# Patient Record
Sex: FEMALE | Race: WHITE | NOT HISPANIC OR LATINO | Employment: OTHER | URBAN - NONMETROPOLITAN AREA
[De-identification: names, ages, dates, MRNs, and addresses within clinical notes are randomized per-mention and may not be internally consistent; named-entity substitution may affect disease eponyms.]

---

## 2017-02-10 ENCOUNTER — APPOINTMENT (OUTPATIENT)
Dept: PREADMISSION TESTING | Facility: HOSPITAL | Age: 72
End: 2017-02-10

## 2017-02-10 VITALS
RESPIRATION RATE: 20 BRPM | OXYGEN SATURATION: 98 % | WEIGHT: 146 LBS | HEIGHT: 64 IN | HEART RATE: 76 BPM | BODY MASS INDEX: 24.92 KG/M2 | DIASTOLIC BLOOD PRESSURE: 80 MMHG | SYSTOLIC BLOOD PRESSURE: 138 MMHG

## 2017-02-10 LAB
ANION GAP SERPL CALCULATED.3IONS-SCNC: 14 MMOL/L (ref 4–13)
BUN BLD-MCNC: 26 MG/DL (ref 5–21)
BUN/CREAT SERPL: 16.7 (ref 7–25)
CALCIUM SPEC-SCNC: 8.9 MG/DL (ref 8.4–10.4)
CHLORIDE SERPL-SCNC: 111 MMOL/L (ref 98–110)
CO2 SERPL-SCNC: 17 MMOL/L (ref 24–31)
CREAT BLD-MCNC: 1.56 MG/DL (ref 0.5–1.4)
DEPRECATED RDW RBC AUTO: 47.3 FL (ref 40–54)
ERYTHROCYTE [DISTWIDTH] IN BLOOD BY AUTOMATED COUNT: 14.6 % (ref 12–15)
GFR SERPL CREATININE-BSD FRML MDRD: 33 ML/MIN/1.73
GLUCOSE BLD-MCNC: 98 MG/DL (ref 70–100)
HCT VFR BLD AUTO: 38.9 % (ref 37–47)
HGB BLD-MCNC: 12.7 G/DL (ref 12–16)
MCH RBC QN AUTO: 29 PG (ref 28–32)
MCHC RBC AUTO-ENTMCNC: 32.6 G/DL (ref 33–36)
MCV RBC AUTO: 88.8 FL (ref 82–98)
PLATELET # BLD AUTO: 210 10*3/MM3 (ref 130–400)
PMV BLD AUTO: 11 FL (ref 6–12)
POTASSIUM BLD-SCNC: 4.1 MMOL/L (ref 3.5–5.3)
RBC # BLD AUTO: 4.38 10*6/MM3 (ref 4.2–5.4)
SODIUM BLD-SCNC: 142 MMOL/L (ref 135–145)
WBC NRBC COR # BLD: 6.76 10*3/MM3 (ref 4.8–10.8)

## 2017-02-10 PROCEDURE — 93005 ELECTROCARDIOGRAM TRACING: CPT | Performed by: ORTHOPAEDIC SURGERY

## 2017-02-10 PROCEDURE — 93010 ELECTROCARDIOGRAM REPORT: CPT | Performed by: INTERNAL MEDICINE

## 2017-02-10 PROCEDURE — 93005 ELECTROCARDIOGRAM TRACING: CPT

## 2017-02-10 PROCEDURE — 80048 BASIC METABOLIC PNL TOTAL CA: CPT | Performed by: ORTHOPAEDIC SURGERY

## 2017-02-10 PROCEDURE — 85027 COMPLETE CBC AUTOMATED: CPT | Performed by: ORTHOPAEDIC SURGERY

## 2017-02-10 RX ORDER — LANOLIN ALCOHOL/MO/W.PET/CERES
1000 CREAM (GRAM) TOPICAL DAILY
COMMUNITY

## 2017-02-10 RX ORDER — PRENATAL VIT NO.126/IRON/FOLIC 28MG-0.8MG
1 TABLET ORAL
COMMUNITY
End: 2021-08-10

## 2017-02-10 RX ORDER — ASPIRIN 81 MG/1
81 TABLET ORAL DAILY
COMMUNITY
End: 2022-01-14

## 2017-02-10 RX ORDER — QUETIAPINE FUMARATE 200 MG/1
200 TABLET, FILM COATED ORAL NIGHTLY
COMMUNITY

## 2017-02-10 RX ORDER — ESCITALOPRAM OXALATE 20 MG/1
20 TABLET ORAL DAILY
Status: ON HOLD | COMMUNITY
End: 2020-10-30

## 2017-02-10 RX ORDER — FERROUS SULFATE 325(65) MG
325 TABLET ORAL
Status: ON HOLD | COMMUNITY
End: 2020-10-30

## 2017-02-10 NOTE — DISCHARGE INSTRUCTIONS
DAY OF SURGERY INSTRUCTIONS        YOUR SURGEON: dr crandall    PROCEDURE: left knee arthroscopic partial medial menisectomy, medial femur and tibial subchondroplasty    DATE OF SURGERY: 2/17/2017    ARRIVAL TIME: AS DIRECTED BY OFFICE    DAY OF SURGERY TAKE ONLY THESE MEDICATIONS: none        BEFORE YOU COME TO THE HOSPITAL  (Pre-op instructions)  • Do not eat, drink, smoke or chew gum after midnight the night before surgery.  This also includes no mints.  • Morning of surgery take only the medicines you have been instructed with a sip of water unless otherwise instructed  by your physician.  • Do not shave, wear makeup or dark nail polish.  • Remove all jewelry including rings.  • Leave anything you consider valuable at home.  • Leave your suitcase in the car until after your surgery.  • Bring the following with you if applicable:  o Picture ID and insurance, Medicare or Medicaid cards  o Co-pay/deductible required by insurance (cash, check, credit card)  o Medications (no narcotics) in original bottles (not a list) including all over-the-counter medications.  o Copy of advance directive, living will or power-of- documents if not brought to PAT  o CPAP or BIPAP mask and tubing  o Skin prep instruction sheet  o Relaxation aids (MP3 player, book, magazine)  • Confirm your arrival time with you surgeon the day before your surgery (surgery times are subject to change)  • On the day of surgery check in at registration located at the main entrance of the hospital.       Outpatient Surgery Guidelines, Adult  Outpatient procedures are those for which the person having the procedure is allowed to go home the same day as the procedure. Various procedures are done on an outpatient basis. You should follow some general guidelines if you will be having an outpatient procedure.  LET YOUR HEALTH CARE PROVIDER KNOW ABOUT:  · Any allergies you have.  · All medicines you are taking, including vitamins, herbs, eye drops,  creams, and over-the-counter medicines.  · Previous problems you or members of your family have had with the use of anesthetics.  · Any blood disorders you have.  · Previous surgeries you have had.  · Medical conditions you have.  RISKS AND COMPLICATIONS  Your health care provider will discuss possible risks and complications with you before surgery. Common risks and complications include:    · Problems due to the use of anesthetics.  · Blood loss and replacement (does not apply to minor surgical procedures).  · Temporary increase in pain due to surgery.  · Uncorrected pain or problems that the surgery was meant to correct.  · Infection.  · New damage.  BEFORE THE PROCEDURE  · Ask your health care provider about changing or stopping your regular medicines. You may need to stop taking certain medicines in the days or weeks before the procedure.  · Stop smoking at least 2 weeks before surgery. This lowers your risk for complications during and after surgery. Ask your health care provider for help with this if needed.  · Eat your usual meals and a light supper the day before surgery. Continue fluid intake. Do not drink alcohol.  · Do not eat or drink after midnight the night before your surgery.   · Arrange for someone to take you home and to stay with you for 24 hours after the procedure. Medicine given for your procedure may affect your ability to drive or to care for yourself.  · Call your health care provider's office if you develop an illness or problem that may prevent you from safely having your procedure.  AFTER THE PROCEDURE  After surgery, you will be taken to a recovery area, where your progress will be monitored. If there are no complications, you will be allowed to go home when you are awake, stable, and taking fluids well. You may have numbness around the surgical site. Healing will take some time. You will have tenderness at the surgical site and may have some swelling and bruising. You may also have  some nausea.  HOME CARE INSTRUCTIONS  · Do not drive for 24 hours, or as directed by your health care provider. Do not drive while taking prescription pain medicines.  · Do not drink alcohol for 24 hours.  · Do not make important decisions or sign legal documents for 24 hours.  · You may resume a normal diet and activities as directed.  · Do not lift anything heavier than 10 pounds (4.5 kg) or play contact sports until your health care provider says it is okay.  · Change your bandages (dressings) as directed.  · Only take over-the-counter or prescription medicines as directed by your health care provider.  · Follow up with your health care provider as directed.  SEEK MEDICAL CARE IF:  · You have increased bleeding (more than a small spot) from the surgical site.  · You have redness, swelling, or increasing pain in the wound.  · You see pus coming from the wound.  · You have a fever.  · You notice a bad smell coming from the wound or dressing.  · You feel lightheaded or faint.  · You develop a rash.  · You have trouble breathing.  · You develop allergies.  MAKE SURE YOU:  · Understand these instructions.  · Will watch your condition.  · Will get help right away if you are not doing well or get worse.     This information is not intended to replace advice given to you by your health care provider. Make sure you discuss any questions you have with your health care provider.     Document Released: 09/12/2002 Document Revised: 05/03/2016 Document Reviewed: 05/22/2014  [employer] Interactive Patient Education ©2016 [employer]       Fall Prevention in Hospitals, Adult  As a hospital patient, your condition and the treatments you receive can increase your risk for falls. Some additional risk factors for falls in a hospital include:  · Being in an unfamiliar environment.  · Being on bed rest.  · Your surgery.  · Taking certain medicines.  · Your tubing requirements, such as intravenous (IV) therapy or catheters.  It is  important that you learn how to decrease fall risks while at the hospital. Below are important tips that can help prevent falls.  SAFETY TIPS FOR PREVENTING FALLS  Talk about your risk of falling.  · Ask your health care provider why you are at risk for falling. Is it your medicine, illness, tubing placement, or something else?  · Make a plan with your health care provider to keep you safe from falls.  · Ask your health care provider or pharmacist about side effects of your medicines. Some medicines can make you dizzy or affect your coordination.  Ask for help.  · Ask for help before getting out of bed. You may need to press your call button.  · Ask for assistance in getting safely to the toilet.  · Ask for a walker or cane to be put at your bedside. Ask that most of the side rails on your bed be placed up before your health care provider leaves the room.  · Ask family or friends to sit with you.  · Ask for things that are out of your reach, such as your glasses, hearing aids, telephone, bedside table, or call button.  Follow these tips to avoid falling:  · Stay lying or seated, rather than standing, while waiting for help.  · Wear rubber-soled slippers or shoes whenever you walk in the hospital.  · Avoid quick, sudden movements.  ¨ Change positions slowly.  ¨ Sit on the side of your bed before standing.  ¨ Stand up slowly and wait before you start to walk.  · Let your health care provider know if there is a spill on the floor.  · Pay careful attention to the medical equipment, electrical cords, and tubes around you.  · When you need help, use your call button by your bed or in the bathroom. Wait for one of your health care providers to help you.  · If you feel dizzy or unsure of your footing, return to bed and wait for assistance.  · Avoid being distracted by the TV, telephone, or another person in your room.  · Do not lean or support yourself on rolling objects, such as IV poles or bedside tables.     This  information is not intended to replace advice given to you by your health care provider. Make sure you discuss any questions you have with your health care provider.     Document Released: 12/15/2001 Document Revised: 01/08/2016 Document Reviewed: 08/25/2013  [employer] Interactive Patient Education ©2016 [employer]       Surgical Site Infections FAQs  What is a Surgical Site Infection (SSI)?  A surgical site infection is an infection that occurs after surgery in the part of the body where the surgery took place. Most patients who have surgery do not develop an infection. However, infections develop in about 1 to 3 out of every 100 patients who have surgery.  Some of the common symptoms of a surgical site infection are:  · Redness and pain around the area where you had surgery  · Drainage of cloudy fluid from your surgical wound  · Fever  Can SSIs be treated?  Yes. Most surgical site infections can be treated with antibiotics. The antibiotic given to you depends on the bacteria (germs) causing the infection. Sometimes patients with SSIs also need another surgery to treat the infection.  What are some of the things that hospitals are doing to prevent SSIs?  To prevent SSIs, doctors, nurses, and other healthcare providers:  · Clean their hands and arms up to their elbows with an antiseptic agent just before the surgery.  · Clean their hands with soap and water or an alcohol-based hand rub before and after caring for each patient.  · May remove some of your hair immediately before your surgery using electric clippers if the hair is in the same area where the procedure will occur. They should not shave you with a razor.  · Wear special hair covers, masks, gowns, and gloves during surgery to keep the surgery area clean.  · Give you antibiotics before your surgery starts. In most cases, you should get antibiotics within 60 minutes before the surgery starts and the antibiotics should be stopped within 24 hours after  surgery.  · Clean the skin at the site of your surgery with a special soap that kills germs.  What can I do to help prevent SSIs?  Before your surgery:  · Tell your doctor about other medical problems you may have. Health problems such as allergies, diabetes, and obesity could affect your surgery and your treatment.  · Quit smoking. Patients who smoke get more infections. Talk to your doctor about how you can quit before your surgery.  · Do not shave near where you will have surgery. Shaving with a razor can irritate your skin and make it easier to develop an infection.  At the time of your surgery:  · Speak up if someone tries to shave you with a razor before surgery. Ask why you need to be shaved and talk with your surgeon if you have any concerns.  · Ask if you will get antibiotics before surgery.  After your surgery:  · Make sure that your healthcare providers clean their hands before examining you, either with soap and water or an alcohol-based hand rub.  · If you do not see your providers clean their hands, please ask them to do so.  · Family and friends who visit you should not touch the surgical wound or dressings.  · Family and friends should clean their hands with soap and water or an alcohol-based hand rub before and after visiting you. If you do not see them clean their hands, ask them to clean their hands.  What do I need to do when I go home from the hospital?  · Before you go home, your doctor or nurse should explain everything you need to know about taking care of your wound. Make sure you understand how to care for your wound before you leave the hospital.  · Always clean your hands before and after caring for your wound.  · Before you go home, make sure you know who to contact if you have questions or problems after you get home.  · If you have any symptoms of an infection, such as redness and pain at the surgery site, drainage, or fever, call your doctor immediately.  If you have additional  questions, please ask your doctor or nurse.  Developed and co-sponsored by The Society for Healthcare Epidemiology of [place] (SHEA); Infectious Diseases Society of [place] (IDSA); American Hospital Association; Association for Professionals in Infection Control and Epidemiology (APIC); Centers for Disease Control and Prevention (CDC); and The Joint Commission.     This information is not intended to replace advice given to you by your health care provider. Make sure you discuss any questions you have with your health care provider.     Document Released: 12/23/2014 Document Revised: 01/08/2016 Document Reviewed: 03/02/2016  [employer] Interactive Patient Education ©2016 [employer].       [facility]  CHG 4% Patient Instruction Sheet    Preparing the Skin Before Surgery  Preparing or “prepping” skin before surgery can reduce the risk of infection at the surgical site. To make the process easier,[facility] has chosen 4% Chlorhexidine Gluconate (CHG) antiseptic solution.   The steps below outline the prepping process and should be carefully followed.                                                                                                                                                      Prep the skin at the following time(s):                                                      We recommend you shower the night before surgery, and again the morning of surgery with the 4% CHG antiseptic solution using half of the bottle and a cloth each time.  Dress in clean clothes/sleepwear after showering.  See instructions below for application.          Do not apply any lotions or moisturizers.       Do not shave the area to be prepped for at least 2 days prior to surgery.    Clipping the hair may be done immediately prior to your surgery at the hospital    if needed.    Directions:  Thoroughly rinse your body with water.  Apply 4% CHG to a cloth and wash skin gently, paying special attention to the operative site.   Rinse again thoroughly.  Once you have begun using this product do not apply anything else to your skin. If itching or redness persists, rinse affected areas and discontinue use.    When using this product:  • Keep out of eyes, ears, and mouth.  • If solution should contact these areas, rinse out promptly and thoroughly with water.  • For external use only.  • Do not use in genital area, on your face or head.      PATIENT/FAMILY/RESPONSIBLE PARTY VERBALIZES UNDERSTANDING OF ABOVE EDUCATION

## 2017-02-17 ENCOUNTER — ANESTHESIA (OUTPATIENT)
Dept: PERIOP | Facility: HOSPITAL | Age: 72
End: 2017-02-17

## 2017-02-17 ENCOUNTER — APPOINTMENT (OUTPATIENT)
Dept: GENERAL RADIOLOGY | Facility: HOSPITAL | Age: 72
End: 2017-02-17

## 2017-02-17 ENCOUNTER — HOSPITAL ENCOUNTER (OUTPATIENT)
Facility: HOSPITAL | Age: 72
Setting detail: HOSPITAL OUTPATIENT SURGERY
Discharge: HOME | End: 2017-02-17
Attending: ORTHOPAEDIC SURGERY | Admitting: ORTHOPAEDIC SURGERY

## 2017-02-17 ENCOUNTER — ANESTHESIA EVENT (OUTPATIENT)
Dept: PERIOP | Facility: HOSPITAL | Age: 72
End: 2017-02-17

## 2017-02-17 VITALS
SYSTOLIC BLOOD PRESSURE: 139 MMHG | HEART RATE: 77 BPM | OXYGEN SATURATION: 92 % | TEMPERATURE: 97.6 F | RESPIRATION RATE: 16 BRPM | DIASTOLIC BLOOD PRESSURE: 69 MMHG

## 2017-02-17 PROBLEM — S83.209A MENISCUS TEAR: Status: ACTIVE | Noted: 2017-02-17

## 2017-02-17 PROCEDURE — 25010000002 METOCLOPRAMIDE PER 10 MG: Performed by: ANESTHESIOLOGY

## 2017-02-17 PROCEDURE — 25010000002 METHYLPREDNISOLONE PER 80 MG: Performed by: ORTHOPAEDIC SURGERY

## 2017-02-17 PROCEDURE — 25010000002 HYDROMORPHONE PER 4 MG: Performed by: ANESTHESIOLOGY

## 2017-02-17 PROCEDURE — 25010000002 PROPOFOL 10 MG/ML EMULSION: Performed by: NURSE ANESTHETIST, CERTIFIED REGISTERED

## 2017-02-17 PROCEDURE — 25010000002 ROPIVACAINE PER 1 MG: Performed by: ANESTHESIOLOGY

## 2017-02-17 PROCEDURE — C1713 ANCHOR/SCREW BN/BN,TIS/BN: HCPCS | Performed by: ORTHOPAEDIC SURGERY

## 2017-02-17 PROCEDURE — 25010000002 MIDAZOLAM PER 1 MG: Performed by: ANESTHESIOLOGY

## 2017-02-17 PROCEDURE — 25010000002 ROPIVACAINE PER 1 MG: Performed by: ORTHOPAEDIC SURGERY

## 2017-02-17 PROCEDURE — 76000 FLUOROSCOPY <1 HR PHYS/QHP: CPT

## 2017-02-17 PROCEDURE — 73560 X-RAY EXAM OF KNEE 1 OR 2: CPT

## 2017-02-17 DEVICE — IMPLANTABLE DEVICE: Type: IMPLANTABLE DEVICE | Status: FUNCTIONAL

## 2017-02-17 RX ORDER — METHYLPREDNISOLONE ACETATE 80 MG/ML
INJECTION, SUSPENSION INTRA-ARTICULAR; INTRALESIONAL; INTRAMUSCULAR; SOFT TISSUE AS NEEDED
Status: DISCONTINUED | OUTPATIENT
Start: 2017-02-17 | End: 2017-02-17 | Stop reason: HOSPADM

## 2017-02-17 RX ORDER — ONDANSETRON 2 MG/ML
4 INJECTION INTRAMUSCULAR; INTRAVENOUS AS NEEDED
Status: DISCONTINUED | OUTPATIENT
Start: 2017-02-17 | End: 2017-02-17 | Stop reason: HOSPADM

## 2017-02-17 RX ORDER — MAGNESIUM HYDROXIDE 1200 MG/15ML
LIQUID ORAL AS NEEDED
Status: DISCONTINUED | OUTPATIENT
Start: 2017-02-17 | End: 2017-02-17 | Stop reason: HOSPADM

## 2017-02-17 RX ORDER — SODIUM CHLORIDE, SODIUM LACTATE, POTASSIUM CHLORIDE, CALCIUM CHLORIDE 600; 310; 30; 20 MG/100ML; MG/100ML; MG/100ML; MG/100ML
100 INJECTION, SOLUTION INTRAVENOUS CONTINUOUS
Status: DISCONTINUED | OUTPATIENT
Start: 2017-02-17 | End: 2017-02-17 | Stop reason: HOSPADM

## 2017-02-17 RX ORDER — LIDOCAINE HYDROCHLORIDE 20 MG/ML
INJECTION, SOLUTION INFILTRATION; PERINEURAL AS NEEDED
Status: DISCONTINUED | OUTPATIENT
Start: 2017-02-17 | End: 2017-02-17 | Stop reason: SURG

## 2017-02-17 RX ORDER — ROPIVACAINE HYDROCHLORIDE 5 MG/ML
INJECTION, SOLUTION EPIDURAL; INFILTRATION; PERINEURAL AS NEEDED
Status: DISCONTINUED | OUTPATIENT
Start: 2017-02-17 | End: 2017-02-17 | Stop reason: HOSPADM

## 2017-02-17 RX ORDER — METOCLOPRAMIDE HYDROCHLORIDE 5 MG/ML
5 INJECTION INTRAMUSCULAR; INTRAVENOUS
Status: DISCONTINUED | OUTPATIENT
Start: 2017-02-17 | End: 2017-02-17 | Stop reason: HOSPADM

## 2017-02-17 RX ORDER — SODIUM CHLORIDE 0.9 % (FLUSH) 0.9 %
1-10 SYRINGE (ML) INJECTION AS NEEDED
Status: DISCONTINUED | OUTPATIENT
Start: 2017-02-17 | End: 2017-02-17 | Stop reason: HOSPADM

## 2017-02-17 RX ORDER — ONDANSETRON 4 MG/1
4 TABLET, FILM COATED ORAL EVERY 8 HOURS PRN
Qty: 20 TABLET | Refills: 0 | Status: SHIPPED | OUTPATIENT
Start: 2017-02-17 | End: 2019-12-20

## 2017-02-17 RX ORDER — FLUMAZENIL 0.1 MG/ML
0.2 INJECTION INTRAVENOUS AS NEEDED
Status: DISCONTINUED | OUTPATIENT
Start: 2017-02-17 | End: 2017-02-17 | Stop reason: HOSPADM

## 2017-02-17 RX ORDER — LABETALOL HYDROCHLORIDE 5 MG/ML
5 INJECTION, SOLUTION INTRAVENOUS
Status: DISCONTINUED | OUTPATIENT
Start: 2017-02-17 | End: 2017-02-17 | Stop reason: HOSPADM

## 2017-02-17 RX ORDER — MEPERIDINE HYDROCHLORIDE 25 MG/ML
12.5 INJECTION INTRAMUSCULAR; INTRAVENOUS; SUBCUTANEOUS
Status: DISCONTINUED | OUTPATIENT
Start: 2017-02-17 | End: 2017-02-17 | Stop reason: HOSPADM

## 2017-02-17 RX ORDER — MIDAZOLAM HYDROCHLORIDE 1 MG/ML
1 INJECTION INTRAMUSCULAR; INTRAVENOUS
Status: DISCONTINUED | OUTPATIENT
Start: 2017-02-17 | End: 2017-02-17 | Stop reason: HOSPADM

## 2017-02-17 RX ORDER — ROPIVACAINE HYDROCHLORIDE 5 MG/ML
INJECTION, SOLUTION EPIDURAL; INFILTRATION; PERINEURAL AS NEEDED
Status: DISCONTINUED | OUTPATIENT
Start: 2017-02-17 | End: 2017-02-17 | Stop reason: SURG

## 2017-02-17 RX ORDER — ONDANSETRON 2 MG/ML
4 INJECTION INTRAMUSCULAR; INTRAVENOUS ONCE AS NEEDED
Status: DISCONTINUED | OUTPATIENT
Start: 2017-02-17 | End: 2017-02-17 | Stop reason: HOSPADM

## 2017-02-17 RX ORDER — SUFENTANIL CITRATE 50 UG/ML
INJECTION EPIDURAL; INTRAVENOUS AS NEEDED
Status: DISCONTINUED | OUTPATIENT
Start: 2017-02-17 | End: 2017-02-17 | Stop reason: SURG

## 2017-02-17 RX ORDER — MORPHINE SULFATE 2 MG/ML
2 INJECTION, SOLUTION INTRAMUSCULAR; INTRAVENOUS AS NEEDED
Status: DISCONTINUED | OUTPATIENT
Start: 2017-02-17 | End: 2017-02-17 | Stop reason: HOSPADM

## 2017-02-17 RX ORDER — MIDAZOLAM HYDROCHLORIDE 1 MG/ML
2 INJECTION INTRAMUSCULAR; INTRAVENOUS
Status: DISCONTINUED | OUTPATIENT
Start: 2017-02-17 | End: 2017-02-17 | Stop reason: HOSPADM

## 2017-02-17 RX ORDER — OXYCODONE AND ACETAMINOPHEN 10; 325 MG/1; MG/1
1-2 TABLET ORAL EVERY 6 HOURS PRN
Qty: 60 TABLET | Refills: 0 | Status: SHIPPED | OUTPATIENT
Start: 2017-02-17 | End: 2019-12-20

## 2017-02-17 RX ORDER — HYDRALAZINE HYDROCHLORIDE 20 MG/ML
5 INJECTION INTRAMUSCULAR; INTRAVENOUS
Status: DISCONTINUED | OUTPATIENT
Start: 2017-02-17 | End: 2017-02-17 | Stop reason: HOSPADM

## 2017-02-17 RX ORDER — IPRATROPIUM BROMIDE AND ALBUTEROL SULFATE 2.5; .5 MG/3ML; MG/3ML
3 SOLUTION RESPIRATORY (INHALATION) ONCE AS NEEDED
Status: DISCONTINUED | OUTPATIENT
Start: 2017-02-17 | End: 2017-02-17 | Stop reason: HOSPADM

## 2017-02-17 RX ORDER — OXYCODONE AND ACETAMINOPHEN 7.5; 325 MG/1; MG/1
1 TABLET ORAL ONCE AS NEEDED
Status: DISCONTINUED | OUTPATIENT
Start: 2017-02-17 | End: 2017-02-17 | Stop reason: HOSPADM

## 2017-02-17 RX ORDER — PROPOFOL 10 MG/ML
VIAL (ML) INTRAVENOUS AS NEEDED
Status: DISCONTINUED | OUTPATIENT
Start: 2017-02-17 | End: 2017-02-17 | Stop reason: SURG

## 2017-02-17 RX ORDER — METOCLOPRAMIDE HYDROCHLORIDE 5 MG/ML
10 INJECTION INTRAMUSCULAR; INTRAVENOUS ONCE AS NEEDED
Status: COMPLETED | OUTPATIENT
Start: 2017-02-17 | End: 2017-02-17

## 2017-02-17 RX ORDER — FENTANYL CITRATE 50 UG/ML
25 INJECTION, SOLUTION INTRAMUSCULAR; INTRAVENOUS AS NEEDED
Status: DISCONTINUED | OUTPATIENT
Start: 2017-02-17 | End: 2017-02-17 | Stop reason: HOSPADM

## 2017-02-17 RX ORDER — NALOXONE HCL 0.4 MG/ML
0.04 VIAL (ML) INJECTION AS NEEDED
Status: DISCONTINUED | OUTPATIENT
Start: 2017-02-17 | End: 2017-02-17 | Stop reason: HOSPADM

## 2017-02-17 RX ADMIN — OXYCODONE HYDROCHLORIDE AND ACETAMINOPHEN 1 TABLET: 7.5; 325 TABLET ORAL at 14:13

## 2017-02-17 RX ADMIN — HYDROMORPHONE HYDROCHLORIDE 1 MG: 1 INJECTION, SOLUTION INTRAMUSCULAR; INTRAVENOUS; SUBCUTANEOUS at 11:55

## 2017-02-17 RX ADMIN — SODIUM CHLORIDE, POTASSIUM CHLORIDE, SODIUM LACTATE AND CALCIUM CHLORIDE 100 ML/HR: 600; 310; 30; 20 INJECTION, SOLUTION INTRAVENOUS at 10:25

## 2017-02-17 RX ADMIN — METOCLOPRAMIDE 10 MG: 5 INJECTION, SOLUTION INTRAMUSCULAR; INTRAVENOUS at 10:29

## 2017-02-17 RX ADMIN — SUFENTANIL CITRATE 10 MCG: 50 INJECTION, SOLUTION EPIDURAL; INTRAVENOUS at 10:35

## 2017-02-17 RX ADMIN — LIDOCAINE HYDROCHLORIDE 100 MG: 20 INJECTION, SOLUTION INFILTRATION; PERINEURAL at 10:35

## 2017-02-17 RX ADMIN — MIDAZOLAM HYDROCHLORIDE 2 MG: 1 INJECTION, SOLUTION INTRAMUSCULAR; INTRAVENOUS at 10:29

## 2017-02-17 RX ADMIN — SODIUM CHLORIDE, POTASSIUM CHLORIDE, SODIUM LACTATE AND CALCIUM CHLORIDE 100 ML/HR: 600; 310; 30; 20 INJECTION, SOLUTION INTRAVENOUS at 12:30

## 2017-02-17 RX ADMIN — HYDROMORPHONE HYDROCHLORIDE 1 MG: 1 INJECTION, SOLUTION INTRAMUSCULAR; INTRAVENOUS; SUBCUTANEOUS at 11:38

## 2017-02-17 RX ADMIN — ROPIVACAINE HYDROCHLORIDE 20 ML: 5 INJECTION, SOLUTION EPIDURAL; INFILTRATION; PERINEURAL at 12:17

## 2017-02-17 RX ADMIN — CEFAZOLIN 1 G: 1 INJECTION, POWDER, FOR SOLUTION INTRAMUSCULAR; INTRAVENOUS; PARENTERAL at 10:33

## 2017-02-17 RX ADMIN — PROPOFOL 100 MG: 10 INJECTION, EMULSION INTRAVENOUS at 10:35

## 2017-02-17 NOTE — OP NOTE
Date of Procedure:  02/17/2017     PREOPERATIVE DIAGNOSIS:   1.  Left medial femoral condyle insufficiency fracture.   2.  Left knee medial tibial plateau insufficiency fracture.   3.  Left knee degenerative medial meniscus tear.    4.  Left knee primary osteoarthritis.     POSTOPERATIVE DIAGNOSES:   1.  Left medial femoral condyle insufficiency fracture.   2.  Left knee medial tibial plateau insufficiency fracture.   3.  Left knee degenerative medial meniscus tear.    4.  Left knee primary osteoarthritis.    PROCEDURES PERFORMED:  1.  Left knee percutaneous repair of a medial tibial plateau insufficiency fracture with calcium phosphate  2.  Left knee percutaneous repair of a medial femoral condyle insufficiency fracture with calcium phosphate  3.  Left knee arthroscopic partial medial meniscectomy.     SURGEON: Dhaval Yepez MD     ASSISTANT: Frederic Williamson Jr., PA-C     ANESTHESIA: General endotracheal anesthesia.     ESTIMATED BLOOD LOSS: Minimal.     COMPLICATIONS: None.     CONDITION: Stable.     An assistant surgeon was scrubbed and present throughout the entire procedure. The assistant aided in limb positioning as well as management of arthroscopic camera and was also responsible for wound closure.     BRIEF HISTORY: This is a 71-year-old female who presented to the outpatient clinic with complaints of medial-sided left knee pain. She did demonstrate mechanical symptoms and medial joint line pain. Her MRI demonstrated large insufficiency fractures or bone marrow lesions on the medial femoral condyle and tibial plateau with a complex medial meniscus tear. Based on this and based on the fact that the patient wanted to avoid arthroplasty, the decision was made to take her to the operating room for the above-mentioned procedure. After the risks and benefits had been discussed with the patient, she agreed to proceed.     DESCRIPTION OF PROCEDURE: The patient was interviewed in the preanesthesia area, where  her left knee was marked with a marking pen. She was then taken to the operative suite where general endotracheal anesthesia was performed by the anesthesia team. Timeout was then called to confirm the patient, the operative site, as well as the planned procedure and administration of antibiotics. The patient was prepped and draped in a standard sterile fashion using ChloraPrep. Once fully prepped and draped, the leg was exsanguinated with an Esmarch and tourniquet was inflated to 250.     Initially, fluoroscopy was brought in with the limb positioned on bone foam. Perfect AP and lateral views were obtained of the knee and the proximal medial tibial plateau and medial femoral condyle were marked out. We then placed a fenestrated trocar for an anterior-to-posterior into the proximal tibia, just below the insufficiency fracture.  Five 1 mL vials of calcium phosphate were then placed in the fracture site and allowed to cure, While this was curing, a and cannulated trocar was then placed into the femur from medial-to-lateral position just above and anterior to the insufficiency fracture. Once again, five 1 mL vials of calcium phosphate were placed into the deficit. Once these were allowed to cure, trocar was removed from the knee. This was done under fluoroscopy. We did get excellent fill. Attention was then turned the arthroscopic portion of the case. A #11 blade scalpel was used to create a standard anterolateral arthroscopic portal and the scope with trocar was introduced into the notch and suprapatellar pouch. The patient had synovitis in the suprapatellar pouch. She had previous chondromalacia of the undersurface of the patella and trochlea. The knee was then dropped into flexion, where a spinal needle was brought in just above the medial meniscus followed by a #11 blade scalpel and then a probe. Medial compartment was then examined. The patient did have a complex tear of the medial meniscus. She also had areas of  full-thickness chondrosis of the medial femoral condyle and medial tibial plateau. The scope was then taken across the notch where the ACL and PCL were found to be intact. It was then placed in the lateral compartment where the patient demonstrated degenerative changes of the lateral compartment with intact lateral meniscus.     The scope was then moved back in the medial compartment where, using a combination of a shaver as well as a basket cutter and upbiter, a partial medial meniscectomy was performed. We were able to resect the meniscus back to a stable margin. Once this was complete, the scope was withdrawn from the knee, the portal sites were closed with 3-0 nylon suture. The patient awoke from anesthesia without difficulty and was transferred to the PACU in stable condition. All sponge, needle, and instrument counts were correct at the end of the procedure.        cc:                   KELLY German/[id number]  D:  02/17/2017 14:20:30(Eastern Time)  T:  02/17/2017 16:24:11(Eastern Time)  Voice ID:  [id number]/Document ID:  [id number]

## 2017-02-17 NOTE — ANESTHESIA POSTPROCEDURE EVALUATION
Patient: Thao Mohr    Procedure Summary     Date Anesthesia Start Anesthesia Stop Room / Location    02/17/17 1033 1126 [facility] 10 / [facility]       Procedure Diagnosis Surgeon Provider    LEFT KNEE ARTHROSCOPIC PARTIAL MEDIAL MENISECTOMY, MEDIAL FEMUR AND TIBIAL SUBCHONDROPLASTY (Left ) (LEFT KNEE PAIN) MD Scott Curran CRNA          Anesthesia Type: general  Last vitals  /73 (02/17/17 1334)    Temp 97.6 °F (36.4 °C) (02/17/17 1320)    Pulse 72 (02/17/17 1334)   Resp 16 (02/17/17 1334)    SpO2 93 % (02/17/17 1334)      Post Anesthesia Care and Evaluation    Patient location during evaluation: PACU  Patient participation: complete - patient participated  Level of consciousness: awake and alert  Pain management: satisfactory to patient  Airway patency: patent  Anesthetic complications: No anesthetic complications    Cardiovascular status: acceptable  Respiratory status: acceptable  Hydration status: acceptable

## 2017-02-17 NOTE — ANESTHESIA PREPROCEDURE EVALUATION
Anesthesia Evaluation     Patient summary reviewed   no history of anesthetic complications (PONV):  NPO Status: > 8 hours   Airway   Mallampati: II  TM distance: >3 FB  Neck ROM: full  no difficulty expected  Dental - normal exam     Pulmonary    (+) asthma,   (-) COPD, sleep apnea, not a smoker  Cardiovascular   Exercise tolerance: good (4-7 METS) (Limited by hip and knee pain, states able to climb stairs without chest pain)    (+) hypertension, PVD (3 cm aortic aneurysm, followed by CT scans q6 months),   (-) pacemaker, past MI, cardiac stents      Neuro/Psych  (+) headaches,    (-) seizures, TIA, CVA  GI/Hepatic/Renal/Endo    (+)  chronic renal disease CRI, hypothyroidism,   (-) liver disease, diabetes    Musculoskeletal     Abdominal    Substance History      OB/GYN          Other   (+) arthritis       Other Comment: Lupus                            Anesthesia Plan    ASA 2     general     intravenous induction   Anesthetic plan and risks discussed with patient.

## 2017-02-17 NOTE — PLAN OF CARE
Problem: Perioperative Period (Adult)  Goal: Signs and Symptoms of Listed Potential Problems Will be Absent or Manageable (Perioperative Period)  Outcome: Outcome(s) achieved Date Met:  02/17/17 02/17/17 1537   Perioperative Period   Problems Assessed (Perioperative Period) all   Problems Present (Perioperative Period) none

## 2017-02-17 NOTE — PLAN OF CARE
Problem: Patient Care Overview (Adult)  Goal: Plan of Care Review  Outcome: Outcome(s) achieved Date Met:  02/17/17 02/17/17 1537   Coping/Psychosocial Response Interventions   Plan Of Care Reviewed With patient;spouse;family   Patient Care Overview   Progress improving   Outcome Evaluation   Outcome Summary/Follow up Plan DISCHG CRITERIA MET

## 2017-02-17 NOTE — ANESTHESIA PROCEDURE NOTES
Peripheral Block    Patient location during procedure: post-op  Start time: 2/17/2017 12:17 PM  Stop time: 2/17/2017 12:20 PM  Reason for block: procedure for pain, at surgeon's request and post-op pain management  Performed by  Anesthesiologist: RELL ARNDT  Preanesthetic Checklist  Completed: patient identified, site marked, surgical consent, pre-op evaluation, timeout performed, IV checked, risks and benefits discussed and monitors and equipment checked  Peripheral Block Prep:  Sterile barriers:cap, gloves and sterile barriers  Prep: ChloraPrep  Patient monitoring: blood pressure monitoring, continuous pulse oximetry and EKG  Peripheral Procedure  Sedation:yes  Guidance:ultrasound guided  Images:still images obtained  Laterality:leftBlock Type:adductor canal block  Injection Technique:single-shotNeedle Type:echogenic  Needle Gauge:20 G  ULTRASOUND INTERPRETATION. Using ultrasound guidance a 20 G gauge needle was placed in close proximity to the nerve, at which point, under ultrasound guidance anesthetic was injected in the area of the nerve and spread of the anesthesia was seen on ultrasound in close proximity thereto.  There were no abnormalities seen on ultrasound; a digital image was taken; and the patient tolerated the procedure with no complications.   Medications  Local Injected:ropivacaine 0.5% without epinephrine Local Amount Injected:20mL  Post Assessment  Patient Tolerance:comfortable throughout block  Complications:no  Additional Notes  Patient was consented preoperatively for femoral and sciatic PNB. Patient evaluated post operatively and complaining of medial/anterior pain most significantly. Knee with dressing to mid thigh. Able to obtain good view of adductor canal in mid calf. Proceeded with adductor canal/saphenous block.

## 2017-02-17 NOTE — ANESTHESIA PROCEDURE NOTES
Airway  Airway not difficult    General Information and Staff    Patient location during procedure: OR  CRNA: GUS AGUILERA    Indications and Patient Condition  Indications for airway management: airway protection    Preoxygenated: yes  MILS maintained throughout  Mask difficulty assessment: 1 - vent by mask    Final Airway Details  Final airway type: supraglottic airway      Successful airway: unique  Size 3    Number of attempts at approach: 1

## 2017-02-17 NOTE — DISCHARGE INSTRUCTIONS
Keep incision clean and dry  Ok to change dressing as needed  May shower post op day 2  No bath or soaking in water  Non weight bearing operative extremity for 2 days then weight bear as tolerated    General Anesthesia, Adult, Care After  Refer to this sheet in the next few weeks. These instructions provide you with information on caring for yourself after your procedure. Your health care provider may also give you more specific instructions. Your treatment has been planned according to current medical practices, but problems sometimes occur. Call your health care provider if you have any problems or questions after your procedure.  WHAT TO EXPECT AFTER THE PROCEDURE  After the procedure, it is typical to experience:  · Sleepiness.  · Nausea and vomiting.  HOME CARE INSTRUCTIONS  · For the first 24 hours after general anesthesia:  ¨ Have a responsible person with you.  ¨ Do not drive a car. If you are alone, do not take public transportation.  ¨ Do not drink alcohol.  ¨ Do not take medicine that has not been prescribed by your health care provider.  ¨ Do not sign important papers or make important decisions.  ¨ You may resume a normal diet and activities as directed by your health care provider.  · Change bandages (dressings) as directed.  · If you have questions or problems that seem related to general anesthesia, call the hospital and ask for the anesthetist or anesthesiologist on call.  SEEK MEDICAL CARE IF:  · You have nausea and vomiting that continue the day after anesthesia.  · You develop a rash.  SEEK IMMEDIATE MEDICAL CARE IF:    · You have difficulty breathing.  · You have chest pain.  · You have any allergic problems.     This information is not intended to replace advice given to you by your health care provider. Make sure you discuss any questions you have with your health care provider.     Document Released: 03/26/2002 Document Revised: 01/08/2016 Document Reviewed: 07/03/2014  [employer]  Patient Education ©2016 [employer]    CALL YOUR PHYSICIAN IF YOU EXPERIENCE  INCREASED PAIN NOT HELPED BY YOUR PAIN MEDICATION.    IF YOU HAVE QUESTIONS OR CONCERNS AFTER YOU ARE DISCHARGED CALL THE NURSE AT THE [facility] LINE [phone].            Fall Prevention in the Home      Falls can cause injuries. They can happen to people of all ages. There are many things you can do to make your home safe and to help prevent falls.    WHAT CAN I DO ON THE OUTSIDE OF MY HOME?  · Regularly fix the edges of walkways and driveways and fix any cracks.  · Remove anything that might make you trip as you walk through a door, such as a raised step or threshold.  · Trim any bushes or trees on the path to your home.  · Use bright outdoor lighting.  · Clear any walking paths of anything that might make someone trip, such as rocks or tools.  · Regularly check to see if handrails are loose or broken. Make sure that both sides of any steps have handrails.  · Any raised decks and porches should have guardrails on the edges.  · Have any leaves, snow, or ice cleared regularly.  · Use sand or salt on walking paths during winter.  · Clean up any spills in your garage right away. This includes oil or grease spills.  WHAT CAN I DO IN THE BATHROOM?    · Use night lights.  · Install grab bars by the toilet and in the tub and shower. Do not use towel bars as grab bars.  · Use non-skid mats or decals in the tub or shower.  · If you need to sit down in the shower, use a plastic, non-slip stool.  · Keep the floor dry. Clean up any water that spills on the floor as soon as it happens.  · Remove soap buildup in the tub or shower regularly.  · Attach bath mats securely with double-sided non-slip rug tape.  · Do not have throw rugs and other things on the floor that can make you trip.  WHAT CAN I DO IN THE BEDROOM?  · Use night lights.  · Make sure that you have a light by your bed that is easy to reach.  · Do not use any sheets or blankets  that are too big for your bed. They should not hang down onto the floor.  · Have a firm chair that has side arms. You can use this for support while you get dressed.  · Do not have throw rugs and other things on the floor that can make you trip.  WHAT CAN I DO IN THE KITCHEN?  · Clean up any spills right away.  · Avoid walking on wet floors.  · Keep items that you use a lot in easy-to-reach places.  · If you need to reach something above you, use a strong step stool that has a grab bar.  · Keep electrical cords out of the way.  · Do not use floor polish or wax that makes floors slippery. If you must use wax, use non-skid floor wax.  · Do not have throw rugs and other things on the floor that can make you trip.  WHAT CAN I DO WITH MY STAIRS?  · Do not leave any items on the stairs.  · Make sure that there are handrails on both sides of the stairs and use them. Fix handrails that are broken or loose. Make sure that handrails are as long as the stairways.  · Check any carpeting to make sure that it is firmly attached to the stairs. Fix any carpet that is loose or worn.  · Avoid having throw rugs at the top or bottom of the stairs. If you do have throw rugs, attach them to the floor with carpet tape.  · Make sure that you have a light switch at the top of the stairs and the bottom of the stairs. If you do not have them, ask someone to add them for you.  WHAT ELSE CAN I DO TO HELP PREVENT FALLS?  · Wear shoes that:  ¨ Do not have high heels.  ¨ Have rubber bottoms.  ¨ Are comfortable and fit you well.  ¨ Are closed at the toe. Do not wear sandals.  · If you use a stepladder:  ¨ Make sure that it is fully opened. Do not climb a closed stepladder.  ¨ Make sure that both sides of the stepladder are locked into place.  ¨ Ask someone to hold it for you, if possible.  · Clearly shai and make sure that you can see:  ¨ Any grab bars or handrails.  ¨ First and last steps.  ¨ Where the edge of each step is.  · Use tools that help  you move around (mobility aids) if they are needed. These include:  ¨ Canes.  ¨ Walkers.  ¨ Scooters.  ¨ Crutches.  · Turn on the lights when you go into a dark area. Replace any light bulbs as soon as they burn out.  · Set up your furniture so you have a clear path. Avoid moving your furniture around.  · If any of your floors are uneven, fix them.  · If there are any pets around you, be aware of where they are.  · Review your medicines with your doctor. Some medicines can make you feel dizzy. This can increase your chance of falling.  Ask your doctor what other things that you can do to help prevent falls.     This information is not intended to replace advice given to you by your health care provider. Make sure you discuss any questions you have with your health care provider.     Document Released: 10/14/2010 Document Revised: 05/03/2016 Document Reviewed: 01/22/2016  [employer] Interactive Patient Education ©2016 [employer]     PATIENT/FAMILY/RESPONSIBLE PARTY VERBALIZES UNDERSTANDING OF ABOVE EDUCATION

## 2017-02-22 NOTE — ADDENDUM NOTE
Addendum  created 02/22/17 1332 by Rip Batres CRNA    Visit Navigator Flowsheet section accepted

## 2017-03-13 ENCOUNTER — TRANSCRIBE ORDERS (OUTPATIENT)
Dept: ADMINISTRATIVE | Facility: HOSPITAL | Age: 72
End: 2017-03-13

## 2017-03-13 ENCOUNTER — APPOINTMENT (OUTPATIENT)
Dept: LAB | Facility: HOSPITAL | Age: 72
End: 2017-03-13

## 2017-03-13 DIAGNOSIS — R19.7 DIARRHEA, UNSPECIFIED TYPE: ICD-10-CM

## 2017-03-13 DIAGNOSIS — R10.13 ABDOMINAL PAIN, EPIGASTRIC: Primary | ICD-10-CM

## 2017-03-13 LAB
ADV 40+41 DNA STL QL NAA+NON-PROBE: NOT DETECTED
ALBUMIN SERPL-MCNC: 3.9 G/DL (ref 3.5–5)
ALBUMIN/GLOB SERPL: 1.3 G/DL (ref 1.1–2.5)
ALP SERPL-CCNC: 219 U/L (ref 24–120)
ALT SERPL W P-5'-P-CCNC: 17 U/L (ref 0–54)
AMYLASE SERPL-CCNC: 70 U/L (ref 30–110)
ANION GAP SERPL CALCULATED.3IONS-SCNC: 12 MMOL/L (ref 4–13)
AST SERPL-CCNC: 21 U/L (ref 7–45)
ASTRO TYP 1-8 RNA STL QL NAA+NON-PROBE: NOT DETECTED
BASOPHILS # BLD AUTO: 0.03 10*3/MM3 (ref 0–0.2)
BASOPHILS NFR BLD AUTO: 0.3 % (ref 0–2)
BILIRUB SERPL-MCNC: 0.6 MG/DL (ref 0.1–1)
BUN BLD-MCNC: 14 MG/DL (ref 5–21)
BUN/CREAT SERPL: 9.1 (ref 7–25)
C CAYETANENSIS DNA STL QL NAA+NON-PROBE: NOT DETECTED
C DIFF TOX GENS STL QL NAA+PROBE: NOT DETECTED
CALCIUM SPEC-SCNC: 8.9 MG/DL (ref 8.4–10.4)
CAMPY SP DNA.DIARRHEA STL QL NAA+PROBE: NOT DETECTED
CHLORIDE SERPL-SCNC: 108 MMOL/L (ref 98–110)
CO2 SERPL-SCNC: 24 MMOL/L (ref 24–31)
CREAT BLD-MCNC: 1.54 MG/DL (ref 0.5–1.4)
CRYPTOSP STL CULT: NOT DETECTED
DEPRECATED RDW RBC AUTO: 55.1 FL (ref 40–54)
E COLI DNA SPEC QL NAA+PROBE: NOT DETECTED
E HISTOLYT AG STL-ACNC: NOT DETECTED
EAEC PAA PLAS AGGR+AATA ST NAA+NON-PRB: NOT DETECTED
EC STX1+STX2 GENES STL QL NAA+NON-PROBE: NOT DETECTED
EOSINOPHIL # BLD AUTO: 0.53 10*3/MM3 (ref 0–0.7)
EOSINOPHIL NFR BLD AUTO: 4.7 % (ref 0–4)
EPEC EAE GENE STL QL NAA+NON-PROBE: NOT DETECTED
ERYTHROCYTE [DISTWIDTH] IN BLOOD BY AUTOMATED COUNT: 17.7 % (ref 12–15)
ETEC LTA+ST1A+ST1B TOX ST NAA+NON-PROBE: NOT DETECTED
G LAMBLIA DNA SPEC QL NAA+PROBE: NOT DETECTED
GFR SERPL CREATININE-BSD FRML MDRD: 33 ML/MIN/1.73
GLOBULIN UR ELPH-MCNC: 3.1 GM/DL
GLUCOSE BLD-MCNC: 95 MG/DL (ref 70–100)
HCT VFR BLD AUTO: 36.7 % (ref 37–47)
HGB BLD-MCNC: 11.4 G/DL (ref 12–16)
IMM GRANULOCYTES # BLD: 0.03 10*3/MM3 (ref 0–0.03)
IMM GRANULOCYTES NFR BLD: 0.3 % (ref 0–5)
LIPASE SERPL-CCNC: 78 U/L (ref 23–203)
LYMPHOCYTES # BLD AUTO: 1.21 10*3/MM3 (ref 0.72–4.86)
LYMPHOCYTES NFR BLD AUTO: 10.6 % (ref 15–45)
MCH RBC QN AUTO: 28.4 PG (ref 28–32)
MCHC RBC AUTO-ENTMCNC: 31.1 G/DL (ref 33–36)
MCV RBC AUTO: 91.3 FL (ref 82–98)
MONOCYTES # BLD AUTO: 0.82 10*3/MM3 (ref 0.19–1.3)
MONOCYTES NFR BLD AUTO: 7.2 % (ref 4–12)
NEUTROPHILS # BLD AUTO: 8.77 10*3/MM3 (ref 1.87–8.4)
NEUTROPHILS NFR BLD AUTO: 76.9 % (ref 39–78)
NOROVIRUS GI+II RNA STL QL NAA+NON-PROBE: NOT DETECTED
P SHIGELLOIDES DNA STL QL NAA+NON-PROBE: NOT DETECTED
PLATELET # BLD AUTO: 278 10*3/MM3 (ref 130–400)
PMV BLD AUTO: 10.6 FL (ref 6–12)
POTASSIUM BLD-SCNC: 3.6 MMOL/L (ref 3.5–5.3)
PROT SERPL-MCNC: 7 G/DL (ref 6.3–8.7)
RBC # BLD AUTO: 4.02 10*6/MM3 (ref 4.2–5.4)
RV RNA STL NAA+PROBE: NOT DETECTED
SALMONELLA DNA SPEC QL NAA+PROBE: NOT DETECTED
SAPO I+II+IV+V RNA STL QL NAA+NON-PROBE: NOT DETECTED
SHIGELLA SP+EIEC IPAH ST NAA+NON-PROBE: NOT DETECTED
SODIUM BLD-SCNC: 144 MMOL/L (ref 135–145)
V CHOLERAE DNA SPEC QL NAA+PROBE: NOT DETECTED
VIBRIO DNA SPEC NAA+PROBE: NOT DETECTED
WBC NRBC COR # BLD: 11.39 10*3/MM3 (ref 4.8–10.8)
YERSINIA STL CULT: NOT DETECTED

## 2017-03-13 PROCEDURE — 85025 COMPLETE CBC W/AUTO DIFF WBC: CPT | Performed by: PHYSICIAN ASSISTANT

## 2017-03-13 PROCEDURE — 87999 UNLISTED MICROBIOLOGY PX: CPT | Performed by: PHYSICIAN ASSISTANT

## 2017-03-13 PROCEDURE — 36415 COLL VENOUS BLD VENIPUNCTURE: CPT | Performed by: PHYSICIAN ASSISTANT

## 2017-03-13 PROCEDURE — 80053 COMPREHEN METABOLIC PANEL: CPT | Performed by: PHYSICIAN ASSISTANT

## 2017-03-13 PROCEDURE — 82150 ASSAY OF AMYLASE: CPT | Performed by: PHYSICIAN ASSISTANT

## 2017-03-13 PROCEDURE — 83690 ASSAY OF LIPASE: CPT | Performed by: PHYSICIAN ASSISTANT

## 2017-10-31 ENCOUNTER — TRANSCRIBE ORDERS (OUTPATIENT)
Dept: ADMINISTRATIVE | Facility: HOSPITAL | Age: 72
End: 2017-10-31

## 2017-10-31 DIAGNOSIS — Z78.0 ASYMPTOMATIC MENOPAUSAL STATE: ICD-10-CM

## 2017-10-31 DIAGNOSIS — Z78.0 ASYMPTOMATIC POSTMENOPAUSAL STATUS: Primary | ICD-10-CM

## 2017-11-01 ENCOUNTER — APPOINTMENT (OUTPATIENT)
Dept: BONE DENSITY | Facility: HOSPITAL | Age: 72
End: 2017-11-01

## 2017-11-09 ENCOUNTER — HOSPITAL ENCOUNTER (OUTPATIENT)
Dept: BONE DENSITY | Facility: HOSPITAL | Age: 72
Discharge: HOME | End: 2017-11-09
Admitting: PHYSICIAN ASSISTANT

## 2017-11-09 DIAGNOSIS — Z78.0 ASYMPTOMATIC MENOPAUSAL STATE: ICD-10-CM

## 2017-11-09 PROCEDURE — 77080 DXA BONE DENSITY AXIAL: CPT

## 2017-11-15 ENCOUNTER — HOSPITAL ENCOUNTER (OUTPATIENT)
Dept: PREADMISSION TESTING | Age: 72
Discharge: HOME | End: 2017-11-15
Payer: MEDICARE

## 2017-11-15 VITALS — BODY MASS INDEX: 25.27 KG/M2 | HEIGHT: 64 IN | WEIGHT: 148 LBS

## 2017-11-15 LAB
ANION GAP SERPL CALCULATED.3IONS-SCNC: 13 MMOL/L (ref 7–19)
APTT: 31.8 SEC (ref 26–36.2)
BASOPHILS ABSOLUTE: 0.1 K/UL (ref 0–0.2)
BASOPHILS RELATIVE PERCENT: 0.9 % (ref 0–1)
BUN BLDV-MCNC: 27 MG/DL (ref 8–23)
CALCIUM SERPL-MCNC: 8.6 MG/DL (ref 8.8–10.2)
CHLORIDE BLD-SCNC: 106 MMOL/L (ref 98–111)
CO2: 21 MMOL/L (ref 22–29)
CREAT SERPL-MCNC: 1.6 MG/DL (ref 0.5–0.9)
EOSINOPHILS ABSOLUTE: 0.5 K/UL (ref 0–0.6)
EOSINOPHILS RELATIVE PERCENT: 6.1 % (ref 0–5)
GFR NON-AFRICAN AMERICAN: 32
GLUCOSE BLD-MCNC: 72 MG/DL (ref 74–109)
HCT VFR BLD CALC: 34.7 % (ref 37–47)
HEMOGLOBIN: 10.8 G/DL (ref 12–16)
INR BLD: 1.05 (ref 0.88–1.18)
LYMPHOCYTES ABSOLUTE: 2.2 K/UL (ref 1.1–4.5)
LYMPHOCYTES RELATIVE PERCENT: 29 % (ref 20–40)
MCH RBC QN AUTO: 29.8 PG (ref 27–31)
MCHC RBC AUTO-ENTMCNC: 31.1 G/DL (ref 33–37)
MCV RBC AUTO: 95.9 FL (ref 81–99)
MONOCYTES ABSOLUTE: 0.6 K/UL (ref 0–0.9)
MONOCYTES RELATIVE PERCENT: 8.2 % (ref 0–10)
NEUTROPHILS ABSOLUTE: 4.1 K/UL (ref 1.5–7.5)
NEUTROPHILS RELATIVE PERCENT: 55.5 % (ref 50–65)
PDW BLD-RTO: 13.5 % (ref 11.5–14.5)
PLATELET # BLD: 209 K/UL (ref 130–400)
PMV BLD AUTO: 11.2 FL (ref 9.4–12.3)
POTASSIUM SERPL-SCNC: 4.8 MMOL/L (ref 3.5–5)
PROTHROMBIN TIME: 13.6 SEC (ref 12–14.6)
RBC # BLD: 3.62 M/UL (ref 4.2–5.4)
SODIUM BLD-SCNC: 140 MMOL/L (ref 136–145)
WBC # BLD: 7.4 K/UL (ref 4.8–10.8)

## 2017-11-15 PROCEDURE — 80048 BASIC METABOLIC PNL TOTAL CA: CPT

## 2017-11-15 PROCEDURE — 85025 COMPLETE CBC W/AUTO DIFF WBC: CPT

## 2017-11-15 PROCEDURE — 93005 ELECTROCARDIOGRAM TRACING: CPT

## 2017-11-15 PROCEDURE — 85610 PROTHROMBIN TIME: CPT

## 2017-11-15 PROCEDURE — 85730 THROMBOPLASTIN TIME PARTIAL: CPT

## 2017-11-15 PROCEDURE — 87070 CULTURE OTHR SPECIMN AEROBIC: CPT

## 2017-11-15 RX ORDER — FERROUS SULFATE 325(65) MG
325 TABLET ORAL 2 TIMES DAILY
Status: ON HOLD | COMMUNITY
End: 2022-04-08 | Stop reason: ALTCHOICE

## 2017-11-15 RX ORDER — GABAPENTIN 300 MG/1
300 CAPSULE ORAL NIGHTLY
COMMUNITY

## 2017-11-15 RX ORDER — POTASSIUM CHLORIDE 1.5 G/1.77G
20 POWDER, FOR SOLUTION ORAL 2 TIMES DAILY
Status: ON HOLD | COMMUNITY
End: 2022-04-19 | Stop reason: HOSPADM

## 2017-11-15 RX ORDER — TRAMADOL HYDROCHLORIDE 50 MG/1
50 TABLET ORAL DAILY
Status: ON HOLD | COMMUNITY
End: 2020-07-27

## 2017-11-15 RX ORDER — OXYCODONE HYDROCHLORIDE AND ACETAMINOPHEN 5; 325 MG/1; MG/1
1 TABLET ORAL EVERY 12 HOURS PRN
Status: ON HOLD | COMMUNITY
End: 2017-11-27 | Stop reason: RX

## 2017-11-15 RX ORDER — QUETIAPINE FUMARATE 100 MG/1
200 TABLET, FILM COATED ORAL NIGHTLY
Status: ON HOLD | COMMUNITY
End: 2022-04-09

## 2017-11-15 RX ORDER — CALCIUM CARBONATE/VITAMIN D3 500-10/5ML
LIQUID (ML) ORAL DAILY
COMMUNITY

## 2017-11-15 RX ORDER — ESCITALOPRAM OXALATE 20 MG/1
20 TABLET ORAL DAILY
COMMUNITY

## 2017-11-15 NOTE — PROGRESS NOTES
Patient has an appointment for cardiac clearance for surgery on 11/20 at 2:45 with Dr. Shelbie Swanson

## 2017-11-16 LAB
EKG P AXIS: 51 DEGREES
EKG P-R INTERVAL: 144 MS
EKG Q-T INTERVAL: 356 MS
EKG QRS DURATION: 84 MS
EKG QTC CALCULATION (BAZETT): 380 MS
EKG T AXIS: 36 DEGREES

## 2017-11-18 LAB
MRSA CULTURE ONLY: ABNORMAL
MRSA CULTURE ONLY: ABNORMAL
ORGANISM: ABNORMAL

## 2017-11-20 ENCOUNTER — OFFICE VISIT (OUTPATIENT)
Dept: CARDIOLOGY | Facility: CLINIC | Age: 72
End: 2017-11-20

## 2017-11-20 VITALS
HEART RATE: 50 BPM | BODY MASS INDEX: 24.75 KG/M2 | WEIGHT: 145 LBS | DIASTOLIC BLOOD PRESSURE: 76 MMHG | OXYGEN SATURATION: 99 % | SYSTOLIC BLOOD PRESSURE: 120 MMHG | HEIGHT: 64 IN

## 2017-11-20 DIAGNOSIS — I51.9 LEFT VENTRICULAR SYSTOLIC DYSFUNCTION WITHOUT HEART FAILURE: ICD-10-CM

## 2017-11-20 DIAGNOSIS — Z01.818 PRE-OP EVALUATION: Primary | ICD-10-CM

## 2017-11-20 DIAGNOSIS — I35.1 NONRHEUMATIC AORTIC VALVE INSUFFICIENCY: ICD-10-CM

## 2017-11-20 DIAGNOSIS — I10 ESSENTIAL HYPERTENSION: ICD-10-CM

## 2017-11-20 PROCEDURE — 93000 ELECTROCARDIOGRAM COMPLETE: CPT | Performed by: INTERNAL MEDICINE

## 2017-11-20 PROCEDURE — 99203 OFFICE O/P NEW LOW 30 MIN: CPT | Performed by: INTERNAL MEDICINE

## 2017-11-20 RX ORDER — POTASSIUM CHLORIDE 20 MEQ/1
20 TABLET, EXTENDED RELEASE ORAL 2 TIMES DAILY
COMMUNITY
End: 2019-12-20

## 2017-11-20 RX ORDER — GABAPENTIN 300 MG/1
300 CAPSULE ORAL DAILY PRN
COMMUNITY

## 2017-11-20 RX ORDER — TRAMADOL HYDROCHLORIDE 50 MG/1
50 TABLET ORAL DAILY
COMMUNITY
End: 2019-12-20

## 2017-11-20 RX ORDER — TELMISARTAN 40 MG/1
40 TABLET ORAL DAILY
COMMUNITY
End: 2022-05-19

## 2017-11-20 NOTE — PROGRESS NOTES
"    Subjective:     Encounter Date:11/20/2017      Patient ID: Thao Mohr is a 72 y.o. female who is referred for further evaluation after an abnormal echocardiogram was identified.    Referring Provider: Greg Rey MD    Reason for Referral: Abnormal echocardiogram    Chief Complaint: \"I am having knee surgery.\"    Hypertension   This is a new ((new to me)) problem. The problem is unchanged. The problem is controlled. Pertinent negatives include no blurred vision, chest pain, headaches, malaise/fatigue, neck pain, orthopnea, palpitations, peripheral edema, PND or shortness of breath. Agents associated with hypertension include thyroid hormones. Risk factors for coronary artery disease include family history. Past treatments include angiotensin blockers. There are no compliance problems.  There is no history of angina, CAD/MI or CVA. Identifiable causes of hypertension include chronic renal disease.     This is a 72-year-old white female who presents for further evaluation of an abnormal echocardiogram but also for preoperative risk stratification in the setting of upcoming knee surgery.  The patient says that she is to have knee surgery in the near future, as soon as next Monday potentially.  The patient had an echocardiogram done at [facility] on 8/29/17.  The indication for the study appears to have been shortness of breath and fatigue.  The results are referenced below.  The patient says that she has never been aware of her heart being weak and she denies any history of coronary artery disease, including a coronary artery bypass grafting or stents.  The patient has never been hospitalized with congestive heart failure.  The patient describes very mild shortness of breath and dyspnea on exertion that do not limit her activities.  She denies any chest discomfort.  She denies orthopnea, PND, edema of the lower extremities.  The patient has been told in the past that she has a " "slight heart murmur.  The patient denies lightheadedness, dizziness, syncope.  In general, the patient's blood pressure has been well-controlled.  She does not have any history of diabetes mellitus.  She does have a history of some degree of chronic kidney disease.  She has not had any trouble with her medications.  She says that, if it were not for her knee pain, she would otherwise be very active and not have any significant limitations to her activities.    The following portions of the patient's history were reviewed and updated as appropriate: allergies, current medications, past family history, past medical history, past social history, past surgical history and problem list.     Past Medical History:   Diagnosis Date   • Aneurysm     Pt states \"Somewhere near my spleen.\"   • Anxiety and depression    • Arthritis    • Diarrhea    • Generalized headaches    • Heart murmur    • Hypertension    • Hypothyroid    • Lupus    • MRSA (methicillin resistant Staphylococcus aureus)     in past , on face   • PONV (postoperative nausea and vomiting)    • Renal failure    • Thrombosis 2007    RIGHT KNEE     Past Surgical History:   Procedure Laterality Date   • APPENDECTOMY     • CHOLECYSTECTOMY     • HYSTERECTOMY     • SHOULDER ROTATOR CUFF REPAIR Right    • TOTAL HIP ARTHROPLASTY Right    • WRIST FRACTURE SURGERY Left 2 weeks ago       Current Outpatient Prescriptions:   •  aspirin 81 MG EC tablet, Take 81 mg by mouth Daily., Disp: , Rfl:   •  escitalopram (LEXAPRO) 20 MG tablet, Take 20 mg by mouth Daily., Disp: , Rfl:   •  ferrous sulfate 325 (65 FE) MG tablet, Take 325 mg by mouth Daily With Breakfast., Disp: , Rfl:   •  gabapentin (NEURONTIN) 300 MG capsule, Take 300 mg by mouth As Needed., Disp: , Rfl:   •  levothyroxine (SYNTHROID, LEVOTHROID) 125 MCG tablet, Take 1 tablet by mouth Daily. (Patient taking differently: Take 137 mcg by mouth Daily.), Disp: 30 tablet, Rfl: 0  •  magnesium oxide (MAGOX) 400 (241.3 MG) MG " tablet tablet, Take 400 mg by mouth Daily., Disp: , Rfl:   •  methocarbamol (ROBAXIN) 500 MG tablet, TAKE 2 TABLETS BY MOUTH EVERY 6 HOURS AS NEEDED, Disp: , Rfl: 0  •  Multiple Vitamins-Minerals (OCUVITE ADULT 50+ PO), Take 1 tablet by mouth Daily., Disp: , Rfl:   •  ondansetron (ZOFRAN) 4 MG tablet, Take 1 tablet by mouth Every 8 (Eight) Hours As Needed for nausea or vomiting., Disp: 20 tablet, Rfl: 0  •  oxyCODONE-acetaminophen (PERCOCET)  MG per tablet, Take 1-2 tablets by mouth Every 6 (Six) Hours As Needed for moderate pain (4-6)., Disp: 60 tablet, Rfl: 0  •  potassium chloride (K-DUR,KLOR-CON) 20 MEQ CR tablet, Take 20 mEq by mouth 2 (Two) Times a Day., Disp: , Rfl:   •  Prenatal Vit-Fe Fumarate-FA (PRENATAL, CLASSIC, VITAMIN) 28-0.8 MG tablet tablet, Take 1 tablet by mouth., Disp: , Rfl:   •  Probiotic Product (PROBIOTIC DAILY PO), Take 1 tablet/day by mouth., Disp: , Rfl:   •  QUEtiapine (SEROquel) 50 MG tablet, Take 100 mg by mouth Every Night., Disp: , Rfl:   •  SODIUM BICARBONATE PO, Take 10 mg by mouth 2 (Two) Times a Day., Disp: , Rfl:   •  telmisartan (MICARDIS) 40 MG tablet, Take 40 mg by mouth Daily., Disp: , Rfl:   •  traMADol (ULTRAM) 50 MG tablet, Take 50 mg by mouth Daily., Disp: , Rfl:   •  vitamin B-12 (CYANOCOBALAMIN) 1000 MCG tablet, Take 1,500 mcg by mouth Daily., Disp: , Rfl:   •  vitamin D (ERGOCALCIFEROL) 84653 UNITS capsule capsule, TAKE 1 CAPSULE BY MOUTH ONCE WEEKLY., Disp: , Rfl: 0    Allergies   Allergen Reactions   • Codeine Hives     Social History   Substance Use Topics   • Smoking status: Never Smoker   • Smokeless tobacco: Never Used   • Alcohol use No     Family History   Problem Relation Age of Onset   • Cancer Father    • Coronary artery disease Brother      Review of Systems   Constitution: Negative for chills, fever, malaise/fatigue, night sweats and weight loss.   HENT: Negative for congestion and hearing loss.    Eyes: Negative for blurred vision and pain.    Cardiovascular: Negative for chest pain, claudication, dyspnea on exertion, irregular heartbeat, leg swelling, orthopnea, palpitations, paroxysmal nocturnal dyspnea and syncope.   Respiratory: Negative for cough, hemoptysis, shortness of breath and wheezing.    Endocrine: Negative for cold intolerance, heat intolerance, polydipsia and polyuria.   Hematologic/Lymphatic: Negative for adenopathy and bleeding problem. Does not bruise/bleed easily.   Skin: Negative for color change, poor wound healing and rash.   Musculoskeletal: Positive for joint pain. Negative for arthritis, back pain, joint swelling, myalgias and neck pain.   Gastrointestinal: Negative for abdominal pain, change in bowel habit, constipation, diarrhea, heartburn, hematochezia, melena, nausea and vomiting.   Genitourinary: Negative for bladder incontinence, dysuria, frequency, hematuria and nocturia.   Neurological: Negative for dizziness, focal weakness, headaches, light-headedness, loss of balance, numbness and seizures.   Psychiatric/Behavioral: Negative for altered mental status, memory loss and substance abuse.   Allergic/Immunologic: Negative for hives and persistent infections.       ECG 12 Lead  Date/Time: 11/20/2017 4:21 PM  Performed by: MADISON ROSS  Authorized by: MADISON ROSS   Comparison: compared with previous ECG from 2/10/2017  Similar to previous ECG  Rhythm: sinus rhythm  Rate: normal  BPM: 71  Conduction: conduction normal  ST Segments: ST segments normal  T Waves: T waves normal  QRS axis: normal  Other: no other findings  Clinical impression: normal ECG             Objective:     Physical Exam   Constitutional: She is oriented to person, place, and time. Vital signs are normal. She appears well-developed and well-nourished. She is cooperative.  Non-toxic appearance. No distress.   HENT:   Head: Normocephalic and atraumatic.   Right Ear: External ear normal.   Left Ear: External ear normal.   Nose: Nose  normal.   Mouth/Throat: Uvula is midline, oropharynx is clear and moist and mucous membranes are normal. Mucous membranes are not pale, not dry and not cyanotic. No oropharyngeal exudate.   Eyes: EOM and lids are normal. Pupils are equal, round, and reactive to light.   Neck: Normal range of motion. Neck supple. No hepatojugular reflux and no JVD present. Carotid bruit is not present. No tracheal deviation and no edema present. No thyroid mass and no thyromegaly present.   Cardiovascular: Normal rate, regular rhythm, S1 normal, S2 normal, intact distal pulses and normal pulses.   No extrasystoles are present. PMI is not displaced.  Exam reveals no gallop and no friction rub.    Murmur heard.  High-pitched blowing decrescendo early diastolic murmur is present with a grade of 1/6  at the upper right sternal border radiating to the apex  Pulses:       Radial pulses are 2+ on the right side, and 2+ on the left side.        Femoral pulses are 2+ on the right side, and 2+ on the left side.       Dorsalis pedis pulses are 2+ on the right side, and 2+ on the left side.        Posterior tibial pulses are 2+ on the right side, and 2+ on the left side.   Pulmonary/Chest: Effort normal and breath sounds normal. No accessory muscle usage. No respiratory distress. She has no wheezes. She has no rales. She exhibits no tenderness.   Abdominal: Soft. Normal appearance and bowel sounds are normal. She exhibits no distension, no abdominal bruit and no pulsatile midline mass. There is no hepatosplenomegaly. There is no tenderness.   Musculoskeletal: Normal range of motion. She exhibits no edema, tenderness or deformity.   Lymphadenopathy:     She has no cervical adenopathy.   Neurological: She is oriented to person, place, and time. She has normal strength. No cranial nerve deficit.   Skin: Skin is warm, dry and intact. No rash noted. She is not diaphoretic. No cyanosis or erythema. Nails show no clubbing.   Psychiatric: She has a  "normal mood and affect. Her speech is normal and behavior is normal. Thought content normal.   Vitals reviewed.    /76 (BP Location: Left arm, Patient Position: Sitting)  Pulse 50  Ht 64\" (162.6 cm)  Wt 145 lb (65.8 kg)  SpO2 99%  BMI 24.89 kg/m2    Data/Lab Review:     Echocardiogram from [facility] on 8/29/17: Ejection fraction estimated at 45% with global hypokinesis, normal diastolic function, normal atrial size, minimal mitral regurgitation, mild aortic valve insufficiency.      Assessment:          Diagnosis Plan   1. Pre-op evaluation  ECG 12 Lead   2. Left ventricular systolic dysfunction without heart failure     3. Essential hypertension     4. Nonrheumatic aortic valve insufficiency            Plan:         1.  Preoperative evaluation: This patient is to undergo intermediate risk surgery.  Based on the Revised Cardiac Risk Index, this patient has no clinical risk factors - no history of ischemic heart disease, history of heart failure (the patient does have left ventricular systolic dysfunction but has never had clinical heart failure), history of cerebrovascular disease, insulin therapy, serum creatinine > 2.  The patient's estimated functional capacity is estimated at 4 metabolic equivalents.  At this level of activity she does not have any significant limitations.  She is clinically stable on examination today.  Therefore, despite this patient's diagnosis of left ventricular systolic dysfunction, no further cardiac workup is indicated prior to this patient proceeding for her knee surgery.        2.  Left ventricular systolic dysfunction: The patient does have mild left ventricular systolic dysfunction based on her echocardiogram in August.  However, this patient does not have clinical congestive heart failure.  She is euvolemic on examination today and has no signs or symptoms of congestive heart failure.  At this time, she would not tolerate beta blocker therapy and she " is on angiotensin receptor blocker therapy.  She has no significant limitations to her activities.  Therefore, she is clinically stable.        3.  Essential hypertension: The patient's blood pressure is well-controlled at this time.  Continue current medications.        4.  Nonrheumatic aortic valve insufficiency: The patient does have mild aortic insufficiency based on her echocardiogram.  She is clinical stable in this regard and no further workup is indicated at this time.        Follow-up: As needed in our office at this time.        EMR Dragon/Transcription disclaimer: Much of this encounter note is an electronic transcription/translation of spoken language to printed text. The electronic translation of spoken language may permit erroneous, or at times, nonsensical words or phrases to be inadvertently transcribed; although I have reviewed the note for such errors, some may still exist.

## 2017-11-27 ENCOUNTER — ANESTHESIA (OUTPATIENT)
Dept: OPERATING ROOM | Age: 72
DRG: 470 | End: 2017-11-27
Payer: MEDICARE

## 2017-11-27 ENCOUNTER — ANESTHESIA EVENT (OUTPATIENT)
Dept: OPERATING ROOM | Age: 72
DRG: 470 | End: 2017-11-27
Payer: MEDICARE

## 2017-11-27 ENCOUNTER — HOSPITAL ENCOUNTER (INPATIENT)
Age: 72
LOS: 3 days | Discharge: HOME | DRG: 470 | End: 2017-11-30
Attending: ORTHOPAEDIC SURGERY | Admitting: ORTHOPAEDIC SURGERY
Payer: MEDICARE

## 2017-11-27 VITALS
TEMPERATURE: 99.3 F | OXYGEN SATURATION: 96 % | DIASTOLIC BLOOD PRESSURE: 60 MMHG | SYSTOLIC BLOOD PRESSURE: 111 MMHG | RESPIRATION RATE: 12 BRPM

## 2017-11-27 PROBLEM — M17.10 ARTHRITIS OF KNEE: Status: ACTIVE | Noted: 2017-11-27

## 2017-11-27 LAB
ABO/RH: NORMAL
ANTIBODY SCREEN: NORMAL

## 2017-11-27 PROCEDURE — 3E0T3BZ INTRODUCTION OF ANESTHETIC AGENT INTO PERIPHERAL NERVES AND PLEXI, PERCUTANEOUS APPROACH: ICD-10-PCS | Performed by: ANESTHESIOLOGY

## 2017-11-27 PROCEDURE — 86901 BLOOD TYPING SEROLOGIC RH(D): CPT

## 2017-11-27 PROCEDURE — 94664 DEMO&/EVAL PT USE INHALER: CPT

## 2017-11-27 PROCEDURE — 7100000001 HC PACU RECOVERY - ADDTL 15 MIN: Performed by: ORTHOPAEDIC SURGERY

## 2017-11-27 PROCEDURE — 6360000002 HC RX W HCPCS: Performed by: ANESTHESIOLOGY

## 2017-11-27 PROCEDURE — 3700000000 HC ANESTHESIA ATTENDED CARE: Performed by: ORTHOPAEDIC SURGERY

## 2017-11-27 PROCEDURE — 86900 BLOOD TYPING SEROLOGIC ABO: CPT

## 2017-11-27 PROCEDURE — 2580000003 HC RX 258: Performed by: ORTHOPAEDIC SURGERY

## 2017-11-27 PROCEDURE — 3600000005 HC SURGERY LEVEL 5 BASE: Performed by: ORTHOPAEDIC SURGERY

## 2017-11-27 PROCEDURE — 7100000000 HC PACU RECOVERY - FIRST 15 MIN: Performed by: ORTHOPAEDIC SURGERY

## 2017-11-27 PROCEDURE — 3600000015 HC SURGERY LEVEL 5 ADDTL 15MIN: Performed by: ORTHOPAEDIC SURGERY

## 2017-11-27 PROCEDURE — C1713 ANCHOR/SCREW BN/BN,TIS/BN: HCPCS | Performed by: ORTHOPAEDIC SURGERY

## 2017-11-27 PROCEDURE — G8979 MOBILITY GOAL STATUS: HCPCS

## 2017-11-27 PROCEDURE — 86850 RBC ANTIBODY SCREEN: CPT

## 2017-11-27 PROCEDURE — 2500000003 HC RX 250 WO HCPCS: Performed by: NURSE ANESTHETIST, CERTIFIED REGISTERED

## 2017-11-27 PROCEDURE — 64447 NJX AA&/STRD FEMORAL NRV IMG: CPT | Performed by: NURSE ANESTHETIST, CERTIFIED REGISTERED

## 2017-11-27 PROCEDURE — 1210000000 HC MED SURG R&B

## 2017-11-27 PROCEDURE — 6360000002 HC RX W HCPCS: Performed by: ORTHOPAEDIC SURGERY

## 2017-11-27 PROCEDURE — 6370000000 HC RX 637 (ALT 250 FOR IP): Performed by: ORTHOPAEDIC SURGERY

## 2017-11-27 PROCEDURE — 2720000001 HC MISC SURG SUPPLY STERILE $51-500: Performed by: ORTHOPAEDIC SURGERY

## 2017-11-27 PROCEDURE — 6360000002 HC RX W HCPCS: Performed by: NURSE ANESTHETIST, CERTIFIED REGISTERED

## 2017-11-27 PROCEDURE — 0SRD0J9 REPLACEMENT OF LEFT KNEE JOINT WITH SYNTHETIC SUBSTITUTE, CEMENTED, OPEN APPROACH: ICD-10-PCS | Performed by: ORTHOPAEDIC SURGERY

## 2017-11-27 PROCEDURE — 3700000001 HC ADD 15 MINUTES (ANESTHESIA): Performed by: ORTHOPAEDIC SURGERY

## 2017-11-27 PROCEDURE — 2580000003 HC RX 258: Performed by: ANESTHESIOLOGY

## 2017-11-27 PROCEDURE — C1776 JOINT DEVICE (IMPLANTABLE): HCPCS | Performed by: ORTHOPAEDIC SURGERY

## 2017-11-27 PROCEDURE — 97162 PT EVAL MOD COMPLEX 30 MIN: CPT

## 2017-11-27 PROCEDURE — 36415 COLL VENOUS BLD VENIPUNCTURE: CPT

## 2017-11-27 PROCEDURE — G8978 MOBILITY CURRENT STATUS: HCPCS

## 2017-11-27 DEVICE — DISCONTINUED USE 416978 CEMENT PALACOS R SING DOSE 40GR: Type: IMPLANTABLE DEVICE | Site: KNEE | Status: FUNCTIONAL

## 2017-11-27 DEVICE — COMPONENT PAT DIA29MM THK8MM KNEE POLY CEM CONVENTIONAL: Type: IMPLANTABLE DEVICE | Site: KNEE | Status: FUNCTIONAL

## 2017-11-27 DEVICE — IMPL KNEE PERSONA LFT FEM PS STND SZ 8: Type: IMPLANTABLE DEVICE | Site: KNEE | Status: FUNCTIONAL

## 2017-11-27 DEVICE — IMPLANTABLE DEVICE: Type: IMPLANTABLE DEVICE | Site: KNEE | Status: FUNCTIONAL

## 2017-11-27 RX ORDER — TRANEXAMIC ACID 100 MG/ML
INJECTION, SOLUTION INTRAVENOUS PRN
Status: DISCONTINUED | OUTPATIENT
Start: 2017-11-27 | End: 2017-11-27 | Stop reason: SDUPTHER

## 2017-11-27 RX ORDER — DIPHENHYDRAMINE HYDROCHLORIDE 50 MG/ML
12.5 INJECTION INTRAMUSCULAR; INTRAVENOUS
Status: DISCONTINUED | OUTPATIENT
Start: 2017-11-27 | End: 2017-11-27 | Stop reason: HOSPADM

## 2017-11-27 RX ORDER — ONDANSETRON 4 MG/1
4 TABLET, FILM COATED ORAL EVERY 8 HOURS PRN
Status: DISCONTINUED | OUTPATIENT
Start: 2017-11-27 | End: 2017-11-30 | Stop reason: HOSPADM

## 2017-11-27 RX ORDER — HYDROCODONE BITARTRATE AND ACETAMINOPHEN 5; 325 MG/1; MG/1
2 TABLET ORAL EVERY 4 HOURS PRN
Status: DISCONTINUED | OUTPATIENT
Start: 2017-11-27 | End: 2017-11-30 | Stop reason: HOSPADM

## 2017-11-27 RX ORDER — OXYCODONE HCL 10 MG/1
10 TABLET, FILM COATED, EXTENDED RELEASE ORAL EVERY 12 HOURS SCHEDULED
Status: DISCONTINUED | OUTPATIENT
Start: 2017-11-27 | End: 2017-11-30 | Stop reason: HOSPADM

## 2017-11-27 RX ORDER — LIDOCAINE HYDROCHLORIDE 10 MG/ML
INJECTION, SOLUTION EPIDURAL; INFILTRATION; INTRACAUDAL; PERINEURAL PRN
Status: DISCONTINUED | OUTPATIENT
Start: 2017-11-27 | End: 2017-11-27 | Stop reason: SDUPTHER

## 2017-11-27 RX ORDER — FERROUS SULFATE 325(65) MG
325 TABLET ORAL 2 TIMES DAILY
Status: DISCONTINUED | OUTPATIENT
Start: 2017-11-27 | End: 2017-11-30 | Stop reason: HOSPADM

## 2017-11-27 RX ORDER — IRON POLYSACCHARIDE COMPLEX 150 MG
150 CAPSULE ORAL 2 TIMES DAILY
Status: DISCONTINUED | OUTPATIENT
Start: 2017-11-27 | End: 2017-11-30 | Stop reason: HOSPADM

## 2017-11-27 RX ORDER — FENTANYL CITRATE 50 UG/ML
INJECTION, SOLUTION INTRAMUSCULAR; INTRAVENOUS PRN
Status: DISCONTINUED | OUTPATIENT
Start: 2017-11-27 | End: 2017-11-27 | Stop reason: SDUPTHER

## 2017-11-27 RX ORDER — SODIUM CHLORIDE 9 MG/ML
INJECTION, SOLUTION INTRAVENOUS CONTINUOUS
Status: DISCONTINUED | OUTPATIENT
Start: 2017-11-27 | End: 2017-11-30 | Stop reason: HOSPADM

## 2017-11-27 RX ORDER — PROPOFOL 10 MG/ML
INJECTION, EMULSION INTRAVENOUS PRN
Status: DISCONTINUED | OUTPATIENT
Start: 2017-11-27 | End: 2017-11-27 | Stop reason: SDUPTHER

## 2017-11-27 RX ORDER — ONDANSETRON 2 MG/ML
4 INJECTION INTRAMUSCULAR; INTRAVENOUS EVERY 6 HOURS PRN
Status: DISCONTINUED | OUTPATIENT
Start: 2017-11-27 | End: 2017-11-30 | Stop reason: HOSPADM

## 2017-11-27 RX ORDER — SODIUM CHLORIDE, SODIUM LACTATE, POTASSIUM CHLORIDE, CALCIUM CHLORIDE 600; 310; 30; 20 MG/100ML; MG/100ML; MG/100ML; MG/100ML
INJECTION, SOLUTION INTRAVENOUS CONTINUOUS
Status: DISCONTINUED | OUTPATIENT
Start: 2017-11-27 | End: 2017-11-27

## 2017-11-27 RX ORDER — ERGOCALCIFEROL 1.25 MG/1
50000 CAPSULE ORAL WEEKLY
Status: DISCONTINUED | OUTPATIENT
Start: 2017-11-27 | End: 2017-11-30 | Stop reason: HOSPADM

## 2017-11-27 RX ORDER — ROCURONIUM BROMIDE 10 MG/ML
INJECTION, SOLUTION INTRAVENOUS PRN
Status: DISCONTINUED | OUTPATIENT
Start: 2017-11-27 | End: 2017-11-27 | Stop reason: SDUPTHER

## 2017-11-27 RX ORDER — GABAPENTIN 300 MG/1
300 CAPSULE ORAL 3 TIMES DAILY PRN
Status: DISCONTINUED | OUTPATIENT
Start: 2017-11-27 | End: 2017-11-30 | Stop reason: HOSPADM

## 2017-11-27 RX ORDER — ROPIVACAINE HYDROCHLORIDE 5 MG/ML
INJECTION, SOLUTION EPIDURAL; INFILTRATION; PERINEURAL PRN
Status: DISCONTINUED | OUTPATIENT
Start: 2017-11-27 | End: 2017-11-27 | Stop reason: SDUPTHER

## 2017-11-27 RX ORDER — ASPIRIN 81 MG/1
81 TABLET, CHEWABLE ORAL DAILY
Status: DISCONTINUED | OUTPATIENT
Start: 2017-11-27 | End: 2017-11-30 | Stop reason: HOSPADM

## 2017-11-27 RX ORDER — MORPHINE SULFATE 1 MG/ML
1 INJECTION, SOLUTION EPIDURAL; INTRATHECAL; INTRAVENOUS EVERY 5 MIN PRN
Status: DISCONTINUED | OUTPATIENT
Start: 2017-11-27 | End: 2017-11-27 | Stop reason: HOSPADM

## 2017-11-27 RX ORDER — DEXAMETHASONE SODIUM PHOSPHATE 10 MG/ML
INJECTION INTRAMUSCULAR; INTRAVENOUS PRN
Status: DISCONTINUED | OUTPATIENT
Start: 2017-11-27 | End: 2017-11-27 | Stop reason: SDUPTHER

## 2017-11-27 RX ORDER — POTASSIUM CHLORIDE 20 MEQ/1
20 TABLET, EXTENDED RELEASE ORAL 2 TIMES DAILY
Status: DISCONTINUED | OUTPATIENT
Start: 2017-11-27 | End: 2017-11-30 | Stop reason: HOSPADM

## 2017-11-27 RX ORDER — SODIUM CHLORIDE 0.9 % (FLUSH) 0.9 %
10 SYRINGE (ML) INJECTION EVERY 12 HOURS SCHEDULED
Status: DISCONTINUED | OUTPATIENT
Start: 2017-11-27 | End: 2017-11-30 | Stop reason: HOSPADM

## 2017-11-27 RX ORDER — DOCUSATE SODIUM 100 MG/1
100 CAPSULE, LIQUID FILLED ORAL 2 TIMES DAILY
Status: DISCONTINUED | OUTPATIENT
Start: 2017-11-27 | End: 2017-11-30 | Stop reason: HOSPADM

## 2017-11-27 RX ORDER — LOSARTAN POTASSIUM 50 MG/1
50 TABLET ORAL DAILY
Status: DISCONTINUED | OUTPATIENT
Start: 2017-11-27 | End: 2017-11-30 | Stop reason: HOSPADM

## 2017-11-27 RX ORDER — UBIDECARENONE 75 MG
50 CAPSULE ORAL DAILY
Status: DISCONTINUED | OUTPATIENT
Start: 2017-11-27 | End: 2017-11-30 | Stop reason: HOSPADM

## 2017-11-27 RX ORDER — METHOCARBAMOL 500 MG/1
500 TABLET, FILM COATED ORAL 3 TIMES DAILY
Status: DISCONTINUED | OUTPATIENT
Start: 2017-11-27 | End: 2017-11-30 | Stop reason: HOSPADM

## 2017-11-27 RX ORDER — EPHEDRINE SULFATE 50 MG/ML
INJECTION, SOLUTION INTRAVENOUS PRN
Status: DISCONTINUED | OUTPATIENT
Start: 2017-11-27 | End: 2017-11-27 | Stop reason: SDUPTHER

## 2017-11-27 RX ORDER — HYDRALAZINE HYDROCHLORIDE 20 MG/ML
5 INJECTION INTRAMUSCULAR; INTRAVENOUS EVERY 10 MIN PRN
Status: DISCONTINUED | OUTPATIENT
Start: 2017-11-27 | End: 2017-11-27 | Stop reason: HOSPADM

## 2017-11-27 RX ORDER — ENALAPRILAT 2.5 MG/2ML
1.25 INJECTION INTRAVENOUS
Status: DISCONTINUED | OUTPATIENT
Start: 2017-11-27 | End: 2017-11-27 | Stop reason: HOSPADM

## 2017-11-27 RX ORDER — FENTANYL CITRATE 50 UG/ML
50 INJECTION, SOLUTION INTRAMUSCULAR; INTRAVENOUS
Status: DISCONTINUED | OUTPATIENT
Start: 2017-11-27 | End: 2017-11-27 | Stop reason: HOSPADM

## 2017-11-27 RX ORDER — SODIUM CHLORIDE 0.9 % (FLUSH) 0.9 %
10 SYRINGE (ML) INJECTION EVERY 12 HOURS SCHEDULED
Status: DISCONTINUED | OUTPATIENT
Start: 2017-11-27 | End: 2017-11-27 | Stop reason: HOSPADM

## 2017-11-27 RX ORDER — QUETIAPINE FUMARATE 100 MG/1
100 TABLET, FILM COATED ORAL NIGHTLY
Status: DISCONTINUED | OUTPATIENT
Start: 2017-11-27 | End: 2017-11-30 | Stop reason: HOSPADM

## 2017-11-27 RX ORDER — ONDANSETRON 2 MG/ML
INJECTION INTRAMUSCULAR; INTRAVENOUS PRN
Status: DISCONTINUED | OUTPATIENT
Start: 2017-11-27 | End: 2017-11-27 | Stop reason: SDUPTHER

## 2017-11-27 RX ORDER — DIPHENHYDRAMINE HCL 25 MG
25 TABLET ORAL EVERY 6 HOURS PRN
Status: DISCONTINUED | OUTPATIENT
Start: 2017-11-27 | End: 2017-11-30 | Stop reason: HOSPADM

## 2017-11-27 RX ORDER — ESCITALOPRAM OXALATE 10 MG/1
20 TABLET ORAL DAILY
Status: DISCONTINUED | OUTPATIENT
Start: 2017-11-27 | End: 2017-11-30 | Stop reason: HOSPADM

## 2017-11-27 RX ORDER — MEPERIDINE HYDROCHLORIDE 50 MG/ML
12.5 INJECTION INTRAMUSCULAR; INTRAVENOUS; SUBCUTANEOUS EVERY 5 MIN PRN
Status: DISCONTINUED | OUTPATIENT
Start: 2017-11-27 | End: 2017-11-27 | Stop reason: HOSPADM

## 2017-11-27 RX ORDER — HYDROCODONE BITARTRATE AND ACETAMINOPHEN 5; 325 MG/1; MG/1
1 TABLET ORAL EVERY 4 HOURS PRN
Status: DISCONTINUED | OUTPATIENT
Start: 2017-11-27 | End: 2017-11-30 | Stop reason: HOSPADM

## 2017-11-27 RX ORDER — AMLODIPINE BESYLATE 5 MG/1
5 TABLET ORAL DAILY
Status: ON HOLD | COMMUNITY
End: 2022-04-08 | Stop reason: ALTCHOICE

## 2017-11-27 RX ORDER — METOCLOPRAMIDE HYDROCHLORIDE 5 MG/ML
10 INJECTION INTRAMUSCULAR; INTRAVENOUS ONCE
Status: COMPLETED | OUTPATIENT
Start: 2017-11-27 | End: 2017-11-27

## 2017-11-27 RX ORDER — PROMETHAZINE HYDROCHLORIDE 25 MG/ML
6.25 INJECTION, SOLUTION INTRAMUSCULAR; INTRAVENOUS
Status: DISCONTINUED | OUTPATIENT
Start: 2017-11-27 | End: 2017-11-27 | Stop reason: HOSPADM

## 2017-11-27 RX ORDER — LIDOCAINE HYDROCHLORIDE 10 MG/ML
1 INJECTION, SOLUTION EPIDURAL; INFILTRATION; INTRACAUDAL; PERINEURAL
Status: DISCONTINUED | OUTPATIENT
Start: 2017-11-27 | End: 2017-11-27 | Stop reason: HOSPADM

## 2017-11-27 RX ORDER — MORPHINE SULFATE 1 MG/ML
2 INJECTION, SOLUTION EPIDURAL; INTRATHECAL; INTRAVENOUS
Status: DISCONTINUED | OUTPATIENT
Start: 2017-11-27 | End: 2017-11-28 | Stop reason: SDUPTHER

## 2017-11-27 RX ORDER — MORPHINE SULFATE 1 MG/ML
2 INJECTION, SOLUTION EPIDURAL; INTRATHECAL; INTRAVENOUS EVERY 5 MIN PRN
Status: DISCONTINUED | OUTPATIENT
Start: 2017-11-27 | End: 2017-11-27 | Stop reason: HOSPADM

## 2017-11-27 RX ORDER — TRAMADOL HYDROCHLORIDE 50 MG/1
50 TABLET ORAL DAILY
Status: DISCONTINUED | OUTPATIENT
Start: 2017-11-27 | End: 2017-11-30 | Stop reason: HOSPADM

## 2017-11-27 RX ORDER — MORPHINE SULFATE 1 MG/ML
4 INJECTION, SOLUTION EPIDURAL; INTRATHECAL; INTRAVENOUS
Status: DISCONTINUED | OUTPATIENT
Start: 2017-11-27 | End: 2017-11-28 | Stop reason: SDUPTHER

## 2017-11-27 RX ORDER — SODIUM CHLORIDE 0.9 % (FLUSH) 0.9 %
10 SYRINGE (ML) INJECTION PRN
Status: DISCONTINUED | OUTPATIENT
Start: 2017-11-27 | End: 2017-11-27 | Stop reason: HOSPADM

## 2017-11-27 RX ORDER — ACETAMINOPHEN 325 MG/1
650 TABLET ORAL EVERY 4 HOURS PRN
Status: DISCONTINUED | OUTPATIENT
Start: 2017-11-27 | End: 2017-11-30 | Stop reason: HOSPADM

## 2017-11-27 RX ORDER — FENTANYL CITRATE 50 UG/ML
25 INJECTION, SOLUTION INTRAMUSCULAR; INTRAVENOUS
Status: DISCONTINUED | OUTPATIENT
Start: 2017-11-27 | End: 2017-11-27 | Stop reason: HOSPADM

## 2017-11-27 RX ORDER — ONDANSETRON 2 MG/ML
4 INJECTION INTRAMUSCULAR; INTRAVENOUS
Status: DISCONTINUED | OUTPATIENT
Start: 2017-11-27 | End: 2017-11-27 | Stop reason: HOSPADM

## 2017-11-27 RX ORDER — LABETALOL HYDROCHLORIDE 5 MG/ML
5 INJECTION, SOLUTION INTRAVENOUS EVERY 10 MIN PRN
Status: DISCONTINUED | OUTPATIENT
Start: 2017-11-27 | End: 2017-11-27 | Stop reason: HOSPADM

## 2017-11-27 RX ORDER — SODIUM CHLORIDE 0.9 % (FLUSH) 0.9 %
10 SYRINGE (ML) INJECTION PRN
Status: DISCONTINUED | OUTPATIENT
Start: 2017-11-27 | End: 2017-11-30 | Stop reason: HOSPADM

## 2017-11-27 RX ORDER — MIDAZOLAM HYDROCHLORIDE 1 MG/ML
2 INJECTION INTRAMUSCULAR; INTRAVENOUS
Status: COMPLETED | OUTPATIENT
Start: 2017-11-27 | End: 2017-11-27

## 2017-11-27 RX ORDER — LEVOTHYROXINE SODIUM 0.07 MG/1
150 TABLET ORAL DAILY
Status: DISCONTINUED | OUTPATIENT
Start: 2017-11-28 | End: 2017-11-30 | Stop reason: HOSPADM

## 2017-11-27 RX ADMIN — EPHEDRINE SULFATE 10 MG: 50 INJECTION, SOLUTION INTRAMUSCULAR; INTRAVENOUS; SUBCUTANEOUS at 09:10

## 2017-11-27 RX ADMIN — Medication 2 MG: at 11:38

## 2017-11-27 RX ADMIN — OXYCODONE HYDROCHLORIDE 10 MG: 10 TABLET, FILM COATED, EXTENDED RELEASE ORAL at 21:12

## 2017-11-27 RX ADMIN — HYDROCODONE BITARTRATE AND ACETAMINOPHEN 2 TABLET: 5; 325 TABLET ORAL at 13:24

## 2017-11-27 RX ADMIN — TRANEXAMIC ACID 1000 MG: 100 INJECTION, SOLUTION INTRAVENOUS at 10:30

## 2017-11-27 RX ADMIN — DEXAMETHASONE SODIUM PHOSPHATE 10 MG: 10 INJECTION INTRAMUSCULAR; INTRAVENOUS at 08:54

## 2017-11-27 RX ADMIN — HYDROMORPHONE HYDROCHLORIDE 0.5 MG: 1 INJECTION, SOLUTION INTRAMUSCULAR; INTRAVENOUS; SUBCUTANEOUS at 10:25

## 2017-11-27 RX ADMIN — METOCLOPRAMIDE 10 MG: 5 INJECTION, SOLUTION INTRAMUSCULAR; INTRAVENOUS at 07:36

## 2017-11-27 RX ADMIN — ONDANSETRON 4 MG: 2 INJECTION INTRAMUSCULAR; INTRAVENOUS at 14:47

## 2017-11-27 RX ADMIN — HYDROCODONE BITARTRATE AND ACETAMINOPHEN 2 TABLET: 5; 325 TABLET ORAL at 17:59

## 2017-11-27 RX ADMIN — ROPIVACAINE HYDROCHLORIDE 15 ML: 5 INJECTION, SOLUTION EPIDURAL; INFILTRATION; PERINEURAL at 08:20

## 2017-11-27 RX ADMIN — HYDROMORPHONE HYDROCHLORIDE 0.5 MG: 1 INJECTION, SOLUTION INTRAMUSCULAR; INTRAVENOUS; SUBCUTANEOUS at 10:45

## 2017-11-27 RX ADMIN — FERROUS SULFATE TAB 325 MG (65 MG ELEMENTAL FE) 325 MG: 325 (65 FE) TAB at 21:12

## 2017-11-27 RX ADMIN — ESCITALOPRAM OXALATE 20 MG: 10 TABLET ORAL at 17:59

## 2017-11-27 RX ADMIN — ONDANSETRON HYDROCHLORIDE 4 MG: 2 SOLUTION INTRAMUSCULAR; INTRAVENOUS at 10:30

## 2017-11-27 RX ADMIN — CEFAZOLIN SODIUM 1 G: 1 INJECTION, SOLUTION INTRAVENOUS at 09:05

## 2017-11-27 RX ADMIN — Medication 2 MG: at 11:26

## 2017-11-27 RX ADMIN — METHOCARBAMOL 500 MG: 500 TABLET ORAL at 21:12

## 2017-11-27 RX ADMIN — ROCURONIUM BROMIDE 50 MG: 10 INJECTION INTRAVENOUS at 08:54

## 2017-11-27 RX ADMIN — SODIUM CHLORIDE, SODIUM LACTATE, POTASSIUM CHLORIDE, AND CALCIUM CHLORIDE: 600; 310; 30; 20 INJECTION, SOLUTION INTRAVENOUS at 09:15

## 2017-11-27 RX ADMIN — HYDROMORPHONE HYDROCHLORIDE 0.5 MG: 1 INJECTION, SOLUTION INTRAMUSCULAR; INTRAVENOUS; SUBCUTANEOUS at 10:30

## 2017-11-27 RX ADMIN — PROPOFOL 100 MG: 10 INJECTION, EMULSION INTRAVENOUS at 08:54

## 2017-11-27 RX ADMIN — Medication 2 MG: at 14:47

## 2017-11-27 RX ADMIN — POTASSIUM CHLORIDE 20 MEQ: 20 TABLET, EXTENDED RELEASE ORAL at 21:12

## 2017-11-27 RX ADMIN — MIDAZOLAM 2 MG: 1 INJECTION INTRAMUSCULAR; INTRAVENOUS at 08:21

## 2017-11-27 RX ADMIN — EPHEDRINE SULFATE 10 MG: 50 INJECTION, SOLUTION INTRAMUSCULAR; INTRAVENOUS; SUBCUTANEOUS at 09:05

## 2017-11-27 RX ADMIN — Medication 2 MG: at 22:51

## 2017-11-27 RX ADMIN — FENTANYL CITRATE 50 MCG: 50 INJECTION, SOLUTION INTRAMUSCULAR; INTRAVENOUS at 08:54

## 2017-11-27 RX ADMIN — Medication 1 MG: at 11:32

## 2017-11-27 RX ADMIN — RIVAROXABAN 10 MG: 10 TABLET, FILM COATED ORAL at 17:59

## 2017-11-27 RX ADMIN — ASPIRIN 81 MG CHEWABLE TABLET 81 MG: 81 TABLET CHEWABLE at 17:59

## 2017-11-27 RX ADMIN — TRANEXAMIC ACID 1000 MG: 100 INJECTION, SOLUTION INTRAVENOUS at 09:05

## 2017-11-27 RX ADMIN — SUGAMMADEX 200 MG: 100 INJECTION, SOLUTION INTRAVENOUS at 10:57

## 2017-11-27 RX ADMIN — QUETIAPINE FUMARATE 100 MG: 100 TABLET ORAL at 21:12

## 2017-11-27 RX ADMIN — HYDROMORPHONE HYDROCHLORIDE 0.5 MG: 1 INJECTION, SOLUTION INTRAMUSCULAR; INTRAVENOUS; SUBCUTANEOUS at 10:55

## 2017-11-27 RX ADMIN — DOCUSATE SODIUM 100 MG: 100 CAPSULE, LIQUID FILLED ORAL at 21:12

## 2017-11-27 RX ADMIN — Medication 10 ML: at 21:16

## 2017-11-27 RX ADMIN — CEFAZOLIN SODIUM 2 G: 2 SOLUTION INTRAVENOUS at 18:00

## 2017-11-27 RX ADMIN — Medication 2 MG: at 11:15

## 2017-11-27 RX ADMIN — Medication 2 MG: at 20:30

## 2017-11-27 RX ADMIN — SODIUM CHLORIDE, SODIUM LACTATE, POTASSIUM CHLORIDE, AND CALCIUM CHLORIDE: 600; 310; 30; 20 INJECTION, SOLUTION INTRAVENOUS at 07:37

## 2017-11-27 RX ADMIN — Medication 150 MG: at 21:12

## 2017-11-27 RX ADMIN — SODIUM CHLORIDE: 9 INJECTION, SOLUTION INTRAVENOUS at 14:47

## 2017-11-27 RX ADMIN — METHOCARBAMOL 500 MG: 500 TABLET ORAL at 18:00

## 2017-11-27 RX ADMIN — LIDOCAINE HYDROCHLORIDE 5 ML: 10 INJECTION, SOLUTION EPIDURAL; INFILTRATION; INTRACAUDAL; PERINEURAL at 08:54

## 2017-11-27 ASSESSMENT — LIFESTYLE VARIABLES: SMOKING_STATUS: 0

## 2017-11-27 ASSESSMENT — PAIN SCALES - GENERAL
PAINLEVEL_OUTOF10: 7
PAINLEVEL_OUTOF10: 9
PAINLEVEL_OUTOF10: 10
PAINLEVEL_OUTOF10: 8
PAINLEVEL_OUTOF10: 9
PAINLEVEL_OUTOF10: 8
PAINLEVEL_OUTOF10: 9
PAINLEVEL_OUTOF10: 8

## 2017-11-27 NOTE — ANESTHESIA POSTPROCEDURE EVALUATION
Department of Anesthesiology  Postprocedure Note    Patient: Francoise Wilson  MRN: [id number]  [place]: 1945  Date of evaluation: 11/27/2017  Time:  11:12 AM     Procedure Summary     Date:  11/27/17 Room / Location:  [facility] OR  / [facility] OR    Anesthesia Start:  5877 Anesthesia Stop:  6624    Procedure:  KNEE TOTAL ARTHROPLASTY (Left Knee) Diagnosis:  (M17.12)    Surgeon:  Toya Mohs, MD Responsible Provider:  Latoya Jean-Baptiste CRNA    Anesthesia Type:  general, regional ASA Status:  3          Anesthesia Type: general, regional    Kenn Phase I: Kenn Score: 8    Kenn Phase II:      Last vitals: Reviewed and per EMR flowsheets.        Anesthesia Post Evaluation    Patient location during evaluation: bedside  Patient participation: complete - patient participated  Level of consciousness: awake and alert  Pain score: 0  Airway patency: patent  Nausea & Vomiting: no nausea  Complications: no  Cardiovascular status: hemodynamically stable  Respiratory status: acceptable  Hydration status: euvolemic

## 2017-11-27 NOTE — H&P
Yes Historical Provider, MD   QUEtiapine (SEROQUEL) 100 MG tablet Take 100 mg by mouth nightly    Historical Provider, MD   escitalopram (LEXAPRO) 20 MG tablet Take 20 mg by mouth daily    Historical Provider, MD   gabapentin (NEURONTIN) 300 MG capsule Take 300 mg by mouth 3 times daily as needed    Historical Provider, MD   traMADol (ULTRAM) 50 MG tablet Take 50 mg by mouth daily . Historical Provider, MD   epoetin niurka (EPOGEN;PROCRIT) 94058 UNIT/ML injection Inject 20,000 Units into the skin every 3 months    Historical Provider, MD   iron polysaccharides (FERREX 150) 150 MG capsule Take 150 mg by mouth 2 times daily    Historical Provider, MD   vitamin B-12 (CYANOCOBALAMIN) 100 MCG tablet Take 50 mcg by mouth daily    Historical Provider, MD   vitamin D (ERGOCALCIFEROL) 43068 UNITS CAPS capsule Take 50,000 Units by mouth once a week    Historical Provider, MD   telmisartan (MICARDIS) 40 MG tablet Take 40 mg by mouth daily    Historical Provider, MD   methocarbamol (ROBAXIN) 500 MG tablet Take 500 mg by mouth 3 times daily    Historical Provider, MD       Allergies:    Allergies   Allergen Reactions    Codeine        Review of systems:     * Constitutional: negative     * HEENT: negative     * Skin: negative     * Cardiac: negative     * Respiratory: negative     * GI: negative     * : negative     * Neuro: negative     * CNS: negative     * Extremities: negative     * Endcrine: negative     Physical Exam:   BP (!) 131/94   Pulse 85   Temp 98.2 °F (36.8 °C) (Temporal)   Resp 16   SpO2 97%     - General: normal development and appearance     - HEENT: normocephalic, MILADIS     - Skin: pink, warm, normal tone     - Neck: supple, no masses,     - Chest: no rales or wheezes, normal expansion     - Heart: RRR, no murmurs/gallops     - Abdomen: soft, nondistended, nontender, normal sounds     - Vascular: normal pulses, color, tone     - Pysch/Neuro: normal affect, mood, alert and oriented     - Musculoskeletal: left knee decreased ROM      Impression: Left knee primary osteoarthritis    Surgical Plan: Left TKA      Electronically signed by Brenda Humphrey MD on 11/27/2017 at 7:37 AM

## 2017-11-27 NOTE — ANESTHESIA PRE PROCEDURE
Provider, MD   vitamin D (ERGOCALCIFEROL) 12058 UNITS CAPS capsule Take 50,000 Units by mouth once a week    Historical Provider, MD   telmisartan (MICARDIS) 40 MG tablet Take 40 mg by mouth daily    Historical Provider, MD   methocarbamol (ROBAXIN) 500 MG tablet Take 500 mg by mouth 3 times daily    Historical Provider, MD   diphenhydrAMINE (BENADRYL) 25 MG tablet Take 25 mg by mouth every 6 hours as needed for Itching    Historical Provider, MD   levothyroxine (SYNTHROID) 150 MCG tablet Take 150 mcg by mouth Daily    Historical Provider, MD   Prenatal Vit-Fe Fumarate-FA (PRENATAL VITAMIN PO) Take by mouth    Historical Provider, MD   Probiotic Product (PROBIOTIC DAILY PO) Take by mouth    Historical Provider, MD       Current medications:    No current facility-administered medications for this encounter. Allergies: Allergies   Allergen Reactions    Codeine        Problem List:  There is no problem list on file for this patient. Past Medical History:        Diagnosis Date    Arthritis     Asthma     DVT (deep vein thrombosis) in pregnancy ([facility] [address]     History of blood transfusion     Hx of blood clots     hAD dvt WAS ON Coumadin for a year. 2006    Hypertension     Lupus     Renal failure     Splenic artery aneurysm (HCC)     Thyroid disease        Past Surgical History:        Procedure Laterality Date    GASTRIC BYPASS SURGERY  1975    HYSTERECTOMY      JOINT REPLACEMENT Right     hip and shoulder    LYMPHADENECTOMY         Social History:    Social History   Substance Use Topics    Smoking status: Former Smoker    Smokeless tobacco: Never Used    Alcohol use No                                Counseling given: Not Answered      Vital Signs (Current): There were no vitals filed for this visit.                                            BP Readings from Last 3 Encounters:   08/26/16 129/81       NPO Status: BMI:   Wt Readings from Last 3 Encounters:   11/15/17 148 lb (67.1 kg)   08/26/16 145 lb (65.8 kg)     There is no height or weight on file to calculate BMI.    CBC:   Lab Results   Component Value Date    WBC 7.4 11/15/2017    RBC 3.62 11/15/2017    HGB 10.8 11/15/2017    HCT 34.7 11/15/2017    MCV 95.9 11/15/2017    RDW 13.5 11/15/2017     11/15/2017       CMP:   Lab Results   Component Value Date     11/15/2017    K 4.8 11/15/2017     11/15/2017    CO2 21 11/15/2017    BUN 27 11/15/2017    CREATININE 1.6 11/15/2017    LABGLOM 32 11/15/2017    GLUCOSE 72 11/15/2017    PROT 5.5 11/12/2015    CALCIUM 8.6 11/15/2017    ALKPHOS 89 11/12/2015    AST 15 11/12/2015    ALT 7 11/12/2015       POC Tests: No results for input(s): POCGLU, POCNA, POCK, POCCL, POCBUN, POCHEMO, POCHCT in the last 72 hours. Coags:   Lab Results   Component Value Date    PROTIME 13.6 11/15/2017    INR 1.05 11/15/2017    APTT 31.8 11/15/2017       HCG (If Applicable): No results found for: PREGTESTUR, PREGSERUM, HCG, HCGQUANT     ABGs: No results found for: PHART, PO2ART, KEK7CCJ, HZO5EHL, BEART, S5PMLGUV     Type & Screen (If Applicable):  No results found for: LABABO, [address]    Anesthesia Evaluation  Patient summary reviewed and Nursing notes reviewed   history of anesthetic complications: PONV.   Airway: Mallampati: II  TM distance: >3 FB   Neck ROM: full   Dental:    (+) lower dentures and upper dentures      Pulmonary:       (-) COPD, asthma and not a current smoker                           Cardiovascular:    (+) hypertension:,     (-) pacemaker, CAD and CABG/stent    ECG reviewed               Beta Blocker:  Not on Beta Blocker         Neuro/Psych:      (-) seizures, TIA and CVA           GI/Hepatic/Renal:   (+) renal disease: CRI,      (-) GERD       Endo/Other:    (+) hypothyroidism::., .    (-) blood dyscrasia, no Type II DM, no Type I DM                ROS comment: H/O SLE Abdominal:           Vascular:

## 2017-11-27 NOTE — OP NOTE
OPERATIVE NOTE    PREOPERATIVE DIAGNOSIS: Left knee primary osteoarthritis     POSTOPERATIVE DIAGNOSIS:  Left knee primary osteoarthritis     PROCEDURE:  Left Total Knee Arthroplasty     SURGEON:  Marne Dakins, MD    ASSISTANT:  First Assistant: Shaan Benton PA-C  Scrub Person First: Felicitas Little M.D. The assistant aided in limb position as well as retractor placement.  The assistant was also critical in implantation of the prosthesis.  In addition to this, the assistant was responsible for wound closure.  It was necessary to have the assistant present in order to complete the procedure. ANESTHESIA:  General    ESTIMATED BLOOD LOSS:  250cc. COMPLICATIONS:  None. CONDITION:  Stable. IMPLANTS:    Implant Name Type Inv. Item Serial No.  Lot No. LRB No. Used                                  HISTORY: 71 yo female presents for a left TKA for end stage osteoarthritis. Patient has failed conservative management including: time, activity modifications, PT, NSAIDs, corticosteriods and viscosupplementation.     PROCEDURE:  The patient was interviewed in the pre-anesthesia area.  The operative limb was then marked with a marking pen.  The patient wasthen administered a regional block per the anesthesia team.  The patient was then taken to the operative suite where General anesthesia was performed by the anesthesia team.  A time out was then called to confirm the administration of 1 gm Ancef as well as the administration of tranexamic acid prior to incision.  The patient was then prepped and draped in standard sterile fashion using ChloraPrep.      Once fully prepped and draped, the limb was reprepped with ChloraPrep.  Sterile marking pen was then used to jaci out the tibia distally as well as the first web space.  The leg was then exsanguinated with Esmarch and tourniquet was inflated to 250 mmHg was placed in a [place] leg higgins and a standard midline incision was

## 2017-11-27 NOTE — PROGRESS NOTES
Physical Therapy    Facility/Department: [facility] SURG SERVICES  Initial Assessment    NAME: Kemal Arechiga  :   MRN: [id number]    Date of Service: 2017    Patient Diagnosis(es): There were no encounter diagnoses. has a past medical history of Arthritis; Asthma; DVT (deep vein thrombosis) in pregnancy ([facility] [address] History of blood transfusion; Hx of blood clots; Hypertension; Lupus; Renal failure; Splenic artery aneurysm ([facility] [address] and Thyroid disease. has a past surgical history that includes Hysterectomy; lymphadenectomy; Gastric bypass surgery (); joint replacement (Right); and total knee arthroplasty (Left, 2017). Restrictions  Restrictions/Precautions  Restrictions/Precautions: Weight Bearing  Lower Extremity Weight Bearing Restrictions  Left Lower Extremity Weight Bearing: Weight Bearing As Tolerated  Vision/Hearing  Vision: Within Functional Limits  Hearing: Within functional limits     Subjective  General  Additional Pertinent Hx: LUPUS, R THR  Family / Caregiver Present: Yes  Diagnosis: L TKR  Follows Commands: Within Functional Limits  General Comment  Comments: pt LYING IN BED. STATES SHE NEEDS TO USE THE BR  Subjective  Subjective: pt C/O BEING IN PAIN. STATES SHE JUST HAD ORAL PAIN MEDS BUT PAIN IS NOT CONTROLLED. ADVISED pt TO DISCUSS WITH RN AND MD. FAMILY MEMBER WAS LOUDLY EXPRESSING FRUSTRATION OVER Pt'S PAIN CONTROL. Pt SAID TO PT \"JUST IGNORE HIM\". Orientation  Orientation  Overall Orientation Status: Within Normal Limits    Social/Functional History  Social/Functional History  Lives With: Spouse  Type of Home: House  Home Layout: One level  Home Access: Stairs to enter without rails  Entrance Stairs - Number of Steps: 3  Ambulation Assistance: Independent  Additional Comments: STATES SHE WAS IND WITH ADL'S AND AMB AND WILL NEED A RW FOR USE AT HOME.  UNDECIDED ABOUT BSC  Objective          AROM RLE (degrees)  RLE AROM: WNL  AROM LLE (degrees)  LLE General AROM: ABLE TO SIT AND FLEX TO GROSSLY 85-90 DEG  Strength RLE  Strength RLE: WFL  Strength LLE  Comment: ABLE TO EXTEND L KNEE AGAINST GRAVITY     Sensation  Overall Sensation Status: WFL  Bed mobility  Supine to Sit: Minimal assistance  Transfers  Sit to Stand: Contact guard assistance  Stand to sit: Contact guard assistance  Bed to Chair: Contact guard assistance  Ambulation  Ambulation?: Yes  Ambulation 1  Surface: level tile  Device: Rolling Walker  Other Apparatus:  (GT BELT)  Assistance: Contact guard assistance  Quality of Gait: SLOW, ANTALGIC, SHORT STEP LENGTH  Distance: 10 FT X 2  Comments: Pt DOING WELL EXCEPT FOR PAIN CONTROL ISSUES     Balance  Sitting - Static: Good  Sitting - Dynamic: Good  Standing - Static: Fair;+  Standing - Dynamic: Fair;+        Assessment   Body structures, Functions, Activity limitations: Decreased functional mobility ; Decreased strength;Decreased ROM; Decreased endurance  Assessment: pt WORKING WELL WITH PT DESPITE PAIN CONTROL ISSUES  Treatment Diagnosis: L TKR  Prognosis: Good  Decision Making: Medium Complexity  Patient Education: FALL PREVENTION, POSITIONING STRATEGIES TO PROMOTE L KNEE EXTENSION  Barriers to Learning: NONE NOTED  REQUIRES PT FOLLOW UP: Yes  Activity Tolerance  Activity Tolerance: Patient Tolerated treatment well;Patient limited by pain  PT Equipment Recommendations  Equipment Needed: Yes  Mobility Devices: Abhay Numbers: Rolling     Discharge Recommendations:  Continue to assess pending progress, Patient would benefit from continued therapy after discharge      Plan   Plan  Times per week: PT AT LEAST ONCE DAILY X 14 DAYS  Current Treatment Recommendations: Strengthening, ROM, Functional Mobility Training, Transfer Training, Pain Management, Positioning, Gait Training, Safety Education & Training  Plan Comment: WBAT  Safety Devices  Type of devices: Gait belt, Left in chair, Call light within reach    G-Code  PT G-Codes  Functional Assessment Tool Used: BED>CHAIR  Score: CJ, CGA  Functional Limitation: Mobility: Walking and moving around  Mobility: Walking and Moving Around Current Status (): At least 20 percent but less than 40 percent impaired, limited or restricted  Mobility: Walking and Moving Around Goal Status ():  At least 1 percent but less than 20 percent impaired, limited or restricted  OutComes Score                                           Goals  Short term goals  Time Frame for Short term goals: 14 DAYS BEFORE RE EVAL  Short term goal 1: SUP<>SIT IND  Short term goal 2: SIT<>STAND SBA  Short term goal 3:  FT WITH RW AND SBA  Short term goal 4: STEPS WITH CGA  Patient Goals   Patient goals : D/C HOME       Therapy Time   Individual Concurrent Group Co-treatment   Time In           Time Out           Minutes                   Jesus Santiago PT    Electronically signed by Jesus Santiago PT on 11/27/2017 at 2:30 PM

## 2017-11-27 NOTE — ANESTHESIA PROCEDURE NOTES
Peripheral Block    Patient location during procedure: holding area  Start time: 11/27/2017 8:25 AM  End time: 11/27/2017 8:25 AM  Staffing  Anesthesiologist: Julisa Gross  Performed: anesthesiologist   Preanesthetic Checklist  Completed: patient identified, site marked, surgical consent, pre-op evaluation, timeout performed, IV checked, risks and benefits discussed, monitors and equipment checked, anesthesia consent given, oxygen available and patient being monitored  Peripheral Block  Patient position: supine  Prep: ChloraPrep  Patient monitoring: cardiac monitor, continuous pulse ox, frequent blood pressure checks and IV access  Block type: Femoral  Laterality: left  Injection technique: single-shot  Procedures: ultrasound guided  Local infiltration: lidocaine  Infiltration strength: 1 %  Dose: 3 mL  Adductor canal  Provider prep: sterile gloves  Local infiltration: lidocaine  Needle  Needle type: combined needle/nerve stimulator   Needle gauge: 21 G  Needle length: 10 cm  Needle localization: ultrasound guidance  Assessment  Injection assessment: negative aspiration for heme, no paresthesia on injection and local visualized surrounding nerve on ultrasound  Paresthesia pain: none  Slow fractionated injection: yes  Hemodynamics: stable  Additional Notes  Under ultrasound (\"US\") guidance, a 21 gauge needle was inserted and placed in close proximity to the femoral nerve. Ultrasound was also used to visualize the spread of the anesthetic in close proximity to the nerve being blocked. The nerve appeared anatomically normal, and there were no abnormal pathological findings. A permanent image was recorded. Emergency resuscitation equipment and lipid emulsion readily available.     Reason for block: post-op pain management

## 2017-11-28 LAB
ANION GAP SERPL CALCULATED.3IONS-SCNC: 14 MMOL/L (ref 7–19)
BUN BLDV-MCNC: 19 MG/DL (ref 8–23)
CALCIUM SERPL-MCNC: 7.7 MG/DL (ref 8.8–10.2)
CHLORIDE BLD-SCNC: 110 MMOL/L (ref 98–111)
CO2: 15 MMOL/L (ref 22–29)
CREAT SERPL-MCNC: 1.5 MG/DL (ref 0.5–0.9)
GFR NON-AFRICAN AMERICAN: 34
GLUCOSE BLD-MCNC: 100 MG/DL (ref 74–109)
HCT VFR BLD CALC: 25.9 % (ref 37–47)
HEMOGLOBIN: 8.2 G/DL (ref 12–16)
MCH RBC QN AUTO: 29.8 PG (ref 27–31)
MCHC RBC AUTO-ENTMCNC: 31.7 G/DL (ref 33–37)
MCV RBC AUTO: 94.2 FL (ref 81–99)
PDW BLD-RTO: 13.4 % (ref 11.5–14.5)
PLATELET # BLD: 190 K/UL (ref 130–400)
PMV BLD AUTO: 10.7 FL (ref 9.4–12.3)
POTASSIUM SERPL-SCNC: 5 MMOL/L (ref 3.5–5)
RBC # BLD: 2.75 M/UL (ref 4.2–5.4)
SODIUM BLD-SCNC: 139 MMOL/L (ref 136–145)
WBC # BLD: 11.2 K/UL (ref 4.8–10.8)

## 2017-11-28 PROCEDURE — 2580000003 HC RX 258: Performed by: ORTHOPAEDIC SURGERY

## 2017-11-28 PROCEDURE — 97116 GAIT TRAINING THERAPY: CPT

## 2017-11-28 PROCEDURE — 36415 COLL VENOUS BLD VENIPUNCTURE: CPT

## 2017-11-28 PROCEDURE — 1210000000 HC MED SURG R&B

## 2017-11-28 PROCEDURE — G8988 SELF CARE GOAL STATUS: HCPCS

## 2017-11-28 PROCEDURE — 6360000002 HC RX W HCPCS: Performed by: ORTHOPAEDIC SURGERY

## 2017-11-28 PROCEDURE — 80048 BASIC METABOLIC PNL TOTAL CA: CPT

## 2017-11-28 PROCEDURE — G8987 SELF CARE CURRENT STATUS: HCPCS

## 2017-11-28 PROCEDURE — 6370000000 HC RX 637 (ALT 250 FOR IP): Performed by: ORTHOPAEDIC SURGERY

## 2017-11-28 PROCEDURE — 85027 COMPLETE CBC AUTOMATED: CPT

## 2017-11-28 PROCEDURE — 97165 OT EVAL LOW COMPLEX 30 MIN: CPT

## 2017-11-28 RX ORDER — MORPHINE SULFATE 4 MG/ML
4 INJECTION, SOLUTION INTRAMUSCULAR; INTRAVENOUS
Status: DISCONTINUED | OUTPATIENT
Start: 2017-11-28 | End: 2017-11-30 | Stop reason: HOSPADM

## 2017-11-28 RX ORDER — DIAZEPAM 5 MG/1
5 TABLET ORAL EVERY 6 HOURS PRN
Status: DISCONTINUED | OUTPATIENT
Start: 2017-11-28 | End: 2017-11-30 | Stop reason: HOSPADM

## 2017-11-28 RX ORDER — MORPHINE SULFATE 4 MG/ML
2 INJECTION, SOLUTION INTRAMUSCULAR; INTRAVENOUS
Status: DISCONTINUED | OUTPATIENT
Start: 2017-11-28 | End: 2017-11-30 | Stop reason: HOSPADM

## 2017-11-28 RX ADMIN — QUETIAPINE FUMARATE 100 MG: 100 TABLET ORAL at 19:21

## 2017-11-28 RX ADMIN — POTASSIUM CHLORIDE 20 MEQ: 20 TABLET, EXTENDED RELEASE ORAL at 07:50

## 2017-11-28 RX ADMIN — HYDROCODONE BITARTRATE AND ACETAMINOPHEN 2 TABLET: 5; 325 TABLET ORAL at 14:15

## 2017-11-28 RX ADMIN — DOCUSATE SODIUM 100 MG: 100 CAPSULE, LIQUID FILLED ORAL at 19:21

## 2017-11-28 RX ADMIN — ASPIRIN 81 MG CHEWABLE TABLET 81 MG: 81 TABLET CHEWABLE at 07:51

## 2017-11-28 RX ADMIN — TRAMADOL HYDROCHLORIDE 50 MG: 50 TABLET, FILM COATED ORAL at 07:50

## 2017-11-28 RX ADMIN — ONDANSETRON 4 MG: 2 INJECTION INTRAMUSCULAR; INTRAVENOUS at 23:45

## 2017-11-28 RX ADMIN — POTASSIUM CHLORIDE 20 MEQ: 20 TABLET, EXTENDED RELEASE ORAL at 19:19

## 2017-11-28 RX ADMIN — Medication 4 MG: at 05:40

## 2017-11-28 RX ADMIN — RIVAROXABAN 10 MG: 10 TABLET, FILM COATED ORAL at 17:40

## 2017-11-28 RX ADMIN — MORPHINE SULFATE 4 MG: 4 INJECTION, SOLUTION INTRAMUSCULAR; INTRAVENOUS at 15:29

## 2017-11-28 RX ADMIN — ESCITALOPRAM OXALATE 20 MG: 10 TABLET ORAL at 07:50

## 2017-11-28 RX ADMIN — CEFAZOLIN SODIUM 2 G: 2 SOLUTION INTRAVENOUS at 00:21

## 2017-11-28 RX ADMIN — LOSARTAN POTASSIUM 50 MG: 50 TABLET ORAL at 07:51

## 2017-11-28 RX ADMIN — METHOCARBAMOL 500 MG: 500 TABLET ORAL at 14:16

## 2017-11-28 RX ADMIN — HYDROCODONE BITARTRATE AND ACETAMINOPHEN 2 TABLET: 5; 325 TABLET ORAL at 23:45

## 2017-11-28 RX ADMIN — ONDANSETRON 4 MG: 2 INJECTION INTRAMUSCULAR; INTRAVENOUS at 09:13

## 2017-11-28 RX ADMIN — SODIUM CHLORIDE: 9 INJECTION, SOLUTION INTRAVENOUS at 02:43

## 2017-11-28 RX ADMIN — Medication 4 MG: at 02:47

## 2017-11-28 RX ADMIN — Medication 4 MG: at 11:45

## 2017-11-28 RX ADMIN — Medication 150 MG: at 19:19

## 2017-11-28 RX ADMIN — FERROUS SULFATE TAB 325 MG (65 MG ELEMENTAL FE) 325 MG: 325 (65 FE) TAB at 07:51

## 2017-11-28 RX ADMIN — FERROUS SULFATE TAB 325 MG (65 MG ELEMENTAL FE) 325 MG: 325 (65 FE) TAB at 19:20

## 2017-11-28 RX ADMIN — HYDROCODONE BITARTRATE AND ACETAMINOPHEN 2 TABLET: 5; 325 TABLET ORAL at 19:20

## 2017-11-28 RX ADMIN — OXYCODONE HYDROCHLORIDE 10 MG: 10 TABLET, FILM COATED, EXTENDED RELEASE ORAL at 07:50

## 2017-11-28 RX ADMIN — Medication 4 MG: at 09:12

## 2017-11-28 RX ADMIN — HYDROCODONE BITARTRATE AND ACETAMINOPHEN 2 TABLET: 5; 325 TABLET ORAL at 07:50

## 2017-11-28 RX ADMIN — Medication 150 MG: at 07:50

## 2017-11-28 RX ADMIN — DOCUSATE SODIUM 100 MG: 100 CAPSULE, LIQUID FILLED ORAL at 07:51

## 2017-11-28 RX ADMIN — METHOCARBAMOL 500 MG: 500 TABLET ORAL at 07:51

## 2017-11-28 RX ADMIN — MORPHINE SULFATE 4 MG: 4 INJECTION, SOLUTION INTRAMUSCULAR; INTRAVENOUS at 17:40

## 2017-11-28 RX ADMIN — VITAMIN B12 0.1 MG ORAL TABLET 50 MCG: 0.1 TABLET ORAL at 07:51

## 2017-11-28 RX ADMIN — HYDROCODONE BITARTRATE AND ACETAMINOPHEN 2 TABLET: 5; 325 TABLET ORAL at 00:31

## 2017-11-28 RX ADMIN — OXYCODONE HYDROCHLORIDE 10 MG: 10 TABLET, FILM COATED, EXTENDED RELEASE ORAL at 19:20

## 2017-11-28 RX ADMIN — LEVOTHYROXINE SODIUM 150 MCG: 75 TABLET ORAL at 05:40

## 2017-11-28 RX ADMIN — METHOCARBAMOL 500 MG: 500 TABLET ORAL at 19:20

## 2017-11-28 ASSESSMENT — PAIN SCALES - GENERAL
PAINLEVEL_OUTOF10: 9
PAINLEVEL_OUTOF10: 8
PAINLEVEL_OUTOF10: 9
PAINLEVEL_OUTOF10: 6
PAINLEVEL_OUTOF10: 8
PAINLEVEL_OUTOF10: 5
PAINLEVEL_OUTOF10: 4
PAINLEVEL_OUTOF10: 9
PAINLEVEL_OUTOF10: 8
PAINLEVEL_OUTOF10: 9
PAINLEVEL_OUTOF10: 9
PAINLEVEL_OUTOF10: 3

## 2017-11-28 ASSESSMENT — PAIN DESCRIPTION - LOCATION: LOCATION: KNEE

## 2017-11-28 ASSESSMENT — PAIN DESCRIPTION - ORIENTATION: ORIENTATION: LEFT

## 2017-11-28 NOTE — PROGRESS NOTES
Supervision  ADL  Feeding: Independent  Grooming: Independent  UE Bathing: Supervision  LE Bathing: Minimal assistance  UE Dressing: Supervision  LE Dressing: Minimal assistance  Toileting: Contact guard assistance           Transfers  Stand Step Transfers: Contact guard assistance  Sit to stand: Supervision     Cognition  Overall Cognitive Status: WNL                 LUE AROM (degrees)  LUE AROM : WNL  RUE AROM (degrees)  RUE AROM : WNL  LUE Strength  Gross LUE Strength: WNL  RUE Strength  Gross RUE Strength: WNL                  Assessment   Performance deficits / Impairments: Decreased ADL status; Decreased balance;Decreased endurance;Decreased functional mobility   Assessment: Pt doing well but has significant pain in L knee. Will progress as tolerated. Treatment Diagnosis: L TKR  Prognosis: Good  Decision Making: Low Complexity  REQUIRES OT FOLLOW UP: Yes  Activity Tolerance  Activity Tolerance: Patient Tolerated treatment well        Discharge Recommendations:        Plan   Plan  Times per week: 3-5x/week  Times per day: Daily    G-Code  OT G-codes  Functional Assessment Tool Used: ADLs, transfers  Score: CI  Functional Limitation: Self care  Self Care Current Status (): At least 1 percent but less than 20 percent impaired, limited or restricted  Self Care Goal Status ():  At least 1 percent but less than 20 percent impaired, limited or restricted  OutComes Score                                           Goals  Short term goals  Time Frame for Short term goals: 1 week  Short term goal 1: Supervision with toilet tfers  Short term goal 2: Supervision with clothing mgmt  Short term goal 3: Supervision with seated LB dsg  Long term goals  Long term goal 1: Return to PLOF       Therapy Time   Individual Concurrent Group Co-treatment   Time In           Time Out           Minutes                   ALE Golden/WON

## 2017-11-28 NOTE — CARE COORDINATION
Admission Information   Attending Provider Admitting Provider Admission Type Admission Date/Time   MD Sarah Irene MD Elective 11/27/17  9852   Discharge Date Hospital Service Auth/Cert Status Service Area    Med/Surg [facility] Department ([facility])   Unit Room/Bed Admission Status      [facility] 5 SURG SERVICES 0502/502-01 Admission (Confirmed)    Admission   Complaint   [facility] Account   Name Acct ID Class Status Primary Coverage   Fabian Mariano [id number] Inpatient Open MEDICARE - MEDICARE PART A AND B          Guarantor Account (for Hospital Account [de-identified])   Name Relation to Pt Service Area Active? Acct Type   Fabian Mariano Self [address] Yes Personal/Family   Address Phone         [address]  [place], [address] [facility] [phone](P)            Coverage Information (for Hospital Account [de-identified])   1. [facility] PART A AND B   F/O Payor/Plan Precert #   MEDICARE/MEDICARE PART A AND B    Subscriber Subscriber #   Fabian Mariano [id number]   Address Phone   [address] [address] [address] [place] [address] [phone]   2.  / FOR LIFE MEDICARE SUPP   F/O Payor/Plan Precert #   / FOR LIFE MEDICARE SUPP    Subscriber Subscriber #   Taylor Chaviraon [id number]   Address Phone   [address] [place] [address] [address], [facility]           Medical Problems   Comment      Last edited by  on  at    [address] Problem List   Never Reviewed            Codes Priority Class Noted POA   Arthritis of knee ICD-10-CM: M17.10  ICD-9-CM: 716.96   11/27/2017 Yes

## 2017-11-28 NOTE — PROGRESS NOTES
Orthopedic Surgery Left TKRA Progress Note    Evelin Rodriguez  2017  0502/502-01  POD# 1 Day Post-Op Procedure(s):  KNEE TOTAL ARTHROPLASTY    Subjective: Interval History: Pain not very well under control so far. States morphine works but does not last long enough. No other complaints this am. Has been ambulating to the bathroom. No signs or symptoms of post op anemia. Objective:     General: A&O x3, NAD, No signs or symptoms of PE. Wound: clean, dry, intact             Dressing: clean, dry, and intact   Extremity: Distal NVI, Soft throughout           DVT Exam: No evidence of DVT seen on physical exam.                   Data Review:  Vitals:  /63   Pulse 76   Temp 98.4 °F (36.9 °C) (Temporal)   Resp 14   SpO2 95%   Temp (24hrs), Av.1 °F (37.3 °C), Min:97.5 °F (36.4 °C), Max:99.8 °F (37.7 °C)      I/O (24Hr):     Intake/Output Summary (Last 24 hours) at 17 0744  Last data filed at 17 0544   Gross per 24 hour   Intake          3514.52 ml   Output              300 ml   Net          3214.52 ml       Labs:  2017  4:15 AM - Kristin Trinh Incoming Lab Results From [facility]     Component Results     Component Value Ref Range & Units Status Collected Lab   WBC 11.2   4.8 - 10.8 K/uL Final 2017  2:26 AM [facility]   RBC 2.75   4.20 - 5.40 M/uL Final 2017  2:26 AM [facility]   Hemoglobin 8.2   12.0 - 16.0 g/dL Final 2017  2:26 AM [facility]   Hematocrit 25.9   37.0 - 47.0 % Final 2017  2:26 AM [facility]   MCV 94.2  81.0 - 99.0 fL Final 2017  2:26 AM [facility]   MCH 29.8  27.0 - 31.0 pg Final 2017  2:26 AM [facility]   MCHC 31.7   33.0 - 37.0 g/dL Final 2017  2:26 AM [facility]   RDW 13.4  11.5 - 14.5 % Final 2017  2:26 AM [facility]   Platelets 560  052 - 400 K/uL Final 2017  2:26 AM [facility] MPV 10.7  9.4 - 12.3 fL Final 11/28/2017  2:26 AM [facility] Lab   Testing Performed By     Kay De Leon Name Director Address Valid Date Range   [address] LAB Radha Brock  [place],[address]  [address] [place] [address] 08/30/17 1233-Present         Assessment:     <principal problem not specified>  POD 1 Day Post-Op Procedure(s):  KNEE TOTAL ARTHROPLASTY      Plan:      1:  DVT prophylaxis, ICE, elevate  2:  Pain control  3:  Physical therapy/Occupation therapy  4:  Anticipate discharge 1-2 days if pain well controlled  5: Full weight bearing operative extremity    Electronically signed by Shaan Benton on 11/28/2017 at 7:44 AM

## 2017-11-29 LAB
BASOPHILS ABSOLUTE: 0 K/UL (ref 0–0.2)
BASOPHILS RELATIVE PERCENT: 0.2 % (ref 0–1)
EOSINOPHILS ABSOLUTE: 0.3 K/UL (ref 0–0.6)
EOSINOPHILS RELATIVE PERCENT: 2 % (ref 0–5)
HCT VFR BLD CALC: 30.2 % (ref 37–47)
HEMOGLOBIN: 9.2 G/DL (ref 12–16)
LYMPHOCYTES ABSOLUTE: 3.3 K/UL (ref 1.1–4.5)
LYMPHOCYTES RELATIVE PERCENT: 25.2 % (ref 20–40)
MCH RBC QN AUTO: 29.5 PG (ref 27–31)
MCHC RBC AUTO-ENTMCNC: 30.5 G/DL (ref 33–37)
MCV RBC AUTO: 96.8 FL (ref 81–99)
MONOCYTES ABSOLUTE: 1.8 K/UL (ref 0–0.9)
MONOCYTES RELATIVE PERCENT: 13.8 % (ref 0–10)
NEUTROPHILS ABSOLUTE: 7.6 K/UL (ref 1.5–7.5)
NEUTROPHILS RELATIVE PERCENT: 58.4 % (ref 50–65)
PDW BLD-RTO: 13.6 % (ref 11.5–14.5)
PLATELET # BLD: 210 K/UL (ref 130–400)
PMV BLD AUTO: 11 FL (ref 9.4–12.3)
RBC # BLD: 3.12 M/UL (ref 4.2–5.4)
WBC # BLD: 13 K/UL (ref 4.8–10.8)

## 2017-11-29 PROCEDURE — 1210000000 HC MED SURG R&B

## 2017-11-29 PROCEDURE — 85025 COMPLETE CBC W/AUTO DIFF WBC: CPT

## 2017-11-29 PROCEDURE — 2580000003 HC RX 258: Performed by: ORTHOPAEDIC SURGERY

## 2017-11-29 PROCEDURE — 6360000002 HC RX W HCPCS: Performed by: ORTHOPAEDIC SURGERY

## 2017-11-29 PROCEDURE — 6370000000 HC RX 637 (ALT 250 FOR IP): Performed by: ORTHOPAEDIC SURGERY

## 2017-11-29 PROCEDURE — 97116 GAIT TRAINING THERAPY: CPT

## 2017-11-29 PROCEDURE — 36415 COLL VENOUS BLD VENIPUNCTURE: CPT

## 2017-11-29 PROCEDURE — 6370000000 HC RX 637 (ALT 250 FOR IP): Performed by: PHYSICIAN ASSISTANT

## 2017-11-29 RX ADMIN — MORPHINE SULFATE 4 MG: 4 INJECTION, SOLUTION INTRAMUSCULAR; INTRAVENOUS at 11:46

## 2017-11-29 RX ADMIN — ONDANSETRON 4 MG: 2 INJECTION INTRAMUSCULAR; INTRAVENOUS at 06:25

## 2017-11-29 RX ADMIN — HYDROCODONE BITARTRATE AND ACETAMINOPHEN 2 TABLET: 5; 325 TABLET ORAL at 03:40

## 2017-11-29 RX ADMIN — ESCITALOPRAM OXALATE 20 MG: 10 TABLET ORAL at 08:05

## 2017-11-29 RX ADMIN — FERROUS SULFATE TAB 325 MG (65 MG ELEMENTAL FE) 325 MG: 325 (65 FE) TAB at 08:06

## 2017-11-29 RX ADMIN — HYDROCODONE BITARTRATE AND ACETAMINOPHEN 2 TABLET: 5; 325 TABLET ORAL at 17:56

## 2017-11-29 RX ADMIN — POTASSIUM CHLORIDE 20 MEQ: 20 TABLET, EXTENDED RELEASE ORAL at 20:55

## 2017-11-29 RX ADMIN — DIAZEPAM 5 MG: 5 TABLET ORAL at 20:55

## 2017-11-29 RX ADMIN — Medication 150 MG: at 08:06

## 2017-11-29 RX ADMIN — QUETIAPINE FUMARATE 100 MG: 100 TABLET ORAL at 20:55

## 2017-11-29 RX ADMIN — DOCUSATE SODIUM 100 MG: 100 CAPSULE, LIQUID FILLED ORAL at 20:55

## 2017-11-29 RX ADMIN — MORPHINE SULFATE 2 MG: 4 INJECTION, SOLUTION INTRAMUSCULAR; INTRAVENOUS at 06:27

## 2017-11-29 RX ADMIN — OXYCODONE HYDROCHLORIDE 10 MG: 10 TABLET, FILM COATED, EXTENDED RELEASE ORAL at 10:19

## 2017-11-29 RX ADMIN — TRAMADOL HYDROCHLORIDE 50 MG: 50 TABLET, FILM COATED ORAL at 08:07

## 2017-11-29 RX ADMIN — Medication 10 ML: at 11:46

## 2017-11-29 RX ADMIN — FERROUS SULFATE TAB 325 MG (65 MG ELEMENTAL FE) 325 MG: 325 (65 FE) TAB at 20:55

## 2017-11-29 RX ADMIN — METHOCARBAMOL 500 MG: 500 TABLET ORAL at 08:06

## 2017-11-29 RX ADMIN — LOSARTAN POTASSIUM 50 MG: 50 TABLET ORAL at 08:06

## 2017-11-29 RX ADMIN — VITAMIN B12 0.1 MG ORAL TABLET 50 MCG: 0.1 TABLET ORAL at 08:06

## 2017-11-29 RX ADMIN — POTASSIUM CHLORIDE 20 MEQ: 20 TABLET, EXTENDED RELEASE ORAL at 08:06

## 2017-11-29 RX ADMIN — RIVAROXABAN 10 MG: 10 TABLET, FILM COATED ORAL at 17:56

## 2017-11-29 RX ADMIN — Medication 10 ML: at 20:56

## 2017-11-29 RX ADMIN — HYDROCODONE BITARTRATE AND ACETAMINOPHEN 2 TABLET: 5; 325 TABLET ORAL at 13:57

## 2017-11-29 RX ADMIN — Medication 150 MG: at 20:55

## 2017-11-29 RX ADMIN — METHOCARBAMOL 500 MG: 500 TABLET ORAL at 13:57

## 2017-11-29 RX ADMIN — HYDROCODONE BITARTRATE AND ACETAMINOPHEN 2 TABLET: 5; 325 TABLET ORAL at 08:06

## 2017-11-29 RX ADMIN — LEVOTHYROXINE SODIUM 150 MCG: 75 TABLET ORAL at 05:37

## 2017-11-29 RX ADMIN — DOCUSATE SODIUM 100 MG: 100 CAPSULE, LIQUID FILLED ORAL at 08:06

## 2017-11-29 RX ADMIN — OXYCODONE HYDROCHLORIDE 10 MG: 10 TABLET, FILM COATED, EXTENDED RELEASE ORAL at 20:55

## 2017-11-29 RX ADMIN — ASPIRIN 81 MG CHEWABLE TABLET 81 MG: 81 TABLET CHEWABLE at 08:06

## 2017-11-29 RX ADMIN — METHOCARBAMOL 500 MG: 500 TABLET ORAL at 20:55

## 2017-11-29 ASSESSMENT — PAIN SCALES - GENERAL
PAINLEVEL_OUTOF10: 8
PAINLEVEL_OUTOF10: 8
PAINLEVEL_OUTOF10: 6
PAINLEVEL_OUTOF10: 9
PAINLEVEL_OUTOF10: 2
PAINLEVEL_OUTOF10: 8
PAINLEVEL_OUTOF10: 9
PAINLEVEL_OUTOF10: 0
PAINLEVEL_OUTOF10: 9
PAINLEVEL_OUTOF10: 9

## 2017-11-29 NOTE — PLAN OF CARE
Problem: Falls - Risk of  Goal: Absence of falls  Outcome: Ongoing      Problem: Pain:  Goal: Pain level will decrease  Pain level will decrease   Outcome: Ongoing    Goal: Control of acute pain  Control of acute pain   Outcome: Ongoing    Goal: Control of chronic pain  Control of chronic pain   Outcome: Ongoing      Problem: Discharge Planning:  Goal: Discharged to appropriate level of care  Discharged to appropriate level of care  Outcome: Ongoing      Problem: Mobility - Impaired:  Goal: Mobility will improve  Mobility will improve  Outcome: Ongoing      Problem: Infection - Surgical Site:  Goal: Will show no infection signs and symptoms  Will show no infection signs and symptoms  Outcome: Ongoing

## 2017-11-29 NOTE — PROGRESS NOTES
Physical Therapy  [id number]     11/29/17 1144   Subjective   Subjective Pt states she is ready to walk, states she is hurting   General Comment   Comments called for more pain meds    Bed Mobility   Supine to Sit Modified independent   Scooting Independent   Transfers   Sit to Stand Stand by assistance   Stand to sit Contact guard assistance   Bed to Chair Contact guard assistance   Ambulation   Ambulation? Yes   Ambulation 1   Surface level tile   Device Rolling Walker   Assistance Contact guard assistance   Quality of Gait very slow due to pain   Distance 75'   Comments back to chair   Short term goals   Time Frame for Short term goals 14 DAYS BEFORE RE EVAL   Short term goal 1 SUP<>SIT IND   Short term goal 2 SIT<>STAND SBA   Short term goal 3  FT WITH RW AND SBA   Short term goal 4 STEPS WITH CGA   Activity Tolerance   Activity Tolerance Patient limited by endurance; Patient limited by pain   Electronically signed by Marylene Chalk, PTA on 11/29/2017 at 11:46 AM

## 2017-11-29 NOTE — PROGRESS NOTES
Physical Therapy  [id number]     11/29/17 1604   Subjective   Subjective Pt states she is willing to walk again   General Comment   Comments wearing yellow gowns for treatment   Bed Mobility   Supine to Sit Modified independent   Sit to Supine Supervision   Transfers   Sit to Stand Stand by assistance   Stand to sit Contact guard assistance   Ambulation   Ambulation? Yes   Ambulation 1   Surface level tile   Device Rolling Walker   Assistance Contact guard assistance   Quality of Gait very slow due to pain   Distance 120'   Comments back to chair but having trouble bending knee during amb   Short term goals   Time Frame for Short term goals 14 DAYS BEFORE RE EVAL   Short term goal 1 SUP<>SIT IND   Short term goal 2 SIT<>STAND SBA   Short term goal 3  FT WITH RW AND SBA   Short term goal 4 STEPS WITH CGA   Activity Tolerance   Activity Tolerance Patient limited by endurance; Patient limited by pain   Electronically signed by Danae Anderson PTA on 11/29/2017 at 4:07 PM

## 2017-11-30 VITALS
HEART RATE: 79 BPM | DIASTOLIC BLOOD PRESSURE: 62 MMHG | SYSTOLIC BLOOD PRESSURE: 109 MMHG | TEMPERATURE: 98 F | OXYGEN SATURATION: 90 % | RESPIRATION RATE: 17 BRPM

## 2017-11-30 LAB
BASOPHILS ABSOLUTE: 0 K/UL (ref 0–0.2)
BASOPHILS RELATIVE PERCENT: 0.3 % (ref 0–1)
EOSINOPHILS ABSOLUTE: 0.6 K/UL (ref 0–0.6)
EOSINOPHILS RELATIVE PERCENT: 4.9 % (ref 0–5)
HCT VFR BLD CALC: 28.2 % (ref 37–47)
HEMOGLOBIN: 8.7 G/DL (ref 12–16)
LYMPHOCYTES ABSOLUTE: 3.4 K/UL (ref 1.1–4.5)
LYMPHOCYTES RELATIVE PERCENT: 27.8 % (ref 20–40)
MCH RBC QN AUTO: 29.7 PG (ref 27–31)
MCHC RBC AUTO-ENTMCNC: 30.9 G/DL (ref 33–37)
MCV RBC AUTO: 96.2 FL (ref 81–99)
MONOCYTES ABSOLUTE: 1.4 K/UL (ref 0–0.9)
MONOCYTES RELATIVE PERCENT: 11.7 % (ref 0–10)
NEUTROPHILS ABSOLUTE: 6.7 K/UL (ref 1.5–7.5)
NEUTROPHILS RELATIVE PERCENT: 55 % (ref 50–65)
PDW BLD-RTO: 13.4 % (ref 11.5–14.5)
PLATELET # BLD: 198 K/UL (ref 130–400)
PMV BLD AUTO: 10.8 FL (ref 9.4–12.3)
RBC # BLD: 2.93 M/UL (ref 4.2–5.4)
WBC # BLD: 12.1 K/UL (ref 4.8–10.8)

## 2017-11-30 PROCEDURE — 36415 COLL VENOUS BLD VENIPUNCTURE: CPT

## 2017-11-30 PROCEDURE — 6370000000 HC RX 637 (ALT 250 FOR IP): Performed by: ORTHOPAEDIC SURGERY

## 2017-11-30 PROCEDURE — 85025 COMPLETE CBC W/AUTO DIFF WBC: CPT

## 2017-11-30 RX ORDER — OXYCODONE HCL 10 MG/1
10 TABLET, FILM COATED, EXTENDED RELEASE ORAL EVERY 12 HOURS SCHEDULED
Qty: 28 TABLET | Refills: 0 | Status: SHIPPED | OUTPATIENT
Start: 2017-11-30 | End: 2017-12-14

## 2017-11-30 RX ORDER — OXYCODONE AND ACETAMINOPHEN 7.5; 325 MG/1; MG/1
1 TABLET ORAL EVERY 4 HOURS PRN
Qty: 60 TABLET | Refills: 0 | Status: SHIPPED | OUTPATIENT
Start: 2017-11-30 | End: 2017-12-07

## 2017-11-30 RX ADMIN — OXYCODONE HYDROCHLORIDE 10 MG: 10 TABLET, FILM COATED, EXTENDED RELEASE ORAL at 08:20

## 2017-11-30 RX ADMIN — VITAMIN B12 0.1 MG ORAL TABLET 50 MCG: 0.1 TABLET ORAL at 08:19

## 2017-11-30 RX ADMIN — LEVOTHYROXINE SODIUM 150 MCG: 75 TABLET ORAL at 06:01

## 2017-11-30 RX ADMIN — POTASSIUM CHLORIDE 20 MEQ: 20 TABLET, EXTENDED RELEASE ORAL at 08:19

## 2017-11-30 RX ADMIN — FERROUS SULFATE TAB 325 MG (65 MG ELEMENTAL FE) 325 MG: 325 (65 FE) TAB at 08:19

## 2017-11-30 RX ADMIN — ESCITALOPRAM OXALATE 20 MG: 10 TABLET ORAL at 08:20

## 2017-11-30 RX ADMIN — LOSARTAN POTASSIUM 50 MG: 50 TABLET ORAL at 08:19

## 2017-11-30 RX ADMIN — ASPIRIN 81 MG CHEWABLE TABLET 81 MG: 81 TABLET CHEWABLE at 08:19

## 2017-11-30 RX ADMIN — DOCUSATE SODIUM 100 MG: 100 CAPSULE, LIQUID FILLED ORAL at 08:19

## 2017-11-30 RX ADMIN — Medication 150 MG: at 08:20

## 2017-11-30 RX ADMIN — TRAMADOL HYDROCHLORIDE 50 MG: 50 TABLET, FILM COATED ORAL at 08:20

## 2017-11-30 RX ADMIN — HYDROCODONE BITARTRATE AND ACETAMINOPHEN 2 TABLET: 5; 325 TABLET ORAL at 07:18

## 2017-11-30 RX ADMIN — METHOCARBAMOL 500 MG: 500 TABLET ORAL at 08:20

## 2017-11-30 RX ADMIN — HYDROCODONE BITARTRATE AND ACETAMINOPHEN 2 TABLET: 5; 325 TABLET ORAL at 02:44

## 2017-11-30 ASSESSMENT — PAIN SCALES - GENERAL
PAINLEVEL_OUTOF10: 8

## 2017-11-30 NOTE — DISCHARGE SUMMARY
day  sodium chloride flush 0.9 % injection 10 mL, 10 mL, Intravenous, PRN  acetaminophen (TYLENOL) tablet 650 mg, 650 mg, Oral, Q4H PRN  docusate sodium (COLACE) capsule 100 mg, 100 mg, Oral, BID  ondansetron (ZOFRAN) injection 4 mg, 4 mg, Intravenous, Q6H PRN  0.9 % sodium chloride infusion, , Intravenous, Continuous  HYDROcodone-acetaminophen (NORCO) 5-325 MG per tablet 1 tablet, 1 tablet, Oral, Q4H PRN **OR** HYDROcodone-acetaminophen (NORCO) 5-325 MG per tablet 2 tablet, 2 tablet, Oral, Q4H PRN  oxyCODONE (OXYCONTIN) extended release tablet 10 mg, 10 mg, Oral, 2 times per day  rivaroxaban (XARELTO) tablet 10 mg, 10 mg, Oral, Daily    Hospital Course   Post-operatively the patients diet was advanced as tolerated. The incision is dressing in place, clean, dry and intact with no signs of infection. The patient remained neurovascularly intact in the lower extremity and had intact pulses distally. The patient was able to move their knee, ankle, and foot without any problems post-operatively. Physical therapy and occupational therapy were consulted and began working with the patient post-operatively. The patient progressed with PT/OT as would be expected and continued to improve through their stay. The patients pain was initially controlled with IV medications but we were able to transition to oral pain medications soon after arrival to the floor and their pain remained under good control through their hospital stay. From a medical standpoint the patient remained stable and continued to have the medicine team follow throughout their stay. The patients dressing was changed/incison was checked on day of d/c. The patient will be discharged at this time to Home  with their current diet restrictions and will continue to follow the total knee precautions outlined to them by us and PT/OT. Condition on Discharge: Stable    Plan  Return visit in 2 weeks. No return appointment scheduled. .  Patient was instructed

## 2017-11-30 NOTE — PROGRESS NOTES
Patient discharged home today with home health to follow. New medications reviewed with patient as well as discharge instructions. Patient verbalized understanding. Patient stable upon discharge.   Electronically signed by Cassidy Bucio RN on 11/30/2017 at 7:39 AM

## 2017-11-30 NOTE — DISCHARGE INSTR - DIET

## 2018-06-19 ENCOUNTER — TRANSCRIBE ORDERS (OUTPATIENT)
Dept: ADMINISTRATIVE | Facility: HOSPITAL | Age: 73
End: 2018-06-19

## 2018-06-19 DIAGNOSIS — R10.9 ABDOMINAL PAIN, UNSPECIFIED ABDOMINAL LOCATION: Primary | ICD-10-CM

## 2018-07-02 ENCOUNTER — HOSPITAL ENCOUNTER (OUTPATIENT)
Dept: CT IMAGING | Facility: HOSPITAL | Age: 73
Discharge: HOME | End: 2018-07-02
Attending: INTERNAL MEDICINE | Admitting: INTERNAL MEDICINE

## 2018-07-02 DIAGNOSIS — R10.9 ABDOMINAL PAIN, UNSPECIFIED ABDOMINAL LOCATION: ICD-10-CM

## 2018-07-02 PROCEDURE — 74176 CT ABD & PELVIS W/O CONTRAST: CPT

## 2018-10-17 ENCOUNTER — TRANSCRIBE ORDERS (OUTPATIENT)
Dept: ADMINISTRATIVE | Facility: HOSPITAL | Age: 73
End: 2018-10-17

## 2018-10-17 DIAGNOSIS — R10.2 PELVIC AND PERINEAL PAIN: ICD-10-CM

## 2018-10-17 DIAGNOSIS — Z12.31 ENCOUNTER FOR SCREENING MAMMOGRAM FOR MALIGNANT NEOPLASM OF BREAST: Primary | ICD-10-CM

## 2019-04-29 ENCOUNTER — APPOINTMENT (OUTPATIENT)
Dept: LAB | Facility: HOSPITAL | Age: 74
End: 2019-04-29

## 2019-04-29 ENCOUNTER — TRANSCRIBE ORDERS (OUTPATIENT)
Dept: ADMINISTRATIVE | Facility: HOSPITAL | Age: 74
End: 2019-04-29

## 2019-04-29 DIAGNOSIS — N18.30 CHRONIC KIDNEY DISEASE, STAGE III (MODERATE) (HCC): Primary | ICD-10-CM

## 2019-04-29 DIAGNOSIS — E03.9 HYPOTHYROIDISM, UNSPECIFIED TYPE: ICD-10-CM

## 2019-04-29 LAB
ALBUMIN SERPL-MCNC: 4 G/DL (ref 3.5–5)
ALBUMIN/GLOB SERPL: 1.4 G/DL (ref 1.1–2.5)
ALP SERPL-CCNC: 124 U/L (ref 24–120)
ALT SERPL W P-5'-P-CCNC: <15 U/L (ref 0–54)
ANION GAP SERPL CALCULATED.3IONS-SCNC: 10 MMOL/L (ref 4–13)
AST SERPL-CCNC: 19 U/L (ref 7–45)
BASOPHILS # BLD AUTO: 0.09 10*3/MM3 (ref 0–0.2)
BASOPHILS NFR BLD AUTO: 1.7 % (ref 0–2)
BILIRUB SERPL-MCNC: 0.3 MG/DL (ref 0.1–1)
BUN BLD-MCNC: 29 MG/DL (ref 5–21)
BUN/CREAT SERPL: 14.3 (ref 7–25)
CALCIUM SPEC-SCNC: 8.4 MG/DL (ref 8.4–10.4)
CHLORIDE SERPL-SCNC: 113 MMOL/L (ref 98–110)
CO2 SERPL-SCNC: 17 MMOL/L (ref 24–31)
CREAT BLD-MCNC: 2.03 MG/DL (ref 0.5–1.4)
DEPRECATED RDW RBC AUTO: 52.6 FL (ref 40–54)
EOSINOPHIL # BLD AUTO: 0.63 10*3/MM3 (ref 0–0.7)
EOSINOPHIL NFR BLD AUTO: 11.6 % (ref 0–4)
ERYTHROCYTE [DISTWIDTH] IN BLOOD BY AUTOMATED COUNT: 15.1 % (ref 12–15)
GFR SERPL CREATININE-BSD FRML MDRD: 24 ML/MIN/1.73
GLOBULIN UR ELPH-MCNC: 2.8 GM/DL
GLUCOSE BLD-MCNC: 86 MG/DL (ref 70–100)
HCT VFR BLD AUTO: 33.2 % (ref 37–47)
HGB BLD-MCNC: 10.2 G/DL (ref 12–16)
IMM GRANULOCYTES # BLD AUTO: 0.01 10*3/MM3 (ref 0–0.05)
IMM GRANULOCYTES NFR BLD AUTO: 0.2 % (ref 0–5)
LYMPHOCYTES # BLD AUTO: 2.03 10*3/MM3 (ref 0.72–4.86)
LYMPHOCYTES NFR BLD AUTO: 37.2 % (ref 15–45)
MCH RBC QN AUTO: 29.3 PG (ref 28–32)
MCHC RBC AUTO-ENTMCNC: 30.7 G/DL (ref 33–36)
MCV RBC AUTO: 95.4 FL (ref 82–98)
MONOCYTES # BLD AUTO: 0.64 10*3/MM3 (ref 0.19–1.3)
MONOCYTES NFR BLD AUTO: 11.7 % (ref 4–12)
NEUTROPHILS # BLD AUTO: 2.05 10*3/MM3 (ref 1.87–8.4)
NEUTROPHILS NFR BLD AUTO: 37.6 % (ref 39–78)
NRBC BLD AUTO-RTO: 0 /100 WBC (ref 0–0.2)
PLATELET # BLD AUTO: 219 10*3/MM3 (ref 130–400)
PMV BLD AUTO: 10.5 FL (ref 6–12)
POTASSIUM BLD-SCNC: 4.9 MMOL/L (ref 3.5–5.3)
PROT SERPL-MCNC: 6.8 G/DL (ref 6.3–8.7)
RBC # BLD AUTO: 3.48 10*6/MM3 (ref 4.2–5.4)
SODIUM BLD-SCNC: 140 MMOL/L (ref 135–145)
TSH SERPL DL<=0.05 MIU/L-ACNC: 3.55 MIU/ML (ref 0.47–4.68)
URATE SERPL-MCNC: 5.7 MG/DL (ref 2.7–7.5)
WBC NRBC COR # BLD: 5.45 10*3/MM3 (ref 4.8–10.8)

## 2019-04-29 PROCEDURE — 84443 ASSAY THYROID STIM HORMONE: CPT | Performed by: INTERNAL MEDICINE

## 2019-04-29 PROCEDURE — 80053 COMPREHEN METABOLIC PANEL: CPT | Performed by: INTERNAL MEDICINE

## 2019-04-29 PROCEDURE — 84550 ASSAY OF BLOOD/URIC ACID: CPT | Performed by: INTERNAL MEDICINE

## 2019-04-29 PROCEDURE — 36415 COLL VENOUS BLD VENIPUNCTURE: CPT | Performed by: INTERNAL MEDICINE

## 2019-04-29 PROCEDURE — 85025 COMPLETE CBC W/AUTO DIFF WBC: CPT | Performed by: INTERNAL MEDICINE

## 2019-05-22 ENCOUNTER — TRANSCRIBE ORDERS (OUTPATIENT)
Dept: ADMINISTRATIVE | Facility: HOSPITAL | Age: 74
End: 2019-05-22

## 2019-05-22 DIAGNOSIS — R51.9 NONINTRACTABLE HEADACHE, UNSPECIFIED CHRONICITY PATTERN, UNSPECIFIED HEADACHE TYPE: Primary | ICD-10-CM

## 2019-05-28 ENCOUNTER — HOSPITAL ENCOUNTER (OUTPATIENT)
Dept: MRI IMAGING | Facility: HOSPITAL | Age: 74
Discharge: HOME | End: 2019-05-28
Admitting: INTERNAL MEDICINE

## 2019-05-28 DIAGNOSIS — R51.9 NONINTRACTABLE HEADACHE, UNSPECIFIED CHRONICITY PATTERN, UNSPECIFIED HEADACHE TYPE: ICD-10-CM

## 2019-05-28 PROCEDURE — 70551 MRI BRAIN STEM W/O DYE: CPT

## 2019-12-12 ENCOUNTER — TRANSCRIBE ORDERS (OUTPATIENT)
Dept: ADMINISTRATIVE | Facility: HOSPITAL | Age: 74
End: 2019-12-12

## 2019-12-12 DIAGNOSIS — Z78.0 ASYMPTOMATIC MENOPAUSAL STATE: ICD-10-CM

## 2019-12-12 DIAGNOSIS — M85.80 OTHER SPECIFIED DISORDERS OF BONE DENSITY AND STRUCTURE, UNSPECIFIED SITE: Primary | ICD-10-CM

## 2019-12-20 ENCOUNTER — APPOINTMENT (OUTPATIENT)
Dept: PREADMISSION TESTING | Facility: HOSPITAL | Age: 74
End: 2019-12-20

## 2019-12-20 VITALS
BODY MASS INDEX: 29.22 KG/M2 | SYSTOLIC BLOOD PRESSURE: 141 MMHG | DIASTOLIC BLOOD PRESSURE: 66 MMHG | HEART RATE: 90 BPM | WEIGHT: 164.9 LBS | HEIGHT: 63 IN | OXYGEN SATURATION: 98 %

## 2019-12-20 LAB
ANION GAP SERPL CALCULATED.3IONS-SCNC: 12 MMOL/L (ref 5–15)
BUN BLD-MCNC: 31 MG/DL (ref 8–23)
BUN/CREAT SERPL: 15.7 (ref 7–25)
CALCIUM SPEC-SCNC: 8.7 MG/DL (ref 8.6–10.5)
CHLORIDE SERPL-SCNC: 109 MMOL/L (ref 98–107)
CO2 SERPL-SCNC: 21 MMOL/L (ref 22–29)
CREAT BLD-MCNC: 1.98 MG/DL (ref 0.57–1)
DEPRECATED RDW RBC AUTO: 50.2 FL (ref 37–54)
ERYTHROCYTE [DISTWIDTH] IN BLOOD BY AUTOMATED COUNT: 14.5 % (ref 12.3–15.4)
GFR SERPL CREATININE-BSD FRML MDRD: 25 ML/MIN/1.73
GLUCOSE BLD-MCNC: 91 MG/DL (ref 65–99)
HCT VFR BLD AUTO: 31.8 % (ref 34–46.6)
HGB BLD-MCNC: 10.3 G/DL (ref 12–15.9)
MCH RBC QN AUTO: 30.6 PG (ref 26.6–33)
MCHC RBC AUTO-ENTMCNC: 32.4 G/DL (ref 31.5–35.7)
MCV RBC AUTO: 94.4 FL (ref 79–97)
PLATELET # BLD AUTO: 248 10*3/MM3 (ref 140–450)
PMV BLD AUTO: 10.1 FL (ref 6–12)
POTASSIUM BLD-SCNC: 4.3 MMOL/L (ref 3.5–5.2)
RBC # BLD AUTO: 3.37 10*6/MM3 (ref 3.77–5.28)
SODIUM BLD-SCNC: 142 MMOL/L (ref 136–145)
WBC NRBC COR # BLD: 6.26 10*3/MM3 (ref 3.4–10.8)

## 2019-12-20 PROCEDURE — 93010 ELECTROCARDIOGRAM REPORT: CPT | Performed by: INTERNAL MEDICINE

## 2019-12-20 PROCEDURE — 36415 COLL VENOUS BLD VENIPUNCTURE: CPT

## 2019-12-20 PROCEDURE — 80048 BASIC METABOLIC PNL TOTAL CA: CPT | Performed by: ORTHOPAEDIC SURGERY

## 2019-12-20 PROCEDURE — 85027 COMPLETE CBC AUTOMATED: CPT | Performed by: ORTHOPAEDIC SURGERY

## 2019-12-20 PROCEDURE — 93005 ELECTROCARDIOGRAM TRACING: CPT

## 2019-12-20 RX ORDER — ALLOPURINOL 100 MG/1
100 TABLET ORAL DAILY
Status: ON HOLD | COMMUNITY
End: 2020-10-30

## 2019-12-20 RX ORDER — AMLODIPINE BESYLATE 5 MG/1
5 TABLET ORAL DAILY
COMMUNITY
End: 2021-08-10

## 2019-12-20 RX ORDER — PROMETHAZINE HYDROCHLORIDE 25 MG/1
25 TABLET ORAL EVERY 8 HOURS PRN
COMMUNITY

## 2019-12-20 RX ORDER — ERYTHROPOIETIN 10000 [IU]/ML
INJECTION, SOLUTION INTRAVENOUS; SUBCUTANEOUS AS NEEDED
COMMUNITY

## 2019-12-20 RX ORDER — BUPROPION HYDROCHLORIDE 300 MG/1
300 TABLET ORAL DAILY
Status: ON HOLD | COMMUNITY
End: 2020-10-30

## 2019-12-20 RX ORDER — CALCITRIOL 0.25 UG/1
0.25 CAPSULE, LIQUID FILLED ORAL DAILY
COMMUNITY

## 2019-12-20 RX ORDER — DIAZEPAM 5 MG/1
5 TABLET ORAL DAILY
COMMUNITY

## 2019-12-20 RX ORDER — LEVOTHYROXINE SODIUM 175 UG/1
150 TABLET ORAL DAILY
COMMUNITY

## 2019-12-20 RX ORDER — TOPIRAMATE 100 MG/1
100 TABLET, FILM COATED ORAL DAILY
COMMUNITY

## 2019-12-20 RX ORDER — RANITIDINE 150 MG/1
150 TABLET ORAL DAILY
Status: ON HOLD | COMMUNITY
End: 2020-10-30

## 2019-12-20 NOTE — DISCHARGE INSTRUCTIONS
DAY OF SURGERY INSTRUCTIONS        YOUR SURGEON: ***    PROCEDURE: ***left reverse total shoulder arthroplasty    DATE OF SURGERY: ***12/27/19    ARRIVAL TIME: AS DIRECTED BY OFFICE    YOU MAY TAKE THE FOLLOWING MEDICATION(S) THE MORNING OF SURGERY WITH A SIP OF WATER: ***diazepam (do not take telmisartan 24 hours prior to surgery per anesthesia)      ALL OTHER HOME MEDICATION CHECK WITH YOUR PHYSICIAN                MANAGING PAIN AFTER SURGERY    We know you are probably wondering what your pain will be like after surgery.  Following surgery it is unrealistic to expect you will not have pain.   Pain is how our bodies let us know that something is wrong or cautions us to be careful.  That said, our goal is to make your pain tolerable.    Methods we may use to treat your pain include (oral or IV medications, PCAs, epidurals, nerve blocks, etc.)   While some procedures require IV pain medications for a short time after surgery, transitioning to pain medications by mouth allows for better management of pain.   Your nurse will encourage you to take oral pain medications whenever possible.  IV medications work almost immediately, but only last a short while.  Taking medications by mouth allows for a more constant level of medication in your blood stream for a longer period of time.      Once your pain is out of control it is harder to get back under control.  It is important you are aware when your next dose of pain medication is due.  If you are admitted, your nurse may write the time of your next dose on the white board in your room to help you remember.      We are interested in your pain and encourage you to inform us about aggravating factors during your visit.   Many times a simple repositioning every few hours can make a big difference.    If your physician says it is okay, do not let your pain prevent you from getting out of bed. Be sure to call your nurse for assistance prior to getting up so you do not  fall.      Before surgery, please decide your tolerable pain goal.  These faces help describe the pain ratings we use on a 0-10 scale.   Be prepared to tell us your goal and whether or not you take pain or anxiety medications at home.          BEFORE YOU COME TO THE HOSPITAL  (Pre-op instructions)  • Do not eat, drink, smoke or chew gum after midnight the night before surgery.  This also includes no mints.  • Morning of surgery take only the medicines you have been instructed with a sip of water unless otherwise instructed  by your physician.  • Do not shave, wear makeup or dark nail polish.  • Remove all jewelry including rings.  • Leave anything you consider valuable at home.  • Leave your suitcase in the car until after your surgery.  • Bring the following with you if applicable:  o Picture ID and insurance, Medicare or Medicaid cards  o Co-pay/deductible required by insurance (cash, check, credit card)  o Copy of advance directive, living will or power-of- documents if not brought to PAT  o CPAP or BIPAP mask and tubing  o Relaxation aids ( book, magazine), etc.  o Hearing aids                        ON THE DAY OF SURGERY  · On the day of surgery check in at registration located at the main entrance of the hospital.   ? You will be registered and given a beeper with instructions where to wait in the main lobby.  ? When your beeper lights up and vibrates a member of the Outpatient Surgery staff will meet you at the double doors under the stair steps and escort you to your preoperative room.   · You may have cloth compression devices placed on your legs. These help to prevent blood clots and reduce swelling in your legs.  · An IV may be inserted into one of your veins.  · In the operating room, you may be given one or more of the following:  ? A medicine to help you relax (sedative).  ? A medicine to numb the area (local anesthetic).  ? A medicine to make you fall asleep (general anesthetic).  ? A medicine  "that is injected into an area of your body to numb everything below the injection site (regional anesthetic).  · Your surgical site will be marked or identified.  · You may be given an antibiotic through your IV to help prevent infection.  Contact a health care provider if you:  · Develop a fever of more than 100.4°F (38°C) or other feelings of illness during the 48 hours before your surgery.  · Have symptoms that get worse.  Have questions or concerns about your surgery    General Anesthesia/Surgery, Adult  General anesthesia is the use of medicines to make a person \"go to sleep\" (unconscious) for a medical procedure. General anesthesia must be used for certain procedures, and is often recommended for procedures that:  · Last a long time.  · Require you to be still or in an unusual position.  · Are major and can cause blood loss.  The medicines used for general anesthesia are called general anesthetics. As well as making you unconscious for a certain amount of time, these medicines:  · Prevent pain.  · Control your blood pressure.  · Relax your muscles.  Tell a health care provider about:  · Any allergies you have.  · All medicines you are taking, including vitamins, herbs, eye drops, creams, and over-the-counter medicines.  · Any problems you or family members have had with anesthetic medicines.  · Types of anesthetics you have had in the past.  · Any blood disorders you have.  · Any surgeries you have had.  · Any medical conditions you have.  · Any recent upper respiratory, chest, or ear infections.  · Any history of:  ? Heart or lung conditions, such as heart failure, sleep apnea, asthma, or chronic obstructive pulmonary disease (COPD).  ?  service.  ? Depression or anxiety.  · Any tobacco or drug use, including marijuana or alcohol use.  · Whether you are pregnant or may be pregnant.  What are the risks?  Generally, this is a safe procedure. However, problems may occur, including:  · Allergic " reaction.  · Lung and heart problems.  · Inhaling food or liquid from the stomach into the lungs (aspiration).  · Nerve injury.  · Air in the bloodstream, which can lead to stroke.  · Extreme agitation or confusion (delirium) when you wake up from the anesthetic.  · Waking up during your procedure and being unable to move. This is rare.  These problems are more likely to develop if you are having a major surgery or if you have an advanced or serious medical condition. You can prevent some of these complications by answering all of your health care provider's questions thoroughly and by following all instructions before your procedure.  General anesthesia can cause side effects, including:  · Nausea or vomiting.  · A sore throat from the breathing tube.  · Hoarseness.  · Wheezing or coughing.  · Shaking chills.  · Tiredness.  · Body aches.  · Anxiety.  · Sleepiness or drowsiness.  · Confusion or agitation.  RISKS AND COMPLICATIONS OF SURGERY  Your health care provider will discuss possible risks and complications with you before surgery. Common risks and complications include:    · Problems due to the use of anesthetics.  · Blood loss and replacement (does not apply to minor surgical procedures).  · Temporary increase in pain due to surgery.  · Uncorrected pain or problems that the surgery was meant to correct.  · Infection.  · New damage.    What happens before the procedure?    Medicines  Ask your health care provider about:  · Changing or stopping your regular medicines. This is especially important if you are taking diabetes medicines or blood thinners.  · Taking medicines such as aspirin and ibuprofen. These medicines can thin your blood. Do not take these medicines unless your health care provider tells you to take them.  · Taking over-the-counter medicines, vitamins, herbs, and supplements. Do not take these during the week before your procedure unless your health care provider approves them.  General  instructions  · Starting 3-6 weeks before the procedure, do not use any products that contain nicotine or tobacco, such as cigarettes and e-cigarettes. If you need help quitting, ask your health care provider.  · If you brush your teeth on the morning of the procedure, make sure to spit out all of the toothpaste.  · Tell your health care provider if you become ill or develop a cold, cough, or fever.  · If instructed by your health care provider, bring your sleep apnea device with you on the day of your surgery (if applicable).  · Ask your health care provider if you will be going home the same day, the following day, or after a longer hospital stay.  ? Plan to have someone take you home from the hospital or clinic.  ? Plan to have a responsible adult care for you for at least 24 hours after you leave the hospital or clinic. This is important.  What happens during the procedure?  · You will be given anesthetics through both of the following:  ? A mask placed over your nose and mouth.  ? An IV in one of your veins.  · You may receive a medicine to help you relax (sedative).  · After you are unconscious, a breathing tube may be inserted down your throat to help you breathe. This will be removed before you wake up.  · An anesthesia specialist will stay with you throughout your procedure. He or she will:  ? Keep you comfortable and safe by continuing to give you medicines and adjusting the amount of medicine that you get.  ? Monitor your blood pressure, pulse, and oxygen levels to make sure that the anesthetics do not cause any problems.  The procedure may vary among health care providers and hospitals.  What happens after the procedure?  · Your blood pressure, temperature, heart rate, breathing rate, and blood oxygen level will be monitored until the medicines you were given have worn off.  · You will wake up in a recovery area. You may wake up slowly.  · If you feel anxious or agitated, you may be given medicine to  help you calm down.  · If you will be going home the same day, your health care provider may check to make sure you can walk, drink, and urinate.  · Your health care provider will treat any pain or side effects you have before you go home.  · Do not drive for 24 hours if you were given a sedative.  Summary  · General anesthesia is used to keep you still and prevent pain during a procedure.  · It is important to tell your healthcare provider about your medical history and any surgeries you have had, and previous experience with anesthesia.  · Follow your healthcare provider’s instructions about when to stop eating, drinking, or taking certain medicines before your procedure.  · Plan to have someone take you home from the hospital or clinic.  This information is not intended to replace advice given to you by your health care provider. Make sure you discuss any questions you have with your health care provider.  Document Released: 03/26/2009 Document Revised: 08/03/2018 Document Reviewed: 08/03/2018  [employer] Interactive Patient Education © 2019 [employer]       Fall Prevention in Hospitals, Adult  As a hospital patient, your condition and the treatments you receive can increase your risk for falls. Some additional risk factors for falls in a hospital include:  · Being in an unfamiliar environment.  · Being on bed rest.  · Your surgery.  · Taking certain medicines.  · Your tubing requirements, such as intravenous (IV) therapy or catheters.  It is important that you learn how to decrease fall risks while at the hospital. Below are important tips that can help prevent falls.  SAFETY TIPS FOR PREVENTING FALLS  Talk about your risk of falling.  · Ask your health care provider why you are at risk for falling. Is it your medicine, illness, tubing placement, or something else?  · Make a plan with your health care provider to keep you safe from falls.  · Ask your health care provider or pharmacist about side effects of your  medicines. Some medicines can make you dizzy or affect your coordination.  Ask for help.  · Ask for help before getting out of bed. You may need to press your call button.  · Ask for assistance in getting safely to the toilet.  · Ask for a walker or cane to be put at your bedside. Ask that most of the side rails on your bed be placed up before your health care provider leaves the room.  · Ask family or friends to sit with you.  · Ask for things that are out of your reach, such as your glasses, hearing aids, telephone, bedside table, or call button.  Follow these tips to avoid falling:  · Stay lying or seated, rather than standing, while waiting for help.  · Wear rubber-soled slippers or shoes whenever you walk in the hospital.  · Avoid quick, sudden movements.  ¨ Change positions slowly.  ¨ Sit on the side of your bed before standing.  ¨ Stand up slowly and wait before you start to walk.  · Let your health care provider know if there is a spill on the floor.  · Pay careful attention to the medical equipment, electrical cords, and tubes around you.  · When you need help, use your call button by your bed or in the bathroom. Wait for one of your health care providers to help you.  · If you feel dizzy or unsure of your footing, return to bed and wait for assistance.  · Avoid being distracted by the TV, telephone, or another person in your room.  · Do not lean or support yourself on rolling objects, such as IV poles or bedside tables.     This information is not intended to replace advice given to you by your health care provider. Make sure you discuss any questions you have with your health care provider.     Document Released: 12/15/2001 Document Revised: 01/08/2016 Document Reviewed: 08/25/2013  [employer] Interactive Patient Education ©2016 [employer]       Surgical Site Infections FAQs  What is a Surgical Site Infection (SSI)?  A surgical site infection is an infection that occurs after surgery in the part of the  body where the surgery took place. Most patients who have surgery do not develop an infection. However, infections develop in about 1 to 3 out of every 100 patients who have surgery.  Some of the common symptoms of a surgical site infection are:  · Redness and pain around the area where you had surgery  · Drainage of cloudy fluid from your surgical wound  · Fever  Can SSIs be treated?  Yes. Most surgical site infections can be treated with antibiotics. The antibiotic given to you depends on the bacteria (germs) causing the infection. Sometimes patients with SSIs also need another surgery to treat the infection.  What are some of the things that hospitals are doing to prevent SSIs?  To prevent SSIs, doctors, nurses, and other healthcare providers:  · Clean their hands and arms up to their elbows with an antiseptic agent just before the surgery.  · Clean their hands with soap and water or an alcohol-based hand rub before and after caring for each patient.  · May remove some of your hair immediately before your surgery using electric clippers if the hair is in the same area where the procedure will occur. They should not shave you with a razor.  · Wear special hair covers, masks, gowns, and gloves during surgery to keep the surgery area clean.  · Give you antibiotics before your surgery starts. In most cases, you should get antibiotics within 60 minutes before the surgery starts and the antibiotics should be stopped within 24 hours after surgery.  · Clean the skin at the site of your surgery with a special soap that kills germs.  What can I do to help prevent SSIs?  Before your surgery:  · Tell your doctor about other medical problems you may have. Health problems such as allergies, diabetes, and obesity could affect your surgery and your treatment.  · Quit smoking. Patients who smoke get more infections. Talk to your doctor about how you can quit before your surgery.  · Do not shave near where you will have surgery.  Shaving with a razor can irritate your skin and make it easier to develop an infection.  At the time of your surgery:  · Speak up if someone tries to shave you with a razor before surgery. Ask why you need to be shaved and talk with your surgeon if you have any concerns.  · Ask if you will get antibiotics before surgery.  After your surgery:  · Make sure that your healthcare providers clean their hands before examining you, either with soap and water or an alcohol-based hand rub.  · If you do not see your providers clean their hands, please ask them to do so.  · Family and friends who visit you should not touch the surgical wound or dressings.  · Family and friends should clean their hands with soap and water or an alcohol-based hand rub before and after visiting you. If you do not see them clean their hands, ask them to clean their hands.  What do I need to do when I go home from the hospital?  · Before you go home, your doctor or nurse should explain everything you need to know about taking care of your wound. Make sure you understand how to care for your wound before you leave the hospital.  · Always clean your hands before and after caring for your wound.  · Before you go home, make sure you know who to contact if you have questions or problems after you get home.  · If you have any symptoms of an infection, such as redness and pain at the surgery site, drainage, or fever, call your doctor immediately.  If you have additional questions, please ask your doctor or nurse.  Developed and co-sponsored by The Society for Healthcare Epidemiology of [place] (SHEA); Infectious Diseases Society of [place] (IDSA); American Hospital Association; Association for Professionals in Infection Control and Epidemiology (APIC); Centers for Disease Control and Prevention (CDC); and The Joint Commission.     This information is not intended to replace advice given to you by your health care provider. Make sure you discuss any  questions you have with your health care provider.     Document Released: 12/23/2014 Document Revised: 01/08/2016 Document Reviewed: 03/02/2016  [employer] Interactive Patient Education ©2016 [employer]           [facility]  CHG 4% Patient Instruction Sheet    Chlorhexidine Before Surgery  Chlorhexidine gluconate (CHG) is a germ-killing (antiseptic) solution that is used to clean the skin. It gets rid of the bacteria that normally live on the skin. Cleaning your skin with CHG before surgery helps lower the risk for infection after surgery.    How to use CHG solution  · You will take 2 showers, one shower the night before surgery, the second shower the morning of surgery before coming to the hospital.  · Use CHG only as told by your health care provider, and follow the instructions on the label.  · Use CHG solution while taking a shower. Follow these steps when using CHG solution (unless your health care provider gives you different instructions):  1. Start the shower.  2. Use your normal soap and shampoo to wash your face and hair.  3. Turn off the shower or move out of the shower stream.  4. Pour the CHG onto a clean washcloth. Do not use any type of brush or rough-edged sponge.  5. Starting at your neck, lather your body down to your toes. Make sure you:  6. Pay special attention to the part of your body where you will be having surgery. Scrub this area for at least 1 minute.  7. Use the full amount of CHG as directed. Usually, this is one half bottle for each shower.  8. Do not use CHG on your head or face. If the solution gets into your ears or eyes, rinse them well with water.  9. Avoid your genital area.  10. Avoid any areas of skin that have broken skin, cuts, or scrapes.  11. Scrub your back and under your arms. Make sure to wash skin folds.  12. Let the lather sit on your skin for 1-2 minutes or as long as told by your health care  provider.  13. Thoroughly rinse your entire body in the shower.  Make sure that all body creases and crevices are rinsed well.  14. Dry off with a clean towel. Do not put any substances on your body afterward, such as powder, lotion, or perfume.  15. Put on clean clothes or pajamas.  16. If it is the night before your surgery, sleep in clean sheets.    What are the risks?  Risks of using CHG include:  · A skin reaction.  · Hearing loss, if CHG gets in your ears.  · Eye injury, if CHG gets in your eyes and is not rinsed out.  · The CHG product catching fire.  Make sure that you avoid smoking and flames after applying CHG to your skin.  Do not use CHG:  · If you have a chlorhexidine allergy or have previously reacted to chlorhexidine.  · On babies younger than 2 months of age.      On the day of surgery, when you are taken to your room in Outpatient Surgery you will be given a CHG prepackaged cloth to wipe the site for your surgery.  How to use CHG prepackaged cloths  · Follow the instructions on the label.  · Use the CHG cloth on clean, dry skin. Follow these steps when using a CHG cloth (unless your health care provider gives you different instructions):  1. Using the CHG cloth, vigorously scrub the part of your body where you will be having surgery. Scrub using a back-and-forth motion for 3 minutes. The area on your body should be completely wet with CHG when you are finished scrubbing.  2. Do not rinse. Discard the cloth and let the area air-dry for 1 minute. Do not put any substances on your body afterward, such as powder, lotion, or perfume.  Contact a health care provider if:  · Your skin gets irritated after scrubbing.  · You have questions about using your solution or cloth.  Get help right away if:  · Your eyes become very red or swollen.  · Your eyes itch badly.  · Your skin itches badly and is red or swollen.  · Your hearing changes.  · You have trouble seeing.  · You have swelling or tingling in your mouth or throat.  · You have trouble breathing.  · You swallow any  chlorhexidine.  Summary  · Chlorhexidine gluconate (CHG) is a germ-killing (antiseptic) solution that is used to clean the skin. Cleaning your skin with CHG before surgery helps lower the risk for infection after surgery.  · You may be given CHG to use at home. It may be in a bottle or in a prepackaged cloth to use on your skin. Carefully follow your health care provider's instructions and the instructions on the product label.  · Do not use CHG if you have a chlorhexidine allergy.  · Contact your health care provider if your skin gets irritated after scrubbing.  This information is not intended to replace advice given to you by your health care provider. Make sure you discuss any questions you have with your health care provider.  Document Released: 09/11/2013 Document Revised: 11/15/2018 Document Reviewed: 11/15/2018  [employer] Interactive Patient Education © 2019 [employer]          PATIENT/FAMILY/RESPONSIBLE PARTY VERBALIZES UNDERSTANDING OF ABOVE EDUCATION.  COPY OF PAIN SCALE GIVEN AND REVIEWED WITH VERBALIZED UNDERSTANDING.

## 2019-12-27 ENCOUNTER — ANESTHESIA (OUTPATIENT)
Dept: PERIOP | Facility: HOSPITAL | Age: 74
End: 2019-12-27

## 2019-12-27 ENCOUNTER — HOSPITAL ENCOUNTER (INPATIENT)
Facility: HOSPITAL | Age: 74
LOS: 1 days | Discharge: HOME | End: 2019-12-28
Attending: ORTHOPAEDIC SURGERY | Admitting: ORTHOPAEDIC SURGERY

## 2019-12-27 ENCOUNTER — APPOINTMENT (OUTPATIENT)
Dept: GENERAL RADIOLOGY | Facility: HOSPITAL | Age: 74
End: 2019-12-27

## 2019-12-27 ENCOUNTER — ANESTHESIA EVENT (OUTPATIENT)
Dept: PERIOP | Facility: HOSPITAL | Age: 74
End: 2019-12-27

## 2019-12-27 DIAGNOSIS — Z78.9 DECREASED ACTIVITIES OF DAILY LIVING (ADL): ICD-10-CM

## 2019-12-27 DIAGNOSIS — Z96.612 S/P REVERSE TOTAL SHOULDER ARTHROPLASTY, LEFT: Primary | ICD-10-CM

## 2019-12-27 PROBLEM — M19.019 OA (OSTEOARTHRITIS) OF SHOULDER: Status: ACTIVE | Noted: 2019-12-27

## 2019-12-27 PROCEDURE — 25010000002 ROPIVACAINE PER 1 MG: Performed by: ANESTHESIOLOGY

## 2019-12-27 PROCEDURE — C1776 JOINT DEVICE (IMPLANTABLE): HCPCS | Performed by: ORTHOPAEDIC SURGERY

## 2019-12-27 PROCEDURE — 25010000002 MIDAZOLAM PER 1 MG

## 2019-12-27 PROCEDURE — 25010000002 HYDROMORPHONE PER 4 MG: Performed by: ORTHOPAEDIC SURGERY

## 2019-12-27 PROCEDURE — 25010000002 ONDANSETRON PER 1 MG: Performed by: NURSE ANESTHETIST, CERTIFIED REGISTERED

## 2019-12-27 PROCEDURE — 94799 UNLISTED PULMONARY SVC/PX: CPT

## 2019-12-27 PROCEDURE — 25010000002 PROPOFOL 10 MG/ML EMULSION: Performed by: NURSE ANESTHETIST, CERTIFIED REGISTERED

## 2019-12-27 PROCEDURE — 25010000002 FENTANYL CITRATE (PF) 100 MCG/2ML SOLUTION: Performed by: ANESTHESIOLOGY

## 2019-12-27 PROCEDURE — 25010000002 DEXAMETHASONE PER 1 MG: Performed by: ANESTHESIOLOGY

## 2019-12-27 PROCEDURE — 73020 X-RAY EXAM OF SHOULDER: CPT

## 2019-12-27 PROCEDURE — 25010000002 ONDANSETRON PER 1 MG

## 2019-12-27 PROCEDURE — 0RRK00Z REPLACEMENT OF LEFT SHOULDER JOINT WITH REVERSE BALL AND SOCKET SYNTHETIC SUBSTITUTE, OPEN APPROACH: ICD-10-PCS | Performed by: ORTHOPAEDIC SURGERY

## 2019-12-27 PROCEDURE — 25010000003 CEFAZOLIN PER 500 MG: Performed by: ORTHOPAEDIC SURGERY

## 2019-12-27 PROCEDURE — 76000 FLUOROSCOPY <1 HR PHYS/QHP: CPT

## 2019-12-27 DEVICE — LINER HUM/SHLDR TRABECULARMETAL REV POLY 60DEG 36MM PLS0: Type: IMPLANTABLE DEVICE | Status: FUNCTIONAL

## 2019-12-27 DEVICE — TOTL SHLDER REV: Type: IMPLANTABLE DEVICE | Status: FUNCTIONAL

## 2019-12-27 DEVICE — SPACR HUM REV TM PLS 9MM: Type: IMPLANTABLE DEVICE | Status: FUNCTIONAL

## 2019-12-27 DEVICE — GLENSPHR TRABECULARMETAL REV 36MM: Type: IMPLANTABLE DEVICE | Status: FUNCTIONAL

## 2019-12-27 DEVICE — STEM HUM/SHLDR TRABECULARMETAL REV 12X130MM: Type: IMPLANTABLE DEVICE | Status: FUNCTIONAL

## 2019-12-27 DEVICE — BASEPLT GLEN TRABECULARMETAL REV 15MM: Type: IMPLANTABLE DEVICE | Status: FUNCTIONAL

## 2019-12-27 DEVICE — INVERSE/REVERSE SCREW SYSTEM, 4.5-33
Type: IMPLANTABLE DEVICE | Status: FUNCTIONAL
Brand: INVERSE/REVERSE

## 2019-12-27 RX ORDER — DEXAMETHASONE SODIUM PHOSPHATE 4 MG/ML
4 INJECTION, SOLUTION INTRA-ARTICULAR; INTRALESIONAL; INTRAMUSCULAR; INTRAVENOUS; SOFT TISSUE ONCE AS NEEDED
Status: COMPLETED | OUTPATIENT
Start: 2019-12-27 | End: 2019-12-27

## 2019-12-27 RX ORDER — OXYCODONE AND ACETAMINOPHEN 7.5; 325 MG/1; MG/1
1 TABLET ORAL ONCE AS NEEDED
Status: DISCONTINUED | OUTPATIENT
Start: 2019-12-27 | End: 2019-12-27 | Stop reason: HOSPADM

## 2019-12-27 RX ORDER — HYDROMORPHONE HYDROCHLORIDE 1 MG/ML
0.25 INJECTION, SOLUTION INTRAMUSCULAR; INTRAVENOUS; SUBCUTANEOUS
Status: DISCONTINUED | OUTPATIENT
Start: 2019-12-27 | End: 2019-12-27 | Stop reason: HOSPADM

## 2019-12-27 RX ORDER — DIAZEPAM 5 MG/1
5 TABLET ORAL EVERY 6 HOURS PRN
Status: DISCONTINUED | OUTPATIENT
Start: 2019-12-27 | End: 2019-12-28 | Stop reason: HOSPADM

## 2019-12-27 RX ORDER — ALLOPURINOL 100 MG/1
100 TABLET ORAL DAILY
Status: DISCONTINUED | OUTPATIENT
Start: 2019-12-27 | End: 2019-12-28 | Stop reason: HOSPADM

## 2019-12-27 RX ORDER — BUPROPION HYDROCHLORIDE 150 MG/1
300 TABLET ORAL DAILY
Status: DISCONTINUED | OUTPATIENT
Start: 2019-12-27 | End: 2019-12-28 | Stop reason: HOSPADM

## 2019-12-27 RX ORDER — FAMOTIDINE 20 MG/1
20 TABLET, FILM COATED ORAL DAILY
Status: DISCONTINUED | OUTPATIENT
Start: 2019-12-27 | End: 2019-12-28 | Stop reason: HOSPADM

## 2019-12-27 RX ORDER — FLUMAZENIL 0.1 MG/ML
0.2 INJECTION INTRAVENOUS AS NEEDED
Status: DISCONTINUED | OUTPATIENT
Start: 2019-12-27 | End: 2019-12-27 | Stop reason: HOSPADM

## 2019-12-27 RX ORDER — MIDAZOLAM HYDROCHLORIDE 1 MG/ML
2 INJECTION INTRAMUSCULAR; INTRAVENOUS
Status: DISCONTINUED | OUTPATIENT
Start: 2019-12-27 | End: 2019-12-27 | Stop reason: HOSPADM

## 2019-12-27 RX ORDER — ROCURONIUM BROMIDE 10 MG/ML
INJECTION, SOLUTION INTRAVENOUS AS NEEDED
Status: DISCONTINUED | OUTPATIENT
Start: 2019-12-27 | End: 2019-12-27 | Stop reason: SURG

## 2019-12-27 RX ORDER — MIDAZOLAM HYDROCHLORIDE 1 MG/ML
1 INJECTION INTRAMUSCULAR; INTRAVENOUS
Status: DISCONTINUED | OUTPATIENT
Start: 2019-12-27 | End: 2019-12-27 | Stop reason: HOSPADM

## 2019-12-27 RX ORDER — DIAZEPAM 5 MG/1
5 TABLET ORAL DAILY
Status: DISCONTINUED | OUTPATIENT
Start: 2019-12-27 | End: 2019-12-28 | Stop reason: HOSPADM

## 2019-12-27 RX ORDER — LOSARTAN POTASSIUM 50 MG/1
50 TABLET ORAL
Status: DISCONTINUED | OUTPATIENT
Start: 2019-12-27 | End: 2019-12-28 | Stop reason: HOSPADM

## 2019-12-27 RX ORDER — SODIUM CHLORIDE 0.9 % (FLUSH) 0.9 %
10 SYRINGE (ML) INJECTION EVERY 12 HOURS SCHEDULED
Status: DISCONTINUED | OUTPATIENT
Start: 2019-12-27 | End: 2019-12-27 | Stop reason: HOSPADM

## 2019-12-27 RX ORDER — FENTANYL CITRATE 50 UG/ML
25 INJECTION, SOLUTION INTRAMUSCULAR; INTRAVENOUS
Status: DISCONTINUED | OUTPATIENT
Start: 2019-12-27 | End: 2019-12-27 | Stop reason: HOSPADM

## 2019-12-27 RX ORDER — MAGNESIUM HYDROXIDE 1200 MG/15ML
LIQUID ORAL AS NEEDED
Status: DISCONTINUED | OUTPATIENT
Start: 2019-12-27 | End: 2019-12-27 | Stop reason: HOSPADM

## 2019-12-27 RX ORDER — LABETALOL HYDROCHLORIDE 5 MG/ML
5 INJECTION, SOLUTION INTRAVENOUS
Status: DISCONTINUED | OUTPATIENT
Start: 2019-12-27 | End: 2019-12-27 | Stop reason: HOSPADM

## 2019-12-27 RX ORDER — FERROUS SULFATE 325(65) MG
325 TABLET ORAL
Status: DISCONTINUED | OUTPATIENT
Start: 2019-12-27 | End: 2019-12-28 | Stop reason: HOSPADM

## 2019-12-27 RX ORDER — ONDANSETRON 2 MG/ML
INJECTION INTRAMUSCULAR; INTRAVENOUS
Status: COMPLETED
Start: 2019-12-27 | End: 2019-12-27

## 2019-12-27 RX ORDER — NALOXONE HCL 0.4 MG/ML
0.04 VIAL (ML) INJECTION AS NEEDED
Status: DISCONTINUED | OUTPATIENT
Start: 2019-12-27 | End: 2019-12-27 | Stop reason: HOSPADM

## 2019-12-27 RX ORDER — QUETIAPINE FUMARATE 100 MG/1
200 TABLET, FILM COATED ORAL NIGHTLY
Status: DISCONTINUED | OUTPATIENT
Start: 2019-12-27 | End: 2019-12-28 | Stop reason: HOSPADM

## 2019-12-27 RX ORDER — SODIUM CHLORIDE, SODIUM LACTATE, POTASSIUM CHLORIDE, CALCIUM CHLORIDE 600; 310; 30; 20 MG/100ML; MG/100ML; MG/100ML; MG/100ML
100 INJECTION, SOLUTION INTRAVENOUS CONTINUOUS
Status: DISCONTINUED | OUTPATIENT
Start: 2019-12-27 | End: 2019-12-27

## 2019-12-27 RX ORDER — HYDRALAZINE HYDROCHLORIDE 20 MG/ML
5 INJECTION INTRAMUSCULAR; INTRAVENOUS
Status: DISCONTINUED | OUTPATIENT
Start: 2019-12-27 | End: 2019-12-27 | Stop reason: HOSPADM

## 2019-12-27 RX ORDER — METHOCARBAMOL 500 MG/1
500 TABLET, FILM COATED ORAL 2 TIMES DAILY PRN
Status: DISCONTINUED | OUTPATIENT
Start: 2019-12-27 | End: 2019-12-28 | Stop reason: HOSPADM

## 2019-12-27 RX ORDER — ASPIRIN 81 MG/1
81 TABLET ORAL DAILY
Status: DISCONTINUED | OUTPATIENT
Start: 2019-12-27 | End: 2019-12-28 | Stop reason: HOSPADM

## 2019-12-27 RX ORDER — AMLODIPINE BESYLATE 5 MG/1
5 TABLET ORAL DAILY
Status: DISCONTINUED | OUTPATIENT
Start: 2019-12-27 | End: 2019-12-28 | Stop reason: HOSPADM

## 2019-12-27 RX ORDER — ACETAMINOPHEN 500 MG
1000 TABLET ORAL ONCE
Status: COMPLETED | OUTPATIENT
Start: 2019-12-27 | End: 2019-12-27

## 2019-12-27 RX ORDER — HYDROCODONE BITARTRATE AND ACETAMINOPHEN 7.5; 325 MG/1; MG/1
1 TABLET ORAL EVERY 4 HOURS PRN
Status: DISCONTINUED | OUTPATIENT
Start: 2019-12-27 | End: 2019-12-28 | Stop reason: HOSPADM

## 2019-12-27 RX ORDER — PROPOFOL 10 MG/ML
VIAL (ML) INTRAVENOUS AS NEEDED
Status: DISCONTINUED | OUTPATIENT
Start: 2019-12-27 | End: 2019-12-27 | Stop reason: SURG

## 2019-12-27 RX ORDER — TOPIRAMATE 100 MG/1
100 TABLET, FILM COATED ORAL DAILY
Status: DISCONTINUED | OUTPATIENT
Start: 2019-12-27 | End: 2019-12-28 | Stop reason: HOSPADM

## 2019-12-27 RX ORDER — METOCLOPRAMIDE HYDROCHLORIDE 5 MG/ML
5 INJECTION INTRAMUSCULAR; INTRAVENOUS
Status: DISCONTINUED | OUTPATIENT
Start: 2019-12-27 | End: 2019-12-27 | Stop reason: HOSPADM

## 2019-12-27 RX ORDER — CALCITRIOL 0.25 UG/1
0.25 CAPSULE, LIQUID FILLED ORAL DAILY
Status: DISCONTINUED | OUTPATIENT
Start: 2019-12-27 | End: 2019-12-28 | Stop reason: HOSPADM

## 2019-12-27 RX ORDER — HYDROMORPHONE HYDROCHLORIDE 1 MG/ML
0.5 INJECTION, SOLUTION INTRAMUSCULAR; INTRAVENOUS; SUBCUTANEOUS
Status: DISCONTINUED | OUTPATIENT
Start: 2019-12-27 | End: 2019-12-28 | Stop reason: HOSPADM

## 2019-12-27 RX ORDER — HYDROCODONE BITARTRATE AND ACETAMINOPHEN 10; 325 MG/1; MG/1
1 TABLET ORAL EVERY 4 HOURS PRN
Status: DISCONTINUED | OUTPATIENT
Start: 2019-12-27 | End: 2019-12-28 | Stop reason: HOSPADM

## 2019-12-27 RX ORDER — NALOXONE HCL 0.4 MG/ML
0.4 VIAL (ML) INJECTION
Status: DISCONTINUED | OUTPATIENT
Start: 2019-12-27 | End: 2019-12-28 | Stop reason: HOSPADM

## 2019-12-27 RX ORDER — ONDANSETRON 2 MG/ML
INJECTION INTRAMUSCULAR; INTRAVENOUS AS NEEDED
Status: DISCONTINUED | OUTPATIENT
Start: 2019-12-27 | End: 2019-12-27 | Stop reason: SURG

## 2019-12-27 RX ORDER — FENTANYL CITRATE 50 UG/ML
25 INJECTION, SOLUTION INTRAMUSCULAR; INTRAVENOUS AS NEEDED
Status: DISCONTINUED | OUTPATIENT
Start: 2019-12-27 | End: 2019-12-27 | Stop reason: HOSPADM

## 2019-12-27 RX ORDER — IPRATROPIUM BROMIDE AND ALBUTEROL SULFATE 2.5; .5 MG/3ML; MG/3ML
3 SOLUTION RESPIRATORY (INHALATION) ONCE AS NEEDED
Status: DISCONTINUED | OUTPATIENT
Start: 2019-12-27 | End: 2019-12-27 | Stop reason: HOSPADM

## 2019-12-27 RX ORDER — MIDAZOLAM HYDROCHLORIDE 1 MG/ML
INJECTION INTRAMUSCULAR; INTRAVENOUS
Status: COMPLETED
Start: 2019-12-27 | End: 2019-12-27

## 2019-12-27 RX ORDER — LIDOCAINE HYDROCHLORIDE 40 MG/ML
SOLUTION TOPICAL AS NEEDED
Status: DISCONTINUED | OUTPATIENT
Start: 2019-12-27 | End: 2019-12-27 | Stop reason: SURG

## 2019-12-27 RX ORDER — SODIUM CHLORIDE, SODIUM LACTATE, POTASSIUM CHLORIDE, CALCIUM CHLORIDE 600; 310; 30; 20 MG/100ML; MG/100ML; MG/100ML; MG/100ML
100 INJECTION, SOLUTION INTRAVENOUS CONTINUOUS PRN
Status: DISCONTINUED | OUTPATIENT
Start: 2019-12-27 | End: 2019-12-27 | Stop reason: HOSPADM

## 2019-12-27 RX ORDER — SODIUM CHLORIDE 0.9 % (FLUSH) 0.9 %
3 SYRINGE (ML) INJECTION EVERY 12 HOURS SCHEDULED
Status: DISCONTINUED | OUTPATIENT
Start: 2019-12-27 | End: 2019-12-27 | Stop reason: HOSPADM

## 2019-12-27 RX ORDER — ONDANSETRON 4 MG/1
4 TABLET, FILM COATED ORAL EVERY 6 HOURS PRN
Status: DISCONTINUED | OUTPATIENT
Start: 2019-12-27 | End: 2019-12-28 | Stop reason: HOSPADM

## 2019-12-27 RX ORDER — CHOLECALCIFEROL (VITAMIN D3) 125 MCG
1000 CAPSULE ORAL DAILY
Status: DISCONTINUED | OUTPATIENT
Start: 2019-12-27 | End: 2019-12-28 | Stop reason: HOSPADM

## 2019-12-27 RX ORDER — SODIUM CHLORIDE 0.9 % (FLUSH) 0.9 %
10 SYRINGE (ML) INJECTION AS NEEDED
Status: DISCONTINUED | OUTPATIENT
Start: 2019-12-27 | End: 2019-12-27 | Stop reason: HOSPADM

## 2019-12-27 RX ORDER — GABAPENTIN 300 MG/1
300 CAPSULE ORAL DAILY PRN
Status: DISCONTINUED | OUTPATIENT
Start: 2019-12-27 | End: 2019-12-28 | Stop reason: HOSPADM

## 2019-12-27 RX ORDER — ESCITALOPRAM OXALATE 10 MG/1
20 TABLET ORAL DAILY
Status: DISCONTINUED | OUTPATIENT
Start: 2019-12-27 | End: 2019-12-28 | Stop reason: HOSPADM

## 2019-12-27 RX ORDER — SODIUM CHLORIDE 9 MG/ML
100 INJECTION, SOLUTION INTRAVENOUS CONTINUOUS
Status: DISCONTINUED | OUTPATIENT
Start: 2019-12-27 | End: 2019-12-28 | Stop reason: HOSPADM

## 2019-12-27 RX ORDER — ONDANSETRON 2 MG/ML
4 INJECTION INTRAMUSCULAR; INTRAVENOUS EVERY 6 HOURS PRN
Status: DISCONTINUED | OUTPATIENT
Start: 2019-12-27 | End: 2019-12-28 | Stop reason: HOSPADM

## 2019-12-27 RX ORDER — ONDANSETRON 2 MG/ML
4 INJECTION INTRAMUSCULAR; INTRAVENOUS AS NEEDED
Status: DISCONTINUED | OUTPATIENT
Start: 2019-12-27 | End: 2019-12-27 | Stop reason: HOSPADM

## 2019-12-27 RX ORDER — SODIUM CHLORIDE 0.9 % (FLUSH) 0.9 %
3-10 SYRINGE (ML) INJECTION AS NEEDED
Status: DISCONTINUED | OUTPATIENT
Start: 2019-12-27 | End: 2019-12-27 | Stop reason: HOSPADM

## 2019-12-27 RX ORDER — PROMETHAZINE HYDROCHLORIDE 25 MG/1
25 TABLET ORAL AS NEEDED
Status: DISCONTINUED | OUTPATIENT
Start: 2019-12-27 | End: 2019-12-28 | Stop reason: HOSPADM

## 2019-12-27 RX ORDER — ROPIVACAINE HYDROCHLORIDE 5 MG/ML
INJECTION, SOLUTION EPIDURAL; INFILTRATION; PERINEURAL
Status: COMPLETED | OUTPATIENT
Start: 2019-12-27 | End: 2019-12-27

## 2019-12-27 RX ORDER — LEVOTHYROXINE SODIUM 137 UG/1
137 TABLET ORAL DAILY
Status: DISCONTINUED | OUTPATIENT
Start: 2019-12-27 | End: 2019-12-28 | Stop reason: HOSPADM

## 2019-12-27 RX ADMIN — ACETAMINOPHEN 1000 MG: 500 TABLET, FILM COATED ORAL at 12:07

## 2019-12-27 RX ADMIN — SODIUM CHLORIDE, POTASSIUM CHLORIDE, SODIUM LACTATE AND CALCIUM CHLORIDE: 600; 310; 30; 20 INJECTION, SOLUTION INTRAVENOUS at 12:48

## 2019-12-27 RX ADMIN — CEFAZOLIN SODIUM 3 G: 1 INJECTION, POWDER, FOR SOLUTION INTRAMUSCULAR; INTRAVENOUS at 16:17

## 2019-12-27 RX ADMIN — FENTANYL CITRATE 25 MCG: 50 INJECTION, SOLUTION INTRAMUSCULAR; INTRAVENOUS at 14:15

## 2019-12-27 RX ADMIN — CEFAZOLIN 1 G: 1 INJECTION, POWDER, FOR SOLUTION INTRAMUSCULAR; INTRAVENOUS; PARENTERAL at 12:35

## 2019-12-27 RX ADMIN — ASPIRIN 81 MG: 81 TABLET ORAL at 16:17

## 2019-12-27 RX ADMIN — ONDANSETRON 4 MG: 2 INJECTION INTRAMUSCULAR; INTRAVENOUS at 14:07

## 2019-12-27 RX ADMIN — ONDANSETRON HYDROCHLORIDE 4 MG: 2 SOLUTION INTRAMUSCULAR; INTRAVENOUS at 14:07

## 2019-12-27 RX ADMIN — SODIUM CHLORIDE, POTASSIUM CHLORIDE, SODIUM LACTATE AND CALCIUM CHLORIDE 100 ML/HR: 600; 310; 30; 20 INJECTION, SOLUTION INTRAVENOUS at 08:56

## 2019-12-27 RX ADMIN — DEXAMETHASONE SODIUM PHOSPHATE 4 MG: 4 INJECTION, SOLUTION INTRAMUSCULAR; INTRAVENOUS at 12:08

## 2019-12-27 RX ADMIN — MAGNESIUM GLUCONATE 500 MG ORAL TABLET 400 MG: 500 TABLET ORAL at 16:17

## 2019-12-27 RX ADMIN — PROPOFOL 80 MG: 10 INJECTION, EMULSION INTRAVENOUS at 12:27

## 2019-12-27 RX ADMIN — LEVOTHYROXINE SODIUM 137 MCG: 137 TABLET ORAL at 16:17

## 2019-12-27 RX ADMIN — SODIUM CHLORIDE 100 ML/HR: 9 INJECTION, SOLUTION INTRAVENOUS at 16:21

## 2019-12-27 RX ADMIN — FERROUS SULFATE TAB 325 MG (65 MG ELEMENTAL FE) 325 MG: 325 (65 FE) TAB at 16:17

## 2019-12-27 RX ADMIN — HYDROMORPHONE HYDROCHLORIDE 0.5 MG: 1 INJECTION, SOLUTION INTRAMUSCULAR; INTRAVENOUS; SUBCUTANEOUS at 19:54

## 2019-12-27 RX ADMIN — MIDAZOLAM 2 MG: 1 INJECTION INTRAMUSCULAR; INTRAVENOUS at 11:40

## 2019-12-27 RX ADMIN — ROCURONIUM BROMIDE 20 MG: 10 INJECTION INTRAVENOUS at 12:27

## 2019-12-27 RX ADMIN — Medication 1000 MCG: at 16:16

## 2019-12-27 RX ADMIN — CALCITRIOL 0.25 MCG: 0.25 CAPSULE ORAL at 16:16

## 2019-12-27 RX ADMIN — ROPIVACAINE HYDROCHLORIDE 20 ML: 5 INJECTION, SOLUTION EPIDURAL; INFILTRATION; PERINEURAL at 11:44

## 2019-12-27 RX ADMIN — MIDAZOLAM HYDROCHLORIDE 2 MG: 1 INJECTION INTRAMUSCULAR; INTRAVENOUS at 11:40

## 2019-12-27 RX ADMIN — ONDANSETRON HYDROCHLORIDE 4 MG: 2 SOLUTION INTRAMUSCULAR; INTRAVENOUS at 13:36

## 2019-12-27 RX ADMIN — ALLOPURINOL 100 MG: 100 TABLET ORAL at 16:17

## 2019-12-27 RX ADMIN — DIAZEPAM 5 MG: 5 TABLET ORAL at 16:16

## 2019-12-27 RX ADMIN — LIDOCAINE HYDROCHLORIDE 1 EACH: 40 SOLUTION TOPICAL at 12:30

## 2019-12-27 RX ADMIN — QUETIAPINE FUMARATE 200 MG: 100 TABLET ORAL at 19:54

## 2019-12-27 RX ADMIN — LOSARTAN POTASSIUM 50 MG: 50 TABLET, FILM COATED ORAL at 16:17

## 2019-12-27 RX ADMIN — HYDROCODONE BITARTRATE AND ACETAMINOPHEN 1 TABLET: 7.5; 325 TABLET ORAL at 16:16

## 2019-12-27 RX ADMIN — BUPROPION HYDROCHLORIDE 300 MG: 150 TABLET, FILM COATED, EXTENDED RELEASE ORAL at 16:17

## 2019-12-27 RX ADMIN — HYDROMORPHONE HYDROCHLORIDE 0.5 MG: 1 INJECTION, SOLUTION INTRAMUSCULAR; INTRAVENOUS; SUBCUTANEOUS at 21:58

## 2019-12-27 RX ADMIN — ESCITALOPRAM 20 MG: 10 TABLET, FILM COATED ORAL at 16:21

## 2019-12-27 RX ADMIN — AMLODIPINE BESYLATE 5 MG: 5 TABLET ORAL at 16:17

## 2019-12-27 RX ADMIN — FENTANYL CITRATE 50 MCG: 50 INJECTION, SOLUTION INTRAMUSCULAR; INTRAVENOUS at 11:41

## 2019-12-27 RX ADMIN — FAMOTIDINE 20 MG: 20 TABLET, FILM COATED ORAL at 16:17

## 2019-12-27 RX ADMIN — CEFAZOLIN SODIUM 3 G: 1 INJECTION, POWDER, FOR SOLUTION INTRAMUSCULAR; INTRAVENOUS at 23:10

## 2019-12-27 RX ADMIN — HYDROCODONE BITARTRATE AND ACETAMINOPHEN 1 TABLET: 10; 325 TABLET ORAL at 20:56

## 2019-12-27 RX ADMIN — TOPIRAMATE 100 MG: 100 TABLET, FILM COATED ORAL at 16:17

## 2019-12-27 NOTE — ANESTHESIA PROCEDURE NOTES
Airway  Urgency: elective    Date/Time: 12/27/2019 12:30 PM  Airway not difficult    General Information and Staff    Patient location during procedure: OR  CRNA: Dhaval Miranda CRNA    Indications and Patient Condition  Indications for airway management: airway protection    Preoxygenated: yes  MILS maintained throughout  Mask difficulty assessment: 1 - vent by mask    Final Airway Details  Final airway type: endotracheal airway      Successful airway: ETT  Cuffed: yes   Successful intubation technique: direct laryngoscopy  Endotracheal tube insertion site: oral  Blade: Solitario  Blade size: 2  ETT size (mm): 7.0  Cormack-Lehane Classification: grade I - full view of glottis  Placement verified by: chest auscultation and capnometry   Cuff volume (mL): 5  Measured from: gums  ETT/EBT to gums (cm): 20  Number of attempts at approach: 1  Assessment: lips, teeth, and gum same as pre-op and atraumatic intubation

## 2019-12-27 NOTE — ANESTHESIA POSTPROCEDURE EVALUATION
Patient: Thao Mohr    Procedure Summary     Date:  12/27/19 Room / Location:  [facility] OR  /  [facility] OR    Anesthesia Start:  1220 Anesthesia Stop:  1400    Procedure:  LEFT REVERSE TOTAL SHOULDER ARTHROPLASTY (Left Shoulder) Diagnosis:  (LEFT SHOULDER PAIN)    Surgeon:  Dhaval Yepez MD Provider:  Dhaval Miranda CRNA    Anesthesia Type:  general with block ASA Status:  3          Anesthesia Type: general with block    Vitals  Vitals Value Taken Time   /68 12/27/2019  2:52 PM   Temp 98.4 °F (36.9 °C) 12/27/2019  2:30 PM   Pulse 73 12/27/2019  2:54 PM   Resp 14 12/27/2019  2:45 PM   SpO2 95 % 12/27/2019  2:54 PM   Vitals shown include unvalidated device data.        Post Anesthesia Care and Evaluation    PONV Status: none  Comments: Patient d/c from PACU prior to anes eval based on Angel Luis score.  Please see RN notes for details of d/c criteria.    Blood pressure 121/72, pulse 72, temperature 98.4 °F (36.9 °C), temperature source Temporal, resp. rate 14, SpO2 94 %, not currently breastfeeding.

## 2019-12-27 NOTE — ANESTHESIA PREPROCEDURE EVALUATION
Anesthesia Evaluation     Patient summary reviewed   history of anesthetic complications: PONV  NPO Solid Status: > 8 hours  NPO Liquid Status: > 8 hours           Airway   Mallampati: II  TM distance: >3 FB  Neck ROM: full  No difficulty expected  Dental    (+) upper dentures and lower dentures    Pulmonary - negative pulmonary ROS   Cardiovascular   Exercise tolerance: good (4-7 METS)    ECG reviewed    (+) hypertension, valvular problems/murmurs AI,   (-) past MI, CAD, angina, cardiac stents      Neuro/Psych- negative ROS  GI/Hepatic/Renal/Endo    (+)   renal disease CRI, thyroid problem hypothyroidism    Musculoskeletal     Abdominal    Substance History      OB/GYN          Other   arthritis, autoimmune disease lupus,                      Anesthesia Plan    ASA 3     general with block     intravenous induction     Anesthetic plan, all risks, benefits, and alternatives have been provided, discussed and informed consent has been obtained with: patient.

## 2019-12-27 NOTE — ANESTHESIA PROCEDURE NOTES
Peripheral Block    Pre-sedation assessment completed: 12/27/2019 11:31 AM    Patient reassessed immediately prior to procedure    Patient location during procedure: holding area  Start time: 12/27/2019 11:42 AM  Stop time: 12/27/2019 11:44 AM  Reason for block: procedure for pain, at surgeon's request, post-op pain management and Request by Dr. Yepez  Performed by  Anesthesiologist: Wanda Bolaños MD  Preanesthetic Checklist  Completed: patient identified, site marked, surgical consent, pre-op evaluation, timeout performed, IV checked, risks and benefits discussed and monitors and equipment checked  Prep:  Pt Position: supine  Sterile barriers:gloves and cap  Prep: ChloraPrep  Patient monitoring: blood pressure monitoring, continuous pulse oximetry and EKG  Procedure  Sedation:yes    Guidance:ultrasound guided and Brachial plexus identified and local anesthetic seen surrounding nerves  ULTRASOUND INTERPRETATION. Using ultrasound guidance a 22 G gauge needle was placed in close proximity to the nerve, at which point, under ultrasound guidance anesthetic was injected in the area of the nerve and spread of the anesthesia was seen on ultrasound in close proximity thereto.  There were no abnormalities seen on ultrasound; a digital image was taken; and the patient tolerated the procedure with no complications. Images:still images obtained, printed/placed on chart (picture printed and placed in patients chart)    Laterality:left  Block Type:interscalene  Injection Technique:single-shot  Needle Type:echogenic  Needle Gauge:22 G  Resistance on Injection: none    Medications Used: ropivacaine (NAROPIN) injection 0.5 %, 20 mL  Med admintered at 12/27/2019 11:44 AM      Post Assessment  Injection Assessment: negative aspiration for heme, no paresthesia on injection and incremental injection  Patient Tolerance:comfortable throughout block  Complications:no

## 2019-12-28 VITALS
DIASTOLIC BLOOD PRESSURE: 60 MMHG | HEART RATE: 82 BPM | SYSTOLIC BLOOD PRESSURE: 105 MMHG | BODY MASS INDEX: 29.95 KG/M2 | TEMPERATURE: 98.5 F | HEIGHT: 63 IN | WEIGHT: 169 LBS | OXYGEN SATURATION: 96 % | RESPIRATION RATE: 16 BRPM

## 2019-12-28 PROBLEM — Z96.612 S/P REVERSE TOTAL SHOULDER ARTHROPLASTY, LEFT: Status: ACTIVE | Noted: 2019-12-28

## 2019-12-28 PROCEDURE — 25010000002 HYDROMORPHONE PER 4 MG: Performed by: ORTHOPAEDIC SURGERY

## 2019-12-28 PROCEDURE — 97165 OT EVAL LOW COMPLEX 30 MIN: CPT | Performed by: OCCUPATIONAL THERAPIST

## 2019-12-28 PROCEDURE — 25010000003 CEFAZOLIN PER 500 MG: Performed by: ORTHOPAEDIC SURGERY

## 2019-12-28 RX ORDER — HYDROCODONE BITARTRATE AND ACETAMINOPHEN 10; 325 MG/1; MG/1
1 TABLET ORAL EVERY 4 HOURS PRN
Qty: 60 TABLET | Refills: 0 | Status: SHIPPED | OUTPATIENT
Start: 2019-12-28 | End: 2020-01-12

## 2019-12-28 RX ADMIN — FERROUS SULFATE TAB 325 MG (65 MG ELEMENTAL FE) 325 MG: 325 (65 FE) TAB at 08:31

## 2019-12-28 RX ADMIN — TOPIRAMATE 100 MG: 100 TABLET, FILM COATED ORAL at 08:32

## 2019-12-28 RX ADMIN — MAGNESIUM GLUCONATE 500 MG ORAL TABLET 400 MG: 500 TABLET ORAL at 08:31

## 2019-12-28 RX ADMIN — CALCITRIOL 0.25 MCG: 0.25 CAPSULE ORAL at 08:32

## 2019-12-28 RX ADMIN — Medication 1000 MCG: at 08:32

## 2019-12-28 RX ADMIN — BUPROPION HYDROCHLORIDE 300 MG: 150 TABLET, FILM COATED, EXTENDED RELEASE ORAL at 08:31

## 2019-12-28 RX ADMIN — ESCITALOPRAM 20 MG: 10 TABLET, FILM COATED ORAL at 08:31

## 2019-12-28 RX ADMIN — CEFAZOLIN SODIUM 3 G: 1 INJECTION, POWDER, FOR SOLUTION INTRAMUSCULAR; INTRAVENOUS at 08:30

## 2019-12-28 RX ADMIN — DIAZEPAM 5 MG: 5 TABLET ORAL at 08:31

## 2019-12-28 RX ADMIN — HYDROCODONE BITARTRATE AND ACETAMINOPHEN 1 TABLET: 10; 325 TABLET ORAL at 05:52

## 2019-12-28 RX ADMIN — HYDROMORPHONE HYDROCHLORIDE 0.5 MG: 1 INJECTION, SOLUTION INTRAMUSCULAR; INTRAVENOUS; SUBCUTANEOUS at 08:39

## 2019-12-28 RX ADMIN — FAMOTIDINE 20 MG: 20 TABLET, FILM COATED ORAL at 08:32

## 2019-12-28 RX ADMIN — LEVOTHYROXINE SODIUM 137 MCG: 137 TABLET ORAL at 08:32

## 2019-12-28 RX ADMIN — HYDROCODONE BITARTRATE AND ACETAMINOPHEN 1 TABLET: 10; 325 TABLET ORAL at 09:51

## 2019-12-28 RX ADMIN — ALLOPURINOL 100 MG: 100 TABLET ORAL at 08:31

## 2019-12-28 RX ADMIN — ASPIRIN 81 MG: 81 TABLET ORAL at 08:32

## 2019-12-29 ENCOUNTER — READMISSION MANAGEMENT (OUTPATIENT)
Dept: CALL CENTER | Facility: HOSPITAL | Age: 74
End: 2019-12-29

## 2019-12-29 NOTE — OUTREACH NOTE
Prep Survey      Responses   Facility patient discharged from?  [facility]   Is patient eligible?  Yes   Discharge diagnosis  OA of shoulder, s/p reverse total shoulder left   Does the patient have one of the following disease processes/diagnoses(primary or secondary)?  Total Joint Replacement   Does the patient have Home health ordered?  No   Is there a DME ordered?  No   Comments regarding appointments  Pt to schedule follow up with PCP   Prep survey completed?  Yes          Christine Grande RN

## 2019-12-31 ENCOUNTER — READMISSION MANAGEMENT (OUTPATIENT)
Dept: CALL CENTER | Facility: HOSPITAL | Age: 74
End: 2019-12-31

## 2019-12-31 NOTE — OUTREACH NOTE
Total Joint Week 1 Survey      Responses   Facility patient discharged from?  [facility]   Does the patient have one of the following disease processes/diagnoses(primary or secondary)?  Total Joint Replacement   Is there a successful TCM telephone encounter documented?  No   Joint surgery performed?  Shoulder   Week 1 attempt successful?  Yes   Call start time  1430   Call end time  1437   Discharge diagnosis  OA of shoulder, s/p reverse total shoulder left   Does the patient have all medications related to this admission filled (includes all antibiotics, pain medications, etc.)  Yes   Is the patient taking all medications as directed (includes completed medication regime)?  Yes   Is the patient able to teach back alternate methods of pain control?  Ice, Shoulder-elevate above heart/ keep in sling as advised, Reposition   Medication comments  pain at worst since DC 9/10 daily with pain med 8/10   Does the patient have a follow up appointment with their surgeon?  Yes   Has the patient kept scheduled appointments due by today?  N/A   Psychosocial issues?  No   Has the patient began therapy sessions (either in the home or as an out patient)?  No   Has the patient fallen since discharge?  No   Comments  Incision covered with dressing, edema around dressing, using ice. Pt having severe pain, not controlled on meds per her report. Advised her to call surg today and discuss. Denies temp, drainage or redness.   Did the patient receive a copy of their discharge instructions?  Yes   Nursing interventions  Reviewed instructions with patient   What is the patient's perception of their functional status since discharge?  Worsening   Is the patient able to teach back signs and symptoms of infection?  Temp >100.4 for 24h or longer, Incisional drainage, Changes in mobility, Shortness of breath or chest pain, Severe discomfort or pain   Is the patient able to teach back how to prevent infection?  Wash hands before and after touching  incision, Shower only as directed by surgeon, Eat well-balanced diet, No tub baths, hot tub or swimming   Is the patient/caregiver able to teach back the hierarchy of who to call/visit for symptoms/problems? PCP, Specialist, Home health nurse, Urgent Care, ED, 911  Yes   Week 1 call completed?  Yes          Darlene Delgado RN

## 2020-01-08 ENCOUNTER — READMISSION MANAGEMENT (OUTPATIENT)
Dept: CALL CENTER | Facility: HOSPITAL | Age: 75
End: 2020-01-08

## 2020-01-08 NOTE — OUTREACH NOTE
Total Joint Week 2 Survey      Responses   Facility patient discharged from?  [facility]   Does the patient have one of the following disease processes/diagnoses(primary or secondary)?  Total Joint Replacement   Joint surgery performed?  Shoulder   Week 2 attempt successful?  No   Unsuccessful attempts  Attempt 1          Christine Varma RN

## 2020-01-10 ENCOUNTER — READMISSION MANAGEMENT (OUTPATIENT)
Dept: CALL CENTER | Facility: HOSPITAL | Age: 75
End: 2020-01-10

## 2020-01-10 NOTE — OUTREACH NOTE
Total Joint Week 2 Survey      Responses   Facility patient discharged from?  [facility]   Does the patient have one of the following disease processes/diagnoses(primary or secondary)?  Total Joint Replacement   Joint surgery performed?  Shoulder   Week 2 attempt successful?  No   Unsuccessful attempts  Attempt 2          Christine Akers RN

## 2020-01-24 ENCOUNTER — READMISSION MANAGEMENT (OUTPATIENT)
Dept: CALL CENTER | Facility: HOSPITAL | Age: 75
End: 2020-01-24

## 2020-01-24 NOTE — OUTREACH NOTE
Total Joint Month 1 Survey      Responses   Facility patient discharged from?  [facility]   Does the patient have one of the following disease processes/diagnoses(primary or secondary)?  Total Joint Replacement   Joint surgery performed?  Shoulder   Month 1 attempt successful?  Yes   Call start time  1831   Rescheduled  Rescheduled-pt requested [Spoke with son and rescheduled.]   Call end time  1832          Eugenia Huber RN

## 2020-01-28 ENCOUNTER — READMISSION MANAGEMENT (OUTPATIENT)
Dept: CALL CENTER | Facility: HOSPITAL | Age: 75
End: 2020-01-28

## 2020-01-28 NOTE — OUTREACH NOTE
Total Joint Month 1 Survey      Responses   Facility patient discharged from?  [facility]   Does the patient have one of the following disease processes/diagnoses(primary or secondary)?  Total Joint Replacement   Joint surgery performed?  Shoulder   Month 1 attempt successful?  No   Rescheduled  Revoked          Ariana Nathan RN

## 2020-02-03 ENCOUNTER — APPOINTMENT (OUTPATIENT)
Dept: BONE DENSITY | Facility: HOSPITAL | Age: 75
End: 2020-02-03

## 2020-04-13 ENCOUNTER — TRANSCRIBE ORDERS (OUTPATIENT)
Dept: ADMINISTRATIVE | Facility: HOSPITAL | Age: 75
End: 2020-04-13

## 2020-04-13 ENCOUNTER — HOSPITAL ENCOUNTER (OUTPATIENT)
Dept: CT IMAGING | Facility: HOSPITAL | Age: 75
Discharge: HOME | End: 2020-04-13
Admitting: INTERNAL MEDICINE

## 2020-04-13 DIAGNOSIS — R10.9 RIGHT FLANK PAIN: Primary | ICD-10-CM

## 2020-04-13 DIAGNOSIS — R10.9 RIGHT FLANK PAIN: ICD-10-CM

## 2020-04-13 PROCEDURE — 74176 CT ABD & PELVIS W/O CONTRAST: CPT

## 2020-05-07 ENCOUNTER — TRANSCRIBE ORDERS (OUTPATIENT)
Dept: ADMINISTRATIVE | Facility: HOSPITAL | Age: 75
End: 2020-05-07

## 2020-05-07 DIAGNOSIS — N19 RENAL FAILURE, UNSPECIFIED CHRONICITY: ICD-10-CM

## 2020-05-07 DIAGNOSIS — D64.9 ANEMIA, UNSPECIFIED TYPE: Primary | ICD-10-CM

## 2020-05-07 DIAGNOSIS — Z96.612 PRESENCE OF LEFT ARTIFICIAL SHOULDER JOINT: ICD-10-CM

## 2020-05-07 DIAGNOSIS — Z01.812 ENCOUNTER FOR PREPROCEDURAL LABORATORY EXAMINATION: ICD-10-CM

## 2020-05-10 ENCOUNTER — LAB (OUTPATIENT)
Dept: LAB | Facility: HOSPITAL | Age: 75
End: 2020-05-10

## 2020-05-10 DIAGNOSIS — Z96.612 PRESENCE OF LEFT ARTIFICIAL SHOULDER JOINT: ICD-10-CM

## 2020-05-10 DIAGNOSIS — D64.9 ANEMIA, UNSPECIFIED TYPE: ICD-10-CM

## 2020-05-10 DIAGNOSIS — Z01.812 ENCOUNTER FOR PREPROCEDURAL LABORATORY EXAMINATION: ICD-10-CM

## 2020-05-10 DIAGNOSIS — N19 RENAL FAILURE, UNSPECIFIED CHRONICITY: ICD-10-CM

## 2020-05-10 LAB
ALBUMIN SERPL-MCNC: 4 G/DL (ref 3.5–5.2)
ALBUMIN/GLOB SERPL: 1.6 G/DL
ALP SERPL-CCNC: 123 U/L (ref 39–117)
ALT SERPL W P-5'-P-CCNC: 10 U/L (ref 1–33)
ANION GAP SERPL CALCULATED.3IONS-SCNC: 14 MMOL/L (ref 5–15)
AST SERPL-CCNC: 14 U/L (ref 1–32)
BASOPHILS # BLD AUTO: 0.06 10*3/MM3 (ref 0–0.2)
BASOPHILS NFR BLD AUTO: 0.8 % (ref 0–1.5)
BILIRUB SERPL-MCNC: 0.3 MG/DL (ref 0.2–1.2)
BUN BLD-MCNC: 37 MG/DL (ref 8–23)
BUN/CREAT SERPL: 18.7 (ref 7–25)
CALCIUM SPEC-SCNC: 8.7 MG/DL (ref 8.6–10.5)
CHLORIDE SERPL-SCNC: 110 MMOL/L (ref 98–107)
CO2 SERPL-SCNC: 18 MMOL/L (ref 22–29)
CREAT BLD-MCNC: 1.98 MG/DL (ref 0.57–1)
DEPRECATED RDW RBC AUTO: 46.8 FL (ref 37–54)
EOSINOPHIL # BLD AUTO: 0.37 10*3/MM3 (ref 0–0.4)
EOSINOPHIL NFR BLD AUTO: 4.9 % (ref 0.3–6.2)
ERYTHROCYTE [DISTWIDTH] IN BLOOD BY AUTOMATED COUNT: 14.7 % (ref 12.3–15.4)
GFR SERPL CREATININE-BSD FRML MDRD: 25 ML/MIN/1.73
GLOBULIN UR ELPH-MCNC: 2.5 GM/DL
GLUCOSE BLD-MCNC: 85 MG/DL (ref 65–99)
HCT VFR BLD AUTO: 33 % (ref 34–46.6)
HGB BLD-MCNC: 10.7 G/DL (ref 12–15.9)
IMM GRANULOCYTES # BLD AUTO: 0.02 10*3/MM3 (ref 0–0.05)
IMM GRANULOCYTES NFR BLD AUTO: 0.3 % (ref 0–0.5)
LYMPHOCYTES # BLD AUTO: 2.95 10*3/MM3 (ref 0.7–3.1)
LYMPHOCYTES NFR BLD AUTO: 38.9 % (ref 19.6–45.3)
MCH RBC QN AUTO: 28.5 PG (ref 26.6–33)
MCHC RBC AUTO-ENTMCNC: 32.4 G/DL (ref 31.5–35.7)
MCV RBC AUTO: 88 FL (ref 79–97)
MONOCYTES # BLD AUTO: 0.68 10*3/MM3 (ref 0.1–0.9)
MONOCYTES NFR BLD AUTO: 9 % (ref 5–12)
NEUTROPHILS # BLD AUTO: 3.5 10*3/MM3 (ref 1.7–7)
NEUTROPHILS NFR BLD AUTO: 46.1 % (ref 42.7–76)
NRBC BLD AUTO-RTO: 0 /100 WBC (ref 0–0.2)
PLATELET # BLD AUTO: 222 10*3/MM3 (ref 140–450)
PMV BLD AUTO: 11 FL (ref 6–12)
POTASSIUM BLD-SCNC: 4.9 MMOL/L (ref 3.5–5.2)
PROT SERPL-MCNC: 6.5 G/DL (ref 6–8.5)
RBC # BLD AUTO: 3.75 10*6/MM3 (ref 3.77–5.28)
SODIUM BLD-SCNC: 142 MMOL/L (ref 136–145)
WBC NRBC COR # BLD: 7.58 10*3/MM3 (ref 3.4–10.8)

## 2020-05-10 PROCEDURE — 85025 COMPLETE CBC W/AUTO DIFF WBC: CPT

## 2020-05-10 PROCEDURE — 80053 COMPREHEN METABOLIC PANEL: CPT

## 2020-05-10 PROCEDURE — 36415 COLL VENOUS BLD VENIPUNCTURE: CPT

## 2020-06-26 ENCOUNTER — OFFICE VISIT (OUTPATIENT)
Age: 75
End: 2020-06-26

## 2020-06-26 VITALS — TEMPERATURE: 99.7 F | OXYGEN SATURATION: 96 %

## 2020-07-01 LAB
REPORT: NORMAL
SARS-COV-2: NOT DETECTED
THIS TEST SENT TO: NORMAL

## 2020-07-23 ENCOUNTER — TRANSCRIBE ORDERS (OUTPATIENT)
Dept: ADMINISTRATIVE | Facility: HOSPITAL | Age: 75
End: 2020-07-23

## 2020-07-23 ENCOUNTER — OFFICE VISIT (OUTPATIENT)
Age: 75
End: 2020-07-23

## 2020-07-23 VITALS — TEMPERATURE: 96.4 F | HEART RATE: 76 BPM | OXYGEN SATURATION: 97 %

## 2020-07-23 DIAGNOSIS — M54.6 PAIN IN THORACIC SPINE: Primary | ICD-10-CM

## 2020-07-23 LAB — SARS-COV-2, PCR: NOT DETECTED

## 2020-07-27 ENCOUNTER — HOSPITAL ENCOUNTER (INPATIENT)
Age: 75
LOS: 1 days | Discharge: HOME | DRG: 940 | End: 2020-07-29
Attending: ORTHOPAEDIC SURGERY | Admitting: ORTHOPAEDIC SURGERY
Payer: MEDICARE

## 2020-07-27 ENCOUNTER — ANESTHESIA EVENT (OUTPATIENT)
Dept: OPERATING ROOM | Age: 75
DRG: 940 | End: 2020-07-27
Payer: MEDICARE

## 2020-07-27 ENCOUNTER — ANESTHESIA (OUTPATIENT)
Dept: OPERATING ROOM | Age: 75
DRG: 940 | End: 2020-07-27
Payer: MEDICARE

## 2020-07-27 VITALS
SYSTOLIC BLOOD PRESSURE: 91 MMHG | RESPIRATION RATE: 11 BRPM | OXYGEN SATURATION: 96 % | DIASTOLIC BLOOD PRESSURE: 52 MMHG | TEMPERATURE: 96.6 F

## 2020-07-27 PROBLEM — M19.019 SHOULDER ARTHRITIS: Status: ACTIVE | Noted: 2020-07-27

## 2020-07-27 LAB
ABO/RH: NORMAL
ANTIBODY SCREEN: NORMAL
APTT: 26.5 SEC (ref 26–36.2)
INR BLD: 1.04 (ref 0.88–1.18)
PROTHROMBIN TIME: 13.5 SEC (ref 12–14.6)

## 2020-07-27 PROCEDURE — 85610 PROTHROMBIN TIME: CPT

## 2020-07-27 PROCEDURE — 87015 SPECIMEN INFECT AGNT CONCNTJ: CPT

## 2020-07-27 PROCEDURE — C1776 JOINT DEVICE (IMPLANTABLE): HCPCS | Performed by: ORTHOPAEDIC SURGERY

## 2020-07-27 PROCEDURE — 6360000002 HC RX W HCPCS: Performed by: ANESTHESIOLOGY

## 2020-07-27 PROCEDURE — 6370000000 HC RX 637 (ALT 250 FOR IP): Performed by: ORTHOPAEDIC SURGERY

## 2020-07-27 PROCEDURE — 2580000003 HC RX 258: Performed by: ORTHOPAEDIC SURGERY

## 2020-07-27 PROCEDURE — 0RRK00Z REPLACEMENT OF LEFT SHOULDER JOINT WITH REVERSE BALL AND SOCKET SYNTHETIC SUBSTITUTE, OPEN APPROACH: ICD-10-PCS | Performed by: ORTHOPAEDIC SURGERY

## 2020-07-27 PROCEDURE — 3600000005 HC SURGERY LEVEL 5 BASE: Performed by: ORTHOPAEDIC SURGERY

## 2020-07-27 PROCEDURE — 87070 CULTURE OTHR SPECIMN AEROBIC: CPT

## 2020-07-27 PROCEDURE — 86900 BLOOD TYPING SEROLOGIC ABO: CPT

## 2020-07-27 PROCEDURE — 2709999900 HC NON-CHARGEABLE SUPPLY: Performed by: ORTHOPAEDIC SURGERY

## 2020-07-27 PROCEDURE — 2580000003 HC RX 258: Performed by: ANESTHESIOLOGY

## 2020-07-27 PROCEDURE — 3700000000 HC ANESTHESIA ATTENDED CARE: Performed by: ORTHOPAEDIC SURGERY

## 2020-07-27 PROCEDURE — 6360000002 HC RX W HCPCS: Performed by: ORTHOPAEDIC SURGERY

## 2020-07-27 PROCEDURE — 6360000002 HC RX W HCPCS: Performed by: NURSE ANESTHETIST, CERTIFIED REGISTERED

## 2020-07-27 PROCEDURE — 0RPK0JZ REMOVAL OF SYNTHETIC SUBSTITUTE FROM LEFT SHOULDER JOINT, OPEN APPROACH: ICD-10-PCS | Performed by: ORTHOPAEDIC SURGERY

## 2020-07-27 PROCEDURE — 86850 RBC ANTIBODY SCREEN: CPT

## 2020-07-27 PROCEDURE — 87205 SMEAR GRAM STAIN: CPT

## 2020-07-27 PROCEDURE — 87206 SMEAR FLUORESCENT/ACID STAI: CPT

## 2020-07-27 PROCEDURE — 2720000010 HC SURG SUPPLY STERILE: Performed by: ORTHOPAEDIC SURGERY

## 2020-07-27 PROCEDURE — 7100000001 HC PACU RECOVERY - ADDTL 15 MIN: Performed by: ORTHOPAEDIC SURGERY

## 2020-07-27 PROCEDURE — 2500000003 HC RX 250 WO HCPCS: Performed by: NURSE ANESTHETIST, CERTIFIED REGISTERED

## 2020-07-27 PROCEDURE — 87102 FUNGUS ISOLATION CULTURE: CPT

## 2020-07-27 PROCEDURE — 36415 COLL VENOUS BLD VENIPUNCTURE: CPT

## 2020-07-27 PROCEDURE — 3700000001 HC ADD 15 MINUTES (ANESTHESIA): Performed by: ORTHOPAEDIC SURGERY

## 2020-07-27 PROCEDURE — 85730 THROMBOPLASTIN TIME PARTIAL: CPT

## 2020-07-27 PROCEDURE — 7100000000 HC PACU RECOVERY - FIRST 15 MIN: Performed by: ORTHOPAEDIC SURGERY

## 2020-07-27 PROCEDURE — 87075 CULTR BACTERIA EXCEPT BLOOD: CPT

## 2020-07-27 PROCEDURE — 86901 BLOOD TYPING SEROLOGIC RH(D): CPT

## 2020-07-27 PROCEDURE — 3600000015 HC SURGERY LEVEL 5 ADDTL 15MIN: Performed by: ORTHOPAEDIC SURGERY

## 2020-07-27 PROCEDURE — 87116 MYCOBACTERIA CULTURE: CPT

## 2020-07-27 PROCEDURE — G0378 HOSPITAL OBSERVATION PER HR: HCPCS

## 2020-07-27 PROCEDURE — 93005 ELECTROCARDIOGRAM TRACING: CPT | Performed by: ANESTHESIOLOGY

## 2020-07-27 DEVICE — TM REVERSE 36MM POLY LINER +6MM: Type: IMPLANTABLE DEVICE | Status: FUNCTIONAL

## 2020-07-27 DEVICE — SPACER HUM +12MM OFFSET 12DEG TRABECULAR MTL: Type: IMPLANTABLE DEVICE | Status: FUNCTIONAL

## 2020-07-27 RX ORDER — LIDOCAINE HYDROCHLORIDE 10 MG/ML
INJECTION, SOLUTION EPIDURAL; INFILTRATION; INTRACAUDAL; PERINEURAL PRN
Status: DISCONTINUED | OUTPATIENT
Start: 2020-07-27 | End: 2020-07-27 | Stop reason: SDUPTHER

## 2020-07-27 RX ORDER — ESCITALOPRAM OXALATE 10 MG/1
20 TABLET ORAL DAILY
Status: DISCONTINUED | OUTPATIENT
Start: 2020-07-27 | End: 2020-07-29 | Stop reason: HOSPADM

## 2020-07-27 RX ORDER — POTASSIUM CHLORIDE 20 MEQ/1
20 TABLET, EXTENDED RELEASE ORAL 2 TIMES DAILY
Status: DISCONTINUED | OUTPATIENT
Start: 2020-07-27 | End: 2020-07-29 | Stop reason: HOSPADM

## 2020-07-27 RX ORDER — TOPIRAMATE 100 MG/1
100 TABLET, FILM COATED ORAL 2 TIMES DAILY
Status: ON HOLD | COMMUNITY
End: 2022-04-19 | Stop reason: SDUPTHER

## 2020-07-27 RX ORDER — DIAZEPAM 5 MG/1
5 TABLET ORAL DAILY
Status: DISCONTINUED | OUTPATIENT
Start: 2020-07-27 | End: 2020-07-29 | Stop reason: HOSPADM

## 2020-07-27 RX ORDER — ALLOPURINOL 100 MG/1
100 TABLET ORAL 2 TIMES DAILY
COMMUNITY

## 2020-07-27 RX ORDER — ROCURONIUM BROMIDE 10 MG/ML
INJECTION, SOLUTION INTRAVENOUS PRN
Status: DISCONTINUED | OUTPATIENT
Start: 2020-07-27 | End: 2020-07-27 | Stop reason: SDUPTHER

## 2020-07-27 RX ORDER — PROMETHAZINE HYDROCHLORIDE 25 MG/1
25 TABLET ORAL EVERY 6 HOURS PRN
COMMUNITY

## 2020-07-27 RX ORDER — SODIUM CHLORIDE 0.9 % (FLUSH) 0.9 %
10 SYRINGE (ML) INJECTION EVERY 12 HOURS SCHEDULED
Status: DISCONTINUED | OUTPATIENT
Start: 2020-07-27 | End: 2020-07-29 | Stop reason: HOSPADM

## 2020-07-27 RX ORDER — DEXAMETHASONE SODIUM PHOSPHATE 10 MG/ML
INJECTION, SOLUTION INTRAMUSCULAR; INTRAVENOUS PRN
Status: DISCONTINUED | OUTPATIENT
Start: 2020-07-27 | End: 2020-07-27 | Stop reason: SDUPTHER

## 2020-07-27 RX ORDER — HYDROMORPHONE HYDROCHLORIDE 1 MG/ML
0.5 INJECTION, SOLUTION INTRAMUSCULAR; INTRAVENOUS; SUBCUTANEOUS
Status: DISCONTINUED | OUTPATIENT
Start: 2020-07-27 | End: 2020-07-29 | Stop reason: HOSPADM

## 2020-07-27 RX ORDER — UBIDECARENONE 75 MG
50 CAPSULE ORAL DAILY
Status: DISCONTINUED | OUTPATIENT
Start: 2020-07-27 | End: 2020-07-29 | Stop reason: HOSPADM

## 2020-07-27 RX ORDER — OXYCODONE HYDROCHLORIDE 5 MG/1
5 TABLET ORAL EVERY 4 HOURS PRN
Status: DISCONTINUED | OUTPATIENT
Start: 2020-07-27 | End: 2020-07-29 | Stop reason: HOSPADM

## 2020-07-27 RX ORDER — PROMETHAZINE HYDROCHLORIDE 25 MG/ML
6.25 INJECTION, SOLUTION INTRAMUSCULAR; INTRAVENOUS
Status: DISCONTINUED | OUTPATIENT
Start: 2020-07-27 | End: 2020-07-27 | Stop reason: HOSPADM

## 2020-07-27 RX ORDER — METOCLOPRAMIDE HYDROCHLORIDE 5 MG/ML
10 INJECTION INTRAMUSCULAR; INTRAVENOUS
Status: DISCONTINUED | OUTPATIENT
Start: 2020-07-27 | End: 2020-07-27 | Stop reason: HOSPADM

## 2020-07-27 RX ORDER — ONDANSETRON 4 MG/1
4 TABLET, FILM COATED ORAL EVERY 8 HOURS PRN
Status: DISCONTINUED | OUTPATIENT
Start: 2020-07-27 | End: 2020-07-29 | Stop reason: HOSPADM

## 2020-07-27 RX ORDER — PROMETHAZINE HYDROCHLORIDE 25 MG/1
25 TABLET ORAL EVERY 6 HOURS PRN
Status: DISCONTINUED | OUTPATIENT
Start: 2020-07-27 | End: 2020-07-29 | Stop reason: HOSPADM

## 2020-07-27 RX ORDER — AMLODIPINE BESYLATE 5 MG/1
5 TABLET ORAL DAILY
Status: DISCONTINUED | OUTPATIENT
Start: 2020-07-27 | End: 2020-07-29 | Stop reason: HOSPADM

## 2020-07-27 RX ORDER — DICYCLOMINE HYDROCHLORIDE 10 MG/1
10 CAPSULE ORAL DAILY
COMMUNITY

## 2020-07-27 RX ORDER — HYDRALAZINE HYDROCHLORIDE 20 MG/ML
5 INJECTION INTRAMUSCULAR; INTRAVENOUS EVERY 10 MIN PRN
Status: DISCONTINUED | OUTPATIENT
Start: 2020-07-27 | End: 2020-07-27 | Stop reason: HOSPADM

## 2020-07-27 RX ORDER — FERROUS SULFATE 325(65) MG
325 TABLET ORAL 2 TIMES DAILY
Status: DISCONTINUED | OUTPATIENT
Start: 2020-07-27 | End: 2020-07-29 | Stop reason: HOSPADM

## 2020-07-27 RX ORDER — SODIUM CHLORIDE 0.9 % (FLUSH) 0.9 %
10 SYRINGE (ML) INJECTION PRN
Status: DISCONTINUED | OUTPATIENT
Start: 2020-07-27 | End: 2020-07-29 | Stop reason: HOSPADM

## 2020-07-27 RX ORDER — LOSARTAN POTASSIUM 50 MG/1
50 TABLET ORAL DAILY
Status: DISCONTINUED | OUTPATIENT
Start: 2020-07-27 | End: 2020-07-29 | Stop reason: HOSPADM

## 2020-07-27 RX ORDER — PROMETHAZINE HYDROCHLORIDE 12.5 MG/1
12.5 TABLET ORAL EVERY 6 HOURS PRN
Status: DISCONTINUED | OUTPATIENT
Start: 2020-07-27 | End: 2020-07-29 | Stop reason: HOSPADM

## 2020-07-27 RX ORDER — ONDANSETRON 2 MG/ML
INJECTION INTRAMUSCULAR; INTRAVENOUS PRN
Status: DISCONTINUED | OUTPATIENT
Start: 2020-07-27 | End: 2020-07-27 | Stop reason: SDUPTHER

## 2020-07-27 RX ORDER — LEVOTHYROXINE SODIUM 137 UG/1
137 TABLET ORAL DAILY
Status: DISCONTINUED | OUTPATIENT
Start: 2020-07-28 | End: 2020-07-29 | Stop reason: HOSPADM

## 2020-07-27 RX ORDER — FLUOXETINE 10 MG/1
10 CAPSULE ORAL DAILY
Status: ON HOLD | COMMUNITY
End: 2022-04-08 | Stop reason: ALTCHOICE

## 2020-07-27 RX ORDER — FLUOXETINE 10 MG/1
10 CAPSULE ORAL DAILY
Status: DISCONTINUED | OUTPATIENT
Start: 2020-07-27 | End: 2020-07-29 | Stop reason: HOSPADM

## 2020-07-27 RX ORDER — METHOCARBAMOL 500 MG/1
500 TABLET, FILM COATED ORAL 3 TIMES DAILY
Status: DISCONTINUED | OUTPATIENT
Start: 2020-07-27 | End: 2020-07-29 | Stop reason: HOSPADM

## 2020-07-27 RX ORDER — OXYCODONE HYDROCHLORIDE 5 MG/1
10 TABLET ORAL EVERY 4 HOURS PRN
Status: DISCONTINUED | OUTPATIENT
Start: 2020-07-27 | End: 2020-07-29 | Stop reason: HOSPADM

## 2020-07-27 RX ORDER — MEPERIDINE HYDROCHLORIDE 50 MG/ML
12.5 INJECTION INTRAMUSCULAR; INTRAVENOUS; SUBCUTANEOUS EVERY 5 MIN PRN
Status: DISCONTINUED | OUTPATIENT
Start: 2020-07-27 | End: 2020-07-27 | Stop reason: HOSPADM

## 2020-07-27 RX ORDER — CALCITRIOL 0.25 UG/1
0.25 CAPSULE, LIQUID FILLED ORAL DAILY
Status: DISCONTINUED | OUTPATIENT
Start: 2020-07-27 | End: 2020-07-29 | Stop reason: HOSPADM

## 2020-07-27 RX ORDER — ONDANSETRON 2 MG/ML
4 INJECTION INTRAMUSCULAR; INTRAVENOUS EVERY 6 HOURS PRN
Status: DISCONTINUED | OUTPATIENT
Start: 2020-07-27 | End: 2020-07-29 | Stop reason: HOSPADM

## 2020-07-27 RX ORDER — CALCITRIOL 0.25 UG/1
0.25 CAPSULE, LIQUID FILLED ORAL DAILY
COMMUNITY

## 2020-07-27 RX ORDER — DIAZEPAM 5 MG/1
5 TABLET ORAL DAILY PRN
COMMUNITY

## 2020-07-27 RX ORDER — TOPIRAMATE 100 MG/1
100 TABLET, FILM COATED ORAL DAILY
Status: DISCONTINUED | OUTPATIENT
Start: 2020-07-27 | End: 2020-07-29 | Stop reason: HOSPADM

## 2020-07-27 RX ORDER — QUETIAPINE FUMARATE 100 MG/1
200 TABLET, FILM COATED ORAL NIGHTLY
Status: DISCONTINUED | OUTPATIENT
Start: 2020-07-27 | End: 2020-07-29 | Stop reason: HOSPADM

## 2020-07-27 RX ORDER — DICYCLOMINE HYDROCHLORIDE 10 MG/1
10 CAPSULE ORAL DAILY
Status: DISCONTINUED | OUTPATIENT
Start: 2020-07-27 | End: 2020-07-29 | Stop reason: HOSPADM

## 2020-07-27 RX ORDER — GABAPENTIN 300 MG/1
300 CAPSULE ORAL 3 TIMES DAILY
Status: DISCONTINUED | OUTPATIENT
Start: 2020-07-27 | End: 2020-07-29 | Stop reason: HOSPADM

## 2020-07-27 RX ORDER — MIDAZOLAM HYDROCHLORIDE 1 MG/ML
2 INJECTION INTRAMUSCULAR; INTRAVENOUS ONCE
Status: COMPLETED | OUTPATIENT
Start: 2020-07-27 | End: 2020-07-27

## 2020-07-27 RX ORDER — HYDROMORPHONE HYDROCHLORIDE 1 MG/ML
0.25 INJECTION, SOLUTION INTRAMUSCULAR; INTRAVENOUS; SUBCUTANEOUS EVERY 5 MIN PRN
Status: DISCONTINUED | OUTPATIENT
Start: 2020-07-27 | End: 2020-07-27 | Stop reason: HOSPADM

## 2020-07-27 RX ORDER — IRON POLYSACCHARIDE COMPLEX 150 MG
150 CAPSULE ORAL 2 TIMES DAILY
Status: DISCONTINUED | OUTPATIENT
Start: 2020-07-27 | End: 2020-07-28 | Stop reason: CLARIF

## 2020-07-27 RX ORDER — SODIUM CHLORIDE, SODIUM LACTATE, POTASSIUM CHLORIDE, CALCIUM CHLORIDE 600; 310; 30; 20 MG/100ML; MG/100ML; MG/100ML; MG/100ML
INJECTION, SOLUTION INTRAVENOUS CONTINUOUS
Status: DISCONTINUED | OUTPATIENT
Start: 2020-07-27 | End: 2020-07-27

## 2020-07-27 RX ORDER — DIPHENHYDRAMINE HYDROCHLORIDE 50 MG/ML
12.5 INJECTION INTRAMUSCULAR; INTRAVENOUS
Status: DISCONTINUED | OUTPATIENT
Start: 2020-07-27 | End: 2020-07-27 | Stop reason: HOSPADM

## 2020-07-27 RX ORDER — LABETALOL HYDROCHLORIDE 5 MG/ML
5 INJECTION, SOLUTION INTRAVENOUS EVERY 10 MIN PRN
Status: DISCONTINUED | OUTPATIENT
Start: 2020-07-27 | End: 2020-07-27 | Stop reason: HOSPADM

## 2020-07-27 RX ORDER — SODIUM CHLORIDE 9 MG/ML
INJECTION, SOLUTION INTRAVENOUS CONTINUOUS
Status: DISCONTINUED | OUTPATIENT
Start: 2020-07-27 | End: 2020-07-29 | Stop reason: HOSPADM

## 2020-07-27 RX ORDER — HYDROMORPHONE HYDROCHLORIDE 1 MG/ML
0.5 INJECTION, SOLUTION INTRAMUSCULAR; INTRAVENOUS; SUBCUTANEOUS EVERY 5 MIN PRN
Status: COMPLETED | OUTPATIENT
Start: 2020-07-27 | End: 2020-07-27

## 2020-07-27 RX ORDER — PROPOFOL 10 MG/ML
INJECTION, EMULSION INTRAVENOUS PRN
Status: DISCONTINUED | OUTPATIENT
Start: 2020-07-27 | End: 2020-07-27 | Stop reason: SDUPTHER

## 2020-07-27 RX ORDER — ENALAPRILAT 2.5 MG/2ML
1.25 INJECTION INTRAVENOUS
Status: DISCONTINUED | OUTPATIENT
Start: 2020-07-27 | End: 2020-07-27 | Stop reason: HOSPADM

## 2020-07-27 RX ORDER — ALLOPURINOL 100 MG/1
100 TABLET ORAL DAILY
Status: DISCONTINUED | OUTPATIENT
Start: 2020-07-27 | End: 2020-07-29 | Stop reason: HOSPADM

## 2020-07-27 RX ORDER — EPHEDRINE SULFATE 50 MG/ML
INJECTION, SOLUTION INTRAVENOUS PRN
Status: DISCONTINUED | OUTPATIENT
Start: 2020-07-27 | End: 2020-07-27 | Stop reason: SDUPTHER

## 2020-07-27 RX ADMIN — EPHEDRINE SULFATE 10 MG: 50 INJECTION INTRAMUSCULAR; INTRAVENOUS; SUBCUTANEOUS at 14:46

## 2020-07-27 RX ADMIN — SODIUM CHLORIDE, SODIUM LACTATE, POTASSIUM CHLORIDE, AND CALCIUM CHLORIDE: 600; 310; 30; 20 INJECTION, SOLUTION INTRAVENOUS at 15:11

## 2020-07-27 RX ADMIN — Medication 2 G: at 21:15

## 2020-07-27 RX ADMIN — QUETIAPINE FUMARATE 200 MG: 100 TABLET ORAL at 21:15

## 2020-07-27 RX ADMIN — EPHEDRINE SULFATE 10 MG: 50 INJECTION INTRAMUSCULAR; INTRAVENOUS; SUBCUTANEOUS at 15:30

## 2020-07-27 RX ADMIN — ONDANSETRON HYDROCHLORIDE 4 MG: 2 INJECTION, SOLUTION INTRAMUSCULAR; INTRAVENOUS at 16:07

## 2020-07-27 RX ADMIN — EPHEDRINE SULFATE 10 MG: 50 INJECTION INTRAMUSCULAR; INTRAVENOUS; SUBCUTANEOUS at 16:04

## 2020-07-27 RX ADMIN — FERROUS SULFATE TAB 325 MG (65 MG ELEMENTAL FE) 325 MG: 325 (65 FE) TAB at 21:15

## 2020-07-27 RX ADMIN — MIDAZOLAM 2 MG: 1 INJECTION INTRAMUSCULAR; INTRAVENOUS at 13:41

## 2020-07-27 RX ADMIN — HYDROMORPHONE HYDROCHLORIDE 1 MG: 1 INJECTION, SOLUTION INTRAMUSCULAR; INTRAVENOUS; SUBCUTANEOUS at 21:16

## 2020-07-27 RX ADMIN — POTASSIUM CHLORIDE 20 MEQ: 1500 TABLET, EXTENDED RELEASE ORAL at 21:15

## 2020-07-27 RX ADMIN — OXYCODONE 10 MG: 5 TABLET ORAL at 19:19

## 2020-07-27 RX ADMIN — DEXAMETHASONE SODIUM PHOSPHATE 10 MG: 10 INJECTION, SOLUTION INTRAMUSCULAR; INTRAVENOUS at 14:25

## 2020-07-27 RX ADMIN — HYDROMORPHONE HYDROCHLORIDE 0.5 MG: 1 INJECTION, SOLUTION INTRAMUSCULAR; INTRAVENOUS; SUBCUTANEOUS at 16:31

## 2020-07-27 RX ADMIN — PROPOFOL 200 MG: 10 INJECTION, EMULSION INTRAVENOUS at 14:18

## 2020-07-27 RX ADMIN — EPHEDRINE SULFATE 10 MG: 50 INJECTION INTRAMUSCULAR; INTRAVENOUS; SUBCUTANEOUS at 14:42

## 2020-07-27 RX ADMIN — SUGAMMADEX 300 MG: 100 INJECTION, SOLUTION INTRAVENOUS at 16:07

## 2020-07-27 RX ADMIN — LIDOCAINE HYDROCHLORIDE 50 MG: 10 INJECTION, SOLUTION EPIDURAL; INFILTRATION; INTRACAUDAL; PERINEURAL at 14:18

## 2020-07-27 RX ADMIN — HYDROMORPHONE HYDROCHLORIDE 0.5 MG: 1 INJECTION, SOLUTION INTRAMUSCULAR; INTRAVENOUS; SUBCUTANEOUS at 16:44

## 2020-07-27 RX ADMIN — HYDROMORPHONE HYDROCHLORIDE 0.5 MG: 1 INJECTION, SOLUTION INTRAMUSCULAR; INTRAVENOUS; SUBCUTANEOUS at 17:02

## 2020-07-27 RX ADMIN — HYDROMORPHONE HYDROCHLORIDE 0.5 MG: 1 INJECTION, SOLUTION INTRAMUSCULAR; INTRAVENOUS; SUBCUTANEOUS at 17:17

## 2020-07-27 RX ADMIN — EPHEDRINE SULFATE 10 MG: 50 INJECTION INTRAMUSCULAR; INTRAVENOUS; SUBCUTANEOUS at 14:38

## 2020-07-27 RX ADMIN — SODIUM CHLORIDE, PRESERVATIVE FREE 10 ML: 5 INJECTION INTRAVENOUS at 21:16

## 2020-07-27 RX ADMIN — ROCURONIUM BROMIDE 50 MG: 10 INJECTION, SOLUTION INTRAVENOUS at 14:18

## 2020-07-27 RX ADMIN — Medication 2 G: at 14:29

## 2020-07-27 RX ADMIN — METHOCARBAMOL TABLETS 500 MG: 500 TABLET, COATED ORAL at 21:15

## 2020-07-27 RX ADMIN — SODIUM CHLORIDE, SODIUM LACTATE, POTASSIUM CHLORIDE, AND CALCIUM CHLORIDE: 600; 310; 30; 20 INJECTION, SOLUTION INTRAVENOUS at 12:08

## 2020-07-27 RX ADMIN — GABAPENTIN 300 MG: 300 CAPSULE ORAL at 21:15

## 2020-07-27 ASSESSMENT — LIFESTYLE VARIABLES: SMOKING_STATUS: 0

## 2020-07-27 ASSESSMENT — PAIN SCALES - GENERAL
PAINLEVEL_OUTOF10: 3
PAINLEVEL_OUTOF10: 8
PAINLEVEL_OUTOF10: 8
PAINLEVEL_OUTOF10: 7
PAINLEVEL_OUTOF10: 8
PAINLEVEL_OUTOF10: 6
PAINLEVEL_OUTOF10: 8
PAINLEVEL_OUTOF10: 7
PAINLEVEL_OUTOF10: 5
PAINLEVEL_OUTOF10: 6
PAINLEVEL_OUTOF10: 7
PAINLEVEL_OUTOF10: 9
PAINLEVEL_OUTOF10: 7
PAINLEVEL_OUTOF10: 8

## 2020-07-27 ASSESSMENT — PAIN DESCRIPTION - FREQUENCY: FREQUENCY: INTERMITTENT

## 2020-07-27 ASSESSMENT — PAIN DESCRIPTION - PROGRESSION: CLINICAL_PROGRESSION: NOT CHANGED

## 2020-07-27 ASSESSMENT — PAIN DESCRIPTION - DESCRIPTORS: DESCRIPTORS: ACHING

## 2020-07-27 ASSESSMENT — PAIN DESCRIPTION - LOCATION: LOCATION: SHOULDER

## 2020-07-27 ASSESSMENT — PAIN - FUNCTIONAL ASSESSMENT: PAIN_FUNCTIONAL_ASSESSMENT: PREVENTS OR INTERFERES SOME ACTIVE ACTIVITIES AND ADLS

## 2020-07-27 ASSESSMENT — PAIN SCALES - WONG BAKER
WONGBAKER_NUMERICALRESPONSE: 0

## 2020-07-27 ASSESSMENT — PAIN DESCRIPTION - ORIENTATION: ORIENTATION: LEFT

## 2020-07-27 ASSESSMENT — PAIN DESCRIPTION - PAIN TYPE: TYPE: SURGICAL PAIN

## 2020-07-27 ASSESSMENT — PAIN DESCRIPTION - ONSET: ONSET: SUDDEN

## 2020-07-27 NOTE — ANESTHESIA PRE PROCEDURE
Department of Anesthesiology  Preprocedure Note       Name:  Paul Garza   Age:  69 y.o.  :  1945                                          MRN:  [id number]         Date:  2020      Surgeon: Barby Staton):  Randi Alvarado MD    Procedure: Procedure(s):  KNEE TOTAL ARTHROPLASTY    Medications prior to admission:   Prior to Admission medications    Medication Sig Start Date End Date Taking?  Authorizing Provider   rivaroxaban (XARELTO) 10 MG TABS tablet Take 1 tablet by mouth daily 17   Randi Alvarado MD   amLODIPine (NORVASC) 5 MG tablet Take 5 mg by mouth daily    Historical Provider, MD   QUEtiapine (SEROQUEL) 100 MG tablet Take 100 mg by mouth nightly    Historical Provider, MD   potassium chloride (KLOR-CON) 20 MEQ packet Take 20 mEq by mouth 2 times daily    Historical Provider, MD   Magnesium Oxide 400 MG CAPS Take by mouth 2 times daily    Historical Provider, MD   escitalopram (LEXAPRO) 20 MG tablet Take 20 mg by mouth daily    Historical Provider, MD   ferrous sulfate 325 (65 Fe) MG tablet Take 325 mg by mouth 2 times daily    Historical Provider, MD   SODIUM BICARBONATE PO Take 10 g by mouth 2 times daily     Historical Provider, MD   Multiple Vitamins-Minerals (OCUVITE EYE + MULTI PO) Take by mouth daily    Historical Provider, MD   gabapentin (NEURONTIN) 300 MG capsule Take 300 mg by mouth 3 times daily as needed    Historical Provider, MD   epoetin niurka (EPOGEN;PROCRIT) 67508 UNIT/ML injection Inject 20,000 Units into the skin every 3 months     Historical Provider, MD   ondansetron (ZOFRAN) 4 MG tablet Take 4 mg by mouth every 8 hours as needed for Nausea or Vomiting    Historical Provider, MD   iron polysaccharides (FERREX 150) 150 MG capsule Take 150 mg by mouth 2 times daily    Historical Provider, MD   aspirin 81 MG tablet Take 81 mg by mouth daily    Historical Provider, MD   vitamin B-12 (CYANOCOBALAMIN) 100 MCG tablet Take 50 mcg by mouth daily    Historical Provider, MD   vitamin D (ERGOCALCIFEROL) 77784 UNITS CAPS capsule Take 50,000 Units by mouth once a week    Historical Provider, MD   telmisartan (MICARDIS) 40 MG tablet Take 40 mg by mouth daily    Historical Provider, MD   methocarbamol (ROBAXIN) 500 MG tablet Take 500 mg by mouth 3 times daily    Historical Provider, MD   levothyroxine (SYNTHROID) 150 MCG tablet Take 137 mcg by mouth Daily     Historical Provider, MD   Prenatal Vit-Fe Fumarate-FA (PRENATAL VITAMIN PO) Take by mouth    Historical Provider, MD   Probiotic Product (PROBIOTIC DAILY PO) Take by mouth    Historical Provider, MD       Current medications:    No current facility-administered medications for this visit. No current outpatient medications on file. Facility-Administered Medications Ordered in Other Visits   Medication Dose Route Frequency Provider Last Rate Last Dose    lactated ringers infusion   Intravenous Continuous Oziel Skinner MD           Allergies: Allergies   Allergen Reactions    Codeine        Problem List:    Patient Active Problem List   Diagnosis Code    Arthritis of knee M17.10       Past Medical History:        Diagnosis Date    Arthritis     Asthma     DVT (deep vein thrombosis) in [facility] ([facility] [address]     History of blood transfusion     Hx of blood clots     hAD dvt WAS ON Coumadin for a year. 2006    Hypertension     Lupus     Renal failure     Splenic artery aneurysm ([facility] [address]     Thyroid disease        Past Surgical History:        Procedure Laterality Date    GASTRIC BYPASS SURGERY  1975    HYSTERECTOMY      JOINT REPLACEMENT Right     hip and shoulder    LYMPHADENECTOMY      NC TOTAL KNEE ARTHROPLASTY Left 11/27/2017    KNEE TOTAL ARTHROPLASTY performed by Mike Aelxis MD at [place] [id number] History:    Social History     Tobacco Use    Smoking status: Former Smoker    Smokeless tobacco: Never Used   Substance Use Topics    Alcohol use:  No                                Counseling given: Not Answered      Vital Signs (Current): There were no vitals filed for this visit. BP Readings from Last 3 Encounters:   11/30/17 109/62   11/27/17 111/60   08/26/16 129/81       NPO Status:                                                                                 BMI:   Wt Readings from Last 3 Encounters:   11/15/17 148 lb (67.1 kg)   08/26/16 145 lb (65.8 kg)     There is no height or weight on file to calculate BMI.    CBC:   Lab Results   Component Value Date    WBC 12.1 11/30/2017    RBC 2.93 11/30/2017    HGB 8.7 11/30/2017    HCT 28.2 11/30/2017    MCV 96.2 11/30/2017    RDW 13.4 11/30/2017     11/30/2017       CMP:   Lab Results   Component Value Date     11/28/2017    K 5.0 11/28/2017     11/28/2017    CO2 15 11/28/2017    BUN 19 11/28/2017    CREATININE 1.5 11/28/2017    LABGLOM 34 11/28/2017    GLUCOSE 100 11/28/2017    PROT 5.5 11/12/2015    CALCIUM 7.7 11/28/2017    ALKPHOS 89 11/12/2015    AST 15 11/12/2015    ALT 7 11/12/2015       POC Tests: No results for input(s): POCGLU, POCNA, POCK, POCCL, POCBUN, POCHEMO, POCHCT in the last 72 hours. Coags:   Lab Results   Component Value Date    PROTIME 13.6 11/15/2017    INR 1.05 11/15/2017    APTT 31.8 11/15/2017       HCG (If Applicable): No results found for: PREGTESTUR, PREGSERUM, HCG, HCGQUANT     ABGs: No results found for: PHART, PO2ART, BPN5OCL, NKO1BNR, BEART, Y9WRLIVZ     Type & Screen (If Applicable):  No results found for: LABABO, [address]    Anesthesia Evaluation  Patient summary reviewed and Nursing notes reviewed   history of anesthetic complications: PONV.   Airway: Mallampati: II  TM distance: >3 FB   Neck ROM: full   Dental:    (+) lower dentures and upper dentures      Pulmonary:normal exam        (-) COPD, asthma and not a current smoker                           Cardiovascular:    (+) hypertension:,     (-) pacemaker, CAD and CABG/stent    ECG reviewed               Beta Blocker:  Not on Beta Blocker         Neuro/Psych:      (-) seizures, TIA and CVA           GI/Hepatic/Renal:   (+) renal disease: CRI,      (-) GERD       Endo/Other:    (+) hypothyroidism: arthritis:., .    (-) blood dyscrasia                ROS comment: H/O SLE Abdominal:           Vascular: negative vascular ROS. Anesthesia Plan      general and regional     ASA 3     (Decadron/Zofran Intraop  Brachial plexus block)  Induction: intravenous. BIS  MIPS: Postoperative opioids intended and Prophylactic antiemetics administered. Anesthetic plan and risks discussed with patient.                       Jhoan Gardner MD   7/27/2020

## 2020-07-27 NOTE — ANESTHESIA POSTPROCEDURE EVALUATION
Department of Anesthesiology  Postprocedure Note    Patient: Geraldo Butler  MRN: [id number]  YOB: 1945  Date of evaluation: 7/27/2020  Time:  4:18 PM     Procedure Summary     Date:  07/27/20 Room / Location:  [address]    Anesthesia Start:  1414 Anesthesia Stop:      Procedure:  LLEFT REVERSE TOTAL SHOULDER POLY EXCHANGE AND BICEPS TENOTOMY (Left Shoulder) Diagnosis:  (N80.233)    Surgeon:  Ross Whitehead MD Responsible Provider:  ODILIA Leiva CRNA    Anesthesia Type:  general, regional ASA Status:  3          Anesthesia Type: general, regional    Kenn Phase I: Kenn Score: 10    Kenn Phase II:      Last vitals: Reviewed and per EMR flowsheets.        Anesthesia Post Evaluation    Patient location during evaluation: PACU  Patient participation: complete - patient participated  Level of consciousness: awake  Pain score: 0  Airway patency: patent  Nausea & Vomiting: no nausea and no vomiting  Complications: no  Cardiovascular status: hemodynamically stable  Respiratory status: acceptable  Hydration status: euvolemic

## 2020-07-28 PROBLEM — M24.012 LOOSE BODY OF LEFT SHOULDER: Status: ACTIVE | Noted: 2020-07-28

## 2020-07-28 PROBLEM — Z96.612 S/P REVERSE TOTAL SHOULDER ARTHROPLASTY, LEFT: Status: ACTIVE | Noted: 2020-07-28

## 2020-07-28 LAB
EKG P AXIS: 45 DEGREES
EKG P-R INTERVAL: 142 MS
EKG Q-T INTERVAL: 362 MS
EKG QRS DURATION: 90 MS
EKG QTC CALCULATION (BAZETT): 390 MS
EKG T AXIS: 39 DEGREES

## 2020-07-28 PROCEDURE — 2580000003 HC RX 258: Performed by: ORTHOPAEDIC SURGERY

## 2020-07-28 PROCEDURE — 97161 PT EVAL LOW COMPLEX 20 MIN: CPT

## 2020-07-28 PROCEDURE — 6370000000 HC RX 637 (ALT 250 FOR IP): Performed by: ORTHOPAEDIC SURGERY

## 2020-07-28 PROCEDURE — 97116 GAIT TRAINING THERAPY: CPT

## 2020-07-28 PROCEDURE — 97535 SELF CARE MNGMENT TRAINING: CPT

## 2020-07-28 PROCEDURE — 1210000000 HC MED SURG R&B

## 2020-07-28 PROCEDURE — 6360000002 HC RX W HCPCS: Performed by: ORTHOPAEDIC SURGERY

## 2020-07-28 PROCEDURE — 97165 OT EVAL LOW COMPLEX 30 MIN: CPT

## 2020-07-28 PROCEDURE — 93010 ELECTROCARDIOGRAM REPORT: CPT | Performed by: INTERNAL MEDICINE

## 2020-07-28 RX ORDER — OXYCODONE HYDROCHLORIDE 10 MG/1
10 TABLET ORAL EVERY 4 HOURS PRN
Qty: 42 TABLET | Refills: 0 | Status: SHIPPED | OUTPATIENT
Start: 2020-07-28 | End: 2020-08-04

## 2020-07-28 RX ADMIN — SODIUM CHLORIDE: 9 INJECTION, SOLUTION INTRAVENOUS at 16:16

## 2020-07-28 RX ADMIN — GABAPENTIN 300 MG: 300 CAPSULE ORAL at 14:12

## 2020-07-28 RX ADMIN — METHOCARBAMOL TABLETS 500 MG: 500 TABLET, COATED ORAL at 14:12

## 2020-07-28 RX ADMIN — OXYCODONE 10 MG: 5 TABLET ORAL at 06:14

## 2020-07-28 RX ADMIN — Medication 50 MCG: at 09:24

## 2020-07-28 RX ADMIN — HYDROMORPHONE HYDROCHLORIDE 0.5 MG: 1 INJECTION, SOLUTION INTRAMUSCULAR; INTRAVENOUS; SUBCUTANEOUS at 16:20

## 2020-07-28 RX ADMIN — LEVOTHYROXINE SODIUM 137 MCG: 137 TABLET ORAL at 05:16

## 2020-07-28 RX ADMIN — OXYCODONE 10 MG: 5 TABLET ORAL at 02:38

## 2020-07-28 RX ADMIN — ALLOPURINOL 100 MG: 100 TABLET ORAL at 09:24

## 2020-07-28 RX ADMIN — DIAZEPAM 5 MG: 5 TABLET ORAL at 09:23

## 2020-07-28 RX ADMIN — METHOCARBAMOL TABLETS 500 MG: 500 TABLET, COATED ORAL at 20:27

## 2020-07-28 RX ADMIN — ESCITALOPRAM OXALATE 20 MG: 10 TABLET ORAL at 09:24

## 2020-07-28 RX ADMIN — OXYCODONE 10 MG: 5 TABLET ORAL at 10:29

## 2020-07-28 RX ADMIN — METHOCARBAMOL TABLETS 500 MG: 500 TABLET, COATED ORAL at 09:24

## 2020-07-28 RX ADMIN — FLUOXETINE 10 MG: 10 CAPSULE ORAL at 09:24

## 2020-07-28 RX ADMIN — DICYCLOMINE HYDROCHLORIDE 10 MG: 10 CAPSULE ORAL at 09:23

## 2020-07-28 RX ADMIN — GABAPENTIN 300 MG: 300 CAPSULE ORAL at 09:23

## 2020-07-28 RX ADMIN — HYDROMORPHONE HYDROCHLORIDE 0.5 MG: 1 INJECTION, SOLUTION INTRAMUSCULAR; INTRAVENOUS; SUBCUTANEOUS at 08:18

## 2020-07-28 RX ADMIN — HYDROMORPHONE HYDROCHLORIDE 0.5 MG: 1 INJECTION, SOLUTION INTRAMUSCULAR; INTRAVENOUS; SUBCUTANEOUS at 05:16

## 2020-07-28 RX ADMIN — FERROUS SULFATE TAB 325 MG (65 MG ELEMENTAL FE) 325 MG: 325 (65 FE) TAB at 09:25

## 2020-07-28 RX ADMIN — GABAPENTIN 300 MG: 300 CAPSULE ORAL at 20:27

## 2020-07-28 RX ADMIN — POTASSIUM CHLORIDE 20 MEQ: 1500 TABLET, EXTENDED RELEASE ORAL at 09:25

## 2020-07-28 RX ADMIN — OXYCODONE 10 MG: 5 TABLET ORAL at 14:12

## 2020-07-28 RX ADMIN — QUETIAPINE FUMARATE 200 MG: 100 TABLET ORAL at 20:27

## 2020-07-28 RX ADMIN — OXYCODONE 10 MG: 5 TABLET ORAL at 20:15

## 2020-07-28 RX ADMIN — CALCITRIOL CAPSULES 0.25 MCG 0.25 MCG: 0.25 CAPSULE ORAL at 09:25

## 2020-07-28 RX ADMIN — HYDROMORPHONE HYDROCHLORIDE 0.5 MG: 1 INJECTION, SOLUTION INTRAMUSCULAR; INTRAVENOUS; SUBCUTANEOUS at 11:51

## 2020-07-28 RX ADMIN — POTASSIUM CHLORIDE 20 MEQ: 1500 TABLET, EXTENDED RELEASE ORAL at 20:28

## 2020-07-28 RX ADMIN — TOPIRAMATE 100 MG: 100 TABLET, FILM COATED ORAL at 09:23

## 2020-07-28 RX ADMIN — FERROUS SULFATE TAB 325 MG (65 MG ELEMENTAL FE) 325 MG: 325 (65 FE) TAB at 20:28

## 2020-07-28 RX ADMIN — Medication 2 G: at 05:16

## 2020-07-28 ASSESSMENT — PAIN SCALES - GENERAL
PAINLEVEL_OUTOF10: 8
PAINLEVEL_OUTOF10: 5
PAINLEVEL_OUTOF10: 8
PAINLEVEL_OUTOF10: 7
PAINLEVEL_OUTOF10: 8
PAINLEVEL_OUTOF10: 0
PAINLEVEL_OUTOF10: 8
PAINLEVEL_OUTOF10: 6

## 2020-07-28 ASSESSMENT — PAIN DESCRIPTION - LOCATION: LOCATION: SHOULDER

## 2020-07-28 ASSESSMENT — PAIN DESCRIPTION - ORIENTATION: ORIENTATION: LEFT

## 2020-07-28 NOTE — DISCHARGE SUMMARY
Department of Orthopedic Surgery  Reverse Total Shoulder Discharge Summary    Admission Date: 7/27/2020    Discharge Date: 7/29/2020    Date of Surgery: 7/27/2020    Procedures:   Left Reverse Total Shoulder Replacement, poly exchange    Consultations:     Reason for Admission: Post operative pain control, acute anemia monitoring, and physical therapy. Brief History: The patient, Chelsea Benjamin is a 69 y.o. female underwent left reverse total shoulder replacement procedure without complication. Chelsea Benjamin was admitted to the floor following her recovery in the PACU. Discharge Diagnosis  S/P Left Reverse Total Shoulder Replacement poly exchange        Discharge Medications  Current Discharge Medication List           Details   oxyCODONE (OXY-IR) 10 MG immediate release tablet Take 1 tablet by mouth every 4 hours as needed for Pain for up to 7 days. Qty: 42 tablet, Refills: 0    Comments: Reduce doses taken as pain becomes manageable  Associated Diagnoses: S/P reverse total shoulder arthroplasty, left              Details   allopurinol (ZYLOPRIM) 100 MG tablet Take 100 mg by mouth daily      diazePAM (VALIUM) 5 MG tablet Take 5 mg by mouth daily.       FLUoxetine (PROZAC) 10 MG capsule Take 10 mg by mouth daily      dicyclomine (BENTYL) 10 MG capsule Take 10 mg by mouth daily      topiramate (TOPAMAX) 100 MG tablet Take 100 mg by mouth daily      promethazine (PHENERGAN) 25 MG tablet Take 25 mg by mouth every 6 hours as needed for Nausea      calcitRIOL (ROCALTROL) 0.25 MCG capsule Take 0.25 mcg by mouth daily      amLODIPine (NORVASC) 5 MG tablet Take 5 mg by mouth daily      QUEtiapine (SEROQUEL) 100 MG tablet Take 200 mg by mouth nightly       Magnesium Oxide 400 MG CAPS Take by mouth 2 times daily      escitalopram (LEXAPRO) 20 MG tablet Take 20 mg by mouth daily      ferrous sulfate 325 (65 Fe) MG tablet Take 325 mg by mouth 2 times daily      SODIUM BICARBONATE PO Take 10 g by mouth 2 times daily soon after arrival to the floor and their pain remained under good control through their hospital stay. From a medical standpoint the patient remained stable and continued to have the medicine team follow throughout their stay. The patients dressing was changed/incison was checked on day of d/c. The patient will be discharged at this time to Home  with their current diet restrictions and will continue to follow the total shoulder precautions outlined to them by us and PT/OT. Condition on Discharge: Stable    Plan  Return visit in 2 weeks. .  Patient was instructed on the use of pain medications, the signs and symptoms of infection, and was given our number to call should they have any questions or concerns following discharge.     Electronically signed by Dax Ruelas on 7/28/2020 at 7:23 AM

## 2020-07-28 NOTE — DISCHARGE INSTR - DIET

## 2020-07-28 NOTE — OP NOTE
[facility] [employer] [facility] SHIR[address] [facility]                                OPERATIVE REPORT    PATIENT NAME: Bijal Resendiz                      :        1945  MED REC NO:   [id number]                              ROOM:       [facility]  ACCOUNT NO:   [de-identified]                           ADMIT DATE: 2020  PROVIDER:     Riccardo Zavala MD    DATE OF PROCEDURE:  2020    PREOPERATIVE DIAGNOSIS:  Painful left reverse total shoulder  arthroplasty. POSTOPERATIVE DIAGNOSIS:  Painful left reverse total shoulder  arthroplasty. Possible left reverse total shoulder arthroplasty  instability. PROCEDURE PERFORMED:  Left shoulder reverse total shoulder arthroplasty,  polyethylene exchange. SURGEON:  Riccardo Zavala MD    ASSISTANT:  Osito Ferguson PA-C    ANESTHESIA:  General endotracheal anesthesia. ESTIMATED BLOOD LOSS:  Minimal.    COMPLICATIONS:  None. CONDITION:  Stable. IMPLANTS:  Size 12 metallic spacer with a size 6 polyethylene spacer. FINDINGS:  No gross purulence was found. It did not appear outwardly  infected. The shoulder did appear slightly loose with a +6 polyethylene  spacer. Multiple tissue samples and fluid samples were sent off for  Gram stain culture and sensitivity to rule out Cutibacterium. BRIEF HISTORY:  This is a 13-year-old female who is status post  bilateral reverse total shoulder arthroplasties. The right shoulder has  done quite well. Unfortunately, the patient's left shoulder continued  to complain of pain. The patient's radiographs were normal.  All her  lab work including CBC, ESR and CRP was normal as well. On clinical  exam, she demonstrated no outward signs of infection or instability. Eventually, once all conservative measures were exhausted, we reluctantly  offered to take her to the operating room for exploration with a poly  exchange to assess the integrity of the shoulder itself. culture and sensitivity as well. Multiple samples were then taken throughout the joint itself. Once  again, this did not appear outwardly infected. The shoulder was then  irrigated with 3 liters of pulsatile lavage. Any type of scar tissue  around the glenohumeral joint was excised with radiofrequency wand. Also removed the posterior aspect of the remaining rotator cuff. Any prominent bone anteriorly that I thought could cause any type of  irritation along the soft tissue, particularly the conjoint tendon, was  removed with a rongeur. At this point in time, attention was then  turned to trialing. Upon trialing, I felt that the size 12 metallic  spacer with a 6 polyethylene standard spacer gave me the most stable  construct. The patient had an excellent range of motion and was stable  throughout an arc of motion. I did attempt the 6 retentive polyethylene  spacer but did not get this reduced as I felt this was going to put  undue tension across the acromion. At this point in time, a definitive size 12 metallic spacer with a 6  polyethylene spacer was placed in position. The assistant then helped  to reduce the shoulder. Shoulder was taken through a range of motion  and it appeared quite stable. Meticulous hemostasis was then  maintained. The assistant then closed the deltopectoral interval with a  #1 Vicryl suture. The skin was then closed by the assistant with #1  Vicryl suture and a Prineo wound closure system. The patient awoke from  anesthesia without difficulty and was transferred to PACU in stable  condition. All sponge, needle, and instrument counts were correct at  the end of the procedure. We will plan to follow this patient's culture in 7 to 10 days to make  sure she did not have any stealth infection from Propionibacterium or  Cutibacterium. We will also follow serial ESR and CRP.         Jian Brennan MD    D: 07/27/2020 17:20:29      T: 07/27/2020 23:00:38     [email or web address]  Job#: [id number]     Doc#: [id number]    CC:

## 2020-07-28 NOTE — PROGRESS NOTES
Spoke with patient concerning discharge order. Patient reports she doesn't think she can go home today due to pain control issues.  Call placed to Dr Nehemias Bennett office awaiting a return call

## 2020-07-28 NOTE — PROGRESS NOTES
Physical Therapy    Daily Treatment Note    DATE:  2020  NAME:  Michelene Saint  :  1945  (74 y.o.,female)  MRN:  [id number]      HOME LIVING:       RESTRICTIONS/PRECAUTIONS:         OVERALL  ORIENTATION STATUS:  Overall Orientation Status: Within Functional Limits    STRENGTH  Strength RLE  Strength RLE: WFL  Strength LLE  Strength LLE: WFL    ROM   PROM RLE (degrees)  RLE PROM: WFL  PROM LLE (degrees)  LLE PROM: WFL     BALANCE  Balance  Posture: Fair  Sitting - Static: Good  Sitting - Dynamic: Good  Standing - Static: Fair  Standing - Dynamic: Fair    BED MOBILITY  Bed Mobility  Scooting: Minimal assistance    TRANSFERS  Transfers  Sit to Stand: Contact guard assistance  Stand to sit: Contact guard assistance  Bed to Chair: Contact guard assistance    AMBULATION  Device: No Device  Assistance: Contact guard assistance  Distance: 30 feet    STAIRS         COMMENTS:  Returned to bed at patient request  Call light within reach  Bed alarm activated  Patient instructed to request assistance before getting up  Nursing updated        Electronically signed by Quiana Jones PT on 2020 at 8:29 AM

## 2020-07-28 NOTE — PROGRESS NOTES
Physical Therapy    Facility/Department: [facility] SURG SERVICES  Initial Assessment    NAME: Derek Williamson  : 1945  MRN: [id number]    Date of Service: 2020    Discharge Recommendations:           Assessment   Body structures, Functions, Activity limitations: Decreased functional mobility   Assessment: Patient will benefit from continuing skilled physical therapy to improve mobility  Treatment Diagnosis: Decline in mobility  Prognosis: Good  Decision Making: Low Complexity  REQUIRES PT FOLLOW UP: Yes  Activity Tolerance  Activity Tolerance: Patient limited by pain       Patient Diagnosis(es): The encounter diagnosis was S/P reverse total shoulder arthroplasty, left.     has a past medical history of Arthritis, Asthma, DVT (deep vein thrombosis) in pregnancy, History of blood transfusion, Hx of blood clots, Hypertension, Lupus ([facility] [address] Renal failure, Splenic artery aneurysm ([facility] [address] and Thyroid disease. has a past surgical history that includes Hysterectomy; lymphadenectomy; Gastric bypass surgery (); joint replacement (Right); pr total knee arthroplasty (Left, 2017); and shoulder surgery (Left, 2020).     Restrictions     Vision/Hearing        Subjective  General  Chart Reviewed: Yes  Patient assessed for rehabilitation services?: Yes  Diagnosis: Left TSA  Follows Commands: Within Functional Limits  Subjective  Subjective: Agrees to work with therapy  Pain Screening  Patient Currently in Pain: Yes          Orientation  Orientation  Overall Orientation Status: Within Functional Limits  Social/Functional History     Cognition        Objective          PROM RLE (degrees)  RLE PROM: WFL  PROM LLE (degrees)  LLE PROM: WFL  Strength RLE  Strength RLE: WFL  Strength LLE  Strength LLE: WFL        Bed mobility  Supine to Sit: Minimal assistance  Sit to Supine: Minimal assistance  Scooting: Minimal assistance  Transfers  Sit to Stand: Contact guard assistance  Stand to sit: Contact guard assistance  Bed to

## 2020-07-29 VITALS
BODY MASS INDEX: 26.29 KG/M2 | SYSTOLIC BLOOD PRESSURE: 99 MMHG | TEMPERATURE: 98.8 F | WEIGHT: 154 LBS | HEART RATE: 88 BPM | OXYGEN SATURATION: 91 % | RESPIRATION RATE: 20 BRPM | DIASTOLIC BLOOD PRESSURE: 61 MMHG | HEIGHT: 64 IN

## 2020-07-29 PROCEDURE — 97530 THERAPEUTIC ACTIVITIES: CPT

## 2020-07-29 PROCEDURE — 6360000002 HC RX W HCPCS: Performed by: ORTHOPAEDIC SURGERY

## 2020-07-29 PROCEDURE — 6370000000 HC RX 637 (ALT 250 FOR IP): Performed by: ORTHOPAEDIC SURGERY

## 2020-07-29 PROCEDURE — 97535 SELF CARE MNGMENT TRAINING: CPT

## 2020-07-29 RX ADMIN — GABAPENTIN 300 MG: 300 CAPSULE ORAL at 08:04

## 2020-07-29 RX ADMIN — ALLOPURINOL 100 MG: 100 TABLET ORAL at 08:05

## 2020-07-29 RX ADMIN — METHOCARBAMOL TABLETS 500 MG: 500 TABLET, COATED ORAL at 08:00

## 2020-07-29 RX ADMIN — DIAZEPAM 5 MG: 5 TABLET ORAL at 08:04

## 2020-07-29 RX ADMIN — OXYCODONE 10 MG: 5 TABLET ORAL at 01:57

## 2020-07-29 RX ADMIN — FERROUS SULFATE TAB 325 MG (65 MG ELEMENTAL FE) 325 MG: 325 (65 FE) TAB at 08:05

## 2020-07-29 RX ADMIN — HYDROMORPHONE HYDROCHLORIDE 0.5 MG: 1 INJECTION, SOLUTION INTRAMUSCULAR; INTRAVENOUS; SUBCUTANEOUS at 04:44

## 2020-07-29 RX ADMIN — LEVOTHYROXINE SODIUM 137 MCG: 137 TABLET ORAL at 05:52

## 2020-07-29 RX ADMIN — ESCITALOPRAM OXALATE 20 MG: 10 TABLET ORAL at 08:03

## 2020-07-29 RX ADMIN — FLUOXETINE 10 MG: 10 CAPSULE ORAL at 08:05

## 2020-07-29 RX ADMIN — POTASSIUM CHLORIDE 20 MEQ: 1500 TABLET, EXTENDED RELEASE ORAL at 08:02

## 2020-07-29 RX ADMIN — OXYCODONE 10 MG: 5 TABLET ORAL at 05:52

## 2020-07-29 RX ADMIN — TOPIRAMATE 100 MG: 100 TABLET, FILM COATED ORAL at 08:00

## 2020-07-29 RX ADMIN — CALCITRIOL CAPSULES 0.25 MCG 0.25 MCG: 0.25 CAPSULE ORAL at 08:00

## 2020-07-29 RX ADMIN — DICYCLOMINE HYDROCHLORIDE 10 MG: 10 CAPSULE ORAL at 08:02

## 2020-07-29 RX ADMIN — Medication 50 MCG: at 08:02

## 2020-07-29 RX ADMIN — OXYCODONE 10 MG: 5 TABLET ORAL at 09:49

## 2020-07-29 ASSESSMENT — PAIN SCALES - GENERAL
PAINLEVEL_OUTOF10: 0
PAINLEVEL_OUTOF10: 5
PAINLEVEL_OUTOF10: 8
PAINLEVEL_OUTOF10: 8
PAINLEVEL_OUTOF10: 7
PAINLEVEL_OUTOF10: 9
PAINLEVEL_OUTOF10: 7
PAINLEVEL_OUTOF10: 0
PAINLEVEL_OUTOF10: 4

## 2020-07-29 ASSESSMENT — PAIN DESCRIPTION - LOCATION
LOCATION: SHOULDER
LOCATION: SHOULDER

## 2020-07-29 ASSESSMENT — PAIN DESCRIPTION - PAIN TYPE: TYPE: SURGICAL PAIN

## 2020-07-29 ASSESSMENT — PAIN DESCRIPTION - ORIENTATION
ORIENTATION: LEFT
ORIENTATION: LEFT

## 2020-07-29 NOTE — PROGRESS NOTES
Occupational Therapy  Facility/Department: [facility] SURG SERVICES  Daily Treatment Note  NAME: Bao Hernandez  : 1945  MRN: [id number]    Date of Service: 2020    Discharge Recommendations:  24 hour supervision or assist  OT Equipment Recommendations  Equipment Needed: No    Assessment   Assessment: Follow up treatment with patient and spouse. Patient and spouse able to verbalize/demonstrate reverse shoulder precautions/exercises, ADL strategies, gait belt use, positioning, sling use. Treatment Diagnosis: Left Reverse Total shoulder revision  REQUIRES OT FOLLOW UP: No  Activity Tolerance  Activity Tolerance: Patient limited by pain  Safety Devices  Safety Devices in place: Yes  Type of devices: Call light within reach; Left in chair         Patient Diagnosis(es): The encounter diagnosis was S/P reverse total shoulder arthroplasty, left.      has a past medical history of Arthritis, Asthma, DVT (deep vein thrombosis) in pregnancy, History of blood transfusion, Hx of blood clots, Hypertension, Lupus ([facility] [address] Renal failure, Splenic artery aneurysm ([facility] [address] and Thyroid disease. has a past surgical history that includes Hysterectomy; lymphadenectomy; Gastric bypass surgery (); joint replacement (Right); pr total knee arthroplasty (Left, 2017); and shoulder surgery (Left, 2020).     Restrictions  Restrictions/Precautions  Restrictions/Precautions: Fall Risk  Position Activity Restriction  Other position/activity restrictions: Reverse Total shoulder protocol  Subjective   General  Chart Reviewed: Yes  Patient assessed for rehabilitation services?: Yes  Family / Caregiver Present: Yes  Pain Assessment  Pain Assessment: 0-10  Pain Level: 7  Pain Location: Shoulder  Pain Orientation: Left  Non-Pharmaceutical Pain Intervention(s): Ambulation/Increased Activity;Cold applied;Repositioned  Response to Pain Intervention: None  Vital Signs  Patient Currently in Pain: Yes   Orientation     Objective ADL  Grooming: Minimal assistance  UE Bathing: Minimal assistance; Moderate assistance  LE Bathing: Minimal assistance; Moderate assistance  UE Dressing: Moderate assistance  LE Dressing: Minimal assistance; Moderate assistance  Toileting: Minimal assistance; Moderate assistance        Balance  Standing Balance: Supervision  Functional Mobility  Functional - Mobility Device: No device  Assist Level: Supervision  Functional Mobility Comments: Instructed spouse in gait belt use  Toilet Transfers  Toilet Transfer: Supervision  Bed mobility  Supine to Sit: Minimal assistance  Transfers  Stand Step Transfers: Supervision                       Cognition  Overall Cognitive Status: WNL           Positioning  Bed Positioning Type: Upper extremity  Bed Postion Comment: LUE        Type of ROM/Therapeutic Exercise  Comment: Reviewed reverse shoulder protocol exercises. Patient minimally able to participate due to pain. Tolerates shoulder flexion 0-30 in pendulum position, mid ranges for elbow flexion/extension for AAROM, distally WNL but even this is very guarded. Plan   Plan  Times per day: Daily  Plan Comment: Discharge home today  G-Code     OutComes Score                                                  AM-PAC Score             Goals  Short term goals  Short term goal 1: Patient and family will be able to demo/restate reverse total shoulder protocols, including ADL strategies, positioning/sling, exercises, precautions.        Therapy Time   Individual Concurrent Group Co-treatment   Time In 0845         Time Out 0945         Minutes [address] Ricardo Electronically signed by Conor Sinha OT on 7/29/2020 at 12:52 PM

## 2020-07-31 LAB
ANAEROBIC CULTURE: NORMAL
CULTURE SURGICAL: NORMAL
GRAM STAIN RESULT: NORMAL

## 2020-08-01 LAB
ANAEROBIC CULTURE: NORMAL
CULTURE SURGICAL: NORMAL
GRAM STAIN RESULT: NORMAL

## 2020-08-03 ENCOUNTER — APPOINTMENT (OUTPATIENT)
Dept: MRI IMAGING | Facility: HOSPITAL | Age: 75
End: 2020-08-03

## 2020-08-17 ENCOUNTER — APPOINTMENT (OUTPATIENT)
Dept: MRI IMAGING | Facility: HOSPITAL | Age: 75
End: 2020-08-17

## 2020-08-25 LAB
FUNGUS (MYCOLOGY) CULTURE: NORMAL
KOH PREP: NORMAL

## 2020-09-08 ENCOUNTER — APPOINTMENT (OUTPATIENT)
Dept: MRI IMAGING | Facility: HOSPITAL | Age: 75
End: 2020-09-08

## 2020-09-09 LAB
AFB CULTURE (MYCOBACTERIA): NORMAL
AFB SMEAR: NORMAL

## 2020-10-14 ENCOUNTER — HOSPITAL ENCOUNTER (OUTPATIENT)
Dept: MRI IMAGING | Facility: HOSPITAL | Age: 75
Discharge: HOME | End: 2020-10-14
Admitting: INTERNAL MEDICINE

## 2020-10-14 DIAGNOSIS — M54.6 PAIN IN THORACIC SPINE: ICD-10-CM

## 2020-10-14 PROCEDURE — 72146 MRI CHEST SPINE W/O DYE: CPT

## 2020-10-23 ENCOUNTER — HOSPITAL ENCOUNTER (OUTPATIENT)
Dept: CT IMAGING | Facility: HOSPITAL | Age: 75
Discharge: HOME | End: 2020-10-23

## 2020-10-23 ENCOUNTER — TRANSCRIBE ORDERS (OUTPATIENT)
Dept: ADMINISTRATIVE | Facility: HOSPITAL | Age: 75
End: 2020-10-23

## 2020-10-23 ENCOUNTER — LAB (OUTPATIENT)
Dept: LAB | Facility: HOSPITAL | Age: 75
End: 2020-10-23

## 2020-10-23 DIAGNOSIS — E83.42 HYPOMAGNESEMIA: ICD-10-CM

## 2020-10-23 DIAGNOSIS — E03.9 HYPOTHYROIDISM, UNSPECIFIED TYPE: Primary | ICD-10-CM

## 2020-10-23 DIAGNOSIS — R10.9 ABDOMINAL PAIN, UNSPECIFIED ABDOMINAL LOCATION: ICD-10-CM

## 2020-10-23 DIAGNOSIS — R10.9 ABDOMINAL PAIN, UNSPECIFIED ABDOMINAL LOCATION: Primary | ICD-10-CM

## 2020-10-23 LAB
ALBUMIN SERPL-MCNC: 4.3 G/DL (ref 3.5–5.2)
ALBUMIN/GLOB SERPL: 1.6 G/DL
ALP SERPL-CCNC: 142 U/L (ref 39–117)
ALT SERPL W P-5'-P-CCNC: 7 U/L (ref 1–33)
AMYLASE SERPL-CCNC: 48 U/L (ref 28–100)
ANION GAP SERPL CALCULATED.3IONS-SCNC: 13 MMOL/L (ref 5–15)
AST SERPL-CCNC: 10 U/L (ref 1–32)
BASOPHILS # BLD AUTO: 0.05 10*3/MM3 (ref 0–0.2)
BASOPHILS NFR BLD AUTO: 0.5 % (ref 0–1.5)
BILIRUB SERPL-MCNC: 0.4 MG/DL (ref 0–1.2)
BUN SERPL-MCNC: 29 MG/DL (ref 8–23)
BUN/CREAT SERPL: 13.7 (ref 7–25)
CALCIUM SPEC-SCNC: 9.3 MG/DL (ref 8.6–10.5)
CHLORIDE SERPL-SCNC: 111 MMOL/L (ref 98–107)
CO2 SERPL-SCNC: 18 MMOL/L (ref 22–29)
CREAT SERPL-MCNC: 2.11 MG/DL (ref 0.57–1)
CRP SERPL-MCNC: 0.07 MG/DL (ref 0–0.5)
DEPRECATED RDW RBC AUTO: 45.9 FL (ref 37–54)
EOSINOPHIL # BLD AUTO: 0.35 10*3/MM3 (ref 0–0.4)
EOSINOPHIL NFR BLD AUTO: 3.8 % (ref 0.3–6.2)
ERYTHROCYTE [DISTWIDTH] IN BLOOD BY AUTOMATED COUNT: 14.1 % (ref 12.3–15.4)
ERYTHROCYTE [SEDIMENTATION RATE] IN BLOOD: 1 MM/HR (ref 0–20)
GFR SERPL CREATININE-BSD FRML MDRD: 23 ML/MIN/1.73
GLOBULIN UR ELPH-MCNC: 2.7 GM/DL
GLUCOSE SERPL-MCNC: 115 MG/DL (ref 65–99)
HCT VFR BLD AUTO: 36.3 % (ref 34–46.6)
HGB BLD-MCNC: 12 G/DL (ref 12–15.9)
IMM GRANULOCYTES # BLD AUTO: 0.04 10*3/MM3 (ref 0–0.05)
IMM GRANULOCYTES NFR BLD AUTO: 0.4 % (ref 0–0.5)
LIPASE SERPL-CCNC: 41 U/L (ref 13–60)
LYMPHOCYTES # BLD AUTO: 2.52 10*3/MM3 (ref 0.7–3.1)
LYMPHOCYTES NFR BLD AUTO: 27.7 % (ref 19.6–45.3)
MAGNESIUM SERPL-MCNC: 1.7 MG/DL (ref 1.6–2.4)
MCH RBC QN AUTO: 29.8 PG (ref 26.6–33)
MCHC RBC AUTO-ENTMCNC: 33.1 G/DL (ref 31.5–35.7)
MCV RBC AUTO: 90.1 FL (ref 79–97)
MONOCYTES # BLD AUTO: 0.76 10*3/MM3 (ref 0.1–0.9)
MONOCYTES NFR BLD AUTO: 8.3 % (ref 5–12)
NEUTROPHILS NFR BLD AUTO: 5.39 10*3/MM3 (ref 1.7–7)
NEUTROPHILS NFR BLD AUTO: 59.3 % (ref 42.7–76)
NRBC BLD AUTO-RTO: 0 /100 WBC (ref 0–0.2)
PLATELET # BLD AUTO: 230 10*3/MM3 (ref 140–450)
PMV BLD AUTO: 10.3 FL (ref 6–12)
POTASSIUM SERPL-SCNC: 3.9 MMOL/L (ref 3.5–5.2)
PROT SERPL-MCNC: 7 G/DL (ref 6–8.5)
RBC # BLD AUTO: 4.03 10*6/MM3 (ref 3.77–5.28)
SODIUM SERPL-SCNC: 142 MMOL/L (ref 136–145)
TSH SERPL DL<=0.05 MIU/L-ACNC: 0.24 UIU/ML (ref 0.27–4.2)
WBC # BLD AUTO: 9.11 10*3/MM3 (ref 3.4–10.8)

## 2020-10-23 PROCEDURE — 84443 ASSAY THYROID STIM HORMONE: CPT | Performed by: INTERNAL MEDICINE

## 2020-10-23 PROCEDURE — 85651 RBC SED RATE NONAUTOMATED: CPT | Performed by: INTERNAL MEDICINE

## 2020-10-23 PROCEDURE — 83690 ASSAY OF LIPASE: CPT | Performed by: INTERNAL MEDICINE

## 2020-10-23 PROCEDURE — 36415 COLL VENOUS BLD VENIPUNCTURE: CPT | Performed by: INTERNAL MEDICINE

## 2020-10-23 PROCEDURE — 83735 ASSAY OF MAGNESIUM: CPT | Performed by: INTERNAL MEDICINE

## 2020-10-23 PROCEDURE — 85025 COMPLETE CBC W/AUTO DIFF WBC: CPT | Performed by: INTERNAL MEDICINE

## 2020-10-23 PROCEDURE — 86140 C-REACTIVE PROTEIN: CPT | Performed by: INTERNAL MEDICINE

## 2020-10-23 PROCEDURE — 74176 CT ABD & PELVIS W/O CONTRAST: CPT

## 2020-10-23 PROCEDURE — 80053 COMPREHEN METABOLIC PANEL: CPT | Performed by: INTERNAL MEDICINE

## 2020-10-23 PROCEDURE — 82150 ASSAY OF AMYLASE: CPT | Performed by: INTERNAL MEDICINE

## 2020-10-27 ENCOUNTER — TRANSCRIBE ORDERS (OUTPATIENT)
Dept: ADMINISTRATIVE | Facility: HOSPITAL | Age: 75
End: 2020-10-27

## 2020-10-27 ENCOUNTER — LAB (OUTPATIENT)
Dept: LAB | Facility: HOSPITAL | Age: 75
End: 2020-10-27

## 2020-10-27 ENCOUNTER — OFFICE VISIT (OUTPATIENT)
Dept: GASTROENTEROLOGY | Facility: CLINIC | Age: 75
End: 2020-10-27

## 2020-10-27 VITALS
DIASTOLIC BLOOD PRESSURE: 78 MMHG | WEIGHT: 161 LBS | HEART RATE: 101 BPM | SYSTOLIC BLOOD PRESSURE: 122 MMHG | TEMPERATURE: 96.4 F | BODY MASS INDEX: 27.49 KG/M2 | HEIGHT: 64 IN | OXYGEN SATURATION: 99 %

## 2020-10-27 DIAGNOSIS — Z01.818 PREOPERATIVE TESTING: ICD-10-CM

## 2020-10-27 DIAGNOSIS — R10.9 ABDOMINAL PAIN, UNSPECIFIED ABDOMINAL LOCATION: Primary | ICD-10-CM

## 2020-10-27 DIAGNOSIS — Z01.818 PREOPERATIVE TESTING: Primary | ICD-10-CM

## 2020-10-27 PROCEDURE — C9803 HOPD COVID-19 SPEC COLLECT: HCPCS

## 2020-10-27 PROCEDURE — U0003 INFECTIOUS AGENT DETECTION BY NUCLEIC ACID (DNA OR RNA); SEVERE ACUTE RESPIRATORY SYNDROME CORONAVIRUS 2 (SARS-COV-2) (CORONAVIRUS DISEASE [COVID-19]), AMPLIFIED PROBE TECHNIQUE, MAKING USE OF HIGH THROUGHPUT TECHNOLOGIES AS DESCRIBED BY CMS-2020-01-R: HCPCS | Performed by: INTERNAL MEDICINE

## 2020-10-27 PROCEDURE — 99204 OFFICE O/P NEW MOD 45 MIN: CPT | Performed by: NURSE PRACTITIONER

## 2020-10-27 RX ORDER — FLUOXETINE HYDROCHLORIDE 20 MG/1
20 CAPSULE ORAL DAILY
COMMUNITY
End: 2022-01-14

## 2020-10-27 NOTE — PROGRESS NOTES
"Chief Complaint   Patient presents with   • Abdominal Pain     Dr. Aguilera did her last colon in 2018/no polyps per pt- last endo in 2018       PCP: Greg Rey MD  REFER: Greg Rey MD    Subjective     HPI    Thao Mohr presents to office with complain of daily sharp right sided abdominal pain x 1 month.  Pain is not worsening.  Eating exacerbates pain.  Associated nausea no emesis.  No heartburn or indigestion or dysphagia.  No bright red blood per rectum, no melena.     No changes in bowels.  Bowels described as daily, loose, and she feels bowels are completely evacuated.   She complain of increase fatigue.  Denies frequent use of NSAIDs with exception of daily ASA.  No weight loss.  She experiences decreased appetite.  Dr Aguilera EGD 2018 with referral to [facility]   She has a previous history of bariatric surgery over 40 years ago.  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    unremarkable CT 10/23/2020  CT dated April 2020 showed prominence of the gastric wall at the level of the  gastric outlet and duodenal bulb as well as mild constipation    Past Medical History:   Diagnosis Date   • Acid reflux    • Aneurysm (CMS/HCC)     Pt states \"Somewhere near my spleen.\"   • Anxiety and depression    • Arthritis    • Diarrhea    • Generalized headaches    • Heart murmur    • History of transfusion    • Hypertension    • Hypothyroid    • Lupus (CMS/HCC)    • Migraines    • MRSA (methicillin resistant Staphylococcus aureus)     in past , on face   • Nausea    • PONV (postoperative nausea and vomiting)    • Renal failure     21%   • Thrombosis 2007    RIGHT KNEE       Past Surgical History:   Procedure Laterality Date   • APPENDECTOMY     • CHOLECYSTECTOMY     • HYSTERECTOMY     • REPLACEMENT TOTAL KNEE Left    • SHOULDER ROTATOR CUFF REPAIR Right    • TOTAL HIP ARTHROPLASTY Right    • TOTAL SHOULDER ARTHROPLASTY W/ DISTAL CLAVICLE EXCISION Left 12/27/2019    Procedure: LEFT REVERSE TOTAL " SHOULDER ARTHROPLASTY;  Surgeon: Dhaval Yepez MD;  Location: [facility];  Service: Orthopedics   • WRIST FRACTURE SURGERY Left 2 weeks ago       Outpatient Medications Marked as Taking for the 10/27/20 encounter (Office Visit) with Rikki Patten APRN   Medication Sig Dispense Refill   • allopurinol (ZYLOPRIM) 100 MG tablet Take 100 mg by mouth Daily.     • amLODIPine (NORVASC) 5 MG tablet Take 5 mg by mouth Daily.     • aspirin 81 MG EC tablet Take 81 mg by mouth Daily.     • calcitriol (ROCALTROL) 0.25 MCG capsule Take 0.25 mcg by mouth Daily.     • diazePAM (VALIUM) 5 MG tablet Take 5 mg by mouth Daily.     • epoetin al (PROCRIT) 10336 UNIT/ML injection Inject  under the skin into the appropriate area as directed As Needed.     • FLUoxetine (PROzac) 10 MG capsule Take 10 mg by mouth Daily.     • gabapentin (NEURONTIN) 300 MG capsule Take 300 mg by mouth Daily As Needed (for pain).     • levothyroxine (SYNTHROID, LEVOTHROID) 137 MCG tablet Take 137 mcg by mouth Daily.     • magnesium oxide (MAGOX) 400 (241.3 MG) MG tablet tablet Take 400 mg by mouth Daily.     • Multiple Vitamins-Minerals (OCUVITE ADULT 50+ PO) Take 1 tablet by mouth Daily.     • Prenatal Vit-Fe Fumarate-FA (PRENATAL, CLASSIC, VITAMIN) 28-0.8 MG tablet tablet Take 1 tablet by mouth.     • Probiotic Product (PROBIOTIC DAILY PO) Take 1 tablet/day by mouth.     • promethazine (PHENERGAN) 25 MG tablet Take 25 mg by mouth As Needed for Nausea or Vomiting.     • QUEtiapine (SEROquel) 200 MG tablet Take 200 mg by mouth Every Night.     • SODIUM BICARBONATE PO Take 10 mg by mouth 2 (Two) Times a Day.     • telmisartan (MICARDIS) 40 MG tablet Take 40 mg by mouth Daily.     • topiramate (TOPAMAX) 100 MG tablet Take 100 mg by mouth Daily.     • vitamin B-12 (CYANOCOBALAMIN) 1000 MCG tablet Take 1,000 mcg by mouth Daily.     • vitamin D (ERGOCALCIFEROL) 82243 UNITS capsule capsule 50,000 Units 2 (Two) Times a Week. Sunday and Wednesday  0  "      Allergies   Allergen Reactions   • Codeine Hives   • Morphine Nausea And Vomiting       Social History     Socioeconomic History   • Marital status:      Spouse name: Not on file   • Number of children: Not on file   • Years of education: Not on file   • Highest education level: Not on file   Tobacco Use   • Smoking status: Never Smoker   • Smokeless tobacco: Never Used   Substance and Sexual Activity   • Alcohol use: No   • Drug use: No   • Sexual activity: Defer       Family History   Problem Relation Age of Onset   • Cancer Father    • Coronary artery disease Brother    • Colon cancer Paternal Aunt    • Colon polyps Neg Hx    • Esophageal cancer Neg Hx        Review of Systems   Constitutional: Negative for fatigue, fever and unexpected weight change.   HENT: Negative for hearing loss, sore throat and voice change.    Eyes: Negative for visual disturbance.   Respiratory: Negative for cough, shortness of breath and wheezing.    Cardiovascular: Negative for chest pain and palpitations.   Gastrointestinal: Positive for abdominal pain. Negative for blood in stool and vomiting.   Endocrine: Negative for polydipsia and polyuria.   Genitourinary: Negative for difficulty urinating, dysuria, hematuria and urgency.   Musculoskeletal: Negative for joint swelling and myalgias.   Skin: Negative for color change, rash and wound.   Neurological: Negative for dizziness, tremors, seizures and syncope.   Hematological: Does not bruise/bleed easily.   Psychiatric/Behavioral: Negative for agitation and confusion. The patient is not nervous/anxious.        Objective     Vitals:    10/27/20 0836   BP: 122/78   Pulse: 101   Temp: 96.4 °F (35.8 °C)   SpO2: 99%   Weight: 73 kg (161 lb)   Height: 162.6 cm (64\")     Body mass index is 27.64 kg/m².    Physical Exam  Constitutional:       Appearance: She is well-developed.   HENT:      Head: Normocephalic and atraumatic.   Eyes:      General: No scleral icterus.     " Conjunctiva/sclera: Conjunctivae normal.      Pupils: Pupils are equal, round, and reactive to light.   Neck:      Thyroid: No thyroid mass or thyromegaly.      Vascular: No JVD.   Cardiovascular:      Rate and Rhythm: Normal rate and regular rhythm.      Heart sounds: Normal heart sounds. No murmur. No friction rub. No gallop.    Pulmonary:      Effort: Pulmonary effort is normal. No accessory muscle usage or respiratory distress.      Breath sounds: Normal breath sounds. No wheezing or rales.   Abdominal:      General: Bowel sounds are normal. There is no distension.      Palpations: Abdomen is soft. There is no hepatomegaly, splenomegaly or mass.      Tenderness: There is no abdominal tenderness. There is no guarding or rebound.   Genitourinary:     Comments: Rectal-Did not examine  Musculoskeletal: Normal range of motion.   Skin:     General: Skin is warm and dry.   Neurological:      Mental Status: She is alert and oriented to person, place, and time.      Comments: Deemed a reliable historian, able to converse without difficulty and able to move all extremities without difficulty   Psychiatric:         Behavior: Behavior normal.         Imaging Results (Most Recent)     None          Body mass index is 27.64 kg/m².    Assessment/Plan     Diagnoses and all orders for this visit:    1. Abdominal pain, unspecified abdominal location (Primary)  -     Case Request; Standing  -     Implement Anesthesia Orders Day of Procedure; Standing  -     Obtain Informed Consent; Standing  -     Case Request        ESOPHAGOGASTRODUODENOSCOPY WITH ANESTHESIA (N/A)    If EGD negative may need small bowel follow through v CTE (will defer to Dr Heard)    The risk of the endoscopy were discussed in detail.  We discussed the risk of perforation (one out of 2119-0283, riskier with dilation), bleeding (one out of 500), and the rare risks of infection, adverse reaction to anesthesia, respiratory failure, cardiac failure including MI and  adverse reaction to medications, etc.  We discussed consequences that could occur if a risk were to develop such as the need for hospitalization, blood transfusion, surgical intervention, medications, pain and disability and death.  Alternatives include not doing anything, or pursuing an UGI series which only offers a diagnosis with potential less accuracy compared to egd.  The patient verbalizes understanding and agrees to proceed.    Precautions are currently being put in place due to COVID-19.  I have explained to Thao Mohr they will be required to undergo COVID testing prior to their procedure.  Thao Mohr verbalized understanding and was willing to proceed.    Patient's Body mass index is 27.64 kg/m². BMI is above normal parameters. Recommendations include: no follow up.       Rikki Patten, APRN  10/27/20          There are no Patient Instructions on file for this visit.

## 2020-10-28 LAB
COVID LABCORP PRIORITY: NORMAL
SARS-COV-2 RNA RESP QL NAA+PROBE: NOT DETECTED

## 2020-10-30 ENCOUNTER — HOSPITAL ENCOUNTER (OUTPATIENT)
Facility: HOSPITAL | Age: 75
Setting detail: HOSPITAL OUTPATIENT SURGERY
Discharge: HOME | End: 2020-10-30
Attending: INTERNAL MEDICINE | Admitting: INTERNAL MEDICINE

## 2020-10-30 ENCOUNTER — ANESTHESIA EVENT (OUTPATIENT)
Dept: GASTROENTEROLOGY | Facility: HOSPITAL | Age: 75
End: 2020-10-30

## 2020-10-30 ENCOUNTER — ANESTHESIA (OUTPATIENT)
Dept: GASTROENTEROLOGY | Facility: HOSPITAL | Age: 75
End: 2020-10-30

## 2020-10-30 VITALS
WEIGHT: 162 LBS | SYSTOLIC BLOOD PRESSURE: 107 MMHG | HEIGHT: 64 IN | TEMPERATURE: 97.5 F | HEART RATE: 75 BPM | DIASTOLIC BLOOD PRESSURE: 67 MMHG | RESPIRATION RATE: 17 BRPM | BODY MASS INDEX: 27.66 KG/M2 | OXYGEN SATURATION: 99 %

## 2020-10-30 DIAGNOSIS — R10.9 ABDOMINAL PAIN, UNSPECIFIED ABDOMINAL LOCATION: ICD-10-CM

## 2020-10-30 PROCEDURE — 43239 EGD BIOPSY SINGLE/MULTIPLE: CPT | Performed by: INTERNAL MEDICINE

## 2020-10-30 PROCEDURE — 87081 CULTURE SCREEN ONLY: CPT | Performed by: INTERNAL MEDICINE

## 2020-10-30 PROCEDURE — 25010000002 PROPOFOL 10 MG/ML EMULSION: Performed by: NURSE ANESTHETIST, CERTIFIED REGISTERED

## 2020-10-30 RX ORDER — SODIUM CHLORIDE 9 MG/ML
500 INJECTION, SOLUTION INTRAVENOUS CONTINUOUS PRN
Status: DISCONTINUED | OUTPATIENT
Start: 2020-10-30 | End: 2020-10-30 | Stop reason: HOSPADM

## 2020-10-30 RX ORDER — PROPOFOL 10 MG/ML
VIAL (ML) INTRAVENOUS AS NEEDED
Status: DISCONTINUED | OUTPATIENT
Start: 2020-10-30 | End: 2020-10-30 | Stop reason: SURG

## 2020-10-30 RX ORDER — SODIUM CHLORIDE 0.9 % (FLUSH) 0.9 %
10 SYRINGE (ML) INJECTION AS NEEDED
Status: DISCONTINUED | OUTPATIENT
Start: 2020-10-30 | End: 2020-10-30 | Stop reason: HOSPADM

## 2020-10-30 RX ADMIN — LIDOCAINE HYDROCHLORIDE 100 MG: 20 INJECTION, SOLUTION INTRAVENOUS at 08:52

## 2020-10-30 RX ADMIN — PROPOFOL 30 MG: 10 INJECTION, EMULSION INTRAVENOUS at 08:55

## 2020-10-30 RX ADMIN — PROPOFOL 150 MG: 10 INJECTION, EMULSION INTRAVENOUS at 08:52

## 2020-10-30 RX ADMIN — SODIUM CHLORIDE 500 ML: 9 INJECTION, SOLUTION INTRAVENOUS at 07:46

## 2020-10-30 NOTE — ANESTHESIA POSTPROCEDURE EVALUATION
"Patient: Thao Mohr    Procedure Summary     Date: 10/30/20 Room / Location: [facility] ENDOSCOPY 5 / [facility]    Anesthesia Start: 0845 Anesthesia Stop: 0858    Procedure: ESOPHAGOGASTRODUODENOSCOPY WITH ANESTHESIA (N/A ) Diagnosis:       Abdominal pain, unspecified abdominal location      (Abdominal pain, unspecified abdominal location [R10.9])    Surgeon: Naresh Heard DO Provider: Doreen Garcia CRNA    Anesthesia Type: MAC ASA Status: 3          Anesthesia Type: MAC    Vitals  No vitals data found for the desired time range.          Post Anesthesia Care and Evaluation    Patient location during evaluation: PHASE II  Patient participation: complete - patient participated  Level of consciousness: awake and alert  Pain score: 0  Pain management: adequate  Airway patency: patent  Anesthetic complications: No anesthetic complications  PONV Status: none  Cardiovascular status: acceptable  Respiratory status: acceptable  Hydration status: acceptable    Comments: Blood pressure 132/80, pulse 95, temperature 97.5 °F (36.4 °C), temperature source Temporal, resp. rate 18, height 162.6 cm (64\"), weight 73.5 kg (162 lb), SpO2 97 %, not currently breastfeeding.    Pt discharged from PACU based on tess score >8      "

## 2020-10-30 NOTE — ANESTHESIA PREPROCEDURE EVALUATION
Anesthesia Evaluation     Patient summary reviewed   history of anesthetic complications: PONV  NPO Solid Status: > 8 hours             Airway   Mallampati: II  TM distance: >3 FB  Neck ROM: full  Dental    (+) lower dentures and upper dentures    Pulmonary - negative pulmonary ROS   Cardiovascular   Exercise tolerance: good (4-7 METS)    (+) valvular problems/murmurs,       Neuro/Psych- negative ROS  GI/Hepatic/Renal/Endo    (+)   renal disease CRI, thyroid problem hypothyroidism    Musculoskeletal     Abdominal    Substance History      OB/GYN          Other                        Anesthesia Plan    ASA 3     MAC       Anesthetic plan, all risks, benefits, and alternatives have been provided, discussed and informed consent has been obtained with: patient.

## 2020-11-02 LAB — UREASE TISS QL: NEGATIVE

## 2020-12-11 ENCOUNTER — TRANSCRIBE ORDERS (OUTPATIENT)
Dept: ADMINISTRATIVE | Facility: HOSPITAL | Age: 75
End: 2020-12-11

## 2020-12-11 DIAGNOSIS — M80.08XA AGE-RELATED OSTEOPOROSIS WITH CURRENT PATHOLOGICAL FRACTURE, VERTEBRA(E), INITIAL ENCOUNTER FOR FRACTURE (HCC): ICD-10-CM

## 2020-12-11 DIAGNOSIS — M85.80 OTHER SPECIFIED DISORDERS OF BONE DENSITY AND STRUCTURE, UNSPECIFIED SITE: Primary | ICD-10-CM

## 2020-12-16 ENCOUNTER — APPOINTMENT (OUTPATIENT)
Dept: BONE DENSITY | Facility: HOSPITAL | Age: 75
End: 2020-12-16

## 2020-12-28 ENCOUNTER — APPOINTMENT (OUTPATIENT)
Dept: BONE DENSITY | Facility: HOSPITAL | Age: 75
End: 2020-12-28

## 2021-01-13 ENCOUNTER — TRANSCRIBE ORDERS (OUTPATIENT)
Dept: ADMINISTRATIVE | Facility: HOSPITAL | Age: 76
End: 2021-01-13

## 2021-01-13 DIAGNOSIS — Z96.652 PRESENCE OF LEFT ARTIFICIAL KNEE JOINT: Primary | ICD-10-CM

## 2021-01-13 DIAGNOSIS — Z86.718 PERSONAL HISTORY OF VENOUS THROMBOSIS AND EMBOLISM: ICD-10-CM

## 2021-01-14 ENCOUNTER — OUTSIDE FACILITY SERVICE (OUTPATIENT)
Dept: CARDIOLOGY | Facility: CLINIC | Age: 76
End: 2021-01-14

## 2021-01-14 PROCEDURE — 93010 ELECTROCARDIOGRAM REPORT: CPT | Performed by: INTERNAL MEDICINE

## 2021-01-19 ENCOUNTER — APPOINTMENT (OUTPATIENT)
Dept: ULTRASOUND IMAGING | Facility: HOSPITAL | Age: 76
End: 2021-01-19

## 2021-01-19 ENCOUNTER — APPOINTMENT (OUTPATIENT)
Dept: CT IMAGING | Facility: HOSPITAL | Age: 76
End: 2021-01-19

## 2021-02-22 ENCOUNTER — IMMUNIZATION (OUTPATIENT)
Dept: VACCINE CLINIC | Facility: HOSPITAL | Age: 76
End: 2021-02-22

## 2021-02-22 PROCEDURE — 91301 HC SARSCO02 VAC 100MCG/0.5ML IM: CPT | Performed by: OBSTETRICS & GYNECOLOGY

## 2021-02-22 PROCEDURE — 0011A: CPT | Performed by: OBSTETRICS & GYNECOLOGY

## 2021-03-16 ENCOUNTER — HOSPITAL ENCOUNTER (OUTPATIENT)
Dept: ULTRASOUND IMAGING | Facility: HOSPITAL | Age: 76
Discharge: HOME | End: 2021-03-16

## 2021-03-16 ENCOUNTER — HOSPITAL ENCOUNTER (OUTPATIENT)
Dept: CT IMAGING | Facility: HOSPITAL | Age: 76
Discharge: HOME | End: 2021-03-16

## 2021-03-16 PROCEDURE — 73700 CT LOWER EXTREMITY W/O DYE: CPT

## 2021-03-16 PROCEDURE — 93971 EXTREMITY STUDY: CPT | Performed by: SURGERY

## 2021-03-16 PROCEDURE — 93971 EXTREMITY STUDY: CPT

## 2021-03-22 ENCOUNTER — IMMUNIZATION (OUTPATIENT)
Dept: VACCINE CLINIC | Facility: HOSPITAL | Age: 76
End: 2021-03-22

## 2021-03-22 PROCEDURE — 91301 HC SARSCO02 VAC 100MCG/0.5ML IM: CPT | Performed by: OBSTETRICS & GYNECOLOGY

## 2021-03-22 PROCEDURE — 0012A: CPT | Performed by: OBSTETRICS & GYNECOLOGY

## 2021-07-12 ENCOUNTER — OFFICE VISIT (OUTPATIENT)
Dept: UROLOGY | Facility: CLINIC | Age: 76
End: 2021-07-12

## 2021-07-12 VITALS — HEIGHT: 64 IN | WEIGHT: 151.6 LBS | BODY MASS INDEX: 25.88 KG/M2 | TEMPERATURE: 98 F

## 2021-07-12 DIAGNOSIS — N39.0 RECURRENT UTI (URINARY TRACT INFECTION): Primary | ICD-10-CM

## 2021-07-12 PROCEDURE — 81003 URINALYSIS AUTO W/O SCOPE: CPT | Performed by: PHYSICIAN ASSISTANT

## 2021-07-12 PROCEDURE — 51798 US URINE CAPACITY MEASURE: CPT | Performed by: PHYSICIAN ASSISTANT

## 2021-07-12 PROCEDURE — 99202 OFFICE O/P NEW SF 15 MIN: CPT | Performed by: PHYSICIAN ASSISTANT

## 2021-07-12 PROCEDURE — 87086 URINE CULTURE/COLONY COUNT: CPT | Performed by: PHYSICIAN ASSISTANT

## 2021-07-12 RX ORDER — CEFDINIR 300 MG/1
300 CAPSULE ORAL 2 TIMES DAILY
Qty: 20 CAPSULE | Refills: 0 | Status: SHIPPED | OUTPATIENT
Start: 2021-07-12 | End: 2021-07-22

## 2021-07-12 RX ORDER — DICYCLOMINE HYDROCHLORIDE 10 MG/1
10 CAPSULE ORAL
COMMUNITY
End: 2022-05-19

## 2021-07-12 RX ORDER — ALLOPURINOL 100 MG/1
100 TABLET ORAL DAILY
COMMUNITY

## 2021-07-12 RX ORDER — PANTOPRAZOLE SODIUM 40 MG/1
40 TABLET, DELAYED RELEASE ORAL DAILY
COMMUNITY

## 2021-07-12 RX ORDER — SUCRALFATE 1 G/1
1 TABLET ORAL 4 TIMES DAILY
COMMUNITY
End: 2022-05-19

## 2021-07-12 RX ORDER — BETHANECHOL CHLORIDE 25 MG/1
25 TABLET ORAL 3 TIMES DAILY
COMMUNITY
End: 2021-08-10

## 2021-07-12 NOTE — PATIENT INSTRUCTIONS
"BMI for Adults  What is BMI?  Body mass index (BMI) is a number that is calculated from a person's weight and height. BMI can help estimate how much of a person's weight is composed of fat. BMI does not measure body fat directly. Rather, it is an alternative to procedures that directly measure body fat, which can be difficult and expensive.  BMI can help identify people who may be at higher risk for certain medical problems.  What are BMI measurements used for?  BMI is used as a screening tool to identify possible weight problems. It helps determine whether a person is obese, overweight, a healthy weight, or underweight.  BMI is useful for:  · Identifying a weight problem that may be related to a medical condition or may increase the risk for medical problems.  · Promoting changes, such as changes in diet and exercise, to help reach a healthy weight. BMI screening can be repeated to see if these changes are working.  How is BMI calculated?  BMI involves measuring your weight in relation to your height. Both height and weight are measured, and the BMI is calculated from those numbers. This can be done either in English (U[place] or metric measurements. Note that charts and online BMI calculators are available to help you find your BMI quickly and easily without having to do these calculations yourself.  To calculate your BMI in English ([place] measurements:    1. Measure your weight in pounds (lb).  2. Multiply the number of pounds by 703.  ? For example, for a person who weighs 180 lb, multiply that number by 703, which equals 126,540.  3. Measure your height in inches. Then multiply that number by itself to get a measurement called \"inches squared.\"  ? For example, for a person who is 70 inches tall, the \"inches squared\" measurement is 70 inches x 70 inches, which equals 4,900 inches squared.  4. Divide the total from step 2 (number of lb x 703) by the total from step 3 (inches squared): 126,540 ÷ 4,900 = 25.8. This is " "your BMI.  To calculate your BMI in metric measurements:  1. Measure your weight in kilograms (kg).  2. Measure your height in meters (m). Then multiply that number by itself to get a measurement called \"meters squared.\"  ? For example, for a person who is 1.75 m tall, the \"meters squared\" measurement is 1.75 m x 1.75 m, which is equal to 3.1 meters squared.  3. Divide the number of kilograms (your weight) by the meters squared number. In this example: 70 ÷ 3.1 = 22.6. This is your BMI.  What do the results mean?  BMI charts are used to identify whether you are underweight, normal weight, overweight, or obese. The following guidelines will be used:  · Underweight: BMI less than 18.5.  · Normal weight: BMI between 18.5 and 24.9.  · Overweight: BMI between 25 and 29.9.  · Obese: BMI of 30 or above.  Keep these notes in mind:  · Weight includes both fat and muscle, so someone with a muscular build, such as an athlete, may have a BMI that is higher than 24.9. In cases like these, BMI is not an accurate measure of body fat.  · To determine if excess body fat is the cause of a BMI of 25 or higher, further assessments may need to be done by a health care provider.  · BMI is usually interpreted in the same way for men and women.  Where to find more information  For more information about BMI, including tools to quickly calculate your BMI, go to these websites:  · Centers for Disease Control and Prevention: www.cdc.gov  · American Heart Association: www.heart.org  · National Heart, Lung, and Blood [place]: www.nhlbi.nih.gov  Summary  · Body mass index (BMI) is a number that is calculated from a person's weight and height.  · BMI may help estimate how much of a person's weight is composed of fat. BMI can help identify those who may be at higher risk for certain medical problems.  · BMI can be measured using English measurements or metric measurements.  · BMI charts are used to identify whether you are underweight, normal " weight, overweight, or obese.  This information is not intended to replace advice given to you by your health care provider. Make sure you discuss any questions you have with your health care provider.  Document Revised: 09/09/2020 Document Reviewed: 07/17/2020  Elsevier Patient Education © 2021 [employer]

## 2021-07-13 LAB — BACTERIA SPEC AEROBE CULT: NORMAL

## 2021-07-14 ENCOUNTER — TELEPHONE (OUTPATIENT)
Dept: UROLOGY | Facility: CLINIC | Age: 76
End: 2021-07-14

## 2021-07-14 NOTE — TELEPHONE ENCOUNTER
----- Message from BRISEIDA Sommer sent at 7/14/2021  8:21 AM CDT -----  Regarding: Urine culture  Urine culture did not grow any bacteria requiring antibiotic.  ----- Message -----  From: Lab, Background User  Sent: 7/13/2021  10:33 AM CDT  To: BRISEIDA Sommer

## 2021-07-14 NOTE — TELEPHONE ENCOUNTER
LVM notifying patient that she did not have an infection requiring treatment with antibiotics. If she had any questions or concerns to call me.

## 2021-07-20 NOTE — PROGRESS NOTES
Subjective    Ms. Mohr is 75 y.o. female    Chief Complaint:     History of Present Illness  Patient here for follow-up with history of recurrent urinary tract infection.  They have been occurring this past 6 months or so.  She got a renal ultrasound and KUB prior to the appointment today.  Patient has right abdominal pain and does have previous history of kidney stones KUB shows bilateral stones renal ultrasound shows no hydronephrosis but does show the stones as seen on KUB.    The following portions of the patient's history were reviewed and updated as appropriate: allergies, current medications, past family history, past medical history, past social history, past surgical history and problem list.    Review of Systems   Constitutional: Negative for chills and fever.   Gastrointestinal: Negative for abdominal pain, anal bleeding and blood in stool.   Genitourinary: Negative for dysuria and hematuria.         Current Outpatient Medications:   •  allopurinol (ZYLOPRIM) 100 MG tablet, Take 100 mg by mouth Daily., Disp: , Rfl:   •  amLODIPine (NORVASC) 5 MG tablet, Take 5 mg by mouth Daily., Disp: , Rfl:   •  aspirin 81 MG EC tablet, Take 81 mg by mouth Daily., Disp: , Rfl:   •  bethanechol (URECHOLINE) 25 MG tablet, Take 25 mg by mouth 3 (Three) Times a Day., Disp: , Rfl:   •  calcitriol (ROCALTROL) 0.25 MCG capsule, Take 0.25 mcg by mouth Daily., Disp: , Rfl:   •  diazePAM (VALIUM) 5 MG tablet, Take 5 mg by mouth Daily., Disp: , Rfl:   •  dicyclomine (BENTYL) 10 MG capsule, Take 10 mg by mouth 4 (Four) Times a Day Before Meals & at Bedtime., Disp: , Rfl:   •  epoetin al (PROCRIT) 40675 UNIT/ML injection, Inject  under the skin into the appropriate area as directed As Needed., Disp: , Rfl:   •  FLUoxetine (PROzac) 10 MG capsule, Take 10 mg by mouth Daily., Disp: , Rfl:   •  gabapentin (NEURONTIN) 300 MG capsule, Take 300 mg by mouth Daily As Needed (for pain)., Disp: , Rfl:   •  levothyroxine (SYNTHROID,  "LEVOTHROID) 137 MCG tablet, Take 137 mcg by mouth Daily., Disp: , Rfl:   •  magnesium oxide (MAGOX) 400 (241.3 MG) MG tablet tablet, Take 400 mg by mouth Daily., Disp: , Rfl:   •  methocarbamol (ROBAXIN) 500 MG tablet, 500 mg 2 (Two) Times a Day As Needed for Muscle Spasms., Disp: , Rfl: 0  •  Multiple Vitamins-Minerals (OCUVITE ADULT 50+ PO), Take 1 tablet by mouth Daily., Disp: , Rfl:   •  pantoprazole (PROTONIX) 40 MG EC tablet, Take 40 mg by mouth Daily., Disp: , Rfl:   •  Prenatal Vit-Fe Fumarate-FA (PRENATAL, CLASSIC, VITAMIN) 28-0.8 MG tablet tablet, Take 1 tablet by mouth., Disp: , Rfl:   •  Probiotic Product (PROBIOTIC DAILY PO), Take 1 tablet/day by mouth., Disp: , Rfl:   •  promethazine (PHENERGAN) 25 MG tablet, Take 25 mg by mouth As Needed for Nausea or Vomiting., Disp: , Rfl:   •  QUEtiapine (SEROquel) 200 MG tablet, Take 200 mg by mouth Every Night., Disp: , Rfl:   •  SODIUM BICARBONATE PO, Take 10 mg by mouth 2 (Two) Times a Day., Disp: , Rfl:   •  sucralfate (CARAFATE) 1 g tablet, Take 1 g by mouth 4 (Four) Times a Day., Disp: , Rfl:   •  telmisartan (MICARDIS) 40 MG tablet, Take 40 mg by mouth Daily., Disp: , Rfl:   •  topiramate (TOPAMAX) 100 MG tablet, Take 100 mg by mouth Daily., Disp: , Rfl:   •  vitamin B-12 (CYANOCOBALAMIN) 1000 MCG tablet, Take 1,000 mcg by mouth Daily., Disp: , Rfl:   •  vitamin D (ERGOCALCIFEROL) 20949 UNITS capsule capsule, 50,000 Units 2 (Two) Times a Week. Sunday and Wednesday, Disp: , Rfl: 0    Past Medical History:   Diagnosis Date   • Acid reflux    • Aneurysm (CMS/HCC)     Pt states \"Somewhere near my spleen.\"   • Anxiety and depression    • Arthritis    • Diarrhea    • Generalized headaches    • Heart murmur    • History of transfusion    • Hypertension    • Hypothyroid    • Lupus (CMS/HCC)    • Migraines    • MRSA (methicillin resistant Staphylococcus aureus)     in past , on face   • Nausea    • PONV (postoperative nausea and vomiting)    • Renal failure     21% " "  • Thrombosis 2007    RIGHT KNEE       Past Surgical History:   Procedure Laterality Date   • APPENDECTOMY     • CHOLECYSTECTOMY     • ENDOSCOPY N/A 10/30/2020    Procedure: ESOPHAGOGASTRODUODENOSCOPY WITH ANESTHESIA;  Surgeon: Naresh Heard DO;  Location: [facility] ENDOSCOPY;  Service: Gastroenterology;  Laterality: N/A;  preop; abdominal pain  postop; normal   PCP Greg Rey    • HYSTERECTOMY     • REPLACEMENT TOTAL KNEE Left    • SHOULDER ROTATOR CUFF REPAIR Right    • TOTAL HIP ARTHROPLASTY Right    • TOTAL SHOULDER ARTHROPLASTY W/ DISTAL CLAVICLE EXCISION Left 12/27/2019    Procedure: LEFT REVERSE TOTAL SHOULDER ARTHROPLASTY;  Surgeon: Dhaval Yepez MD;  Location: [facility] OR;  Service: Orthopedics   • WRIST FRACTURE SURGERY Left 2 weeks ago       Social History     Socioeconomic History   • Marital status:      Spouse name: Not on file   • Number of children: Not on file   • Years of education: Not on file   • Highest education level: Not on file   Tobacco Use   • Smoking status: Never Smoker   • Smokeless tobacco: Never Used   Vaping Use   • Vaping Use: Never used   Substance and Sexual Activity   • Alcohol use: No   • Drug use: No   • Sexual activity: Defer       Family History   Problem Relation Age of Onset   • Cancer Father    • Coronary artery disease Brother    • Colon cancer Paternal Aunt    • Colon polyps Neg Hx    • Esophageal cancer Neg Hx        Objective    Temp 97.1 °F (36.2 °C)   Ht 162.6 cm (64\")   Wt 68.5 kg (151 lb)   BMI 25.92 kg/m²     Physical Exam  Vitals reviewed.   Constitutional:       Appearance: Normal appearance.   HENT:      Head: Normocephalic and atraumatic.      Right Ear: External ear normal.      Left Ear: External ear normal.      Nose: No congestion.   Pulmonary:      Effort: Pulmonary effort is normal.   Abdominal:      Tenderness: There is abdominal tenderness. There is no right CVA tenderness or left CVA tenderness.   Musculoskeletal:         General: No " tenderness.   Neurological:      General: No focal deficit present.      Mental Status: She is alert and oriented to person, place, and time.   Psychiatric:         Mood and Affect: Mood normal.         Behavior: Behavior normal.             Results for orders placed or performed in visit on 07/26/21   POC Urinalysis Dipstick, Multipro    Specimen: Urine   Result Value Ref Range    Color Yellow Yellow, Straw, Dark Yellow, Nguyen    Clarity, UA Cloudy (A) Clear    Glucose,  mg/dL (A) Negative, 1000 mg/dL (3+) mg/dL    Bilirubin Small (1+) (A) Negative    Ketones, UA Negative Negative    Specific Gravity  1.015 1.005 - 1.030    Blood, UA Trace (A) Negative    pH, Urine 5.5 5.0 - 8.0    Protein, POC 2+ (A) Negative mg/dL    Urobilinogen, UA Normal Normal    Nitrite, UA Negative Negative    Leukocytes Large (3+) (A) Negative       KUB independent review    A KUB is available for me to review today.  The image is inspected for a bowel gas pattern and the general bone structure of the spine and pelvis. The kidneys are then inspected closely.  Renal outline is noted if identifiable. The kidney, collecting system, and anticipated path of the ureter are examined for calcifications including those in the true pelvis.  This film reveals:    On the right there are multiple renal stones. Up to 6 mm.    On the left there are multiple renal stones. .    Assessment and Plan    Diagnoses and all orders for this visit:    1. Recurrent UTI (urinary tract infection) (Primary)  -     POC Urinalysis Dipstick, Multipro    2. Right lateral abdominal pain  -     CT Abdomen Pelvis Without Contrast; Future    3. Bilateral kidney stones  -     CT Abdomen Pelvis Without Contrast; Future    Renal ultrasound showed no hydronephrosis but does show bilateral stones as seen on KUB.  No Parks to explain her pain however there are stones in the right kidney could be treated with ESWL.  Although I do not see any bacteria in the urine.     Will  have patient get CT scan to rule out a ureteral stone not seen on the KUB or ultrasound if there is no ureteral stone present could schedule patient for right ESWL.  She does not take any anticoagulant or blood thinning medication.  I discussed that we will treat the stones with ESWL.

## 2021-07-26 ENCOUNTER — HOSPITAL ENCOUNTER (OUTPATIENT)
Dept: GENERAL RADIOLOGY | Facility: HOSPITAL | Age: 76
Discharge: HOME | End: 2021-07-26

## 2021-07-26 ENCOUNTER — OFFICE VISIT (OUTPATIENT)
Dept: UROLOGY | Facility: CLINIC | Age: 76
End: 2021-07-26

## 2021-07-26 ENCOUNTER — HOSPITAL ENCOUNTER (OUTPATIENT)
Dept: ULTRASOUND IMAGING | Facility: HOSPITAL | Age: 76
Discharge: HOME | End: 2021-07-26

## 2021-07-26 ENCOUNTER — HOSPITAL ENCOUNTER (OUTPATIENT)
Dept: CT IMAGING | Facility: HOSPITAL | Age: 76
Discharge: HOME | End: 2021-07-26

## 2021-07-26 VITALS — BODY MASS INDEX: 25.78 KG/M2 | TEMPERATURE: 97.1 F | HEIGHT: 64 IN | WEIGHT: 151 LBS

## 2021-07-26 DIAGNOSIS — N20.0 BILATERAL KIDNEY STONES: ICD-10-CM

## 2021-07-26 DIAGNOSIS — N39.0 RECURRENT UTI (URINARY TRACT INFECTION): Primary | ICD-10-CM

## 2021-07-26 DIAGNOSIS — N39.0 RECURRENT UTI (URINARY TRACT INFECTION): ICD-10-CM

## 2021-07-26 DIAGNOSIS — R10.9 RIGHT LATERAL ABDOMINAL PAIN: ICD-10-CM

## 2021-07-26 LAB
BILIRUB BLD-MCNC: ABNORMAL MG/DL
CLARITY, POC: ABNORMAL
COLOR UR: YELLOW
GLUCOSE UR STRIP-MCNC: ABNORMAL MG/DL
KETONES UR QL: NEGATIVE
LEUKOCYTE EST, POC: ABNORMAL
NITRITE UR-MCNC: NEGATIVE MG/ML
PH UR: 5.5 [PH] (ref 5–8)
PROT UR STRIP-MCNC: ABNORMAL MG/DL
RBC # UR STRIP: ABNORMAL /UL
SP GR UR: 1.01 (ref 1–1.03)
UROBILINOGEN UR QL: NORMAL

## 2021-07-26 PROCEDURE — 99214 OFFICE O/P EST MOD 30 MIN: CPT | Performed by: PHYSICIAN ASSISTANT

## 2021-07-26 PROCEDURE — 81001 URINALYSIS AUTO W/SCOPE: CPT | Performed by: PHYSICIAN ASSISTANT

## 2021-07-26 PROCEDURE — 76775 US EXAM ABDO BACK WALL LIM: CPT

## 2021-07-26 PROCEDURE — 74176 CT ABD & PELVIS W/O CONTRAST: CPT

## 2021-07-26 PROCEDURE — 74018 RADEX ABDOMEN 1 VIEW: CPT

## 2021-07-27 ENCOUNTER — TELEPHONE (OUTPATIENT)
Dept: UROLOGY | Facility: CLINIC | Age: 76
End: 2021-07-27

## 2021-07-27 NOTE — TELEPHONE ENCOUNTER
I called patient at home regarding her CT scan that shows bilateral stones as seen on KUB there is no obstruction there are no ureteral stones.  Again given the fact she is having right flank pain I did explain that this as well may or may not help with the pain also due to there being more than 1 stone this most likely require more than one treatment.  I also explained that the kidney could only take a maximum of approximately 3000 shocks once he reaches at maximum the treatment must be stopped.  I explained there could also be pain from passing fragments gross hematuria and lower urinary tract symptoms.  She is understanding of these risks and wishes to proceed and schedule right ESWL.  She takes daily aspirin 81 mg which she will have to stop proxy 5 to 7 days prior to the surgery but after reviewing medications and the patient she states she is not on any blood thinning medications.  I explained once we have a date and time for the surgery she will be contacted with that date and when to come in for preop testing.

## 2021-07-28 ENCOUNTER — TELEPHONE (OUTPATIENT)
Dept: UROLOGY | Facility: CLINIC | Age: 76
End: 2021-07-28

## 2021-07-28 PROBLEM — N20.0 BILATERAL KIDNEY STONES: Status: ACTIVE | Noted: 2021-07-28

## 2021-07-28 NOTE — TELEPHONE ENCOUNTER
Called patient to inform her that I had her surgery scheduled for 08/16/2021 with an arrival time of 1100. I advised her that her preop would be on 08/10/2021 @ 1415. I advised her that she would need to stop her aspirin 5 days prior, nothing to eat or drink after midnight and she would need someone to drive her to be able to bring her home.  Patient voiced understanding.

## 2021-08-02 NOTE — H&P (VIEW-ONLY)
Subjective    Ms. Mohr is 75 y.o. female    Chief Complaint: Kidney stone    History of Present Illness  Patient is a 75-year-old female I saw 07/26/2021 with history of kidney stones her KUB showed bilateral stones CT scan the head done to rule out stone in the ureter showed bilateral nonobstructing stones.  Since patient was seen she is having worsening flank and abdominal pain more diffusely and more severe she does not have an appetite she has had fever and chills however temp is normal here.  He has some nausea also.  KUB showed stable findings from previous no obvious ureteral stone.    The following portions of the patient's history were reviewed and updated as appropriate: allergies, current medications, past family history, past medical history, past social history, past surgical history and problem list.    Review of Systems   Constitutional: Positive for chills and fever.   Gastrointestinal: Negative for abdominal pain, anal bleeding, blood in stool, nausea and vomiting.   Genitourinary: Positive for flank pain (right side). Negative for dysuria and hematuria.         Current Outpatient Medications:   •  allopurinol (ZYLOPRIM) 100 MG tablet, Take 100 mg by mouth Daily., Disp: , Rfl:   •  amLODIPine (NORVASC) 5 MG tablet, Take 5 mg by mouth Daily., Disp: , Rfl:   •  aspirin 81 MG EC tablet, Take 81 mg by mouth Daily., Disp: , Rfl:   •  bethanechol (URECHOLINE) 25 MG tablet, Take 25 mg by mouth 3 (Three) Times a Day., Disp: , Rfl:   •  calcitriol (ROCALTROL) 0.25 MCG capsule, Take 0.25 mcg by mouth Daily., Disp: , Rfl:   •  diazePAM (VALIUM) 5 MG tablet, Take 5 mg by mouth Daily., Disp: , Rfl:   •  dicyclomine (BENTYL) 10 MG capsule, Take 10 mg by mouth 4 (Four) Times a Day Before Meals & at Bedtime., Disp: , Rfl:   •  epoetin al (PROCRIT) 03199 UNIT/ML injection, Inject  under the skin into the appropriate area as directed As Needed., Disp: , Rfl:   •  FLUoxetine (PROzac) 10 MG capsule, Take 10 mg by  "mouth Daily., Disp: , Rfl:   •  gabapentin (NEURONTIN) 300 MG capsule, Take 300 mg by mouth Daily As Needed (for pain)., Disp: , Rfl:   •  levothyroxine (SYNTHROID, LEVOTHROID) 137 MCG tablet, Take 137 mcg by mouth Daily., Disp: , Rfl:   •  magnesium oxide (MAGOX) 400 (241.3 MG) MG tablet tablet, Take 400 mg by mouth Daily., Disp: , Rfl:   •  methocarbamol (ROBAXIN) 500 MG tablet, 500 mg 2 (Two) Times a Day As Needed for Muscle Spasms., Disp: , Rfl: 0  •  Multiple Vitamins-Minerals (OCUVITE ADULT 50+ PO), Take 1 tablet by mouth Daily., Disp: , Rfl:   •  pantoprazole (PROTONIX) 40 MG EC tablet, Take 40 mg by mouth Daily., Disp: , Rfl:   •  Prenatal Vit-Fe Fumarate-FA (PRENATAL, CLASSIC, VITAMIN) 28-0.8 MG tablet tablet, Take 1 tablet by mouth., Disp: , Rfl:   •  Probiotic Product (PROBIOTIC DAILY PO), Take 1 tablet/day by mouth., Disp: , Rfl:   •  promethazine (PHENERGAN) 25 MG tablet, Take 25 mg by mouth As Needed for Nausea or Vomiting., Disp: , Rfl:   •  QUEtiapine (SEROquel) 200 MG tablet, Take 200 mg by mouth Every Night., Disp: , Rfl:   •  SODIUM BICARBONATE PO, Take 10 mg by mouth 2 (Two) Times a Day., Disp: , Rfl:   •  sucralfate (CARAFATE) 1 g tablet, Take 1 g by mouth 4 (Four) Times a Day., Disp: , Rfl:   •  telmisartan (MICARDIS) 40 MG tablet, Take 40 mg by mouth Daily., Disp: , Rfl:   •  topiramate (TOPAMAX) 100 MG tablet, Take 100 mg by mouth Daily., Disp: , Rfl:   •  vitamin B-12 (CYANOCOBALAMIN) 1000 MCG tablet, Take 1,000 mcg by mouth Daily., Disp: , Rfl:   •  vitamin D (ERGOCALCIFEROL) 41172 UNITS capsule capsule, 50,000 Units 2 (Two) Times a Week. Sunday and Wednesday, Disp: , Rfl: 0    Past Medical History:   Diagnosis Date   • Acid reflux    • Aneurysm (CMS/HCC)     Pt states \"Somewhere near my spleen.\"   • Anxiety and depression    • Arthritis    • Diarrhea    • Generalized headaches    • Heart murmur    • History of transfusion    • Hypertension    • Hypothyroid    • Lupus (CMS/HCC)    • " "Migraines    • MRSA (methicillin resistant Staphylococcus aureus)     in past , on face   • Nausea    • PONV (postoperative nausea and vomiting)    • Renal failure     21%   • Thrombosis 2007    RIGHT KNEE       Past Surgical History:   Procedure Laterality Date   • APPENDECTOMY     • CHOLECYSTECTOMY     • ENDOSCOPY N/A 10/30/2020    Procedure: ESOPHAGOGASTRODUODENOSCOPY WITH ANESTHESIA;  Surgeon: Naresh Heard DO;  Location: [facility] ENDOSCOPY;  Service: Gastroenterology;  Laterality: N/A;  preop; abdominal pain  postop; normal   PCP Greg Rey    • HYSTERECTOMY     • REPLACEMENT TOTAL KNEE Left    • SHOULDER ROTATOR CUFF REPAIR Right    • TOTAL HIP ARTHROPLASTY Right    • TOTAL SHOULDER ARTHROPLASTY W/ DISTAL CLAVICLE EXCISION Left 12/27/2019    Procedure: LEFT REVERSE TOTAL SHOULDER ARTHROPLASTY;  Surgeon: Dhaval Yepez MD;  Location: [facility] OR;  Service: Orthopedics   • WRIST FRACTURE SURGERY Left 2 weeks ago       Social History     Socioeconomic History   • Marital status:      Spouse name: Not on file   • Number of children: Not on file   • Years of education: Not on file   • Highest education level: Not on file   Tobacco Use   • Smoking status: Never Smoker   • Smokeless tobacco: Never Used   Vaping Use   • Vaping Use: Never used   Substance and Sexual Activity   • Alcohol use: No   • Drug use: No   • Sexual activity: Defer       Family History   Problem Relation Age of Onset   • Cancer Father    • Coronary artery disease Brother    • Colon cancer Paternal Aunt    • Colon polyps Neg Hx    • Esophageal cancer Neg Hx        Objective    /84   Pulse 85   Temp 98.5 °F (36.9 °C)   Ht 162.6 cm (64\")   Wt 69 kg (152 lb 3.2 oz)   SpO2 98%   BMI 26.13 kg/m²     Physical Exam  Vitals reviewed.   Constitutional:       General: She is in acute distress.      Appearance: Normal appearance. She is ill-appearing.   HENT:      Head: Normocephalic and atraumatic.      Right Ear: External ear normal.    "   Left Ear: External ear normal.      Nose: No congestion.   Pulmonary:      Effort: Pulmonary effort is normal.   Abdominal:      Palpations: Abdomen is soft.      Tenderness: There is abdominal tenderness.      Comments: Diffuse acute tenderness with palpation.   Skin:     Coloration: Skin is pale.   Neurological:      General: No focal deficit present.      Mental Status: She is alert and oriented to person, place, and time.   Psychiatric:         Mood and Affect: Mood normal.         Behavior: Behavior normal.             Results for orders placed or performed in visit on 08/03/21   POC Urinalysis Dipstick, Multipro    Specimen: Urine   Result Value Ref Range    Color Yellow Yellow, Straw, Dark Yellow, Nguyen    Clarity, UA Clear Clear    Glucose, UA Negative Negative, 1000 mg/dL (3+) mg/dL    Bilirubin Negative Negative    Ketones, UA Negative Negative    Specific Gravity  1.010 1.005 - 1.030    Blood, UA Trace (A) Negative    pH, Urine 5.5 5.0 - 8.0    Protein, POC Negative Negative mg/dL    Urobilinogen, UA Normal Normal    Nitrite, UA Negative Negative    Leukocytes Small (1+) (A) Negative       KUB independent review    A KUB is available for me to review today.  The image is inspected for a bowel gas pattern and the general bone structure of the spine and pelvis. The kidneys are then inspected closely.  Renal outline is noted if identifiable. The kidney, collecting system, and anticipated path of the ureter are examined for calcifications including those in the true pelvis.  This film reveals:    On the right there are multiple renal stones.     On the left there are multiple renal stones.    Assessment and Plan    Diagnoses and all orders for this visit:    1. Bilateral kidney stones (Primary)  -     POC Urinalysis Dipstick, Multipro  -     XR abdomen kub; Future    2. Generalized abdominal pain    3. History of fever    Patient presents today with worsening pain is described as severe she has decreased  appetite some nausea and has had fever and chills at home.  She is scheduled for ESWL 08/16/2021 for stones in the right kidney recent CT done recently showed no ureteral stones however given the worsening pain fever chills and patient does not feel well I recommend she be further evaluated the emergency department.

## 2021-08-02 NOTE — PROGRESS NOTES
Subjective    Ms. Mohr is 75 y.o. female    Chief Complaint: Kidney stone    History of Present Illness  Patient is a 75-year-old female I saw 07/26/2021 with history of kidney stones her KUB showed bilateral stones CT scan the head done to rule out stone in the ureter showed bilateral nonobstructing stones.  Since patient was seen she is having worsening flank and abdominal pain more diffusely and more severe she does not have an appetite she has had fever and chills however temp is normal here.  He has some nausea also.  KUB showed stable findings from previous no obvious ureteral stone.    The following portions of the patient's history were reviewed and updated as appropriate: allergies, current medications, past family history, past medical history, past social history, past surgical history and problem list.    Review of Systems   Constitutional: Positive for chills and fever.   Gastrointestinal: Negative for abdominal pain, anal bleeding, blood in stool, nausea and vomiting.   Genitourinary: Positive for flank pain (right side). Negative for dysuria and hematuria.         Current Outpatient Medications:   •  allopurinol (ZYLOPRIM) 100 MG tablet, Take 100 mg by mouth Daily., Disp: , Rfl:   •  amLODIPine (NORVASC) 5 MG tablet, Take 5 mg by mouth Daily., Disp: , Rfl:   •  aspirin 81 MG EC tablet, Take 81 mg by mouth Daily., Disp: , Rfl:   •  bethanechol (URECHOLINE) 25 MG tablet, Take 25 mg by mouth 3 (Three) Times a Day., Disp: , Rfl:   •  calcitriol (ROCALTROL) 0.25 MCG capsule, Take 0.25 mcg by mouth Daily., Disp: , Rfl:   •  diazePAM (VALIUM) 5 MG tablet, Take 5 mg by mouth Daily., Disp: , Rfl:   •  dicyclomine (BENTYL) 10 MG capsule, Take 10 mg by mouth 4 (Four) Times a Day Before Meals & at Bedtime., Disp: , Rfl:   •  epoetin al (PROCRIT) 27382 UNIT/ML injection, Inject  under the skin into the appropriate area as directed As Needed., Disp: , Rfl:   •  FLUoxetine (PROzac) 10 MG capsule, Take 10 mg by  "mouth Daily., Disp: , Rfl:   •  gabapentin (NEURONTIN) 300 MG capsule, Take 300 mg by mouth Daily As Needed (for pain)., Disp: , Rfl:   •  levothyroxine (SYNTHROID, LEVOTHROID) 137 MCG tablet, Take 137 mcg by mouth Daily., Disp: , Rfl:   •  magnesium oxide (MAGOX) 400 (241.3 MG) MG tablet tablet, Take 400 mg by mouth Daily., Disp: , Rfl:   •  methocarbamol (ROBAXIN) 500 MG tablet, 500 mg 2 (Two) Times a Day As Needed for Muscle Spasms., Disp: , Rfl: 0  •  Multiple Vitamins-Minerals (OCUVITE ADULT 50+ PO), Take 1 tablet by mouth Daily., Disp: , Rfl:   •  pantoprazole (PROTONIX) 40 MG EC tablet, Take 40 mg by mouth Daily., Disp: , Rfl:   •  Prenatal Vit-Fe Fumarate-FA (PRENATAL, CLASSIC, VITAMIN) 28-0.8 MG tablet tablet, Take 1 tablet by mouth., Disp: , Rfl:   •  Probiotic Product (PROBIOTIC DAILY PO), Take 1 tablet/day by mouth., Disp: , Rfl:   •  promethazine (PHENERGAN) 25 MG tablet, Take 25 mg by mouth As Needed for Nausea or Vomiting., Disp: , Rfl:   •  QUEtiapine (SEROquel) 200 MG tablet, Take 200 mg by mouth Every Night., Disp: , Rfl:   •  SODIUM BICARBONATE PO, Take 10 mg by mouth 2 (Two) Times a Day., Disp: , Rfl:   •  sucralfate (CARAFATE) 1 g tablet, Take 1 g by mouth 4 (Four) Times a Day., Disp: , Rfl:   •  telmisartan (MICARDIS) 40 MG tablet, Take 40 mg by mouth Daily., Disp: , Rfl:   •  topiramate (TOPAMAX) 100 MG tablet, Take 100 mg by mouth Daily., Disp: , Rfl:   •  vitamin B-12 (CYANOCOBALAMIN) 1000 MCG tablet, Take 1,000 mcg by mouth Daily., Disp: , Rfl:   •  vitamin D (ERGOCALCIFEROL) 19422 UNITS capsule capsule, 50,000 Units 2 (Two) Times a Week. Sunday and Wednesday, Disp: , Rfl: 0    Past Medical History:   Diagnosis Date   • Acid reflux    • Aneurysm (CMS/HCC)     Pt states \"Somewhere near my spleen.\"   • Anxiety and depression    • Arthritis    • Diarrhea    • Generalized headaches    • Heart murmur    • History of transfusion    • Hypertension    • Hypothyroid    • Lupus (CMS/HCC)    • " "Migraines    • MRSA (methicillin resistant Staphylococcus aureus)     in past , on face   • Nausea    • PONV (postoperative nausea and vomiting)    • Renal failure     21%   • Thrombosis 2007    RIGHT KNEE       Past Surgical History:   Procedure Laterality Date   • APPENDECTOMY     • CHOLECYSTECTOMY     • ENDOSCOPY N/A 10/30/2020    Procedure: ESOPHAGOGASTRODUODENOSCOPY WITH ANESTHESIA;  Surgeon: Naresh Heard DO;  Location: [facility] ENDOSCOPY;  Service: Gastroenterology;  Laterality: N/A;  preop; abdominal pain  postop; normal   PCP Greg Rey    • HYSTERECTOMY     • REPLACEMENT TOTAL KNEE Left    • SHOULDER ROTATOR CUFF REPAIR Right    • TOTAL HIP ARTHROPLASTY Right    • TOTAL SHOULDER ARTHROPLASTY W/ DISTAL CLAVICLE EXCISION Left 12/27/2019    Procedure: LEFT REVERSE TOTAL SHOULDER ARTHROPLASTY;  Surgeon: Dhaval Yepez MD;  Location: [facility] OR;  Service: Orthopedics   • WRIST FRACTURE SURGERY Left 2 weeks ago       Social History     Socioeconomic History   • Marital status:      Spouse name: Not on file   • Number of children: Not on file   • Years of education: Not on file   • Highest education level: Not on file   Tobacco Use   • Smoking status: Never Smoker   • Smokeless tobacco: Never Used   Vaping Use   • Vaping Use: Never used   Substance and Sexual Activity   • Alcohol use: No   • Drug use: No   • Sexual activity: Defer       Family History   Problem Relation Age of Onset   • Cancer Father    • Coronary artery disease Brother    • Colon cancer Paternal Aunt    • Colon polyps Neg Hx    • Esophageal cancer Neg Hx        Objective    /84   Pulse 85   Temp 98.5 °F (36.9 °C)   Ht 162.6 cm (64\")   Wt 69 kg (152 lb 3.2 oz)   SpO2 98%   BMI 26.13 kg/m²     Physical Exam  Vitals reviewed.   Constitutional:       General: She is in acute distress.      Appearance: Normal appearance. She is ill-appearing.   HENT:      Head: Normocephalic and atraumatic.      Right Ear: External ear normal.    "   Left Ear: External ear normal.      Nose: No congestion.   Pulmonary:      Effort: Pulmonary effort is normal.   Abdominal:      Palpations: Abdomen is soft.      Tenderness: There is abdominal tenderness.      Comments: Diffuse acute tenderness with palpation.   Skin:     Coloration: Skin is pale.   Neurological:      General: No focal deficit present.      Mental Status: She is alert and oriented to person, place, and time.   Psychiatric:         Mood and Affect: Mood normal.         Behavior: Behavior normal.             Results for orders placed or performed in visit on 08/03/21   POC Urinalysis Dipstick, Multipro    Specimen: Urine   Result Value Ref Range    Color Yellow Yellow, Straw, Dark Yellow, Nguyen    Clarity, UA Clear Clear    Glucose, UA Negative Negative, 1000 mg/dL (3+) mg/dL    Bilirubin Negative Negative    Ketones, UA Negative Negative    Specific Gravity  1.010 1.005 - 1.030    Blood, UA Trace (A) Negative    pH, Urine 5.5 5.0 - 8.0    Protein, POC Negative Negative mg/dL    Urobilinogen, UA Normal Normal    Nitrite, UA Negative Negative    Leukocytes Small (1+) (A) Negative       KUB independent review    A KUB is available for me to review today.  The image is inspected for a bowel gas pattern and the general bone structure of the spine and pelvis. The kidneys are then inspected closely.  Renal outline is noted if identifiable. The kidney, collecting system, and anticipated path of the ureter are examined for calcifications including those in the true pelvis.  This film reveals:    On the right there are multiple renal stones.     On the left there are multiple renal stones.    Assessment and Plan    Diagnoses and all orders for this visit:    1. Bilateral kidney stones (Primary)  -     POC Urinalysis Dipstick, Multipro  -     XR abdomen kub; Future    2. Generalized abdominal pain    3. History of fever    Patient presents today with worsening pain is described as severe she has decreased  appetite some nausea and has had fever and chills at home.  She is scheduled for ESWL 08/16/2021 for stones in the right kidney recent CT done recently showed no ureteral stones however given the worsening pain fever chills and patient does not feel well I recommend she be further evaluated the emergency department.

## 2021-08-03 ENCOUNTER — HOSPITAL ENCOUNTER (OUTPATIENT)
Facility: HOSPITAL | Age: 76
Setting detail: OBSERVATION
Discharge: HOME | End: 2021-08-03
Attending: INTERNAL MEDICINE | Admitting: EMERGENCY MEDICINE

## 2021-08-03 ENCOUNTER — APPOINTMENT (OUTPATIENT)
Dept: CT IMAGING | Facility: HOSPITAL | Age: 76
End: 2021-08-03

## 2021-08-03 ENCOUNTER — OFFICE VISIT (OUTPATIENT)
Dept: UROLOGY | Facility: CLINIC | Age: 76
End: 2021-08-03

## 2021-08-03 ENCOUNTER — HOSPITAL ENCOUNTER (OUTPATIENT)
Dept: GENERAL RADIOLOGY | Facility: HOSPITAL | Age: 76
Discharge: HOME | End: 2021-08-03
Admitting: PHYSICIAN ASSISTANT

## 2021-08-03 VITALS
BODY MASS INDEX: 25.99 KG/M2 | HEART RATE: 85 BPM | OXYGEN SATURATION: 98 % | DIASTOLIC BLOOD PRESSURE: 84 MMHG | TEMPERATURE: 98.5 F | SYSTOLIC BLOOD PRESSURE: 122 MMHG | WEIGHT: 152.2 LBS | HEIGHT: 64 IN

## 2021-08-03 VITALS
SYSTOLIC BLOOD PRESSURE: 104 MMHG | DIASTOLIC BLOOD PRESSURE: 61 MMHG | HEIGHT: 63 IN | TEMPERATURE: 97.6 F | BODY MASS INDEX: 26.93 KG/M2 | WEIGHT: 152 LBS | OXYGEN SATURATION: 96 % | HEART RATE: 87 BPM | RESPIRATION RATE: 22 BRPM

## 2021-08-03 DIAGNOSIS — N20.0 BILATERAL KIDNEY STONES: ICD-10-CM

## 2021-08-03 DIAGNOSIS — R93.5 ABNORMAL CT OF THE ABDOMEN: ICD-10-CM

## 2021-08-03 DIAGNOSIS — K83.8 INTRAHEPATIC BILE DUCT DILATION: ICD-10-CM

## 2021-08-03 DIAGNOSIS — R10.9 INTRACTABLE ABDOMINAL PAIN: Primary | ICD-10-CM

## 2021-08-03 DIAGNOSIS — Z87.898 HISTORY OF FEVER: ICD-10-CM

## 2021-08-03 DIAGNOSIS — R10.84 GENERALIZED ABDOMINAL PAIN: ICD-10-CM

## 2021-08-03 DIAGNOSIS — K83.8 COMMON BILE DUCT DILATION: ICD-10-CM

## 2021-08-03 DIAGNOSIS — N20.0 BILATERAL KIDNEY STONES: Primary | ICD-10-CM

## 2021-08-03 LAB
ALBUMIN SERPL-MCNC: 4.2 G/DL (ref 3.5–5.2)
ALBUMIN/GLOB SERPL: 1.6 G/DL
ALP SERPL-CCNC: 151 U/L (ref 39–117)
ALT SERPL W P-5'-P-CCNC: 12 U/L (ref 1–33)
ANION GAP SERPL CALCULATED.3IONS-SCNC: 15 MMOL/L (ref 5–15)
AST SERPL-CCNC: 17 U/L (ref 1–32)
BASOPHILS # BLD AUTO: 0.06 10*3/MM3 (ref 0–0.2)
BASOPHILS NFR BLD AUTO: 0.8 % (ref 0–1.5)
BILIRUB BLD-MCNC: NEGATIVE MG/DL
BILIRUB SERPL-MCNC: 0.3 MG/DL (ref 0–1.2)
BILIRUB UR QL STRIP: NEGATIVE
BUN SERPL-MCNC: 32 MG/DL (ref 8–23)
BUN/CREAT SERPL: 21.1 (ref 7–25)
CALCIUM SPEC-SCNC: 9.3 MG/DL (ref 8.6–10.5)
CHLORIDE SERPL-SCNC: 111 MMOL/L (ref 98–107)
CLARITY UR: CLEAR
CLARITY, POC: CLEAR
CO2 SERPL-SCNC: 18 MMOL/L (ref 22–29)
COLOR UR: YELLOW
COLOR UR: YELLOW
CREAT SERPL-MCNC: 1.52 MG/DL (ref 0.57–1)
D-LACTATE SERPL-SCNC: 1.6 MMOL/L (ref 0.5–2)
DEPRECATED RDW RBC AUTO: 47.3 FL (ref 37–54)
EOSINOPHIL # BLD AUTO: 0.81 10*3/MM3 (ref 0–0.4)
EOSINOPHIL NFR BLD AUTO: 10.7 % (ref 0.3–6.2)
ERYTHROCYTE [DISTWIDTH] IN BLOOD BY AUTOMATED COUNT: 14.6 % (ref 12.3–15.4)
GFR SERPL CREATININE-BSD FRML MDRD: 33 ML/MIN/1.73
GLOBULIN UR ELPH-MCNC: 2.7 GM/DL
GLUCOSE SERPL-MCNC: 112 MG/DL (ref 65–99)
GLUCOSE UR STRIP-MCNC: NEGATIVE MG/DL
GLUCOSE UR STRIP-MCNC: NEGATIVE MG/DL
HCT VFR BLD AUTO: 34.9 % (ref 34–46.6)
HGB BLD-MCNC: 11.2 G/DL (ref 12–15.9)
HGB UR QL STRIP.AUTO: NEGATIVE
IMM GRANULOCYTES # BLD AUTO: 0.02 10*3/MM3 (ref 0–0.05)
IMM GRANULOCYTES NFR BLD AUTO: 0.3 % (ref 0–0.5)
KETONES UR QL STRIP: NEGATIVE
KETONES UR QL: NEGATIVE
LEUKOCYTE EST, POC: ABNORMAL
LEUKOCYTE ESTERASE UR QL STRIP.AUTO: NEGATIVE
LIPASE SERPL-CCNC: 43 U/L (ref 13–60)
LYMPHOCYTES # BLD AUTO: 2.55 10*3/MM3 (ref 0.7–3.1)
LYMPHOCYTES NFR BLD AUTO: 33.8 % (ref 19.6–45.3)
MCH RBC QN AUTO: 28.8 PG (ref 26.6–33)
MCHC RBC AUTO-ENTMCNC: 32.1 G/DL (ref 31.5–35.7)
MCV RBC AUTO: 89.7 FL (ref 79–97)
MONOCYTES # BLD AUTO: 0.83 10*3/MM3 (ref 0.1–0.9)
MONOCYTES NFR BLD AUTO: 11 % (ref 5–12)
NEUTROPHILS NFR BLD AUTO: 3.28 10*3/MM3 (ref 1.7–7)
NEUTROPHILS NFR BLD AUTO: 43.4 % (ref 42.7–76)
NITRITE UR QL STRIP: NEGATIVE
NITRITE UR-MCNC: NEGATIVE MG/ML
NRBC BLD AUTO-RTO: 0 /100 WBC (ref 0–0.2)
PH UR STRIP.AUTO: 6 [PH] (ref 5–8)
PH UR: 5.5 [PH] (ref 5–8)
PLATELET # BLD AUTO: 234 10*3/MM3 (ref 140–450)
PMV BLD AUTO: 11.4 FL (ref 6–12)
POTASSIUM SERPL-SCNC: 3.8 MMOL/L (ref 3.5–5.2)
PROT SERPL-MCNC: 6.9 G/DL (ref 6–8.5)
PROT UR QL STRIP: NEGATIVE
PROT UR STRIP-MCNC: NEGATIVE MG/DL
RBC # BLD AUTO: 3.89 10*6/MM3 (ref 3.77–5.28)
RBC # UR STRIP: ABNORMAL /UL
SARS-COV-2 RNA PNL SPEC NAA+PROBE: NOT DETECTED
SODIUM SERPL-SCNC: 144 MMOL/L (ref 136–145)
SP GR UR STRIP: 1.01 (ref 1–1.03)
SP GR UR: 1.01 (ref 1–1.03)
UROBILINOGEN UR QL STRIP: NORMAL
UROBILINOGEN UR QL: NORMAL
WBC # BLD AUTO: 7.55 10*3/MM3 (ref 3.4–10.8)

## 2021-08-03 PROCEDURE — 83605 ASSAY OF LACTIC ACID: CPT | Performed by: EMERGENCY MEDICINE

## 2021-08-03 PROCEDURE — G0378 HOSPITAL OBSERVATION PER HR: HCPCS

## 2021-08-03 PROCEDURE — 25010000002 MORPHINE PER 10 MG: Performed by: EMERGENCY MEDICINE

## 2021-08-03 PROCEDURE — 96375 TX/PRO/DX INJ NEW DRUG ADDON: CPT

## 2021-08-03 PROCEDURE — 80053 COMPREHEN METABOLIC PANEL: CPT | Performed by: EMERGENCY MEDICINE

## 2021-08-03 PROCEDURE — 87635 SARS-COV-2 COVID-19 AMP PRB: CPT | Performed by: PHYSICIAN ASSISTANT

## 2021-08-03 PROCEDURE — 85025 COMPLETE CBC W/AUTO DIFF WBC: CPT | Performed by: EMERGENCY MEDICINE

## 2021-08-03 PROCEDURE — 81001 URINALYSIS AUTO W/SCOPE: CPT | Performed by: PHYSICIAN ASSISTANT

## 2021-08-03 PROCEDURE — 96361 HYDRATE IV INFUSION ADD-ON: CPT

## 2021-08-03 PROCEDURE — 25010000002 IOPAMIDOL 61 % SOLUTION: Performed by: PHYSICIAN ASSISTANT

## 2021-08-03 PROCEDURE — 99214 OFFICE O/P EST MOD 30 MIN: CPT | Performed by: PHYSICIAN ASSISTANT

## 2021-08-03 PROCEDURE — 74018 RADEX ABDOMEN 1 VIEW: CPT

## 2021-08-03 PROCEDURE — C9803 HOPD COVID-19 SPEC COLLECT: HCPCS

## 2021-08-03 PROCEDURE — 99283 EMERGENCY DEPT VISIT LOW MDM: CPT

## 2021-08-03 PROCEDURE — 25010000002 ONDANSETRON PER 1 MG: Performed by: EMERGENCY MEDICINE

## 2021-08-03 PROCEDURE — 74177 CT ABD & PELVIS W/CONTRAST: CPT

## 2021-08-03 PROCEDURE — 25010000002 FENTANYL CITRATE (PF) 50 MCG/ML SOLUTION: Performed by: EMERGENCY MEDICINE

## 2021-08-03 PROCEDURE — P9612 CATHETERIZE FOR URINE SPEC: HCPCS

## 2021-08-03 PROCEDURE — 83690 ASSAY OF LIPASE: CPT | Performed by: EMERGENCY MEDICINE

## 2021-08-03 PROCEDURE — 96374 THER/PROPH/DIAG INJ IV PUSH: CPT

## 2021-08-03 PROCEDURE — 81003 URINALYSIS AUTO W/O SCOPE: CPT | Performed by: EMERGENCY MEDICINE

## 2021-08-03 RX ORDER — FENTANYL CITRATE 50 UG/ML
12.5 INJECTION, SOLUTION INTRAMUSCULAR; INTRAVENOUS ONCE
Status: COMPLETED | OUTPATIENT
Start: 2021-08-03 | End: 2021-08-03

## 2021-08-03 RX ORDER — SODIUM CHLORIDE 9 MG/ML
125 INJECTION, SOLUTION INTRAVENOUS CONTINUOUS
Status: DISCONTINUED | OUTPATIENT
Start: 2021-08-03 | End: 2021-08-03 | Stop reason: HOSPADM

## 2021-08-03 RX ORDER — ONDANSETRON 2 MG/ML
4 INJECTION INTRAMUSCULAR; INTRAVENOUS ONCE
Status: COMPLETED | OUTPATIENT
Start: 2021-08-03 | End: 2021-08-03

## 2021-08-03 RX ADMIN — SODIUM CHLORIDE 125 ML/HR: 9 INJECTION, SOLUTION INTRAVENOUS at 12:16

## 2021-08-03 RX ADMIN — ONDANSETRON 4 MG: 2 INJECTION INTRAMUSCULAR; INTRAVENOUS at 12:16

## 2021-08-03 RX ADMIN — IOPAMIDOL 100 ML: 612 INJECTION, SOLUTION INTRAVENOUS at 14:23

## 2021-08-03 RX ADMIN — FENTANYL CITRATE 12.5 MCG: 50 INJECTION, SOLUTION INTRAMUSCULAR; INTRAVENOUS at 14:37

## 2021-08-03 RX ADMIN — MORPHINE SULFATE 4 MG: 4 INJECTION, SOLUTION INTRAMUSCULAR; INTRAVENOUS at 12:16

## 2021-08-03 NOTE — PATIENT INSTRUCTIONS
"BMI for Adults  What is BMI?  Body mass index (BMI) is a number that is calculated from a person's weight and height. BMI can help estimate how much of a person's weight is composed of fat. BMI does not measure body fat directly. Rather, it is an alternative to procedures that directly measure body fat, which can be difficult and expensive.  BMI can help identify people who may be at higher risk for certain medical problems.  What are BMI measurements used for?  BMI is used as a screening tool to identify possible weight problems. It helps determine whether a person is obese, overweight, a healthy weight, or underweight.  BMI is useful for:  · Identifying a weight problem that may be related to a medical condition or may increase the risk for medical problems.  · Promoting changes, such as changes in diet and exercise, to help reach a healthy weight. BMI screening can be repeated to see if these changes are working.  How is BMI calculated?  BMI involves measuring your weight in relation to your height. Both height and weight are measured, and the BMI is calculated from those numbers. This can be done either in English ([place] or metric measurements. Note that charts and online BMI calculators are available to help you find your BMI quickly and easily without having to do these calculations yourself.  To calculate your BMI in English (U[place] measurements:    1. Measure your weight in pounds (lb).  2. Multiply the number of pounds by 703.  ? For example, for a person who weighs 180 lb, multiply that number by 703, which equals 126,540.  3. Measure your height in inches. Then multiply that number by itself to get a measurement called \"inches squared.\"  ? For example, for a person who is 70 inches tall, the \"inches squared\" measurement is 70 inches x 70 inches, which equals 4,900 inches squared.  4. Divide the total from step 2 (number of lb x 703) by the total from step 3 (inches squared): 126,540 ÷ 4,900 = 25.8. This is " "your BMI.  To calculate your BMI in metric measurements:  1. Measure your weight in kilograms (kg).  2. Measure your height in meters (m). Then multiply that number by itself to get a measurement called \"meters squared.\"  ? For example, for a person who is 1.75 m tall, the \"meters squared\" measurement is 1.75 m x 1.75 m, which is equal to 3.1 meters squared.  3. Divide the number of kilograms (your weight) by the meters squared number. In this example: 70 ÷ 3.1 = 22.6. This is your BMI.  What do the results mean?  BMI charts are used to identify whether you are underweight, normal weight, overweight, or obese. The following guidelines will be used:  · Underweight: BMI less than 18.5.  · Normal weight: BMI between 18.5 and 24.9.  · Overweight: BMI between 25 and 29.9.  · Obese: BMI of 30 or above.  Keep these notes in mind:  · Weight includes both fat and muscle, so someone with a muscular build, such as an athlete, may have a BMI that is higher than 24.9. In cases like these, BMI is not an accurate measure of body fat.  · To determine if excess body fat is the cause of a BMI of 25 or higher, further assessments may need to be done by a health care provider.  · BMI is usually interpreted in the same way for men and women.  Where to find more information  For more information about BMI, including tools to quickly calculate your BMI, go to these websites:  · Centers for Disease Control and Prevention: www.cdc.gov  · American Heart Association: www.heart.org  · National Heart, Lung, and Blood [place]: www.nhlbi.nih.gov  Summary  · Body mass index (BMI) is a number that is calculated from a person's weight and height.  · BMI may help estimate how much of a person's weight is composed of fat. BMI can help identify those who may be at higher risk for certain medical problems.  · BMI can be measured using English measurements or metric measurements.  · BMI charts are used to identify whether you are underweight, normal " weight, overweight, or obese.  This information is not intended to replace advice given to you by your health care provider. Make sure you discuss any questions you have with your health care provider.  Document Revised: 09/09/2020 Document Reviewed: 07/17/2020  [employer]vier Patient Education © 2021 [employer]

## 2021-08-03 NOTE — ED PROVIDER NOTES
"Subjective   History of Present Illness    Patient is a pleasant 75-year-old female who presents with continued worsening right flank pain. The patient describes this has been present since about 4 months where she had this manageable discomfort in the right flank. She did seek medical attention with her urologist. She reports at one time, she was diagnosed with a UTI and was given a round of antibiotics.She also was noted to have hematuria.  She did complete her antibiotics. She describes completing a renal ultrasound as well as CT scan of her abdomen pelvis without contrast. Her pain worsened in the past 2 to 3 weeks. She did follow-up with her urologist today. She completed a KUB. With her worsening pain, she was advised to go to the ER to be further evaluated. Patient describes that she is scheduled to have some type of urological surgery on August 16, 2021, with Dr. Hayes. She not really sure exactly what kind of procedure. She does complain of dysuria and hematuria. She denies any fever. She denies any change in bowel movement. Patient denies taking any pain medication for her pain.    Review of Systems   Constitutional: Positive for activity change. Negative for fever.   HENT: Negative.    Respiratory: Negative.    Cardiovascular: Negative.    Gastrointestinal: Positive for abdominal pain. Negative for constipation, diarrhea, nausea and vomiting.   Genitourinary: Positive for dysuria and hematuria.   Musculoskeletal: Negative.    Neurological: Negative.    All other systems reviewed and are negative.      Past Medical History:   Diagnosis Date   • Acid reflux    • Aneurysm (CMS/HCC)     Pt states \"Somewhere near my spleen.\"   • Anxiety and depression    • Arthritis    • Diarrhea    • Generalized headaches    • Heart murmur    • History of transfusion    • Hypertension    • Hypothyroid    • Lupus (CMS/HCC)    • Migraines    • MRSA (methicillin resistant Staphylococcus aureus)     in past , on face   • Nausea  "   • PONV (postoperative nausea and vomiting)    • Renal failure     21%   • Thrombosis 2007    RIGHT KNEE       Allergies   Allergen Reactions   • Codeine Hives   • Morphine Nausea And Vomiting       Past Surgical History:   Procedure Laterality Date   • APPENDECTOMY     • CHOLECYSTECTOMY     • ENDOSCOPY N/A 10/30/2020    Procedure: ESOPHAGOGASTRODUODENOSCOPY WITH ANESTHESIA;  Surgeon: Naresh Heard DO;  Location: [facility] ENDOSCOPY;  Service: Gastroenterology;  Laterality: N/A;  preop; abdominal pain  postop; normal   PCP Greg Rey    • HYSTERECTOMY     • REPLACEMENT TOTAL KNEE Left    • SHOULDER ROTATOR CUFF REPAIR Right    • TOTAL HIP ARTHROPLASTY Right    • TOTAL SHOULDER ARTHROPLASTY W/ DISTAL CLAVICLE EXCISION Left 12/27/2019    Procedure: LEFT REVERSE TOTAL SHOULDER ARTHROPLASTY;  Surgeon: Dhaval Yepez MD;  Location: [facility] OR;  Service: Orthopedics   • WRIST FRACTURE SURGERY Left 2 weeks ago       Family History   Problem Relation Age of Onset   • Cancer Father    • Coronary artery disease Brother    • Colon cancer Paternal Aunt    • Colon polyps Neg Hx    • Esophageal cancer Neg Hx        Social History     Socioeconomic History   • Marital status:      Spouse name: Not on file   • Number of children: Not on file   • Years of education: Not on file   • Highest education level: Not on file   Tobacco Use   • Smoking status: Never Smoker   • Smokeless tobacco: Never Used   Vaping Use   • Vaping Use: Never used   Substance and Sexual Activity   • Alcohol use: No   • Drug use: No   • Sexual activity: Defer       Prior to Admission medications    Medication Sig Start Date End Date Taking? Authorizing Provider   allopurinol (ZYLOPRIM) 100 MG tablet Take 100 mg by mouth Daily.    Provider, MD Mariano   amLODIPine (NORVASC) 5 MG tablet Take 5 mg by mouth Daily.    Provider, MD Mariano   aspirin 81 MG EC tablet Take 81 mg by mouth Daily.    Provider, MD Mariano   bethanechol (URECHOLINE) 25  MG tablet Take 25 mg by mouth 3 (Three) Times a Day.    Mariano Moulton MD   calcitriol (ROCALTROL) 0.25 MCG capsule Take 0.25 mcg by mouth Daily.    Mariano Moulton MD   diazePAM (VALIUM) 5 MG tablet Take 5 mg by mouth Daily.    Mariano Moulton MD   dicyclomine (BENTYL) 10 MG capsule Take 10 mg by mouth 4 (Four) Times a Day Before Meals & at Bedtime.    Mariano Moulton MD   epoetin al (PROCRIT) 48947 UNIT/ML injection Inject  under the skin into the appropriate area as directed As Needed.    Mariano Moulton MD   FLUoxetine (PROzac) 10 MG capsule Take 10 mg by mouth Daily.    Mariano Moulton MD   gabapentin (NEURONTIN) 300 MG capsule Take 300 mg by mouth Daily As Needed (for pain).    Mariano Moulton MD   levothyroxine (SYNTHROID, LEVOTHROID) 137 MCG tablet Take 137 mcg by mouth Daily.    Mariano Moulton MD   magnesium oxide (MAGOX) 400 (241.3 MG) MG tablet tablet Take 400 mg by mouth Daily.    Mariano Moulton MD   methocarbamol (ROBAXIN) 500 MG tablet 500 mg 2 (Two) Times a Day As Needed for Muscle Spasms. 7/22/16   Mariano Moulton MD   Multiple Vitamins-Minerals (OCUVITE ADULT 50+ PO) Take 1 tablet by mouth Daily.    Mariano Moulton MD   pantoprazole (PROTONIX) 40 MG EC tablet Take 40 mg by mouth Daily.    Mariano Moulton MD   Prenatal Vit-Fe Fumarate-FA (PRENATAL, CLASSIC, VITAMIN) 28-0.8 MG tablet tablet Take 1 tablet by mouth.    Mariano Moulton MD   Probiotic Product (PROBIOTIC DAILY PO) Take 1 tablet/day by mouth.    Mariano Moulton MD   promethazine (PHENERGAN) 25 MG tablet Take 25 mg by mouth As Needed for Nausea or Vomiting.    Mariano Moulton MD   QUEtiapine (SEROquel) 200 MG tablet Take 200 mg by mouth Every Night.    Mariano Moulton MD   SODIUM BICARBONATE PO Take 10 mg by mouth 2 (Two) Times a Day.    Mariano Moulton MD   sucralfate (CARAFATE) 1 g tablet Take 1 g by mouth 4 (Four) Times a Day.     "Mariano Moulton MD   telmisartan (MICARDIS) 40 MG tablet Take 40 mg by mouth Daily.    Mariano Moulton MD   topiramate (TOPAMAX) 100 MG tablet Take 100 mg by mouth Daily.    Mariano Moulton MD   vitamin B-12 (CYANOCOBALAMIN) 1000 MCG tablet Take 1,000 mcg by mouth Daily.    Mariano Moulton MD   vitamin D (ERGOCALCIFEROL) 03076 UNITS capsule capsule 50,000 Units 2 (Two) Times a Week. Sunday and Wednesday 8/21/16   Mariano Moulton MD       Medications   sodium chloride 0.9 % infusion (125 mL/hr Intravenous New Bag 8/3/21 1216)   HYDROmorphone (DILAUDID) injection 1 mg (has no administration in time range)   morphine injection 4 mg (4 mg Intravenous Given 8/3/21 1216)   ondansetron (ZOFRAN) injection 4 mg (4 mg Intravenous Given 8/3/21 1216)   fentaNYL citrate (PF) (SUBLIMAZE) injection 12.5 mcg (12.5 mcg Intravenous Given 8/3/21 1437)   iopamidol (ISOVUE-300) 61 % injection 100 mL (100 mL Intravenous Given 8/3/21 1423)       /83   Pulse 79   Temp 97.6 °F (36.4 °C) (Oral)   Resp 22   Ht 160 cm (63\")   Wt 68.9 kg (152 lb)   SpO2 95%   BMI 26.93 kg/m²       Objective   Physical Exam  Vitals and nursing note reviewed.   Constitutional:       General: She is not in acute distress.     Appearance: She is well-developed. She is not diaphoretic.   HENT:      Head: Normocephalic and atraumatic.   Eyes:      Conjunctiva/sclera: Conjunctivae normal.      Pupils: Pupils are equal, round, and reactive to light.   Neck:      Trachea: No tracheal deviation.   Cardiovascular:      Rate and Rhythm: Normal rate and regular rhythm.      Heart sounds: Normal heart sounds. No murmur heard.     Pulmonary:      Effort: Pulmonary effort is normal.      Breath sounds: Normal breath sounds.   Abdominal:      General: Bowel sounds are normal. There is no distension.      Palpations: Abdomen is soft. There is no mass.      Tenderness: There is generalized abdominal tenderness. There is no guarding or " rebound.      Comments: Large scar from xiphoid to mons pubis region secondary to previous cholecystectomy gastric bypass surgery   Musculoskeletal:         General: Normal range of motion.      Cervical back: Normal range of motion and neck supple.   Skin:     General: Skin is warm and dry.      Capillary Refill: Capillary refill takes less than 2 seconds.   Neurological:      General: No focal deficit present.      Mental Status: She is alert and oriented to person, place, and time.      Deep Tendon Reflexes: Reflexes are normal and symmetric.   Psychiatric:         Mood and Affect: Mood normal.         Behavior: Behavior normal.         Thought Content: Thought content normal.         Judgment: Judgment normal.         Procedures         Lab Results (last 24 hours)     Procedure Component Value Units Date/Time    POC Urinalysis Dipstick, Multipro [[id number]]  (Abnormal) Collected: 08/03/21 0911    Specimen: Urine Updated: 08/03/21 0911     Color Yellow     Clarity, UA Clear     Glucose, UA Negative mg/dL      Bilirubin Negative     Ketones, UA Negative     Specific Gravity  1.010     Blood, UA Trace     pH, Urine 5.5     Protein, POC Negative mg/dL      Urobilinogen, UA Normal     Nitrite, UA Negative     Leukocytes Small (1+)    CBC & Differential [[id number]]  (Abnormal) Collected: 08/03/21 1208    Specimen: Blood Updated: 08/03/21 1215    Narrative:      The following orders were created for panel order CBC & Differential.  Procedure                               Abnormality         Status                     ---------                               -----------         ------                     CBC Auto Differential[[id number]]        Abnormal            Final result                 Please view results for these tests on the individual orders.    Comprehensive Metabolic Panel [[id number]]  (Abnormal) Collected: 08/03/21 1208    Specimen: Blood Updated: 08/03/21 1238     Glucose 112 mg/dL      BUN 32 mg/dL       Creatinine 1.52 mg/dL      Sodium 144 mmol/L      Potassium 3.8 mmol/L      Comment: Slight hemolysis detected by analyzer. Results may be affected.        Chloride 111 mmol/L      CO2 18.0 mmol/L      Calcium 9.3 mg/dL      Total Protein 6.9 g/dL      Albumin 4.20 g/dL      ALT (SGPT) 12 U/L      AST (SGOT) 17 U/L      Alkaline Phosphatase 151 U/L      Total Bilirubin 0.3 mg/dL      eGFR Non African Amer 33 mL/min/1.73      Globulin 2.7 gm/dL      A/G Ratio 1.6 g/dL      BUN/Creatinine Ratio 21.1     Anion Gap 15.0 mmol/L     Narrative:      GFR Normal >60  Chronic Kidney Disease <60  Kidney Failure <15      Lipase [[id number]]  (Normal) Collected: 08/03/21 1208    Specimen: Blood Updated: 08/03/21 1234     Lipase 43 U/L     Lactic Acid, Plasma [[id number]]  (Normal) Collected: 08/03/21 1208    Specimen: Blood Updated: 08/03/21 1234     Lactate 1.6 mmol/L     CBC Auto Differential [[id number]]  (Abnormal) Collected: 08/03/21 1208    Specimen: Blood Updated: 08/03/21 1215     WBC 7.55 10*3/mm3      RBC 3.89 10*6/mm3      Hemoglobin 11.2 g/dL      Hematocrit 34.9 %      MCV 89.7 fL      MCH 28.8 pg      MCHC 32.1 g/dL      RDW 14.6 %      RDW-SD 47.3 fl      MPV 11.4 fL      Platelets 234 10*3/mm3      Neutrophil % 43.4 %      Lymphocyte % 33.8 %      Monocyte % 11.0 %      Eosinophil % 10.7 %      Basophil % 0.8 %      Immature Grans % 0.3 %      Neutrophils, Absolute 3.28 10*3/mm3      Lymphocytes, Absolute 2.55 10*3/mm3      Monocytes, Absolute 0.83 10*3/mm3      Eosinophils, Absolute 0.81 10*3/mm3      Basophils, Absolute 0.06 10*3/mm3      Immature Grans, Absolute 0.02 10*3/mm3      nRBC 0.0 /100 WBC     Urinalysis With Culture If Indicated - Urine, Catheter In/Out [[id number]]  (Normal) Collected: 08/03/21 1405    Specimen: Urine, Catheter In/Out Updated: 08/03/21 1445     Color, UA Yellow     Appearance, UA Clear     pH, UA 6.0     Specific Gravity, UA 1.011     Glucose, UA Negative     Ketones, UA Negative      Bilirubin, UA Negative     Blood, UA Negative     Protein, UA Negative     Leuk Esterase, UA Negative     Nitrite, UA Negative     Urobilinogen, UA 0.2 E.U./dL    Narrative:      Urine microscopic not indicated.          CT Abdomen Pelvis Without Contrast    Result Date: 7/26/2021  Narrative: EXAMINATION: CT ABDOMEN PELVIS WO CONTRAST-   7/26/2021 4:34 PM CDT  HISTORY: right abdominal pian and bilateral kidney stones; R10.9-Unspecified abdominal pain; N20.0-Calculus of kidney  In order to have a CT radiation dose as low as reasonably achievable Automated Exposure Control was utilized for adjustment of the mA and/or KV according to patient size.  DLP in mGycm= 307.  Noncontrast abdomen pelvis CT. Comparison is made with noncontrast CT imaging from October 23, 2020.  Heart size is within normal limits. Mild chronic interstitial disease at the lung bases. Normal noncontrast appearance of the liver. The gallbladder appears to be absent.  Normal appearance of the pancreas and spleen. Unchanged rim calcified distal splenic artery aneurysm measuring approximately 19 mm.  Normal and symmetric adrenal glands.  Multiple bilateral renal stones. No hydronephrosis and no ureteral stone.  No bowel obstruction. No pelvic mass or fluid.  Right hip prosthesis with streak artifact. The uterus is absent.  Summary: 1. Multiple bilateral renal stones with no hydronephrosis and no ureteral stone.            This report was finalized on 07/26/2021 16:45 by Dr. Mahesh Lackey MD.    CT Abdomen Pelvis With Contrast    Result Date: 8/3/2021  Narrative: EXAM: CT ABDOMEN PELVIS W CONTRAST- - 8/3/2021 2:17 PM CDT  HISTORY: severe right flank pain   COMPARISON: 7/26/2021.  DOSE LENGTH PRODUCT: 307 mGy cm. Automatic exposure control was utilized to make radiation dose as low as reasonably achievable.  TECHNIQUE: Enhanced  axial images of the abdomen and pelvis obtained with multiplanar reformats.  FINDINGS: VISUALIZED CHEST: No pleural or  pericardial effusion. Lung bases clear.  LIVER: No focal liver lesion. Patent portal and hepatic veins.  BILIARY: New intrahepatic and extrahepatic bile duct dilation. Common bile duct measures up to 1.2 cm.  PANCREAS: Normal pancreas contour and enhancement.  SPLEEN: Normal size and contour. There is a 1.9 cm rim calcified splenic artery aneurysm, similar to prior.  ADRENAL: Normal appearance of the bilateral adrenal glands.  GENITOURINARY: Bilateral nonobstructing renal calculi. No hydronephrosis or ureteral calculus. No solid renal lesion. Urinary bladder collapsed, which limits evaluation of the bladder wall. Uterus appears surgically absent. Suspected adnexa appear grossly symmetric. There is soft tissue fullness at the level of the anus and vulva such as axial images 84-87.  PERITONEUM: No free air or free fluid.  GI TRACT: Small hiatal hernia. Otherwise normal appearance of the stomach and duodenum. No abnormally dilated loops of bowel or bowel wall thickening.  Appendix not discretely identified. No secondary signs of appendicitis.  VESSELS: Aorta normal in course and caliber with calcified atherosclerosis. Main branch vessels appear patent.  RETROPERITONEUM: No mass, lymphadenopathy or hemorrhage.  SOFT TISSUES: Normal appearance of the overlying abdominal soft tissues.   BONES: Right hip prosthesis. No acute bony finding.       Impression: 1. New intrahepatic and extrahepatic bile duct dilation with common bile duct measuring up to 1.2 cm. Recommend correlation with laboratory studies and clinical symptoms. The patient is status post cholecystectomy, but bile duct dilation is new compared to 7/26/2021. 2. Soft tissue fullness at the level of the anus and vulva. Recommend correlation with clinical exam. 3. Bilateral nonobstructing renal calculi. No hydronephrosis or nephrolithiasis. 4. Small hiatal hernia. 5. Similar 1.9 cm rim calcified splenic artery aneurysm.  This report was finalized on 08/03/2021  14:59 by Dr Zena Muñoz MD.    US Renal Bilateral    Result Date: 7/26/2021  Narrative: RENAL ULTRASOUND COMPLETE 7/26/2021 11:58 AM CDT  REASON FOR EXAM: Recurrent UTI; N39.0-Urinary tract infection, site not specified   COMPARISON: None   TECHNIQUE: Multiple longitudinal and transverse realtime sonographic images of the kidneys and urinary bladder are obtained.  FINDINGS:  RIGHT KIDNEY: 2.6 x 4.1 x 9.2 cm. Normal in size, shape, contour and position. No solid or cystic masses. The central echo complex is compact with no evidence for hydronephrosis. Echogenic 5 mm calculus is present in the right kidney.. No hydroureter.  LEFT KIDNEY: 4.4 x 3.7 x 9.3 cm. Normal in size, shape, contour and position. No solid or cystic masses. The central echo complex is compact with no evidence for hydronephrosis. Echogenic 7 mm calculus is present lower pole of the left kidney. No hydroureter.  PELVIS: The bladder is mildly distended with anechoic urine and demonstrates no significant wall thickening or internal echogenicities. There is no surrounding ascites.      Impression: 1. Bilateral nephrolithiasis.   This report was finalized on 07/26/2021 14:59 by Dr. Johnnie Spangler MD.    XR Abdomen KUB    Result Date: 8/3/2021  Narrative: EXAM: XR ABDOMEN KUB- - 8/3/2021 8:39 AM CDT  HISTORY: renal stone; N20.0-Calculus of kidney   COMPARISON: 7/26/2021.  TECHNIQUE:  1 images.  Upright view the abdomen  FINDINGS:  Grossly similar in bilateral renal calculi projecting at the bilateral kidneys. Similar pelvic phleboliths.  Limited evaluation for free air on supine imaging. Nonobstructive bowel gas pattern.  Left upper quadrant coarse calcification appears to correlate with a 19 mm calcified splenic artery aneurysm.  Similar postoperative changes at the midline abdomen. Right hip prosthesis. No acute bony finding.       Impression: 1. Similar bilateral renal calculi compared to 7/26/2021. 2. Calcified 1.9 cm splenic artery aneurysm. 3.  Nonobstructive bowel gas pattern. This report was finalized on 08/03/2021 09:10 by Dr Zena Muñoz MD.    XR Abdomen KUB    Result Date: 7/26/2021  Narrative: XR ABDOMEN KUB- 7/26/2021 11:47 AM CDT  HISTORY: Recurrent UTI history of kidney stones; N39.0-Urinary tract infection, site not specified   COMPARISON: None  FINDINGS: There is a nonspecific bowel gas pattern. Calcifications are noted projecting over each renal contour. Numerous calculi are present. The largest is the upper pole of the right kidney approximately 6 mm.  Calcified splenic artery aneurysms present.  Right hip prosthesis is present..  No acute skeletal abnormality is identified.      Impression: 1. Bilateral nephrolithiasis.   This report was finalized on 07/26/2021 12:09 by Dr. Johnnie Spangler MD.      ED Course  ED Course as of Aug 03 1601   Tue Aug 03, 2021   1521 Patient has been reassessed.  Her pain is still intractable despite trying Dilaudid and fentanyl.  She has been educated that she has new findings of intrahepatic and extrahepatic bile duct dilation with common bile duct measuring up to 1.2 cm.  Her alk phos is slightly elevated at 151 but has been elevated.  Her total bilirubin is normal.  I am concerned the patient may need further evaluation with possible MRCP and other images or studies.  I do have a call out to Dr. Greg Rey, the patient's primary care provider at this time.    [TK]   1522 I have discussed this case with Dr. Rey. He has reviewed the patient's previous records.  The patient does have a history of chronic abdominal pain noted 3 years ago.  With her findings, we're not sure that's exactly why she is hurting.  She may need a ERCP later with a gastroenterologist that can complete this procedure.  He would like to stress to the patient that he may not find the resolution of her pain while she is hospitalized in the next 24 hours.  This has been relayed to the patient.  She is concerned with her intractable  pain, she does not want to go home.  The patient will be admitted overnight to Dr. Rey.    [TK]      ED Course User Index  [TK] Aramis Interiano PA          [facility]    Final diagnoses:   Intractable abdominal pain   Abnormal CT of the abdomen   Common bile duct dilation   Intrahepatic bile duct dilation          Aramis Interiano PA  08/03/21 0704

## 2021-08-04 NOTE — PLAN OF CARE
Goal Outcome Evaluation:  Plan of Care Reviewed With: patient        Progress: no change   Pt states pain is only moderately controlled but sleeps comfortably between care. Pt states that she is ready to go home and can treat her pain better there. Krissy made aware, orders given. Safety maintained, will continue to monitor.

## 2021-08-10 ENCOUNTER — TRANSCRIBE ORDERS (OUTPATIENT)
Dept: LAB | Facility: HOSPITAL | Age: 76
End: 2021-08-10

## 2021-08-10 ENCOUNTER — PRE-ADMISSION TESTING (OUTPATIENT)
Dept: PREADMISSION TESTING | Facility: HOSPITAL | Age: 76
End: 2021-08-10

## 2021-08-10 VITALS
SYSTOLIC BLOOD PRESSURE: 111 MMHG | WEIGHT: 150.57 LBS | BODY MASS INDEX: 26.68 KG/M2 | HEIGHT: 63 IN | DIASTOLIC BLOOD PRESSURE: 75 MMHG | RESPIRATION RATE: 18 BRPM | HEART RATE: 114 BPM | OXYGEN SATURATION: 96 %

## 2021-08-10 DIAGNOSIS — Z01.818 PREOPERATIVE TESTING: Primary | ICD-10-CM

## 2021-08-10 LAB
ANION GAP SERPL CALCULATED.3IONS-SCNC: 13 MMOL/L (ref 5–15)
BUN SERPL-MCNC: 30 MG/DL (ref 8–23)
BUN/CREAT SERPL: 17.6 (ref 7–25)
CALCIUM SPEC-SCNC: 9.2 MG/DL (ref 8.6–10.5)
CHLORIDE SERPL-SCNC: 114 MMOL/L (ref 98–107)
CO2 SERPL-SCNC: 15 MMOL/L (ref 22–29)
CREAT SERPL-MCNC: 1.7 MG/DL (ref 0.57–1)
DEPRECATED RDW RBC AUTO: 46.5 FL (ref 37–54)
ERYTHROCYTE [DISTWIDTH] IN BLOOD BY AUTOMATED COUNT: 14.4 % (ref 12.3–15.4)
GFR SERPL CREATININE-BSD FRML MDRD: 29 ML/MIN/1.73
GLUCOSE SERPL-MCNC: 104 MG/DL (ref 65–99)
HCT VFR BLD AUTO: 34.3 % (ref 34–46.6)
HGB BLD-MCNC: 10.6 G/DL (ref 12–15.9)
MCH RBC QN AUTO: 27.5 PG (ref 26.6–33)
MCHC RBC AUTO-ENTMCNC: 30.9 G/DL (ref 31.5–35.7)
MCV RBC AUTO: 89.1 FL (ref 79–97)
PLATELET # BLD AUTO: 244 10*3/MM3 (ref 140–450)
PMV BLD AUTO: 11 FL (ref 6–12)
POTASSIUM SERPL-SCNC: 3.9 MMOL/L (ref 3.5–5.2)
RBC # BLD AUTO: 3.85 10*6/MM3 (ref 3.77–5.28)
SODIUM SERPL-SCNC: 142 MMOL/L (ref 136–145)
WBC # BLD AUTO: 6.38 10*3/MM3 (ref 3.4–10.8)

## 2021-08-10 PROCEDURE — 85027 COMPLETE CBC AUTOMATED: CPT

## 2021-08-10 PROCEDURE — 85610 PROTHROMBIN TIME: CPT | Performed by: PHYSICIAN ASSISTANT

## 2021-08-10 PROCEDURE — 93005 ELECTROCARDIOGRAM TRACING: CPT

## 2021-08-10 PROCEDURE — 93010 ELECTROCARDIOGRAM REPORT: CPT | Performed by: INTERNAL MEDICINE

## 2021-08-10 PROCEDURE — 80048 BASIC METABOLIC PNL TOTAL CA: CPT

## 2021-08-10 RX ORDER — POTASSIUM CHLORIDE 750 MG/1
20 TABLET, FILM COATED, EXTENDED RELEASE ORAL 2 TIMES DAILY
COMMUNITY

## 2021-08-10 NOTE — DISCHARGE INSTRUCTIONS
DAY OF SURGERY INSTRUCTIONS        YOUR SURGEON: ***Dr Hayes    PROCEDURE: ***extracorporeal shockwave lithotripsy-right   Possible cystoscopy with placement of right double J stent    DATE OF SURGERY: ***08/16/2021    ARRIVAL TIME: AS DIRECTED BY OFFICE    YOU MAY TAKE THE FOLLOWING MEDICATION(S) THE MORNING OF SURGERY WITH A SIP OF WATER: ***valium, gabapentin     ALL OTHER HOME MEDICATIONS CHECK WITH YOUR DOCTOR    DO NOT TAKE ANY ERECTILE DYSFUNCTION MEDICATIONS (EX:  CIALIS, VIAGRA) 24 HOURS PRIOR TO SURGERY              MANAGING PAIN AFTER SURGERY    We know you are probably wondering what your pain will be like after surgery.  Following surgery it is unrealistic to expect you will not have pain.   Pain is how our bodies let us know that something is wrong or cautions us to be careful.  That said, our goal is to make your pain tolerable.    Methods we may use to treat your pain include (oral or IV medications, PCAs, epidurals, nerve blocks, etc.)   While some procedures require IV pain medications for a short time after surgery, transitioning to pain medications by mouth allows for better management of pain.   Your nurse will encourage you to take oral pain medications whenever possible.  IV medications work almost immediately, but only last a short while.  Taking medications by mouth allows for a more constant level of medication in your blood stream for a longer period of time.      Once your pain is out of control it is harder to get back under control.  It is important you are aware when your next dose of pain medication is due.  If you are admitted, your nurse may write the time of your next dose on the white board in your room to help you remember.      We are interested in your pain and encourage you to inform us about aggravating factors during your visit.   Many times a simple repositioning every few hours can make a big difference.    If your physician says it is okay, do not let your pain prevent you  from getting out of bed. Be sure to call your nurse for assistance prior to getting up so you do not fall.      Before surgery, please decide your tolerable pain goal.  These faces help describe the pain ratings we use on a 0-10 scale.   Be prepared to tell us your goal and whether or not you take pain or anxiety medications at home.      BEFORE YOU COME TO THE HOSPITAL  (Pre-op instructions)  • Do not eat, drink, smoke or chew gum after midnight the night before surgery.  This also includes no mints.  • Morning of surgery take only the medicines you have been instructed with a sip of water unless otherwise instructed  by your physician.  • Do not shave, wear makeup or dark nail polish.  • Remove all jewelry including rings.  • Leave anything you consider valuable at home.  • Leave your suitcase in the car until after your surgery.  • Bring the following with you if applicable:  o Picture ID and insurance, Medicare or Medicaid cards  o Co-pay/deductible required by insurance (cash, check, credit card)  o Copy of advance directive, living will or power-of- documents if not brought to PAT  o CPAP or BIPAP mask and tubing  o Relaxation aids ( book, magazine), etc.  o Hearing aids                                 ON THE DAY OF SURGERY  · On the day of surgery check in at registration located at the main entrance of the hospital.   ? You will be registered and given a beeper with instructions where to wait in the main lobby.  ? When your beeper lights up and vibrates a member of the Outpatient Surgery staff will meet you at the double doors under the stair steps and escort you to your preoperative room.   · You may have cloth compression devices placed on your legs. These help to prevent blood clots and reduce swelling in your legs.  · An IV may be inserted into one of your veins.  · In the operating room, you may be given one or more of the following:  ? A medicine to help you relax (sedative).  ? A medicine to  "numb the area (local anesthetic).  ? A medicine to make you fall asleep (general anesthetic).  ? A medicine that is injected into an area of your body to numb everything below the injection site (regional anesthetic).  · Your surgical site will be marked or identified.  · You may be given an antibiotic through your IV to help prevent infection.  Contact a health care provider if you:  · Develop a fever of more than 100.4°F (38°C) or other feelings of illness during the 48 hours before your surgery.  · Have symptoms that get worse.  Have questions or concerns about your surgery    General Anesthesia/Surgery, Adult  General anesthesia is the use of medicines to make a person \"go to sleep\" (unconscious) for a medical procedure. General anesthesia must be used for certain procedures, and is often recommended for procedures that:  · Last a long time.  · Require you to be still or in an unusual position.  · Are major and can cause blood loss.  The medicines used for general anesthesia are called general anesthetics. As well as making you unconscious for a certain amount of time, these medicines:  · Prevent pain.  · Control your blood pressure.  · Relax your muscles.  Tell a health care provider about:  · Any allergies you have.  · All medicines you are taking, including vitamins, herbs, eye drops, creams, and over-the-counter medicines.  · Any problems you or family members have had with anesthetic medicines.  · Types of anesthetics you have had in the past.  · Any blood disorders you have.  · Any surgeries you have had.  · Any medical conditions you have.  · Any recent upper respiratory, chest, or ear infections.  · Any history of:  ? Heart or lung conditions, such as heart failure, sleep apnea, asthma, or chronic obstructive pulmonary disease (COPD).  ?  service.  ? Depression or anxiety.  · Any tobacco or drug use, including marijuana or alcohol use.  · Whether you are pregnant or may be pregnant.  What are the " risks?  Generally, this is a safe procedure. However, problems may occur, including:  · Allergic reaction.  · Lung and heart problems.  · Inhaling food or liquid from the stomach into the lungs (aspiration).  · Nerve injury.  · Air in the bloodstream, which can lead to stroke.  · Extreme agitation or confusion (delirium) when you wake up from the anesthetic.  · Waking up during your procedure and being unable to move. This is rare.  These problems are more likely to develop if you are having a major surgery or if you have an advanced or serious medical condition. You can prevent some of these complications by answering all of your health care provider's questions thoroughly and by following all instructions before your procedure.  General anesthesia can cause side effects, including:  · Nausea or vomiting.  · A sore throat from the breathing tube.  · Hoarseness.  · Wheezing or coughing.  · Shaking chills.  · Tiredness.  · Body aches.  · Anxiety.  · Sleepiness or drowsiness.  · Confusion or agitation.  RISKS AND COMPLICATIONS OF SURGERY  Your health care provider will discuss possible risks and complications with you before surgery. Common risks and complications include:    · Problems due to the use of anesthetics.  · Blood loss and replacement (does not apply to minor surgical procedures).  · Temporary increase in pain due to surgery.  · Uncorrected pain or problems that the surgery was meant to correct.  · Infection.  · New damage.    What happens before the procedure?    Medicines  Ask your health care provider about:  · Changing or stopping your regular medicines. This is especially important if you are taking diabetes medicines or blood thinners.  · Taking medicines such as aspirin and ibuprofen. These medicines can thin your blood. Do not take these medicines unless your health care provider tells you to take them.  · Taking over-the-counter medicines, vitamins, herbs, and supplements. Do not take these during  the week before your procedure unless your health care provider approves them.  General instructions  · Starting 3-6 weeks before the procedure, do not use any products that contain nicotine or tobacco, such as cigarettes and e-cigarettes. If you need help quitting, ask your health care provider.  · If you brush your teeth on the morning of the procedure, make sure to spit out all of the toothpaste.  · Tell your health care provider if you become ill or develop a cold, cough, or fever.  · If instructed by your health care provider, bring your sleep apnea device with you on the day of your surgery (if applicable).  · Ask your health care provider if you will be going home the same day, the following day, or after a longer hospital stay.  ? Plan to have someone take you home from the hospital or clinic.  ? Plan to have a responsible adult care for you for at least 24 hours after you leave the hospital or clinic. This is important.  What happens during the procedure?  · You will be given anesthetics through both of the following:  ? A mask placed over your nose and mouth.  ? An IV in one of your veins.  · You may receive a medicine to help you relax (sedative).  · After you are unconscious, a breathing tube may be inserted down your throat to help you breathe. This will be removed before you wake up.  · An anesthesia specialist will stay with you throughout your procedure. He or she will:  ? Keep you comfortable and safe by continuing to give you medicines and adjusting the amount of medicine that you get.  ? Monitor your blood pressure, pulse, and oxygen levels to make sure that the anesthetics do not cause any problems.  The procedure may vary among health care providers and hospitals.  What happens after the procedure?  · Your blood pressure, temperature, heart rate, breathing rate, and blood oxygen level will be monitored until the medicines you were given have worn off.  · You will wake up in a recovery area. You  may wake up slowly.  · If you feel anxious or agitated, you may be given medicine to help you calm down.  · If you will be going home the same day, your health care provider may check to make sure you can walk, drink, and urinate.  · Your health care provider will treat any pain or side effects you have before you go home.  · Do not drive for 24 hours if you were given a sedative.  Summary  · General anesthesia is used to keep you still and prevent pain during a procedure.  · It is important to tell your healthcare provider about your medical history and any surgeries you have had, and previous experience with anesthesia.  · Follow your healthcare provider’s instructions about when to stop eating, drinking, or taking certain medicines before your procedure.  · Plan to have someone take you home from the hospital or clinic.  This information is not intended to replace advice given to you by your health care provider. Make sure you discuss any questions you have with your health care provider.  Document Released: 03/26/2009 Document Revised: 08/03/2018 Document Reviewed: 08/03/2018  [employer] Interactive Patient Education © 2019 [employer]      Fall Prevention in Hospitals, Adult  As a hospital patient, your condition and the treatments you receive can increase your risk for falls. Some additional risk factors for falls in a hospital include:  · Being in an unfamiliar environment.  · Being on bed rest.  · Your surgery.  · Taking certain medicines.  · Your tubing requirements, such as intravenous (IV) therapy or catheters.  It is important that you learn how to decrease fall risks while at the hospital. Below are important tips that can help prevent falls.  SAFETY TIPS FOR PREVENTING FALLS  Talk about your risk of falling.  · Ask your health care provider why you are at risk for falling. Is it your medicine, illness, tubing placement, or something else?  · Make a plan with your health care provider to keep you safe  from falls.  · Ask your health care provider or pharmacist about side effects of your medicines. Some medicines can make you dizzy or affect your coordination.  Ask for help.  · Ask for help before getting out of bed. You may need to press your call button.  · Ask for assistance in getting safely to the toilet.  · Ask for a walker or cane to be put at your bedside. Ask that most of the side rails on your bed be placed up before your health care provider leaves the room.  · Ask family or friends to sit with you.  · Ask for things that are out of your reach, such as your glasses, hearing aids, telephone, bedside table, or call button.  Follow these tips to avoid falling:  · Stay lying or seated, rather than standing, while waiting for help.  · Wear rubber-soled slippers or shoes whenever you walk in the hospital.  · Avoid quick, sudden movements.  ¨ Change positions slowly.  ¨ Sit on the side of your bed before standing.  ¨ Stand up slowly and wait before you start to walk.  · Let your health care provider know if there is a spill on the floor.  · Pay careful attention to the medical equipment, electrical cords, and tubes around you.  · When you need help, use your call button by your bed or in the bathroom. Wait for one of your health care providers to help you.  · If you feel dizzy or unsure of your footing, return to bed and wait for assistance.  · Avoid being distracted by the TV, telephone, or another person in your room.  · Do not lean or support yourself on rolling objects, such as IV poles or bedside tables.     This information is not intended to replace advice given to you by your health care provider. Make sure you discuss any questions you have with your health care provider.     Document Released: 12/15/2001 Document Revised: 01/08/2016 Document Reviewed: 08/25/2013  [email or web address] Interactive Patient Education ©2016 [email or web address]            PATIENT/FAMILY/RESPONSIBLE PARTY VERBALIZES UNDERSTANDING OF ABOVE  EDUCATION.  COPY OF PAIN SCALE GIVEN AND REVIEWED WITH VERBALIZED UNDERSTANDING.

## 2021-08-11 LAB
QT INTERVAL: 340 MS
QTC INTERVAL: 429 MS

## 2021-08-13 ENCOUNTER — LAB (OUTPATIENT)
Dept: LAB | Facility: HOSPITAL | Age: 76
End: 2021-08-13

## 2021-08-13 LAB — SARS-COV-2 ORF1AB RESP QL NAA+PROBE: NOT DETECTED

## 2021-08-13 PROCEDURE — U0004 COV-19 TEST NON-CDC HGH THRU: HCPCS | Performed by: UROLOGY

## 2021-08-13 PROCEDURE — C9803 HOPD COVID-19 SPEC COLLECT: HCPCS | Performed by: UROLOGY

## 2021-08-13 PROCEDURE — U0005 INFEC AGEN DETEC AMPLI PROBE: HCPCS | Performed by: UROLOGY

## 2021-08-16 ENCOUNTER — HOSPITAL ENCOUNTER (OUTPATIENT)
Facility: HOSPITAL | Age: 76
Setting detail: HOSPITAL OUTPATIENT SURGERY
Discharge: HOME | End: 2021-08-16
Attending: UROLOGY | Admitting: UROLOGY

## 2021-08-16 ENCOUNTER — APPOINTMENT (OUTPATIENT)
Dept: GENERAL RADIOLOGY | Facility: HOSPITAL | Age: 76
End: 2021-08-16

## 2021-08-16 ENCOUNTER — ANESTHESIA EVENT (OUTPATIENT)
Dept: PERIOP | Facility: HOSPITAL | Age: 76
End: 2021-08-16

## 2021-08-16 ENCOUNTER — ANESTHESIA (OUTPATIENT)
Dept: PERIOP | Facility: HOSPITAL | Age: 76
End: 2021-08-16

## 2021-08-16 VITALS
HEART RATE: 95 BPM | DIASTOLIC BLOOD PRESSURE: 75 MMHG | SYSTOLIC BLOOD PRESSURE: 120 MMHG | TEMPERATURE: 97.8 F | OXYGEN SATURATION: 94 % | RESPIRATION RATE: 18 BRPM

## 2021-08-16 DIAGNOSIS — R10.9 RIGHT LATERAL ABDOMINAL PAIN: ICD-10-CM

## 2021-08-16 DIAGNOSIS — N20.0 BILATERAL KIDNEY STONES: ICD-10-CM

## 2021-08-16 PROCEDURE — 25010000002 DEXAMETHASONE PER 1 MG: Performed by: NURSE ANESTHETIST, CERTIFIED REGISTERED

## 2021-08-16 PROCEDURE — 25010000002 CEFAZOLIN PER 500 MG: Performed by: PHYSICIAN ASSISTANT

## 2021-08-16 PROCEDURE — 25010000002 DEXAMETHASONE PER 1 MG: Performed by: ANESTHESIOLOGY

## 2021-08-16 PROCEDURE — 50590 FRAGMENTING OF KIDNEY STONE: CPT | Performed by: UROLOGY

## 2021-08-16 PROCEDURE — 25010000002 ONDANSETRON PER 1 MG: Performed by: NURSE ANESTHETIST, CERTIFIED REGISTERED

## 2021-08-16 PROCEDURE — 25010000002 FENTANYL CITRATE (PF) 100 MCG/2ML SOLUTION: Performed by: NURSE ANESTHETIST, CERTIFIED REGISTERED

## 2021-08-16 PROCEDURE — 74018 RADEX ABDOMEN 1 VIEW: CPT

## 2021-08-16 PROCEDURE — 25010000002 PROPOFOL 10 MG/ML EMULSION: Performed by: NURSE ANESTHETIST, CERTIFIED REGISTERED

## 2021-08-16 PROCEDURE — 25010000002 PHENYLEPHRINE HCL 0.8 MG/10ML SOLUTION PREFILLED SYRINGE: Performed by: NURSE ANESTHETIST, CERTIFIED REGISTERED

## 2021-08-16 RX ORDER — LIDOCAINE HYDROCHLORIDE 20 MG/ML
INJECTION, SOLUTION EPIDURAL; INFILTRATION; INTRACAUDAL; PERINEURAL AS NEEDED
Status: DISCONTINUED | OUTPATIENT
Start: 2021-08-16 | End: 2021-08-16 | Stop reason: SURG

## 2021-08-16 RX ORDER — FLUMAZENIL 0.1 MG/ML
0.2 INJECTION INTRAVENOUS AS NEEDED
Status: DISCONTINUED | OUTPATIENT
Start: 2021-08-16 | End: 2021-08-16 | Stop reason: HOSPADM

## 2021-08-16 RX ORDER — ONDANSETRON 4 MG/1
4 TABLET, FILM COATED ORAL ONCE AS NEEDED
Status: DISCONTINUED | OUTPATIENT
Start: 2021-08-16 | End: 2021-08-16 | Stop reason: HOSPADM

## 2021-08-16 RX ORDER — OXYCODONE HYDROCHLORIDE AND ACETAMINOPHEN 5; 325 MG/1; MG/1
1 TABLET ORAL ONCE AS NEEDED
Status: DISCONTINUED | OUTPATIENT
Start: 2021-08-16 | End: 2021-08-16 | Stop reason: HOSPADM

## 2021-08-16 RX ORDER — PROPOFOL 10 MG/ML
VIAL (ML) INTRAVENOUS AS NEEDED
Status: DISCONTINUED | OUTPATIENT
Start: 2021-08-16 | End: 2021-08-16 | Stop reason: SURG

## 2021-08-16 RX ORDER — DEXAMETHASONE SODIUM PHOSPHATE 4 MG/ML
4 INJECTION, SOLUTION INTRA-ARTICULAR; INTRALESIONAL; INTRAMUSCULAR; INTRAVENOUS; SOFT TISSUE ONCE AS NEEDED
Status: COMPLETED | OUTPATIENT
Start: 2021-08-16 | End: 2021-08-16

## 2021-08-16 RX ORDER — HYDROCODONE BITARTRATE AND ACETAMINOPHEN 7.5; 325 MG/1; MG/1
1 TABLET ORAL EVERY 6 HOURS PRN
Qty: 12 TABLET | Refills: 0 | Status: SHIPPED | OUTPATIENT
Start: 2021-08-16 | End: 2022-01-14

## 2021-08-16 RX ORDER — SODIUM CHLORIDE 0.9 % (FLUSH) 0.9 %
3 SYRINGE (ML) INJECTION EVERY 12 HOURS SCHEDULED
Status: DISCONTINUED | OUTPATIENT
Start: 2021-08-16 | End: 2021-08-16 | Stop reason: HOSPADM

## 2021-08-16 RX ORDER — LIDOCAINE HYDROCHLORIDE 10 MG/ML
0.5 INJECTION, SOLUTION EPIDURAL; INFILTRATION; INTRACAUDAL; PERINEURAL ONCE AS NEEDED
Status: DISCONTINUED | OUTPATIENT
Start: 2021-08-16 | End: 2021-08-16 | Stop reason: SDUPTHER

## 2021-08-16 RX ORDER — SODIUM CHLORIDE 0.9 % (FLUSH) 0.9 %
3 SYRINGE (ML) INJECTION AS NEEDED
Status: DISCONTINUED | OUTPATIENT
Start: 2021-08-16 | End: 2021-08-16 | Stop reason: HOSPADM

## 2021-08-16 RX ORDER — ONDANSETRON 2 MG/ML
INJECTION INTRAMUSCULAR; INTRAVENOUS AS NEEDED
Status: DISCONTINUED | OUTPATIENT
Start: 2021-08-16 | End: 2021-08-16 | Stop reason: SURG

## 2021-08-16 RX ORDER — FENTANYL CITRATE 50 UG/ML
INJECTION, SOLUTION INTRAMUSCULAR; INTRAVENOUS AS NEEDED
Status: DISCONTINUED | OUTPATIENT
Start: 2021-08-16 | End: 2021-08-16 | Stop reason: SURG

## 2021-08-16 RX ORDER — HYDROCODONE BITARTRATE AND ACETAMINOPHEN 7.5; 325 MG/1; MG/1
1 TABLET ORAL ONCE AS NEEDED
Status: DISCONTINUED | OUTPATIENT
Start: 2021-08-16 | End: 2021-08-16 | Stop reason: HOSPADM

## 2021-08-16 RX ORDER — FENTANYL CITRATE 50 UG/ML
25 INJECTION, SOLUTION INTRAMUSCULAR; INTRAVENOUS
Status: DISCONTINUED | OUTPATIENT
Start: 2021-08-16 | End: 2021-08-16 | Stop reason: HOSPADM

## 2021-08-16 RX ORDER — LIDOCAINE HYDROCHLORIDE 10 MG/ML
0.5 INJECTION, SOLUTION EPIDURAL; INFILTRATION; INTRACAUDAL; PERINEURAL ONCE AS NEEDED
Status: DISCONTINUED | OUTPATIENT
Start: 2021-08-16 | End: 2021-08-16 | Stop reason: HOSPADM

## 2021-08-16 RX ORDER — ROCURONIUM BROMIDE 10 MG/ML
INJECTION, SOLUTION INTRAVENOUS AS NEEDED
Status: DISCONTINUED | OUTPATIENT
Start: 2021-08-16 | End: 2021-08-16 | Stop reason: SURG

## 2021-08-16 RX ORDER — DEXAMETHASONE SODIUM PHOSPHATE 4 MG/ML
INJECTION, SOLUTION INTRA-ARTICULAR; INTRALESIONAL; INTRAMUSCULAR; INTRAVENOUS; SOFT TISSUE AS NEEDED
Status: DISCONTINUED | OUTPATIENT
Start: 2021-08-16 | End: 2021-08-16 | Stop reason: SURG

## 2021-08-16 RX ORDER — IBUPROFEN 600 MG/1
600 TABLET ORAL ONCE AS NEEDED
Status: DISCONTINUED | OUTPATIENT
Start: 2021-08-16 | End: 2021-08-16 | Stop reason: HOSPADM

## 2021-08-16 RX ORDER — SODIUM CHLORIDE, SODIUM LACTATE, POTASSIUM CHLORIDE, CALCIUM CHLORIDE 600; 310; 30; 20 MG/100ML; MG/100ML; MG/100ML; MG/100ML
100 INJECTION, SOLUTION INTRAVENOUS CONTINUOUS
Status: DISCONTINUED | OUTPATIENT
Start: 2021-08-16 | End: 2021-08-16 | Stop reason: HOSPADM

## 2021-08-16 RX ORDER — NALOXONE HCL 0.4 MG/ML
0.4 VIAL (ML) INJECTION AS NEEDED
Status: DISCONTINUED | OUTPATIENT
Start: 2021-08-16 | End: 2021-08-16 | Stop reason: HOSPADM

## 2021-08-16 RX ORDER — SODIUM CHLORIDE, SODIUM LACTATE, POTASSIUM CHLORIDE, CALCIUM CHLORIDE 600; 310; 30; 20 MG/100ML; MG/100ML; MG/100ML; MG/100ML
1000 INJECTION, SOLUTION INTRAVENOUS CONTINUOUS
Status: DISCONTINUED | OUTPATIENT
Start: 2021-08-16 | End: 2021-08-16 | Stop reason: HOSPADM

## 2021-08-16 RX ORDER — PHENYLEPHRINE HCL IN 0.9% NACL 0.8MG/10ML
SYRINGE (ML) INTRAVENOUS AS NEEDED
Status: DISCONTINUED | OUTPATIENT
Start: 2021-08-16 | End: 2021-08-16 | Stop reason: SURG

## 2021-08-16 RX ORDER — TAMSULOSIN HYDROCHLORIDE 0.4 MG/1
1 CAPSULE ORAL NIGHTLY
Qty: 14 CAPSULE | Refills: 0 | Status: SHIPPED | OUTPATIENT
Start: 2021-08-16 | End: 2021-08-23 | Stop reason: SDUPTHER

## 2021-08-16 RX ORDER — SODIUM CHLORIDE 0.9 % (FLUSH) 0.9 %
3-10 SYRINGE (ML) INJECTION AS NEEDED
Status: DISCONTINUED | OUTPATIENT
Start: 2021-08-16 | End: 2021-08-16 | Stop reason: HOSPADM

## 2021-08-16 RX ORDER — LABETALOL HYDROCHLORIDE 5 MG/ML
5 INJECTION, SOLUTION INTRAVENOUS
Status: DISCONTINUED | OUTPATIENT
Start: 2021-08-16 | End: 2021-08-16 | Stop reason: HOSPADM

## 2021-08-16 RX ORDER — OXYCODONE AND ACETAMINOPHEN 7.5; 325 MG/1; MG/1
1 TABLET ORAL EVERY 4 HOURS PRN
Status: DISCONTINUED | OUTPATIENT
Start: 2021-08-16 | End: 2021-08-16 | Stop reason: HOSPADM

## 2021-08-16 RX ORDER — FAMOTIDINE 10 MG/ML
20 INJECTION, SOLUTION INTRAVENOUS
Status: COMPLETED | OUTPATIENT
Start: 2021-08-16 | End: 2021-08-16

## 2021-08-16 RX ORDER — BUPIVACAINE HCL/0.9 % NACL/PF 0.1 %
2 PLASTIC BAG, INJECTION (ML) EPIDURAL ONCE
Status: COMPLETED | OUTPATIENT
Start: 2021-08-16 | End: 2021-08-16

## 2021-08-16 RX ORDER — SUCCINYLCHOLINE/SOD CL,ISO/PF 200MG/10ML
SYRINGE (ML) INTRAVENOUS AS NEEDED
Status: DISCONTINUED | OUTPATIENT
Start: 2021-08-16 | End: 2021-08-16 | Stop reason: SURG

## 2021-08-16 RX ORDER — ONDANSETRON 2 MG/ML
4 INJECTION INTRAMUSCULAR; INTRAVENOUS ONCE AS NEEDED
Status: DISCONTINUED | OUTPATIENT
Start: 2021-08-16 | End: 2021-08-16 | Stop reason: HOSPADM

## 2021-08-16 RX ADMIN — FENTANYL CITRATE 100 MCG: 50 INJECTION, SOLUTION INTRAMUSCULAR; INTRAVENOUS at 14:23

## 2021-08-16 RX ADMIN — ROCURONIUM BROMIDE 10 MG: 50 INJECTION INTRAVENOUS at 14:23

## 2021-08-16 RX ADMIN — LIDOCAINE HYDROCHLORIDE 100 MG: 20 INJECTION, SOLUTION EPIDURAL; INFILTRATION; INTRACAUDAL; PERINEURAL at 14:23

## 2021-08-16 RX ADMIN — Medication 80 MG: at 14:23

## 2021-08-16 RX ADMIN — SODIUM CHLORIDE, POTASSIUM CHLORIDE, SODIUM LACTATE AND CALCIUM CHLORIDE: 600; 310; 30; 20 INJECTION, SOLUTION INTRAVENOUS at 15:03

## 2021-08-16 RX ADMIN — Medication 160 MCG: at 14:32

## 2021-08-16 RX ADMIN — DEXAMETHASONE SODIUM PHOSPHATE 4 MG: 4 INJECTION, SOLUTION INTRA-ARTICULAR; INTRALESIONAL; INTRAMUSCULAR; INTRAVENOUS; SOFT TISSUE at 14:23

## 2021-08-16 RX ADMIN — SODIUM CHLORIDE, POTASSIUM CHLORIDE, SODIUM LACTATE AND CALCIUM CHLORIDE 1000 ML: 600; 310; 30; 20 INJECTION, SOLUTION INTRAVENOUS at 12:11

## 2021-08-16 RX ADMIN — PROPOFOL 150 MG: 10 INJECTION, EMULSION INTRAVENOUS at 14:23

## 2021-08-16 RX ADMIN — Medication 2 G: at 14:28

## 2021-08-16 RX ADMIN — ONDANSETRON 4 MG: 2 INJECTION INTRAMUSCULAR; INTRAVENOUS at 14:23

## 2021-08-16 RX ADMIN — OXYCODONE HYDROCHLORIDE AND ACETAMINOPHEN 1 TABLET: 7.5; 325 TABLET ORAL at 15:38

## 2021-08-16 RX ADMIN — Medication 160 MCG: at 14:43

## 2021-08-16 RX ADMIN — FAMOTIDINE 20 MG: 10 INJECTION, SOLUTION INTRAVENOUS at 14:05

## 2021-08-16 RX ADMIN — DEXAMETHASONE SODIUM PHOSPHATE 4 MG: 4 INJECTION, SOLUTION INTRA-ARTICULAR; INTRALESIONAL; INTRAMUSCULAR; INTRAVENOUS; SOFT TISSUE at 14:05

## 2021-08-16 NOTE — OP NOTE
EXTRACORPOREAL SHOCKWAVE LITHOTRIPSY WITH CYSTOSCOPY  Procedure Note    Thao Mohr  8/16/2021    Pre-op Diagnosis:   Right lateral abdominal pain [R10.9]  Bilateral kidney stones [N20.0]    Post-op Diagnosis:     Post-Op Diagnosis Codes:     * Right lateral abdominal pain [R10.9]     * Bilateral kidney stones [N20.0]    Procedure/CPT® Codes:      Procedure(s):  EXTRACORPOREAL SHOCKWAVE LITHOTRIPSY RIGHT    Surgeon(s):  Ronnie Hayes MD    Anesthesia: General    Staff:   Circulator: Erica Velazquez RN; Meredith Shepard RN; Sarath Campos RN  Scrub Person: Ese Moraes; Guera Barber    Estimated Blood Loss: minimal    Specimens:                None      Drains: * No LDAs found *    Findings: Multiple stones in upper middle and lower pole  Each pole of the kidney was treated with 1000 shocks  Good break-up of stones    Complications: None    Indications:. Patient has multiple stones largest measuring approximately 10  mm in the right Upper pole, Lower pole and Interpolar area. After discussion of options, patient has given informed consent to undergo right ESWL.  All risks, benefits, and alternatives were discussed.    Operative Report: After informed consent was obtained, patient was brought to the operating room.  General endotracheal anesthesia was administered in the supine posistion.  Preoperative KUB was reviewed.  Please see above for preoperative KUB findings.    The shock head is brought into position and the stone is brought into the F2 plane for focus.  The stone was identified and the procedure began.  We gradually increased the energy level from 1 to 7.  This stone was treated at a rate of 90.  We paused for 2 minutes after 300 shocks to reduce the risk of renal damage.  The stone was periodically checked to make sure that we were on it and getting good breakup.  We checked at 700, 1000, 1500, 2000, and 2500 shocks.  The stone did have good breakup.  I felt it was unnecessary to place a  ureteral stent.  At the conclusion of 3000 shocks, the patient was awakened from anesthesia and transferred to the recovery room in satisfactory condition.            Ronnie Hayes MD     Date: 8/16/2021  Time: 15:07 CDT

## 2021-08-16 NOTE — ANESTHESIA PREPROCEDURE EVALUATION
Anesthesia Evaluation     Patient summary reviewed and Nursing notes reviewed   history of anesthetic complications: PONV  NPO Solid Status: > 8 hours  NPO Liquid Status: > 8 hours           Airway   Mallampati: I  TM distance: >3 FB  Neck ROM: full  No difficulty expected  Dental      Pulmonary    (-) sleep apnea, not a smoker  Cardiovascular   Exercise tolerance: poor (<4 METS)    (+) hypertension, valvular problems/murmurs AI and murmur, CHF Systolic <55%,     ROS comment: Echo 2017  EF 45%  Mild AI    Neuro/Psych  (+) headaches, psychiatric history Anxiety and Depression,     (-) seizures, TIA, CVA  GI/Hepatic/Renal/Endo    (+)  GERD,  renal disease CRI and stones, thyroid problem hypothyroidism  (-) diabetes    Musculoskeletal         ROS comment: Lupus  Abdominal    Substance History      OB/GYN          Other   arthritis, blood dyscrasia anemia,                     Anesthesia Plan    ASA 3     general   Rapid sequence(Pt with current nausea/vomiting. Will pre tx with pepcid and proceed with RSI)  intravenous induction     Anesthetic plan, all risks, benefits, and alternatives have been provided, discussed and informed consent has been obtained with: patient.

## 2021-08-16 NOTE — DISCHARGE INSTRUCTIONS
YOUR NEXT PAIN MEDICATION IS DUE AT 7:30 PM        General Anesthesia, Adult, Care After  This sheet gives you information about how to care for yourself after your procedure. Your health care provider may also give you more specific instructions. If you have problems or questions, contact your health care provider.  What can I expect after the procedure?  After the procedure, the following side effects are common:  · Pain or discomfort at the IV site.  · Nausea.  · Vomiting.  · Sore throat.  · Trouble concentrating.  · Feeling cold or chills.  · Weak or tired.  · Sleepiness and fatigue.  · Soreness and body aches. These side effects can affect parts of the body that were not involved in surgery.  Follow these instructions at home:   For at least 24 hours after the procedure:  1. Have a responsible adult stay with you. It is important to have someone help care for you until you are awake and alert.  2. Rest as needed.  3. Do not:  ? Participate in activities in which you could fall or become injured.  ? Drive.  ? Use heavy machinery.  ? Drink alcohol.  ? Take sleeping pills or medicines that cause drowsiness.  ? Make important decisions or sign legal documents.  ? Take care of children on your own.  Eating and drinking  · Follow any instructions from your health care provider about eating or drinking restrictions.  · When you feel hungry, start by eating small amounts of foods that are soft and easy to digest (bland), such as toast. Gradually return to your regular diet.  · Drink enough fluid to keep your urine pale yellow.  · If you vomit, rehydrate by drinking water, juice, or clear broth.  General instructions  1. If you have sleep apnea, surgery and certain medicines can increase your risk for breathing problems. Follow instructions from your health care provider about wearing your sleep device:  ? Anytime you are sleeping, including during daytime naps.  ? While taking prescription pain medicines, sleeping  medicines, or medicines that make you drowsy.  2. Return to your normal activities as told by your health care provider. Ask your health care provider what activities are safe for you.  3. Take over-the-counter and prescription medicines only as told by your health care provider.  4. If you smoke, do not smoke without supervision.  5. Keep all follow-up visits as told by your health care provider. This is important.  Contact a health care provider if:  · You have nausea or vomiting that does not get better with medicine.  · You cannot eat or drink without vomiting.  · You have pain that does not get better with medicine.  · You are unable to pass urine.  · You develop a skin rash.  · You have a fever.  · You have redness around your IV site that gets worse.  Get help right away if:  · You have difficulty breathing.  · You have chest pain.  · You have blood in your urine or stool, or you vomit blood.  Summary  · After the procedure, it is common to have a sore throat or nausea. It is also common to feel tired.  · Have a responsible adult stay with you for the first 24 hours after general anesthesia. It is important to have someone help care for you until you are awake and alert.  · When you feel hungry, start by eating small amounts of foods that are soft and easy to digest (bland), such as toast. Gradually return to your regular diet.  · Drink enough fluid to keep your urine pale yellow.  · Return to your normal activities as told by your health care provider. Ask your health care provider what activities are safe for you.  This information is not intended to replace advice given to you by your health care provider. Make sure you discuss any questions you have with your health care provider.  Document Revised: 12/21/2018 Document Reviewed: 08/03/2018    CALL YOUR PHYSICIAN IF YOU EXPERIENCE  INCREASED PAIN NOT HELPED BY YOUR PAIN MEDICATION.      .                                              Fall Prevention in the  Home      Falls can cause injuries. They can happen to people of all ages. There are many things you can do to make your home safe and to help prevent falls.    WHAT CAN I DO ON THE OUTSIDE OF MY HOME?  · Regularly fix the edges of walkways and driveways and fix any cracks.  · Remove anything that might make you trip as you walk through a door, such as a raised step or threshold.  · Trim any bushes or trees on the path to your home.  · Use bright outdoor lighting.  · Clear any walking paths of anything that might make someone trip, such as rocks or tools.  · Regularly check to see if handrails are loose or broken. Make sure that both sides of any steps have handrails.  · Any raised decks and porches should have guardrails on the edges.  · Have any leaves, snow, or ice cleared regularly.  · Use sand or salt on walking paths during winter.  · Clean up any spills in your garage right away. This includes oil or grease spills.  WHAT CAN I DO IN THE BATHROOM?    · Use night lights.  · Install grab bars by the toilet and in the tub and shower. Do not use towel bars as grab bars.  · Use non-skid mats or decals in the tub or shower.  · If you need to sit down in the shower, use a plastic, non-slip stool.  · Keep the floor dry. Clean up any water that spills on the floor as soon as it happens.  · Remove soap buildup in the tub or shower regularly.  · Attach bath mats securely with double-sided non-slip rug tape.  · Do not have throw rugs and other things on the floor that can make you trip.  WHAT CAN I DO IN THE BEDROOM?  · Use night lights.  · Make sure that you have a light by your bed that is easy to reach.  · Do not use any sheets or blankets that are too big for your bed. They should not hang down onto the floor.  · Have a firm chair that has side arms. You can use this for support while you get dressed.  · Do not have throw rugs and other things on the floor that can make you trip.  WHAT CAN I DO IN THE  KITCHEN?  · Clean up any spills right away.  · Avoid walking on wet floors.  · Keep items that you use a lot in easy-to-reach places.  · If you need to reach something above you, use a strong step stool that has a grab bar.  · Keep electrical cords out of the way.  · Do not use floor polish or wax that makes floors slippery. If you must use wax, use non-skid floor wax.  · Do not have throw rugs and other things on the floor that can make you trip.  WHAT CAN I DO WITH MY STAIRS?  · Do not leave any items on the stairs.  · Make sure that there are handrails on both sides of the stairs and use them. Fix handrails that are broken or loose. Make sure that handrails are as long as the stairways.  · Check any carpeting to make sure that it is firmly attached to the stairs. Fix any carpet that is loose or worn.  · Avoid having throw rugs at the top or bottom of the stairs. If you do have throw rugs, attach them to the floor with carpet tape.  · Make sure that you have a light switch at the top of the stairs and the bottom of the stairs. If you do not have them, ask someone to add them for you.  WHAT ELSE CAN I DO TO HELP PREVENT FALLS?  · Wear shoes that:  ¨ Do not have high heels.  ¨ Have rubber bottoms.  ¨ Are comfortable and fit you well.  ¨ Are closed at the toe. Do not wear sandals.  · If you use a stepladder:  ¨ Make sure that it is fully opened. Do not climb a closed stepladder.  ¨ Make sure that both sides of the stepladder are locked into place.  ¨ Ask someone to hold it for you, if possible.  · Clearly shai and make sure that you can see:  ¨ Any grab bars or handrails.  ¨ First and last steps.  ¨ Where the edge of each step is.  · Use tools that help you move around (mobility aids) if they are needed. These include:  ¨ Canes.  ¨ Walkers.  ¨ Scooters.  ¨ Crutches.  · Turn on the lights when you go into a dark area. Replace any light bulbs as soon as they burn out.  · Set up your furniture so you have a clear path.  Avoid moving your furniture around.  · If any of your floors are uneven, fix them.  · If there are any pets around you, be aware of where they are.  · Review your medicines with your doctor. Some medicines can make you feel dizzy. This can increase your chance of falling.  Ask your doctor what other things that you can do to help prevent falls.     This information is not intended to replace advice given to you by your health care provider. Make sure you discuss any questions you have with your health care provider.     Document Released: 10/14/2010 Document Revised: 05/03/2016 Document Reviewed: 01/22/2016  [employer] Interactive Patient Education ©2016 [employer]     PATIENT/FAMILY/RESPONSIBLE PARTY VERBALIZES UNDERSTANDING OF ABOVE EDUCATION.  COPY OF PAIN SCALE GIVEN AND REVIEWED WITH VERBALIZED UNDERSTANDING.

## 2021-08-16 NOTE — ANESTHESIA POSTPROCEDURE EVALUATION
Patient: Thao Mohr    Procedure Summary     Date: 08/16/21 Room / Location:  [facility] 08 /  [facility]    Anesthesia Start: 1419 Anesthesia Stop: 1515    Procedure: EXTRACORPOREAL SHOCKWAVE LITHOTRIPSY RIGHT (Right Urethra) Diagnosis:       Right lateral abdominal pain      Bilateral kidney stones      (Right lateral abdominal pain [R10.9])      (Bilateral kidney stones [N20.0])    Surgeons: Ronnie Hayes MD Provider: Rommel Clemons CRNA    Anesthesia Type: general ASA Status: 3          Anesthesia Type: general    Vitals  Vitals Value Taken Time   /71 08/16/21 1540   Temp 97.8 °F (36.6 °C) 08/16/21 1540   Pulse 92 08/16/21 1541   Resp 16 08/16/21 1540   SpO2 94 % 08/16/21 1541   Vitals shown include unvalidated device data.        Post Anesthesia Care and Evaluation    Patient location during evaluation: PACU  Level of consciousness: awake  Pain management: adequate  Airway patency: patent  Anesthetic complications: No anesthetic complications  PONV Status: none  Cardiovascular status: acceptable  Respiratory status: acceptable  Hydration status: acceptable

## 2021-08-16 NOTE — ANESTHESIA PROCEDURE NOTES
Airway  Urgency: elective    Date/Time: 8/16/2021 2:24 PM  Airway not difficult    General Information and Staff    Patient location during procedure: OR  CRNA: Rommel Clemons CRNA    Indications and Patient Condition  Indications for airway management: airway protection    Preoxygenated: yes  MILS maintained throughout  Mask difficulty assessment: 0 - not attempted    Final Airway Details  Final airway type: endotracheal airway      Successful airway: ETT  Cuffed: yes   Successful intubation technique: direct laryngoscopy and RSI  Facilitating devices/methods: cricoid pressure  Endotracheal tube insertion site: oral  Blade: Solitario  Blade size: 2  ETT size (mm): 7.0  Cormack-Lehane Classification: grade I - full view of glottis  Placement verified by: chest auscultation and capnometry   Cuff volume (mL): 6  Measured from: lips  ETT/EBT  to lips (cm): 20  Number of attempts at approach: 1  Assessment: lips, teeth, and gum same as pre-op and atraumatic intubation

## 2021-08-16 NOTE — INTERVAL H&P NOTE
H&P updated. The patient was examined and the following changes are noted:  ER records reviewed with nonobstructing stones. Reviewed imaging. Plan for right ESWL today.

## 2021-08-23 ENCOUNTER — TRANSCRIBE ORDERS (OUTPATIENT)
Dept: ADMINISTRATIVE | Facility: HOSPITAL | Age: 76
End: 2021-08-23

## 2021-08-23 DIAGNOSIS — N20.0 BILATERAL KIDNEY STONES: Primary | ICD-10-CM

## 2021-08-23 DIAGNOSIS — K83.9 DISEASE OF BILIARY TRACT: Primary | ICD-10-CM

## 2021-08-23 DIAGNOSIS — N90.89 LESION OF VULVA: ICD-10-CM

## 2021-08-23 DIAGNOSIS — N20.0 BILATERAL KIDNEY STONES: ICD-10-CM

## 2021-08-23 RX ORDER — TAMSULOSIN HYDROCHLORIDE 0.4 MG/1
1 CAPSULE ORAL NIGHTLY
Qty: 14 CAPSULE | Refills: 0 | Status: SHIPPED | OUTPATIENT
Start: 2021-08-23 | End: 2022-01-14

## 2021-08-23 RX ORDER — HYDROCODONE BITARTRATE AND ACETAMINOPHEN 7.5; 325 MG/1; MG/1
1 TABLET ORAL EVERY 6 HOURS PRN
Qty: 12 TABLET | Refills: 0 | Status: SHIPPED | OUTPATIENT
Start: 2021-08-23 | End: 2022-01-14

## 2021-08-30 ENCOUNTER — HOSPITAL ENCOUNTER (OUTPATIENT)
Dept: ULTRASOUND IMAGING | Facility: HOSPITAL | Age: 76
Discharge: HOME | End: 2021-08-30

## 2021-08-30 ENCOUNTER — TRANSCRIBE ORDERS (OUTPATIENT)
Dept: ADMINISTRATIVE | Facility: HOSPITAL | Age: 76
End: 2021-08-30

## 2021-08-30 DIAGNOSIS — N90.89 HYPERTROPHY OF CLITORIS: Primary | ICD-10-CM

## 2021-08-30 DIAGNOSIS — N90.89 HYPERTROPHY OF CLITORIS: ICD-10-CM

## 2021-09-03 ENCOUNTER — OFFICE VISIT (OUTPATIENT)
Dept: UROLOGY | Facility: CLINIC | Age: 76
End: 2021-09-03

## 2021-09-03 ENCOUNTER — HOSPITAL ENCOUNTER (OUTPATIENT)
Dept: GENERAL RADIOLOGY | Facility: HOSPITAL | Age: 76
Discharge: HOME | End: 2021-09-03
Admitting: UROLOGY

## 2021-09-03 VITALS — WEIGHT: 150 LBS | TEMPERATURE: 97.1 F | HEIGHT: 63 IN | BODY MASS INDEX: 26.58 KG/M2

## 2021-09-03 DIAGNOSIS — N20.0 BILATERAL KIDNEY STONES: Primary | ICD-10-CM

## 2021-09-03 DIAGNOSIS — N20.0 BILATERAL KIDNEY STONES: ICD-10-CM

## 2021-09-03 LAB
BILIRUB BLD-MCNC: NEGATIVE MG/DL
CLARITY, POC: CLEAR
COLOR UR: YELLOW
GLUCOSE UR STRIP-MCNC: NEGATIVE MG/DL
KETONES UR QL: NEGATIVE
LEUKOCYTE EST, POC: ABNORMAL
NITRITE UR-MCNC: NEGATIVE MG/ML
PH UR: 5.5 [PH] (ref 5–8)
PROT UR STRIP-MCNC: ABNORMAL MG/DL
RBC # UR STRIP: ABNORMAL /UL
SP GR UR: 1.01 (ref 1–1.03)
UROBILINOGEN UR QL: NORMAL

## 2021-09-03 PROCEDURE — 81001 URINALYSIS AUTO W/SCOPE: CPT | Performed by: PHYSICIAN ASSISTANT

## 2021-09-03 PROCEDURE — 99024 POSTOP FOLLOW-UP VISIT: CPT | Performed by: PHYSICIAN ASSISTANT

## 2021-09-03 PROCEDURE — 74018 RADEX ABDOMEN 1 VIEW: CPT

## 2021-09-09 ENCOUNTER — HOSPITAL ENCOUNTER (OUTPATIENT)
Dept: MRI IMAGING | Facility: HOSPITAL | Age: 76
Discharge: HOME | End: 2021-09-09
Admitting: INTERNAL MEDICINE

## 2021-09-09 DIAGNOSIS — K83.9 DISEASE OF BILIARY TRACT: ICD-10-CM

## 2021-09-09 LAB — CREAT BLDA-MCNC: 2.4 MG/DL (ref 0.6–1.3)

## 2021-09-09 PROCEDURE — 74181 MRI ABDOMEN W/O CONTRAST: CPT

## 2021-09-09 PROCEDURE — 82565 ASSAY OF CREATININE: CPT

## 2021-11-04 ENCOUNTER — TRANSCRIBE ORDERS (OUTPATIENT)
Dept: ADMINISTRATIVE | Facility: HOSPITAL | Age: 76
End: 2021-11-04

## 2021-11-04 DIAGNOSIS — M25.551 PAIN IN RIGHT HIP: Primary | ICD-10-CM

## 2021-11-12 ENCOUNTER — HOSPITAL ENCOUNTER (OUTPATIENT)
Dept: GENERAL RADIOLOGY | Facility: HOSPITAL | Age: 76
Discharge: HOME | End: 2021-11-12
Admitting: INTERNAL MEDICINE

## 2021-11-12 DIAGNOSIS — M25.551 PAIN IN RIGHT HIP: ICD-10-CM

## 2021-11-12 PROCEDURE — 73502 X-RAY EXAM HIP UNI 2-3 VIEWS: CPT

## 2021-12-14 NOTE — PROGRESS NOTES
Subjective    Ms. Mohr is 76 y.o. female    Chief Complaint: 3 Month follow up for Bilateral Kidney Stones.    History of Present Illness  Patient is a 76-year-old female with history of bilateral kidney stones when I saw her last 09/03/2021 she was having right lower abdominal pain.  She had evaluation for a large bile duct and states today that everything came back normal.  Today she is complaining of lower abdominal pain bilaterally.  Denies any fever chills nausea vomiting.  KUB was done it shows bilateral calcifications more numerous on the left.  No obvious ureteral stone.    The following portions of the patient's history were reviewed and updated as appropriate: allergies, current medications, past family history, past medical history, past social history, past surgical history and problem list.    Review of Systems   Constitutional: Negative for chills and fever.   Gastrointestinal: Negative for abdominal pain, anal bleeding and blood in stool.   Genitourinary: Negative for decreased urine volume, difficulty urinating, dyspareunia, dysuria, enuresis, flank pain, frequency, genital sores, hematuria, menstrual problem, pelvic pain, urgency, vaginal bleeding, vaginal discharge and vaginal pain.         Current Outpatient Medications:   •  allopurinol (ZYLOPRIM) 100 MG tablet, Take 100 mg by mouth Daily., Disp: , Rfl:   •  aspirin 81 MG EC tablet, Take 81 mg by mouth Daily., Disp: , Rfl:   •  calcitriol (ROCALTROL) 0.25 MCG capsule, Take 0.25 mcg by mouth Daily., Disp: , Rfl:   •  diazePAM (VALIUM) 5 MG tablet, Take 5 mg by mouth Daily., Disp: , Rfl:   •  dicyclomine (BENTYL) 10 MG capsule, Take 10 mg by mouth 4 (Four) Times a Day Before Meals & at Bedtime., Disp: , Rfl:   •  epoetin al (PROCRIT) 11825 UNIT/ML injection, Inject  under the skin into the appropriate area as directed As Needed., Disp: , Rfl:   •  gabapentin (NEURONTIN) 300 MG capsule, Take 300 mg by mouth Daily As Needed (for pain)., Disp: ,  Rfl:   •  levothyroxine (SYNTHROID, LEVOTHROID) 137 MCG tablet, Take 137 mcg by mouth Daily., Disp: , Rfl:   •  magnesium oxide (MAGOX) 400 (241.3 MG) MG tablet tablet, Take 400 mg by mouth Daily., Disp: , Rfl:   •  methocarbamol (ROBAXIN) 500 MG tablet, 500 mg 2 (Two) Times a Day As Needed for Muscle Spasms., Disp: , Rfl: 0  •  Multiple Vitamins-Minerals (OCUVITE ADULT 50+ PO), Take 1 tablet by mouth Daily., Disp: , Rfl:   •  pantoprazole (PROTONIX) 40 MG EC tablet, Take 40 mg by mouth Daily., Disp: , Rfl:   •  potassium chloride (K-DUR,KLOR-CON) 10 MEQ ER tablet, Take 20 mEq by mouth 2 (Two) Times a Day., Disp: , Rfl:   •  Probiotic Product (PROBIOTIC DAILY PO), Take 1 tablet/day by mouth., Disp: , Rfl:   •  promethazine (PHENERGAN) 25 MG tablet, Take 25 mg by mouth As Needed for Nausea or Vomiting., Disp: , Rfl:   •  QUEtiapine (SEROquel) 200 MG tablet, Take 200 mg by mouth Every Night., Disp: , Rfl:   •  SODIUM BICARBONATE PO, Take 10 mg by mouth 2 (Two) Times a Day., Disp: , Rfl:   •  sucralfate (CARAFATE) 1 g tablet, Take 1 g by mouth 4 (Four) Times a Day., Disp: , Rfl:   •  tamsulosin (FLOMAX) 0.4 MG capsule 24 hr capsule, Take 1 capsule by mouth Every Night., Disp: 14 capsule, Rfl: 0  •  telmisartan (MICARDIS) 40 MG tablet, Take 40 mg by mouth Daily., Disp: , Rfl:   •  topiramate (TOPAMAX) 100 MG tablet, Take 100 mg by mouth Daily., Disp: , Rfl:   •  vitamin B-12 (CYANOCOBALAMIN) 1000 MCG tablet, Take 1,000 mcg by mouth Daily., Disp: , Rfl:   •  vitamin D (ERGOCALCIFEROL) 90304 UNITS capsule capsule, 50,000 Units 2 (Two) Times a Week. Sunday and Wednesday, Disp: , Rfl: 0  •  escitalopram (LEXAPRO) 20 MG tablet, Take 20 mg by mouth Daily., Disp: , Rfl:   •  FLUoxetine (PROzac) 10 MG capsule, Take 20 mg by mouth Daily., Disp: , Rfl:   •  HYDROcodone-acetaminophen (NORCO) 7.5-325 MG per tablet, Take 1 tablet by mouth Every 6 (Six) Hours As Needed for Moderate Pain ., Disp: 12 tablet, Rfl: 0  •   "HYDROcodone-acetaminophen (NORCO) 7.5-325 MG per tablet, Take 1 tablet by mouth Every 6 (Six) Hours As Needed for Moderate Pain ., Disp: 12 tablet, Rfl: 0    Past Medical History:   Diagnosis Date   • Acid reflux    • Aneurysm (HCC)     Pt states \"Somewhere near my spleen.\"   • Anxiety and depression    • Arthritis    • Diarrhea    • Generalized headaches    • Heart murmur    • History of transfusion    • Hypertension    • Hypothyroid    • Kidney stones    • Lupus (HCC)    • Migraines    • MRSA (methicillin resistant Staphylococcus aureus)     in past , on face   • Nausea    • PONV (postoperative nausea and vomiting)    • Renal failure     21%   • Thrombosis 2007    RIGHT KNEE       Past Surgical History:   Procedure Laterality Date   • APPENDECTOMY     • CHOLECYSTECTOMY     • COLONOSCOPY     • ENDOSCOPY N/A 10/30/2020    Procedure: ESOPHAGOGASTRODUODENOSCOPY WITH ANESTHESIA;  Surgeon: Naresh Heard DO;  Location: [facility] ENDOSCOPY;  Service: Gastroenterology;  Laterality: N/A;  preop; abdominal pain  postop; normal   PCP Greg Rey    • EXTRACORPOREAL SHOCK WAVE LITHOTRIPSY (ESWL) Right 8/16/2021    Procedure: EXTRACORPOREAL SHOCKWAVE LITHOTRIPSY RIGHT;  Surgeon: Ronnie Hayes MD;  Location: [facility] OR;  Service: Urology;  Laterality: Right;   • HYSTERECTOMY     • JOINT REPLACEMENT     • REPLACEMENT TOTAL KNEE Left    • SHOULDER ROTATOR CUFF REPAIR Right    • TOTAL HIP ARTHROPLASTY Right    • TOTAL SHOULDER ARTHROPLASTY W/ DISTAL CLAVICLE EXCISION Left 12/27/2019    Procedure: LEFT REVERSE TOTAL SHOULDER ARTHROPLASTY;  Surgeon: Dhaval Yepez MD;  Location: [facility] OR;  Service: Orthopedics   • WRIST FRACTURE SURGERY Left 2 weeks ago       Social History     Socioeconomic History   • Marital status:    Tobacco Use   • Smoking status: Never Smoker   • Smokeless tobacco: Never Used   Vaping Use   • Vaping Use: Never used   Substance and Sexual Activity   • Alcohol use: No   • Drug use: No   • Sexual " "activity: Defer       Family History   Problem Relation Age of Onset   • Cancer Father    • Coronary artery disease Brother    • Colon cancer Paternal Aunt    • Colon polyps Neg Hx    • Esophageal cancer Neg Hx        Objective    Temp 98.7 °F (37.1 °C)   Ht 161 cm (63.39\")   Wt 69.6 kg (153 lb 6.4 oz)   BMI 26.84 kg/m²     Physical Exam  Vitals reviewed.   Constitutional:       Appearance: Normal appearance. She is not ill-appearing or toxic-appearing.   HENT:      Head: Normocephalic and atraumatic.   Pulmonary:      Effort: Pulmonary effort is normal.   Abdominal:      General: Abdomen is flat.      Palpations: Abdomen is soft.      Tenderness: There is abdominal tenderness.      Comments: Lower abdominal tenderness with palpation no obvious masses were palpated.   Skin:     General: Skin is warm and dry.   Neurological:      Mental Status: She is alert and oriented to person, place, and time.   Psychiatric:         Mood and Affect: Mood normal.         Behavior: Behavior normal.             Results for orders placed or performed in visit on 12/21/21   POC Urinalysis Dipstick, Multipro    Specimen: Urine   Result Value Ref Range    Color Nguyen Yellow, Straw, Dark Yellow, Nguyen    Clarity, UA Clear Clear    Glucose, UA Negative Negative, 1000 mg/dL (3+) mg/dL    Bilirubin Negative Negative    Ketones, UA Negative Negative    Specific Gravity  1.025 1.005 - 1.030    Blood, UA Negative Negative    pH, Urine 6.0 5.0 - 8.0    Protein, POC Negative Negative mg/dL    Urobilinogen, UA Normal Normal    Nitrite, UA Negative Negative    Leukocytes Negative Negative     KUB independent review    A KUB is available for me to review today.  The image is inspected for a bowel gas pattern and the general bone structure of the spine and pelvis. The kidneys are then inspected closely.  Renal outline is noted if identifiable. The kidney, collecting system, and anticipated path of the ureter are examined for calcifications " including those in the true pelvis.  This film reveals:    On the right there are multiple renal stones. Up to 3 mm.    On the left there are multiple renal stones.  Measuring up to 5 mm.    Assessment and Plan    Diagnoses and all orders for this visit:    1. Bilateral kidney stones (Primary)  -     POC Urinalysis Dipstick, Multipro  -     CT Abdomen Pelvis Without Contrast; Future    2. Lower abdominal pain  -     CT Abdomen Pelvis Without Contrast; Future    Patient here for follow-up with kidney stones she has bilateral stones more easily visualized and numerous on the left.  No obvious stone seen in the course of either ureter.  She is having a few weeks of lower abdominal pain which appears across her entire lower abdomen no flank pain.  Her urine is not infected looking it is clear.  I discussed ESWL for the stones in the left kidney which she is interested in scheduling however I would like her to get a CT scan to rule out any acute source of her lower abdominal pain first if it is negative for any ureteral stones or acute pathology we could contact her to get her scheduled for left ESWL.

## 2021-12-21 ENCOUNTER — HOSPITAL ENCOUNTER (OUTPATIENT)
Dept: GENERAL RADIOLOGY | Facility: HOSPITAL | Age: 76
Discharge: HOME | End: 2021-12-21
Admitting: PHYSICIAN ASSISTANT

## 2021-12-21 ENCOUNTER — OFFICE VISIT (OUTPATIENT)
Dept: UROLOGY | Facility: CLINIC | Age: 76
End: 2021-12-21

## 2021-12-21 VITALS — HEIGHT: 63 IN | TEMPERATURE: 98.7 F | BODY MASS INDEX: 27.18 KG/M2 | WEIGHT: 153.4 LBS

## 2021-12-21 DIAGNOSIS — R10.30 LOWER ABDOMINAL PAIN: ICD-10-CM

## 2021-12-21 DIAGNOSIS — N20.0 BILATERAL KIDNEY STONES: ICD-10-CM

## 2021-12-21 DIAGNOSIS — N28.89 OTHER SPECIFIED DISORDERS OF KIDNEY AND URETER: ICD-10-CM

## 2021-12-21 LAB
BILIRUB BLD-MCNC: NEGATIVE MG/DL
CLARITY, POC: CLEAR
COLOR UR: NORMAL
GLUCOSE UR STRIP-MCNC: NEGATIVE MG/DL
KETONES UR QL: NEGATIVE
LEUKOCYTE EST, POC: NEGATIVE
NITRITE UR-MCNC: NEGATIVE MG/ML
PH UR: 6 [PH] (ref 5–8)
PROT UR STRIP-MCNC: NEGATIVE MG/DL
RBC # UR STRIP: NEGATIVE /UL
SP GR UR: 1.02 (ref 1–1.03)
UROBILINOGEN UR QL: NORMAL

## 2021-12-21 PROCEDURE — 74018 RADEX ABDOMEN 1 VIEW: CPT

## 2021-12-21 PROCEDURE — 81001 URINALYSIS AUTO W/SCOPE: CPT | Performed by: PHYSICIAN ASSISTANT

## 2021-12-21 PROCEDURE — 99214 OFFICE O/P EST MOD 30 MIN: CPT | Performed by: PHYSICIAN ASSISTANT

## 2021-12-21 RX ORDER — ESCITALOPRAM OXALATE 20 MG/1
20 TABLET ORAL DAILY
COMMUNITY
Start: 2021-10-12

## 2021-12-23 ENCOUNTER — HOSPITAL ENCOUNTER (OUTPATIENT)
Dept: CT IMAGING | Facility: HOSPITAL | Age: 76
Discharge: HOME | End: 2021-12-23
Admitting: PHYSICIAN ASSISTANT

## 2021-12-23 DIAGNOSIS — R10.30 LOWER ABDOMINAL PAIN: ICD-10-CM

## 2021-12-23 DIAGNOSIS — N20.0 BILATERAL KIDNEY STONES: ICD-10-CM

## 2021-12-23 PROCEDURE — 74176 CT ABD & PELVIS W/O CONTRAST: CPT

## 2021-12-28 ENCOUNTER — TELEPHONE (OUTPATIENT)
Dept: UROLOGY | Facility: CLINIC | Age: 76
End: 2021-12-28

## 2021-12-28 NOTE — TELEPHONE ENCOUNTER
I  Spoke with  the patient and gave  her the  KUB results. Also discussed with her that Dhaval had spoken with her about doing a left eswl and would she like to proceed with the procedure . Patient stated that yes she would like to proceed with the procedure. I told patient that we would get back to her with an appointment and the details

## 2022-01-04 ENCOUNTER — TELEPHONE (OUTPATIENT)
Dept: UROLOGY | Facility: CLINIC | Age: 77
End: 2022-01-04

## 2022-01-04 NOTE — TELEPHONE ENCOUNTER
Called patient with surgery info scheduled 01-17-22. Arrive at 1130 on 01-17-22. NPO after MN. Stop ASA 81 mg 7 days prior to surgery. Pre op 01-14-22 at 1015. All questions answered and she voiced understanding

## 2022-01-14 ENCOUNTER — PRE-ADMISSION TESTING (OUTPATIENT)
Dept: PREADMISSION TESTING | Facility: HOSPITAL | Age: 77
End: 2022-01-14

## 2022-01-14 ENCOUNTER — LAB (OUTPATIENT)
Dept: LAB | Facility: HOSPITAL | Age: 77
End: 2022-01-14

## 2022-01-14 VITALS
WEIGHT: 149.91 LBS | HEART RATE: 93 BPM | HEIGHT: 63 IN | BODY MASS INDEX: 26.56 KG/M2 | RESPIRATION RATE: 18 BRPM | DIASTOLIC BLOOD PRESSURE: 61 MMHG | OXYGEN SATURATION: 97 % | SYSTOLIC BLOOD PRESSURE: 118 MMHG

## 2022-01-14 DIAGNOSIS — N20.0 BILATERAL KIDNEY STONES: ICD-10-CM

## 2022-01-14 LAB
ANION GAP SERPL CALCULATED.3IONS-SCNC: 11 MMOL/L (ref 5–15)
BUN SERPL-MCNC: 39 MG/DL (ref 8–23)
BUN/CREAT SERPL: 19.4 (ref 7–25)
CALCIUM SPEC-SCNC: 8.7 MG/DL (ref 8.6–10.5)
CHLORIDE SERPL-SCNC: 112 MMOL/L (ref 98–107)
CO2 SERPL-SCNC: 17 MMOL/L (ref 22–29)
CREAT SERPL-MCNC: 2.01 MG/DL (ref 0.57–1)
DEPRECATED RDW RBC AUTO: 45.7 FL (ref 37–54)
ERYTHROCYTE [DISTWIDTH] IN BLOOD BY AUTOMATED COUNT: 13.8 % (ref 12.3–15.4)
GFR SERPL CREATININE-BSD FRML MDRD: 24 ML/MIN/1.73
GLUCOSE SERPL-MCNC: 97 MG/DL (ref 65–99)
HCT VFR BLD AUTO: 36.5 % (ref 34–46.6)
HGB BLD-MCNC: 11.6 G/DL (ref 12–15.9)
MCH RBC QN AUTO: 28.6 PG (ref 26.6–33)
MCHC RBC AUTO-ENTMCNC: 31.8 G/DL (ref 31.5–35.7)
MCV RBC AUTO: 89.9 FL (ref 79–97)
PLATELET # BLD AUTO: 200 10*3/MM3 (ref 140–450)
PMV BLD AUTO: 10.9 FL (ref 6–12)
POTASSIUM SERPL-SCNC: 4.5 MMOL/L (ref 3.5–5.2)
RBC # BLD AUTO: 4.06 10*6/MM3 (ref 3.77–5.28)
SARS-COV-2 ORF1AB RESP QL NAA+PROBE: NOT DETECTED
SODIUM SERPL-SCNC: 140 MMOL/L (ref 136–145)
WBC NRBC COR # BLD: 6.43 10*3/MM3 (ref 3.4–10.8)

## 2022-01-14 PROCEDURE — 85027 COMPLETE CBC AUTOMATED: CPT

## 2022-01-14 PROCEDURE — U0004 COV-19 TEST NON-CDC HGH THRU: HCPCS

## 2022-01-14 PROCEDURE — U0005 INFEC AGEN DETEC AMPLI PROBE: HCPCS

## 2022-01-14 PROCEDURE — 85610 PROTHROMBIN TIME: CPT | Performed by: PHYSICIAN ASSISTANT

## 2022-01-14 PROCEDURE — 80048 BASIC METABOLIC PNL TOTAL CA: CPT

## 2022-01-14 PROCEDURE — C9803 HOPD COVID-19 SPEC COLLECT: HCPCS

## 2022-01-14 NOTE — DISCHARGE INSTRUCTIONS
DAY OF SURGERY INSTRUCTIONS          ARRIVAL TIME: AS DIRECTED BY OFFICE    YOU MAY TAKE THE FOLLOWING MEDICATION(S) THE MORNING OF SURGERY WITH A SIP OF WATER: ***VALIUM    DO NOT TAKE YOUR TELMISARTAN FOR 24 HOURS PRIOR TO SURGERY    ALL OTHER HOME MEDICATIONS CHECK WITH YOUR DOCTOR (ask your health care provider about changing or stopping your regular medicines, especially if you are taking diabetes medicines or blood thinners)    DO NOT TAKE ANY ERECTILE DYSFUNCTION MEDICATIONS (EX:  CIALIS, VIAGRA) 24 HOURS PRIOR TO SURGERY              MANAGING PAIN AFTER SURGERY    We know you are probably wondering what your pain will be like after surgery.  Following surgery it is unrealistic to expect you will not have pain.   Pain is how our bodies let us know that something is wrong or cautions us to be careful.  That said, our goal is to make your pain tolerable.    Methods we may use to treat your pain include (oral or IV medications, PCAs, epidurals, nerve blocks, etc.)   While some procedures require IV pain medications for a short time after surgery, transitioning to pain medications by mouth allows for better management of pain.   Your nurse will encourage you to take oral pain medications whenever possible.  IV medications work almost immediately, but only last a short while.  Taking medications by mouth allows for a more constant level of medication in your blood stream for a longer period of time.      Once your pain is out of control it is harder to get back under control.  It is important you are aware when your next dose of pain medication is due.  If you are admitted, your nurse may write the time of your next dose on the white board in your room to help you remember.      We are interested in your pain and encourage you to inform us about aggravating factors during your visit.   Many times a simple repositioning every few hours can make a big difference.    If your physician says it is okay, do not let your  pain prevent you from getting out of bed. Be sure to call your nurse for assistance prior to getting up so you do not fall.      Before surgery, please decide your tolerable pain goal.  These faces help describe the pain ratings we use on a 0-10 scale.   Be prepared to tell us your goal and whether or not you take pain or anxiety medications at home.      BEFORE YOU COME TO THE HOSPITAL  (Pre-op instructions)  • Do not eat, drink, smoke or chew gum after midnight the night before surgery.  This also includes no mints.  • Morning of surgery take only the medicines you have been instructed with a sip of water unless otherwise instructed  by your physician.  • Do not shave, wear makeup or dark nail polish.  • Remove all jewelry including rings.  • Leave anything you consider valuable at home.  • Leave your suitcase in the car until after your surgery.  • Bring the following with you if applicable:  o Picture ID and insurance, Medicare or Medicaid cards  o Co-pay/deductible required by insurance (cash, check, credit card)  o Copy of advance directive, living will or power-of- documents if not brought to Pre-work  o CPAP or BIPAP mask and tubing  o Relaxation aids ( book, magazine), etc.  o Hearing aids                                 ON THE DAY OF SURGERY  · On the day of surgery check in at registration located at the main entrance of the hospital. Only one family member or friend are allowed per patient.  ? You will be registered and given a beeper with instructions where to wait in the main lobby.  ? When your beeper lights up and vibrates a member of the Outpatient Surgery staff will meet you at the double doors under the stair steps and escort you to your preoperative room.   · You may have cloth compression devices placed on your legs. These help to prevent blood clots and reduce swelling in your legs.  · An IV may be inserted into one of your veins.  · In the operating room, you may be given one or more  "of the following:  ? A medicine to help you relax (sedative).  ? A medicine to numb the area (local anesthetic).  ? A medicine to make you fall asleep (general anesthetic).  ? A medicine that is injected into an area of your body to numb everything below the injection site (regional anesthetic).  · Your surgical site will be marked or identified.  · You may be given an antibiotic through your IV to help prevent infection.  Contact a health care provider if you:  · Develop a fever of more than 100.4°F (38°C) or other feelings of illness during the 48 hours before your surgery.  · Have symptoms that get worse.  Have questions or concerns about your surgery    General Anesthesia/Surgery, Adult  General anesthesia is the use of medicines to make a person \"go to sleep\" (unconscious) for a medical procedure. General anesthesia must be used for certain procedures, and is often recommended for procedures that:  · Last a long time.  · Require you to be still or in an unusual position.  · Are major and can cause blood loss.  The medicines used for general anesthesia are called general anesthetics. As well as making you unconscious for a certain amount of time, these medicines:  · Prevent pain.  · Control your blood pressure.  · Relax your muscles.  Tell a health care provider about:  · Any allergies you have.  · All medicines you are taking, including vitamins, herbs, eye drops, creams, and over-the-counter medicines.  · Any problems you or family members have had with anesthetic medicines.  · Types of anesthetics you have had in the past.  · Any blood disorders you have.  · Any surgeries you have had.  · Any medical conditions you have.  · Any recent upper respiratory, chest, or ear infections.  · Any history of:  ? Heart or lung conditions, such as heart failure, sleep apnea, asthma, or chronic obstructive pulmonary disease (COPD).  ?  service.  ? Depression or anxiety.  · Any tobacco or drug use, including " marijuana or alcohol use.  · Whether you are pregnant or may be pregnant.  What are the risks?  Generally, this is a safe procedure. However, problems may occur, including:  · Allergic reaction.  · Lung and heart problems.  · Inhaling food or liquid from the stomach into the lungs (aspiration).  · Nerve injury.  · Air in the bloodstream, which can lead to stroke.  · Extreme agitation or confusion (delirium) when you wake up from the anesthetic.  · Waking up during your procedure and being unable to move. This is rare.  These problems are more likely to develop if you are having a major surgery or if you have an advanced or serious medical condition. You can prevent some of these complications by answering all of your health care provider's questions thoroughly and by following all instructions before your procedure.  General anesthesia can cause side effects, including:  · Nausea or vomiting.  · A sore throat from the breathing tube.  · Hoarseness.  · Wheezing or coughing.  · Shaking chills.  · Tiredness.  · Body aches.  · Anxiety.  · Sleepiness or drowsiness.  · Confusion or agitation.  RISKS AND COMPLICATIONS OF SURGERY  Your health care provider will discuss possible risks and complications with you before surgery. Common risks and complications include:    · Problems due to the use of anesthetics.  · Blood loss and replacement (does not apply to minor surgical procedures).  · Temporary increase in pain due to surgery.  · Uncorrected pain or problems that the surgery was meant to correct.  · Infection.  · New damage.    What happens before the procedure?    Medicines  Ask your health care provider about:  · Changing or stopping your regular medicines. This is especially important if you are taking diabetes medicines or blood thinners.  · Taking medicines such as aspirin and ibuprofen. These medicines can thin your blood. Do not take these medicines unless your health care provider tells you to take  them.  · Taking over-the-counter medicines, vitamins, herbs, and supplements. Do not take these during the week before your procedure unless your health care provider approves them.  General instructions  · Starting 3-6 weeks before the procedure, do not use any products that contain nicotine or tobacco, such as cigarettes and e-cigarettes. If you need help quitting, ask your health care provider.  · If you brush your teeth on the morning of the procedure, make sure to spit out all of the toothpaste.  · Tell your health care provider if you become ill or develop a cold, cough, or fever.  · If instructed by your health care provider, bring your sleep apnea device with you on the day of your surgery (if applicable).  · Ask your health care provider if you will be going home the same day, the following day, or after a longer hospital stay.  ? Plan to have someone take you home from the hospital or clinic.  ? Plan to have a responsible adult care for you for at least 24 hours after you leave the hospital or clinic. This is important.  What happens during the procedure?  · You will be given anesthetics through both of the following:  ? A mask placed over your nose and mouth.  ? An IV in one of your veins.  · You may receive a medicine to help you relax (sedative).  · After you are unconscious, a breathing tube may be inserted down your throat to help you breathe. This will be removed before you wake up.  · An anesthesia specialist will stay with you throughout your procedure. He or she will:  ? Keep you comfortable and safe by continuing to give you medicines and adjusting the amount of medicine that you get.  ? Monitor your blood pressure, pulse, and oxygen levels to make sure that the anesthetics do not cause any problems.  The procedure may vary among health care providers and hospitals.  What happens after the procedure?  · Your blood pressure, temperature, heart rate, breathing rate, and blood oxygen level will be  monitored until the medicines you were given have worn off.  · You will wake up in a recovery area. You may wake up slowly.  · If you feel anxious or agitated, you may be given medicine to help you calm down.  · If you will be going home the same day, your health care provider may check to make sure you can walk, drink, and urinate.  · Your health care provider will treat any pain or side effects you have before you go home.  · Do not drive for 24 hours if you were given a sedative.  Summary  · General anesthesia is used to keep you still and prevent pain during a procedure.  · It is important to tell your healthcare provider about your medical history and any surgeries you have had, and previous experience with anesthesia.  · Follow your healthcare provider’s instructions about when to stop eating, drinking, or taking certain medicines before your procedure.  · Plan to have someone take you home from the hospital or clinic.  This information is not intended to replace advice given to you by your health care provider. Make sure you discuss any questions you have with your health care provider.  Document Released: 03/26/2009 Document Revised: 08/03/2018 Document Reviewed: 08/03/2018  [employer] Interactive Patient Education © 2019 [employer]      Fall Prevention in Hospitals, Adult  As a hospital patient, your condition and the treatments you receive can increase your risk for falls. Some additional risk factors for falls in a hospital include:  · Being in an unfamiliar environment.  · Being on bed rest.  · Your surgery.  · Taking certain medicines.  · Your tubing requirements, such as intravenous (IV) therapy or catheters.  It is important that you learn how to decrease fall risks while at the hospital. Below are important tips that can help prevent falls.  SAFETY TIPS FOR PREVENTING FALLS  Talk about your risk of falling.  · Ask your health care provider why you are at risk for falling. Is it your medicine,  illness, tubing placement, or something else?  · Make a plan with your health care provider to keep you safe from falls.  · Ask your health care provider or pharmacist about side effects of your medicines. Some medicines can make you dizzy or affect your coordination.  Ask for help.  · Ask for help before getting out of bed. You may need to press your call button.  · Ask for assistance in getting safely to the toilet.  · Ask for a walker or cane to be put at your bedside. Ask that most of the side rails on your bed be placed up before your health care provider leaves the room.  · Ask family or friends to sit with you.  · Ask for things that are out of your reach, such as your glasses, hearing aids, telephone, bedside table, or call button.  Follow these tips to avoid falling:  · Stay lying or seated, rather than standing, while waiting for help.  · Wear rubber-soled slippers or shoes whenever you walk in the hospital.  · Avoid quick, sudden movements.  ¨ Change positions slowly.  ¨ Sit on the side of your bed before standing.  ¨ Stand up slowly and wait before you start to walk.  · Let your health care provider know if there is a spill on the floor.  · Pay careful attention to the medical equipment, electrical cords, and tubes around you.  · When you need help, use your call button by your bed or in the bathroom. Wait for one of your health care providers to help you.  · If you feel dizzy or unsure of your footing, return to bed and wait for assistance.  · Avoid being distracted by the TV, telephone, or another person in your room.  · Do not lean or support yourself on rolling objects, such as IV poles or bedside tables.     This information is not intended to replace advice given to you by your health care provider. Make sure you discuss any questions you have with your health care provider.     Document Released: 12/15/2001 Document Revised: 01/08/2016 Document Reviewed: 08/25/2013  [employer] Patient  Education ©2016 [employer]            PATIENT/FAMILY/RESPONSIBLE PARTY VERBALIZES UNDERSTANDING OF ABOVE EDUCATION.  COPY OF PAIN SCALE GIVEN AND REVIEWED WITH VERBALIZED UNDERSTANDING.

## 2022-01-17 ENCOUNTER — HOSPITAL ENCOUNTER (OUTPATIENT)
Facility: HOSPITAL | Age: 77
Setting detail: HOSPITAL OUTPATIENT SURGERY
Discharge: HOME | End: 2022-01-17
Attending: UROLOGY | Admitting: UROLOGY

## 2022-01-17 ENCOUNTER — APPOINTMENT (OUTPATIENT)
Dept: GENERAL RADIOLOGY | Facility: HOSPITAL | Age: 77
End: 2022-01-17

## 2022-01-17 ENCOUNTER — ANESTHESIA (OUTPATIENT)
Dept: PERIOP | Facility: HOSPITAL | Age: 77
End: 2022-01-17

## 2022-01-17 ENCOUNTER — ANESTHESIA EVENT (OUTPATIENT)
Dept: PERIOP | Facility: HOSPITAL | Age: 77
End: 2022-01-17

## 2022-01-17 VITALS
HEART RATE: 78 BPM | SYSTOLIC BLOOD PRESSURE: 120 MMHG | OXYGEN SATURATION: 95 % | TEMPERATURE: 97.7 F | RESPIRATION RATE: 16 BRPM | DIASTOLIC BLOOD PRESSURE: 74 MMHG

## 2022-01-17 DIAGNOSIS — N20.0 BILATERAL KIDNEY STONES: Primary | ICD-10-CM

## 2022-01-17 PROCEDURE — 25010000002 FENTANYL CITRATE (PF) 100 MCG/2ML SOLUTION: Performed by: NURSE ANESTHETIST, CERTIFIED REGISTERED

## 2022-01-17 PROCEDURE — 25010000002 DEXAMETHASONE PER 1 MG: Performed by: ANESTHESIOLOGY

## 2022-01-17 PROCEDURE — 25010000002 FENTANYL CITRATE (PF) 50 MCG/ML SOLUTION: Performed by: ANESTHESIOLOGY

## 2022-01-17 PROCEDURE — 25010000002 METOCLOPRAMIDE PER 10 MG: Performed by: ANESTHESIOLOGY

## 2022-01-17 PROCEDURE — 74018 RADEX ABDOMEN 1 VIEW: CPT

## 2022-01-17 PROCEDURE — 25010000002 DEXAMETHASONE PER 1 MG: Performed by: NURSE ANESTHETIST, CERTIFIED REGISTERED

## 2022-01-17 PROCEDURE — 50590 FRAGMENTING OF KIDNEY STONE: CPT | Performed by: UROLOGY

## 2022-01-17 PROCEDURE — 25010000002 ONDANSETRON PER 1 MG: Performed by: NURSE ANESTHETIST, CERTIFIED REGISTERED

## 2022-01-17 PROCEDURE — 25010000002 PROPOFOL 10 MG/ML EMULSION: Performed by: NURSE ANESTHETIST, CERTIFIED REGISTERED

## 2022-01-17 RX ORDER — SODIUM CHLORIDE 0.9 % (FLUSH) 0.9 %
3-10 SYRINGE (ML) INJECTION AS NEEDED
Status: DISCONTINUED | OUTPATIENT
Start: 2022-01-17 | End: 2022-01-17 | Stop reason: HOSPADM

## 2022-01-17 RX ORDER — METOCLOPRAMIDE HYDROCHLORIDE 5 MG/ML
5 INJECTION INTRAMUSCULAR; INTRAVENOUS ONCE AS NEEDED
Status: COMPLETED | OUTPATIENT
Start: 2022-01-17 | End: 2022-01-17

## 2022-01-17 RX ORDER — SODIUM CHLORIDE 0.9 % (FLUSH) 0.9 %
3 SYRINGE (ML) INJECTION EVERY 12 HOURS SCHEDULED
Status: DISCONTINUED | OUTPATIENT
Start: 2022-01-17 | End: 2022-01-17 | Stop reason: HOSPADM

## 2022-01-17 RX ORDER — FENTANYL CITRATE 50 UG/ML
INJECTION, SOLUTION INTRAMUSCULAR; INTRAVENOUS AS NEEDED
Status: DISCONTINUED | OUTPATIENT
Start: 2022-01-17 | End: 2022-01-17 | Stop reason: SURG

## 2022-01-17 RX ORDER — HYDROMORPHONE HYDROCHLORIDE 1 MG/ML
0.5 INJECTION, SOLUTION INTRAMUSCULAR; INTRAVENOUS; SUBCUTANEOUS
Status: DISCONTINUED | OUTPATIENT
Start: 2022-01-17 | End: 2022-01-17 | Stop reason: HOSPADM

## 2022-01-17 RX ORDER — ONDANSETRON 2 MG/ML
4 INJECTION INTRAMUSCULAR; INTRAVENOUS ONCE AS NEEDED
Status: DISCONTINUED | OUTPATIENT
Start: 2022-01-17 | End: 2022-01-17 | Stop reason: HOSPADM

## 2022-01-17 RX ORDER — NEOSTIGMINE METHYLSULFATE 5 MG/5 ML
SYRINGE (ML) INTRAVENOUS AS NEEDED
Status: DISCONTINUED | OUTPATIENT
Start: 2022-01-17 | End: 2022-01-17 | Stop reason: SURG

## 2022-01-17 RX ORDER — FLUMAZENIL 0.1 MG/ML
0.2 INJECTION INTRAVENOUS AS NEEDED
Status: DISCONTINUED | OUTPATIENT
Start: 2022-01-17 | End: 2022-01-17 | Stop reason: HOSPADM

## 2022-01-17 RX ORDER — TAMSULOSIN HYDROCHLORIDE 0.4 MG/1
1 CAPSULE ORAL NIGHTLY
Qty: 14 CAPSULE | Refills: 0 | Status: SHIPPED | OUTPATIENT
Start: 2022-01-17 | End: 2022-05-19

## 2022-01-17 RX ORDER — LIDOCAINE HYDROCHLORIDE 10 MG/ML
0.5 INJECTION, SOLUTION EPIDURAL; INFILTRATION; INTRACAUDAL; PERINEURAL ONCE AS NEEDED
Status: DISCONTINUED | OUTPATIENT
Start: 2022-01-17 | End: 2022-01-17 | Stop reason: HOSPADM

## 2022-01-17 RX ORDER — SODIUM CHLORIDE 0.9 % (FLUSH) 0.9 %
3 SYRINGE (ML) INJECTION AS NEEDED
Status: DISCONTINUED | OUTPATIENT
Start: 2022-01-17 | End: 2022-01-17 | Stop reason: HOSPADM

## 2022-01-17 RX ORDER — LIDOCAINE HYDROCHLORIDE 20 MG/ML
INJECTION, SOLUTION EPIDURAL; INFILTRATION; INTRACAUDAL; PERINEURAL AS NEEDED
Status: DISCONTINUED | OUTPATIENT
Start: 2022-01-17 | End: 2022-01-17 | Stop reason: SURG

## 2022-01-17 RX ORDER — LABETALOL HYDROCHLORIDE 5 MG/ML
5 INJECTION, SOLUTION INTRAVENOUS
Status: DISCONTINUED | OUTPATIENT
Start: 2022-01-17 | End: 2022-01-17 | Stop reason: HOSPADM

## 2022-01-17 RX ORDER — ONDANSETRON 2 MG/ML
INJECTION INTRAMUSCULAR; INTRAVENOUS AS NEEDED
Status: DISCONTINUED | OUTPATIENT
Start: 2022-01-17 | End: 2022-01-17 | Stop reason: SURG

## 2022-01-17 RX ORDER — ROCURONIUM BROMIDE 10 MG/ML
INJECTION, SOLUTION INTRAVENOUS AS NEEDED
Status: DISCONTINUED | OUTPATIENT
Start: 2022-01-17 | End: 2022-01-17 | Stop reason: SURG

## 2022-01-17 RX ORDER — OXYCODONE AND ACETAMINOPHEN 7.5; 325 MG/1; MG/1
2 TABLET ORAL EVERY 4 HOURS PRN
Status: DISCONTINUED | OUTPATIENT
Start: 2022-01-17 | End: 2022-01-17 | Stop reason: HOSPADM

## 2022-01-17 RX ORDER — PROPOFOL 10 MG/ML
VIAL (ML) INTRAVENOUS AS NEEDED
Status: DISCONTINUED | OUTPATIENT
Start: 2022-01-17 | End: 2022-01-17 | Stop reason: SURG

## 2022-01-17 RX ORDER — OXYCODONE AND ACETAMINOPHEN 10; 325 MG/1; MG/1
1 TABLET ORAL ONCE AS NEEDED
Status: DISCONTINUED | OUTPATIENT
Start: 2022-01-17 | End: 2022-01-17 | Stop reason: HOSPADM

## 2022-01-17 RX ORDER — FENTANYL CITRATE 50 UG/ML
25 INJECTION, SOLUTION INTRAMUSCULAR; INTRAVENOUS
Status: DISCONTINUED | OUTPATIENT
Start: 2022-01-17 | End: 2022-01-17 | Stop reason: HOSPADM

## 2022-01-17 RX ORDER — DEXAMETHASONE SODIUM PHOSPHATE 4 MG/ML
INJECTION, SOLUTION INTRA-ARTICULAR; INTRALESIONAL; INTRAMUSCULAR; INTRAVENOUS; SOFT TISSUE AS NEEDED
Status: DISCONTINUED | OUTPATIENT
Start: 2022-01-17 | End: 2022-01-17 | Stop reason: SURG

## 2022-01-17 RX ORDER — OXYCODONE AND ACETAMINOPHEN 7.5; 325 MG/1; MG/1
1 TABLET ORAL EVERY 4 HOURS PRN
Qty: 12 TABLET | Refills: 0 | Status: SHIPPED | OUTPATIENT
Start: 2022-01-17 | End: 2022-01-21 | Stop reason: SDUPTHER

## 2022-01-17 RX ORDER — IBUPROFEN 600 MG/1
600 TABLET ORAL ONCE AS NEEDED
Status: COMPLETED | OUTPATIENT
Start: 2022-01-17 | End: 2022-01-17

## 2022-01-17 RX ORDER — PHENYLEPHRINE HCL IN 0.9% NACL 1 MG/10 ML
SYRINGE (ML) INTRAVENOUS AS NEEDED
Status: DISCONTINUED | OUTPATIENT
Start: 2022-01-17 | End: 2022-01-17 | Stop reason: SURG

## 2022-01-17 RX ORDER — LIDOCAINE HYDROCHLORIDE 40 MG/ML
SOLUTION TOPICAL AS NEEDED
Status: DISCONTINUED | OUTPATIENT
Start: 2022-01-17 | End: 2022-01-17 | Stop reason: SURG

## 2022-01-17 RX ORDER — OXYCODONE AND ACETAMINOPHEN 7.5; 325 MG/1; MG/1
1 TABLET ORAL ONCE AS NEEDED
Status: DISCONTINUED | OUTPATIENT
Start: 2022-01-17 | End: 2022-01-17 | Stop reason: HOSPADM

## 2022-01-17 RX ORDER — SODIUM CHLORIDE, SODIUM LACTATE, POTASSIUM CHLORIDE, CALCIUM CHLORIDE 600; 310; 30; 20 MG/100ML; MG/100ML; MG/100ML; MG/100ML
100 INJECTION, SOLUTION INTRAVENOUS CONTINUOUS
Status: DISCONTINUED | OUTPATIENT
Start: 2022-01-17 | End: 2022-01-17 | Stop reason: HOSPADM

## 2022-01-17 RX ORDER — DEXAMETHASONE SODIUM PHOSPHATE 4 MG/ML
4 INJECTION, SOLUTION INTRA-ARTICULAR; INTRALESIONAL; INTRAMUSCULAR; INTRAVENOUS; SOFT TISSUE ONCE AS NEEDED
Status: COMPLETED | OUTPATIENT
Start: 2022-01-17 | End: 2022-01-17

## 2022-01-17 RX ORDER — SODIUM CHLORIDE, SODIUM LACTATE, POTASSIUM CHLORIDE, CALCIUM CHLORIDE 600; 310; 30; 20 MG/100ML; MG/100ML; MG/100ML; MG/100ML
1000 INJECTION, SOLUTION INTRAVENOUS CONTINUOUS
Status: DISCONTINUED | OUTPATIENT
Start: 2022-01-17 | End: 2022-01-17 | Stop reason: HOSPADM

## 2022-01-17 RX ORDER — ACETAMINOPHEN 500 MG
1000 TABLET ORAL ONCE
Status: COMPLETED | OUTPATIENT
Start: 2022-01-17 | End: 2022-01-17

## 2022-01-17 RX ORDER — ONDANSETRON 4 MG/1
4 TABLET, FILM COATED ORAL ONCE AS NEEDED
Status: DISCONTINUED | OUTPATIENT
Start: 2022-01-17 | End: 2022-01-17 | Stop reason: HOSPADM

## 2022-01-17 RX ORDER — MIDAZOLAM HYDROCHLORIDE 1 MG/ML
0.5 INJECTION INTRAMUSCULAR; INTRAVENOUS
Status: DISCONTINUED | OUTPATIENT
Start: 2022-01-17 | End: 2022-01-17 | Stop reason: HOSPADM

## 2022-01-17 RX ORDER — NALOXONE HCL 0.4 MG/ML
0.4 VIAL (ML) INJECTION AS NEEDED
Status: DISCONTINUED | OUTPATIENT
Start: 2022-01-17 | End: 2022-01-17 | Stop reason: HOSPADM

## 2022-01-17 RX ADMIN — DEXAMETHASONE SODIUM PHOSPHATE 4 MG: 4 INJECTION, SOLUTION INTRA-ARTICULAR; INTRALESIONAL; INTRAMUSCULAR; INTRAVENOUS; SOFT TISSUE at 15:01

## 2022-01-17 RX ADMIN — FENTANYL CITRATE 25 MCG: 50 INJECTION INTRAMUSCULAR; INTRAVENOUS at 16:23

## 2022-01-17 RX ADMIN — VASOPRESSIN 0.5 ML: 20 INJECTION INTRAVENOUS at 15:21

## 2022-01-17 RX ADMIN — LIDOCAINE HYDROCHLORIDE 80 MG: 20 INJECTION, SOLUTION EPIDURAL; INFILTRATION; INTRACAUDAL; PERINEURAL at 14:53

## 2022-01-17 RX ADMIN — OXYCODONE HYDROCHLORIDE AND ACETAMINOPHEN 2 TABLET: 7.5; 325 TABLET ORAL at 16:06

## 2022-01-17 RX ADMIN — IBUPROFEN 600 MG: 600 TABLET ORAL at 17:15

## 2022-01-17 RX ADMIN — Medication 3 MG: at 15:35

## 2022-01-17 RX ADMIN — PROPOFOL 90 MG: 10 INJECTION, EMULSION INTRAVENOUS at 14:53

## 2022-01-17 RX ADMIN — VASOPRESSIN 0.5 ML: 20 INJECTION INTRAVENOUS at 15:26

## 2022-01-17 RX ADMIN — LIDOCAINE HYDROCHLORIDE 1 EACH: 40 SOLUTION TOPICAL at 14:54

## 2022-01-17 RX ADMIN — VASOPRESSIN 0.5 ML: 20 INJECTION INTRAVENOUS at 15:13

## 2022-01-17 RX ADMIN — ROCURONIUM BROMIDE 20 MG: 10 INJECTION INTRAVENOUS at 14:53

## 2022-01-17 RX ADMIN — DEXAMETHASONE SODIUM PHOSPHATE 4 MG: 4 INJECTION, SOLUTION INTRAMUSCULAR; INTRAVENOUS at 14:35

## 2022-01-17 RX ADMIN — ONDANSETRON 4 MG: 2 INJECTION INTRAMUSCULAR; INTRAVENOUS at 15:01

## 2022-01-17 RX ADMIN — Medication 200 MCG: at 15:02

## 2022-01-17 RX ADMIN — GLYCOPYRROLATE 0.4 MG: 0.2 INJECTION, SOLUTION INTRAMUSCULAR; INTRAVENOUS at 15:35

## 2022-01-17 RX ADMIN — Medication 200 MCG: at 15:11

## 2022-01-17 RX ADMIN — FENTANYL CITRATE 100 MCG: 50 INJECTION, SOLUTION INTRAMUSCULAR; INTRAVENOUS at 14:50

## 2022-01-17 RX ADMIN — FENTANYL CITRATE 25 MCG: 50 INJECTION INTRAMUSCULAR; INTRAVENOUS at 16:18

## 2022-01-17 RX ADMIN — FENTANYL CITRATE 25 MCG: 50 INJECTION INTRAMUSCULAR; INTRAVENOUS at 16:33

## 2022-01-17 RX ADMIN — FENTANYL CITRATE 25 MCG: 50 INJECTION INTRAMUSCULAR; INTRAVENOUS at 16:28

## 2022-01-17 RX ADMIN — SODIUM CHLORIDE, POTASSIUM CHLORIDE, SODIUM LACTATE AND CALCIUM CHLORIDE 1000 ML: 600; 310; 30; 20 INJECTION, SOLUTION INTRAVENOUS at 12:10

## 2022-01-17 RX ADMIN — Medication 100 MCG: at 15:26

## 2022-01-17 RX ADMIN — ACETAMINOPHEN 1000 MG: 500 TABLET ORAL at 14:35

## 2022-01-17 RX ADMIN — Medication 200 MCG: at 15:03

## 2022-01-17 RX ADMIN — METOCLOPRAMIDE HYDROCHLORIDE 5 MG: 5 INJECTION INTRAMUSCULAR; INTRAVENOUS at 14:35

## 2022-01-17 RX ADMIN — Medication 200 MCG: at 15:07

## 2022-01-17 NOTE — DISCHARGE INSTRUCTIONS
YOUR NEXT PAIN MEDICATION IS DUE AT___8pm___________         General Anesthesia, Adult, Care After  This sheet gives you information about how to care for yourself after your procedure. Your health care provider may also give you more specific instructions. If you have problems or questions, contact your health care provider.  What can I expect after the procedure?  After the procedure, the following side effects are common:  · Pain or discomfort at the IV site.  · Nausea.  · Vomiting.  · Sore throat.  · Trouble concentrating.  · Feeling cold or chills.  · Weak or tired.  · Sleepiness and fatigue.  · Soreness and body aches. These side effects can affect parts of the body that were not involved in surgery.  Follow these instructions at home:   For at least 24 hours after the procedure:  1. Have a responsible adult stay with you. It is important to have someone help care for you until you are awake and alert.  2. Rest as needed.  3. Do not:  ? Participate in activities in which you could fall or become injured.  ? Drive.  ? Use heavy machinery.  ? Drink alcohol.  ? Take sleeping pills or medicines that cause drowsiness.  ? Make important decisions or sign legal documents.  ? Take care of children on your own.  Eating and drinking  · Follow any instructions from your health care provider about eating or drinking restrictions.  · When you feel hungry, start by eating small amounts of foods that are soft and easy to digest (bland), such as toast. Gradually return to your regular diet.  · Drink enough fluid to keep your urine pale yellow.  · If you vomit, rehydrate by drinking water, juice, or clear broth.  General instructions  1. If you have sleep apnea, surgery and certain medicines can increase your risk for breathing problems. Follow instructions from your health care provider about wearing your sleep device:  ? Anytime you are sleeping, including during daytime naps.  ? While taking prescription pain medicines,  sleeping medicines, or medicines that make you drowsy.  2. Return to your normal activities as told by your health care provider. Ask your health care provider what activities are safe for you.  3. Take over-the-counter and prescription medicines only as told by your health care provider.  4. If you smoke, do not smoke without supervision.  5. Keep all follow-up visits as told by your health care provider. This is important.  Contact a health care provider if:  · You have nausea or vomiting that does not get better with medicine.  · You cannot eat or drink without vomiting.  · You have pain that does not get better with medicine.  · You are unable to pass urine.  · You develop a skin rash.  · You have a fever.  · You have redness around your IV site that gets worse.  Get help right away if:  · You have difficulty breathing.  · You have chest pain.  · You have blood in your urine or stool, or you vomit blood.  Summary  · After the procedure, it is common to have a sore throat or nausea. It is also common to feel tired.  · Have a responsible adult stay with you for the first 24 hours after general anesthesia. It is important to have someone help care for you until you are awake and alert.  · When you feel hungry, start by eating small amounts of foods that are soft and easy to digest (bland), such as toast. Gradually return to your regular diet.  · Drink enough fluid to keep your urine pale yellow.  · Return to your normal activities as told by your health care provider. Ask your health care provider what activities are safe for you.  This information is not intended to replace advice given to you by your health care provider. Make sure you discuss any questions you have with your health care provider.  Document Revised: 12/21/2018 Document Reviewed: 08/03/2018    CALL YOUR PHYSICIAN IF YOU EXPERIENCE  INCREASED PAIN NOT HELPED BY YOUR PAIN MEDICATION.      .                                              Fall  Prevention in the Home      Falls can cause injuries. They can happen to people of all ages. There are many things you can do to make your home safe and to help prevent falls.    WHAT CAN I DO ON THE OUTSIDE OF MY HOME?  · Regularly fix the edges of walkways and driveways and fix any cracks.  · Remove anything that might make you trip as you walk through a door, such as a raised step or threshold.  · Trim any bushes or trees on the path to your home.  · Use bright outdoor lighting.  · Clear any walking paths of anything that might make someone trip, such as rocks or tools.  · Regularly check to see if handrails are loose or broken. Make sure that both sides of any steps have handrails.  · Any raised decks and porches should have guardrails on the edges.  · Have any leaves, snow, or ice cleared regularly.  · Use sand or salt on walking paths during winter.  · Clean up any spills in your garage right away. This includes oil or grease spills.  WHAT CAN I DO IN THE BATHROOM?    · Use night lights.  · Install grab bars by the toilet and in the tub and shower. Do not use towel bars as grab bars.  · Use non-skid mats or decals in the tub or shower.  · If you need to sit down in the shower, use a plastic, non-slip stool.  · Keep the floor dry. Clean up any water that spills on the floor as soon as it happens.  · Remove soap buildup in the tub or shower regularly.  · Attach bath mats securely with double-sided non-slip rug tape.  · Do not have throw rugs and other things on the floor that can make you trip.  WHAT CAN I DO IN THE BEDROOM?  · Use night lights.  · Make sure that you have a light by your bed that is easy to reach.  · Do not use any sheets or blankets that are too big for your bed. They should not hang down onto the floor.  · Have a firm chair that has side arms. You can use this for support while you get dressed.  · Do not have throw rugs and other things on the floor that can make you trip.  WHAT CAN I DO IN  THE KITCHEN?  · Clean up any spills right away.  · Avoid walking on wet floors.  · Keep items that you use a lot in easy-to-reach places.  · If you need to reach something above you, use a strong step stool that has a grab bar.  · Keep electrical cords out of the way.  · Do not use floor polish or wax that makes floors slippery. If you must use wax, use non-skid floor wax.  · Do not have throw rugs and other things on the floor that can make you trip.  WHAT CAN I DO WITH MY STAIRS?  · Do not leave any items on the stairs.  · Make sure that there are handrails on both sides of the stairs and use them. Fix handrails that are broken or loose. Make sure that handrails are as long as the stairways.  · Check any carpeting to make sure that it is firmly attached to the stairs. Fix any carpet that is loose or worn.  · Avoid having throw rugs at the top or bottom of the stairs. If you do have throw rugs, attach them to the floor with carpet tape.  · Make sure that you have a light switch at the top of the stairs and the bottom of the stairs. If you do not have them, ask someone to add them for you.  WHAT ELSE CAN I DO TO HELP PREVENT FALLS?  · Wear shoes that:  ¨ Do not have high heels.  ¨ Have rubber bottoms.  ¨ Are comfortable and fit you well.  ¨ Are closed at the toe. Do not wear sandals.  · If you use a stepladder:  ¨ Make sure that it is fully opened. Do not climb a closed stepladder.  ¨ Make sure that both sides of the stepladder are locked into place.  ¨ Ask someone to hold it for you, if possible.  · Clearly shai and make sure that you can see:  ¨ Any grab bars or handrails.  ¨ First and last steps.  ¨ Where the edge of each step is.  · Use tools that help you move around (mobility aids) if they are needed. These include:  ¨ Canes.  ¨ Walkers.  ¨ Scooters.  ¨ Crutches.  · Turn on the lights when you go into a dark area. Replace any light bulbs as soon as they burn out.  · Set up your furniture so you have a clear  path. Avoid moving your furniture around.  · If any of your floors are uneven, fix them.  · If there are any pets around you, be aware of where they are.  · Review your medicines with your doctor. Some medicines can make you feel dizzy. This can increase your chance of falling.  Ask your doctor what other things that you can do to help prevent falls.     This information is not intended to replace advice given to you by your health care provider. Make sure you discuss any questions you have with your health care provider.     Document Released: 10/14/2010 Document Revised: 05/03/2016 Document Reviewed: 01/22/2016  [employer] Interactive Patient Education ©2016 [employer]     PATIENT/FAMILY/RESPONSIBLE PARTY VERBALIZES UNDERSTANDING OF ABOVE EDUCATION.  COPY OF PAIN SCALE GIVEN AND REVIEWED WITH VERBALIZED UNDERSTANDING.

## 2022-01-17 NOTE — OP NOTE
EXTRACORPOREAL SHOCKWAVE LITHOTRIPSY  Procedure Note    Thao Mohr  1/17/2022    Pre-op Diagnosis:   Bilateral kidney stones [N20.0]    Post-op Diagnosis:     Post-Op Diagnosis Codes:     * Bilateral kidney stones [N20.0]    Procedure/CPT® Codes:      Procedure(s):  EXTRACORPOREAL SHOCKWAVE LITHOTRIPSY LEFT    Surgeon(s):  Ronnie Hayes MD    Anesthesia: General    Staff:   Circulator: Katya Butterfield RN  Vendor Representative: Eren Garcia    Estimated Blood Loss: minimal    Specimens:                None      Drains: * No LDAs found *    Findings: Multiple stones throughout kidney  3 stones in upper and mid pole treated  Lower pole untreated     Complications: none    Indications:. Patient has  3 stones  measuring approximately 4  mm in the left Upper pole and Interpolar area. After discussion of options, patient has given informed consent to undergo left ESWL.  All risks, benefits, and alternatives were discussed.    Operative Report: After informed consent was obtained, patient was brought to the operating room.  General endotracheal anesthesia was administered in the supine posistion.  Preoperative KUB was reviewed.  Please see above for preoperative KUB findings.    The shock head is brought into position and the stone is brought into the F2 plane for focus.  The stone was identified and the procedure began.  We gradually increased the energy level from 1 to 7.  This stone was treated at a rate of 90.  We paused for 2 minutes after 300 shocks to reduce the risk of renal damage.  The stone was periodically checked to make sure that we were on it and getting good breakup.  I treated the upper and mid pole stones.   We checked at 700, 1000, 1500, 2000, and 2500 shocks.  The stone did have good breakup.  I felt it was unnecessary to place a ureteral stent.  At the conclusion of 3000 shocks, the patient was awakened from anesthesia and transferred to the recovery room in satisfactory condition.             Ronnie Hayes MD     Date: 1/17/2022  Time: 15:35 CST

## 2022-01-17 NOTE — ANESTHESIA PREPROCEDURE EVALUATION
Anesthesia Evaluation     Patient summary reviewed   history of anesthetic complications: PONV  NPO Solid Status: > 8 hours  NPO Liquid Status: > 8 hours           Airway   Mallampati: I  No difficulty expected  Dental    (+) edentulous    Pulmonary - negative pulmonary ROS   Cardiovascular   Exercise tolerance: good (4-7 METS)    (+) hypertension, valvular problems/murmurs murmur and AI,   (-) angina      Neuro/Psych- negative ROS  GI/Hepatic/Renal/Endo    (+)  GERD,  renal disease stones and CRI, thyroid problem hypothyroidism    Musculoskeletal     Abdominal    Substance History      OB/GYN          Other   autoimmune disease lupus,                      Anesthesia Plan    ASA 3     general     intravenous induction     Anesthetic plan, all risks, benefits, and alternatives have been provided, discussed and informed consent has been obtained with: patient.

## 2022-01-17 NOTE — NURSING NOTE
Handoff from BOLA Lindquist states pt had several false eyelashes removed when tape was removed in OR . Ameena was told this by GINA Sheffield    Pt awake discussed issue with tape from OR had pulled away some false eyelashes pt voices no concerns at this time. BOLA Lindquist witnessed discussion .

## 2022-01-17 NOTE — ANESTHESIA PROCEDURE NOTES
Airway  Urgency: elective    Date/Time: 1/17/2022 2:55 PM    General Information and Staff    Patient location during procedure: OR  CRNA: Haroldo Cabrera CRNA    Indications and Patient Condition  Indications for airway management: airway protection    Preoxygenated: yes  Mask difficulty assessment: 1 - vent by mask    Final Airway Details  Final airway type: endotracheal airway      Successful airway: ETT  Cuffed: yes   Successful intubation technique: direct laryngoscopy  Facilitating devices/methods: intubating stylet  Endotracheal tube insertion site: oral  Blade: Solitario  Blade size: 2  ETT size (mm): 7.0  Cormack-Lehane Classification: grade I - full view of glottis  Placement verified by: chest auscultation and capnometry   Measured from: lips  ETT/EBT  to lips (cm): 22  Number of attempts at approach: 1  Assessment: lips, teeth, and gum same as pre-op and atraumatic intubation

## 2022-01-18 NOTE — ANESTHESIA POSTPROCEDURE EVALUATION
Patient: Thao Mohr    Procedure Summary     Date: 01/17/22 Room / Location:  [facility] OR 08 /  [facility]    Anesthesia Start: 1449 Anesthesia Stop: 1546    Procedure: EXTRACORPOREAL SHOCKWAVE LITHOTRIPSY LEFT (Left ) Diagnosis:       Bilateral kidney stones      (Bilateral kidney stones [N20.0])    Surgeons: Ronnie Hayes MD Provider: Geno Gomez CRNA    Anesthesia Type: general ASA Status: 3          Anesthesia Type: general    Vitals  Vitals Value Taken Time   BP 96/47 01/17/22 1658   Temp 97.7 °F (36.5 °C) 01/17/22 1658   Pulse 70 01/17/22 1701   Resp 15 01/17/22 1658   SpO2 97 % 01/17/22 1701   Vitals shown include unvalidated device data.        Post Anesthesia Care and Evaluation    Patient location during evaluation: PACU  Patient participation: complete - patient participated  Level of consciousness: awake and alert  Pain management: adequate  Airway patency: patent  Anesthetic complications: No anesthetic complications    Cardiovascular status: acceptable  Respiratory status: acceptable  Hydration status: acceptable    Comments: Blood pressure 120/74, pulse 78, temperature 97.7 °F (36.5 °C), resp. rate 16, SpO2 95 %, not currently breastfeeding.    Pt discharged from PACU based on tess score >8

## 2022-01-21 ENCOUNTER — TELEPHONE (OUTPATIENT)
Dept: UROLOGY | Facility: CLINIC | Age: 77
End: 2022-01-21

## 2022-01-21 DIAGNOSIS — N20.0 BILATERAL KIDNEY STONES: ICD-10-CM

## 2022-01-21 RX ORDER — OXYCODONE AND ACETAMINOPHEN 7.5; 325 MG/1; MG/1
1 TABLET ORAL EVERY 6 HOURS PRN
Qty: 12 TABLET | Refills: 0 | Status: SHIPPED | OUTPATIENT
Start: 2022-01-21 | End: 2022-05-19

## 2022-01-21 RX ORDER — OXYCODONE AND ACETAMINOPHEN 7.5; 325 MG/1; MG/1
1 TABLET ORAL EVERY 4 HOURS PRN
Qty: 12 TABLET | Refills: 0 | Status: CANCELLED | OUTPATIENT
Start: 2022-01-21

## 2022-01-21 NOTE — TELEPHONE ENCOUNTER
----- Message from Selina Alvares MA sent at 1/21/2022  2:17 PM CST -----  So Freddy is already gone and patient is calling and stating that she is still in pain and wanting to know if someone will call her in some more. JENNIFER is in media.

## 2022-01-21 NOTE — TELEPHONE ENCOUNTER
Caller: JENNIFER AYALA    Relationship: SELF    Best call back number: [phone]    Requested Prescriptions:   Requested Prescriptions     Pending Prescriptions Disp Refills   • oxyCODONE-acetaminophen (Percocet) 7.5-325 MG per tablet 12 tablet 0     Sig: Take 1 tablet by mouth Every 4 (Four) Hours As Needed for Severe Pain .        Pharmacy where request should be sent:  [id number] [address] [place] [facility] [address] - [phone] [facility] [phone]   [phone]    Additional details provided by patient: PT IS OUT OF MEDS, IN A LOT OF PAIN, AND STILL PASSING STONES.    Does the patient have less than a 3 day supply:  [x] Yes  [] No    Concepción Nieto Rep   01/21/22 08:51 CST

## 2022-01-27 ENCOUNTER — TELEPHONE (OUTPATIENT)
Dept: UROLOGY | Facility: CLINIC | Age: 77
End: 2022-01-27

## 2022-01-27 NOTE — PROGRESS NOTES
Subjective    Ms. Mohr is 76 y.o. female    Chief Complaint: 2 Week post op follow up.    History of Present Illness  Patient is a 76-year-old female who presents for follow-up after having left ESWL done by Dr. Hayes 01/17/2022.  Patient has passed some fragments she did not bring with her today.  She had a several stones in the left kidney that retreated from the upper pole to the midpole.  Of note reported good fragmentation stent was not placed.  Patient had a KUB prior to her appointment today she has some residual left flank pain but it is improved from previous.  She denies any fever chills or gross hematuria.     The following portions of the patient's history were reviewed and updated as appropriate: allergies, current medications, past family history, past medical history, past social history, past surgical history and problem list.    Review of Systems   Constitutional: Negative for chills and fever.   Gastrointestinal: Negative for abdominal pain, anal bleeding and blood in stool.   Genitourinary: Negative for decreased urine volume, difficulty urinating, dyspareunia, dysuria, enuresis, flank pain, frequency, genital sores, hematuria, menstrual problem, pelvic pain, urgency, vaginal bleeding, vaginal discharge and vaginal pain.         Current Outpatient Medications:   •  allopurinol (ZYLOPRIM) 100 MG tablet, Take 100 mg by mouth Daily., Disp: , Rfl:   •  calcitriol (ROCALTROL) 0.25 MCG capsule, Take 0.25 mcg by mouth Daily., Disp: , Rfl:   •  diazePAM (VALIUM) 5 MG tablet, Take 5 mg by mouth Daily., Disp: , Rfl:   •  dicyclomine (BENTYL) 10 MG capsule, Take 10 mg by mouth 4 (Four) Times a Day Before Meals & at Bedtime., Disp: , Rfl:   •  epoetin al (Procrit) 35467 UNIT/ML injection, Inject  under the skin into the appropriate area as directed As Needed., Disp: , Rfl:   •  escitalopram (LEXAPRO) 20 MG tablet, Take 20 mg by mouth Daily., Disp: , Rfl:   •  gabapentin (NEURONTIN) 300 MG capsule, Take 300  "mg by mouth Daily As Needed (for pain)., Disp: , Rfl:   •  levothyroxine (SYNTHROID, LEVOTHROID) 175 MCG tablet, Take 175 mcg by mouth Daily., Disp: , Rfl:   •  magnesium oxide (MAGOX) 400 (241.3 MG) MG tablet tablet, Take 400 mg by mouth Daily., Disp: , Rfl:   •  methocarbamol (ROBAXIN) 500 MG tablet, 500 mg 2 (Two) Times a Day As Needed for Muscle Spasms., Disp: , Rfl: 0  •  pantoprazole (PROTONIX) 40 MG EC tablet, Take 40 mg by mouth Daily., Disp: , Rfl:   •  potassium chloride (K-DUR,KLOR-CON) 10 MEQ ER tablet, Take 20 mEq by mouth 2 (Two) Times a Day., Disp: , Rfl:   •  Probiotic Product (PROBIOTIC DAILY PO), Take 1 tablet/day by mouth., Disp: , Rfl:   •  promethazine (PHENERGAN) 25 MG tablet, Take 25 mg by mouth Every 8 (Eight) Hours As Needed for Nausea or Vomiting., Disp: , Rfl:   •  QUEtiapine (SEROquel) 200 MG tablet, Take 200 mg by mouth Every Night., Disp: , Rfl:   •  SODIUM BICARBONATE PO, Take 650 mg by mouth 2 (Two) Times a Day As Needed (HEARTBURN)., Disp: , Rfl:   •  sucralfate (CARAFATE) 1 g tablet, Take 1 g by mouth 4 (Four) Times a Day., Disp: , Rfl:   •  tamsulosin (FLOMAX) 0.4 MG capsule 24 hr capsule, Take 1 capsule by mouth Every Night., Disp: 14 capsule, Rfl: 0  •  telmisartan (MICARDIS) 40 MG tablet, Take 40 mg by mouth Daily., Disp: , Rfl:   •  topiramate (TOPAMAX) 100 MG tablet, Take 100 mg by mouth Daily., Disp: , Rfl:   •  vitamin B-12 (CYANOCOBALAMIN) 1000 MCG tablet, Take 1,000 mcg by mouth Daily., Disp: , Rfl:   •  vitamin D (ERGOCALCIFEROL) 28523 UNITS capsule capsule, 50,000 Units 2 (Two) Times a Week. Sunday and Wednesday, Disp: , Rfl: 0  •  oxyCODONE-acetaminophen (Percocet) 7.5-325 MG per tablet, Take 1 tablet by mouth Every 6 (Six) Hours As Needed for Severe Pain ., Disp: 12 tablet, Rfl: 0    Past Medical History:   Diagnosis Date   • Acid reflux    • Aneurysm (HCC)     Pt states \"Somewhere near my spleen.\"   • Anxiety and depression    • Arthritis    • Diarrhea    • " Generalized headaches    • Heart murmur    • History of transfusion    • Hypertension    • Hypothyroid    • Kidney stones    • Lupus (HCC)    • Migraines    • MRSA (methicillin resistant Staphylococcus aureus)     in past , on face   • Nausea    • PONV (postoperative nausea and vomiting)    • Renal failure     21%   • Thrombosis 2007    RIGHT KNEE       Past Surgical History:   Procedure Laterality Date   • APPENDECTOMY     • CHOLECYSTECTOMY     • COLONOSCOPY     • ENDOSCOPY N/A 10/30/2020    Procedure: ESOPHAGOGASTRODUODENOSCOPY WITH ANESTHESIA;  Surgeon: Naresh Heard DO;  Location: [facility] ENDOSCOPY;  Service: Gastroenterology;  Laterality: N/A;  preop; abdominal pain  postop; normal   PCP Greg Rey    • EXTRACORPOREAL SHOCK WAVE LITHOTRIPSY (ESWL) Right 8/16/2021    Procedure: EXTRACORPOREAL SHOCKWAVE LITHOTRIPSY RIGHT;  Surgeon: Ronnie Hayes MD;  Location: [facility] OR;  Service: Urology;  Laterality: Right;   • EXTRACORPOREAL SHOCK WAVE LITHOTRIPSY (ESWL) Left 1/17/2022    Procedure: EXTRACORPOREAL SHOCKWAVE LITHOTRIPSY LEFT;  Surgeon: Ronnie Hayes MD;  Location: [facility] OR;  Service: Urology;  Laterality: Left;   • HYSTERECTOMY     • JOINT REPLACEMENT     • REPLACEMENT TOTAL KNEE Left    • SHOULDER ROTATOR CUFF REPAIR Right    • TOTAL HIP ARTHROPLASTY Right    • TOTAL SHOULDER ARTHROPLASTY W/ DISTAL CLAVICLE EXCISION Left 12/27/2019    Procedure: LEFT REVERSE TOTAL SHOULDER ARTHROPLASTY;  Surgeon: Dhaval Yepez MD;  Location: [facility] OR;  Service: Orthopedics   • WRIST FRACTURE SURGERY Left 2 weeks ago       Social History     Socioeconomic History   • Marital status:    Tobacco Use   • Smoking status: Never Smoker   • Smokeless tobacco: Never Used   Vaping Use   • Vaping Use: Never used   Substance and Sexual Activity   • Alcohol use: No   • Drug use: No   • Sexual activity: Defer       Family History   Problem Relation Age of Onset   • Cancer Father    • Coronary artery disease  "Brother    • Colon cancer Paternal Aunt    • Colon polyps Neg Hx    • Esophageal cancer Neg Hx        Objective    Temp 98.9 °F (37.2 °C)   Ht 159.5 cm (62.8\")   Wt 68.3 kg (150 lb 9.6 oz)   BMI 26.85 kg/m²     Physical Exam  Vitals reviewed.   Constitutional:       General: She is not in acute distress.     Appearance: She is not toxic-appearing.   HENT:      Head: Normocephalic and atraumatic.   Pulmonary:      Effort: Pulmonary effort is normal.   Neurological:      General: No focal deficit present.      Mental Status: She is alert and oriented to person, place, and time.   Psychiatric:         Mood and Affect: Mood normal.         Behavior: Behavior normal.         KUB independent review    A KUB is available for me to review today.  The image is inspected for a bowel gas pattern and the general bone structure of the spine and pelvis. The kidneys are then inspected closely.  Renal outline is noted if identifiable. The kidney, collecting system, and anticipated path of the ureter are examined for calcifications including those in the true pelvis.  This film reveals:    On the right there is a single renal stone measuring 3 mm.    On the left there are multiple renal stones.  2 to 3 mm left upper pole stone and approximate 4 mm stone in the midpole..      Assessment and Plan    Diagnoses and all orders for this visit:    1. Kidney stones (Primary)  -     XR Abdomen KUB; Future    Follow ESWL patient has passed fragments she has had some flank pain but it has improved from previous KUB reveals good interval improvement in stone burden the upper pole shows just a faint 2 to 3 mm stone in the upper pole and there is approximately 4 mm stone in the midpole I explained I feel she could still pass some more fragments from the as well so she will return in 3 months with a KUB I did give her a stone dietary sheet.            "

## 2022-01-27 NOTE — TELEPHONE ENCOUNTER
Called the patient about her KUB prior to her appointment on 1/31/22 but no answer did leave a message for the patient that this needed to be completed or appointment will need to be RS

## 2022-01-31 ENCOUNTER — HOSPITAL ENCOUNTER (OUTPATIENT)
Dept: GENERAL RADIOLOGY | Facility: HOSPITAL | Age: 77
Discharge: HOME | End: 2022-01-31
Admitting: UROLOGY

## 2022-01-31 ENCOUNTER — OFFICE VISIT (OUTPATIENT)
Dept: UROLOGY | Facility: CLINIC | Age: 77
End: 2022-01-31

## 2022-01-31 VITALS — TEMPERATURE: 98.9 F | WEIGHT: 150.6 LBS | HEIGHT: 63 IN | BODY MASS INDEX: 26.68 KG/M2

## 2022-01-31 DIAGNOSIS — N20.0 KIDNEY STONES: Primary | ICD-10-CM

## 2022-01-31 DIAGNOSIS — N20.0 BILATERAL KIDNEY STONES: ICD-10-CM

## 2022-01-31 PROCEDURE — 74018 RADEX ABDOMEN 1 VIEW: CPT

## 2022-01-31 PROCEDURE — 99024 POSTOP FOLLOW-UP VISIT: CPT | Performed by: PHYSICIAN ASSISTANT

## 2022-03-23 ENCOUNTER — TRANSCRIBE ORDERS (OUTPATIENT)
Dept: ADMINISTRATIVE | Facility: HOSPITAL | Age: 77
End: 2022-03-23

## 2022-03-23 DIAGNOSIS — I50.22 CHRONIC SYSTOLIC HEART FAILURE: Primary | ICD-10-CM

## 2022-03-28 ENCOUNTER — TRANSCRIBE ORDERS (OUTPATIENT)
Dept: ADMINISTRATIVE | Facility: HOSPITAL | Age: 77
End: 2022-03-28

## 2022-03-28 ENCOUNTER — LAB (OUTPATIENT)
Dept: LAB | Facility: HOSPITAL | Age: 77
End: 2022-03-28

## 2022-03-28 DIAGNOSIS — M25.312 INSTABILITY OF LEFT SHOULDER JOINT: Primary | ICD-10-CM

## 2022-03-28 DIAGNOSIS — Z96.612 PRESENCE OF LEFT ARTIFICIAL SHOULDER JOINT: ICD-10-CM

## 2022-03-28 DIAGNOSIS — M25.512 LEFT SHOULDER PAIN, UNSPECIFIED CHRONICITY: ICD-10-CM

## 2022-03-28 DIAGNOSIS — M25.312 INSTABILITY OF LEFT SHOULDER JOINT: ICD-10-CM

## 2022-03-28 LAB
CRP SERPL-MCNC: <0.3 MG/DL (ref 0–0.5)
DEPRECATED RDW RBC AUTO: 47.5 FL (ref 37–54)
ERYTHROCYTE [DISTWIDTH] IN BLOOD BY AUTOMATED COUNT: 13.9 % (ref 12.3–15.4)
ERYTHROCYTE [SEDIMENTATION RATE] IN BLOOD: 6 MM/HR (ref 0–30)
HCT VFR BLD AUTO: 32.8 % (ref 34–46.6)
HGB BLD-MCNC: 10.3 G/DL (ref 12–15.9)
MCH RBC QN AUTO: 29.5 PG (ref 26.6–33)
MCHC RBC AUTO-ENTMCNC: 31.4 G/DL (ref 31.5–35.7)
MCV RBC AUTO: 94 FL (ref 79–97)
PLATELET # BLD AUTO: 254 10*3/MM3 (ref 140–450)
PMV BLD AUTO: 11.3 FL (ref 6–12)
RBC # BLD AUTO: 3.49 10*6/MM3 (ref 3.77–5.28)
WBC NRBC COR # BLD: 7.93 10*3/MM3 (ref 3.4–10.8)

## 2022-03-28 PROCEDURE — 36415 COLL VENOUS BLD VENIPUNCTURE: CPT

## 2022-03-28 PROCEDURE — 85027 COMPLETE CBC AUTOMATED: CPT

## 2022-03-28 PROCEDURE — 86140 C-REACTIVE PROTEIN: CPT

## 2022-03-28 PROCEDURE — 85652 RBC SED RATE AUTOMATED: CPT

## 2022-04-04 ENCOUNTER — HOSPITAL ENCOUNTER (OUTPATIENT)
Dept: CT IMAGING | Facility: HOSPITAL | Age: 77
Discharge: HOME | End: 2022-04-04
Admitting: PHYSICIAN ASSISTANT

## 2022-04-04 DIAGNOSIS — M25.312 INSTABILITY OF LEFT SHOULDER JOINT: ICD-10-CM

## 2022-04-04 PROCEDURE — 73200 CT UPPER EXTREMITY W/O DYE: CPT

## 2022-04-08 ENCOUNTER — APPOINTMENT (OUTPATIENT)
Dept: GENERAL RADIOLOGY | Age: 77
DRG: 674 | End: 2022-04-08
Payer: MEDICARE

## 2022-04-08 ENCOUNTER — HOSPITAL ENCOUNTER (INPATIENT)
Age: 77
LOS: 11 days | Discharge: HOME | DRG: 674 | End: 2022-04-19
Attending: INTERNAL MEDICINE | Admitting: INTERNAL MEDICINE
Payer: MEDICARE

## 2022-04-08 DIAGNOSIS — N17.9 ACUTE KIDNEY INJURY SUPERIMPOSED ON CHRONIC KIDNEY DISEASE (HCC): Primary | ICD-10-CM

## 2022-04-08 DIAGNOSIS — N18.9 ACUTE KIDNEY INJURY SUPERIMPOSED ON CHRONIC KIDNEY DISEASE (HCC): Primary | ICD-10-CM

## 2022-04-08 DIAGNOSIS — D64.9 ANEMIA, UNSPECIFIED TYPE: ICD-10-CM

## 2022-04-08 PROBLEM — D63.1 ANEMIA DUE TO CHRONIC KIDNEY DISEASE: Status: ACTIVE | Noted: 2022-04-08

## 2022-04-08 LAB
ALBUMIN SERPL-MCNC: 4.3 G/DL (ref 3.5–5.2)
ALP BLD-CCNC: 195 U/L (ref 35–104)
ALT SERPL-CCNC: 18 U/L (ref 5–33)
ANION GAP SERPL CALCULATED.3IONS-SCNC: 15 MMOL/L (ref 7–19)
AST SERPL-CCNC: 12 U/L (ref 5–32)
BACTERIA: NEGATIVE /HPF
BASOPHILS ABSOLUTE: 0.1 K/UL (ref 0–0.2)
BASOPHILS RELATIVE PERCENT: 1.3 % (ref 0–1)
BILIRUB SERPL-MCNC: 0.3 MG/DL (ref 0.2–1.2)
BILIRUBIN URINE: NEGATIVE
BLOOD, URINE: NEGATIVE
BUN BLDV-MCNC: 54 MG/DL (ref 8–23)
CALCIUM SERPL-MCNC: 8 MG/DL (ref 8.8–10.2)
CHLORIDE BLD-SCNC: 111 MMOL/L (ref 98–111)
CLARITY: CLEAR
CO2: 18 MMOL/L (ref 22–29)
COLOR: YELLOW
CREAT SERPL-MCNC: 3.3 MG/DL (ref 0.5–0.9)
CRYSTALS, UA: ABNORMAL /HPF
EOSINOPHILS ABSOLUTE: 0.6 K/UL (ref 0–0.6)
EOSINOPHILS RELATIVE PERCENT: 8.2 % (ref 0–5)
EPITHELIAL CELLS, UA: 1 /HPF (ref 0–5)
GFR AFRICAN AMERICAN: 16
GFR NON-AFRICAN AMERICAN: 14
GLUCOSE BLD-MCNC: 107 MG/DL (ref 74–109)
GLUCOSE URINE: NEGATIVE MG/DL
HCT VFR BLD CALC: 32.6 % (ref 37–47)
HEMOGLOBIN: 9.8 G/DL (ref 12–16)
HYALINE CASTS: 4 /HPF (ref 0–8)
IMMATURE GRANULOCYTES #: 0 K/UL
KETONES, URINE: NEGATIVE MG/DL
LEUKOCYTE ESTERASE, URINE: ABNORMAL
LYMPHOCYTES ABSOLUTE: 2.2 K/UL (ref 1.1–4.5)
LYMPHOCYTES RELATIVE PERCENT: 30.6 % (ref 20–40)
MCH RBC QN AUTO: 29.5 PG (ref 27–31)
MCHC RBC AUTO-ENTMCNC: 30.1 G/DL (ref 33–37)
MCV RBC AUTO: 98.2 FL (ref 81–99)
MONOCYTES ABSOLUTE: 0.8 K/UL (ref 0–0.9)
MONOCYTES RELATIVE PERCENT: 10.6 % (ref 0–10)
NEUTROPHILS ABSOLUTE: 3.5 K/UL (ref 1.5–7.5)
NEUTROPHILS RELATIVE PERCENT: 48.9 % (ref 50–65)
NITRITE, URINE: NEGATIVE
PDW BLD-RTO: 15.5 % (ref 11.5–14.5)
PH UA: 5.5 (ref 5–8)
PLATELET # BLD: 237 K/UL (ref 130–400)
PMV BLD AUTO: 10.8 FL (ref 9.4–12.3)
POTASSIUM REFLEX MAGNESIUM: 4.4 MMOL/L (ref 3.5–5)
PROTEIN UA: NEGATIVE MG/DL
RBC # BLD: 3.32 M/UL (ref 4.2–5.4)
RBC UA: 1 /HPF (ref 0–4)
SODIUM BLD-SCNC: 144 MMOL/L (ref 136–145)
SPECIFIC GRAVITY UA: 1.01 (ref 1–1.03)
TOTAL PROTEIN: 6.6 G/DL (ref 6.6–8.7)
UROBILINOGEN, URINE: 0.2 E.U./DL
WBC # BLD: 7.1 K/UL (ref 4.8–10.8)
WBC UA: 33 /HPF (ref 0–5)

## 2022-04-08 PROCEDURE — 82607 VITAMIN B-12: CPT

## 2022-04-08 PROCEDURE — 81001 URINALYSIS AUTO W/SCOPE: CPT

## 2022-04-08 PROCEDURE — 93005 ELECTROCARDIOGRAM TRACING: CPT | Performed by: NURSE PRACTITIONER

## 2022-04-08 PROCEDURE — 2500000003 HC RX 250 WO HCPCS

## 2022-04-08 PROCEDURE — 36415 COLL VENOUS BLD VENIPUNCTURE: CPT

## 2022-04-08 PROCEDURE — 87086 URINE CULTURE/COLONY COUNT: CPT

## 2022-04-08 PROCEDURE — 6360000002 HC RX W HCPCS

## 2022-04-08 PROCEDURE — 85025 COMPLETE CBC W/AUTO DIFF WBC: CPT

## 2022-04-08 PROCEDURE — 71045 X-RAY EXAM CHEST 1 VIEW: CPT

## 2022-04-08 PROCEDURE — 2580000003 HC RX 258: Performed by: NURSE PRACTITIONER

## 2022-04-08 PROCEDURE — 87077 CULTURE AEROBIC IDENTIFY: CPT

## 2022-04-08 PROCEDURE — 82728 ASSAY OF FERRITIN: CPT

## 2022-04-08 PROCEDURE — 99284 EMERGENCY DEPT VISIT MOD MDM: CPT

## 2022-04-08 PROCEDURE — 82746 ASSAY OF FOLIC ACID SERUM: CPT

## 2022-04-08 PROCEDURE — 83550 IRON BINDING TEST: CPT

## 2022-04-08 PROCEDURE — 83540 ASSAY OF IRON: CPT

## 2022-04-08 PROCEDURE — 1210000000 HC MED SURG R&B

## 2022-04-08 PROCEDURE — 2580000003 HC RX 258

## 2022-04-08 PROCEDURE — 82306 VITAMIN D 25 HYDROXY: CPT

## 2022-04-08 PROCEDURE — 80053 COMPREHEN METABOLIC PANEL: CPT

## 2022-04-08 RX ORDER — 0.9 % SODIUM CHLORIDE 0.9 %
500 INTRAVENOUS SOLUTION INTRAVENOUS ONCE
Status: COMPLETED | OUTPATIENT
Start: 2022-04-08 | End: 2022-04-08

## 2022-04-08 RX ORDER — SODIUM CHLORIDE 0.9 % (FLUSH) 0.9 %
5-40 SYRINGE (ML) INJECTION EVERY 12 HOURS SCHEDULED
Status: DISCONTINUED | OUTPATIENT
Start: 2022-04-08 | End: 2022-04-19 | Stop reason: HOSPADM

## 2022-04-08 RX ORDER — SODIUM CHLORIDE 0.9 % (FLUSH) 0.9 %
5-40 SYRINGE (ML) INJECTION PRN
Status: DISCONTINUED | OUTPATIENT
Start: 2022-04-08 | End: 2022-04-19 | Stop reason: HOSPADM

## 2022-04-08 RX ORDER — ONDANSETRON 4 MG/1
4 TABLET, ORALLY DISINTEGRATING ORAL EVERY 8 HOURS PRN
Status: DISCONTINUED | OUTPATIENT
Start: 2022-04-08 | End: 2022-04-19 | Stop reason: HOSPADM

## 2022-04-08 RX ORDER — ACETAMINOPHEN 325 MG/1
650 TABLET ORAL EVERY 6 HOURS PRN
Status: DISCONTINUED | OUTPATIENT
Start: 2022-04-08 | End: 2022-04-10

## 2022-04-08 RX ORDER — METOPROLOL SUCCINATE 25 MG/1
25 TABLET, EXTENDED RELEASE ORAL DAILY
COMMUNITY

## 2022-04-08 RX ORDER — HEPARIN SODIUM 5000 [USP'U]/ML
5000 INJECTION, SOLUTION INTRAVENOUS; SUBCUTANEOUS EVERY 8 HOURS SCHEDULED
Status: DISCONTINUED | OUTPATIENT
Start: 2022-04-08 | End: 2022-04-19 | Stop reason: HOSPADM

## 2022-04-08 RX ORDER — ACETAMINOPHEN 650 MG/1
650 SUPPOSITORY RECTAL EVERY 6 HOURS PRN
Status: DISCONTINUED | OUTPATIENT
Start: 2022-04-08 | End: 2022-04-10

## 2022-04-08 RX ORDER — ONDANSETRON 2 MG/ML
4 INJECTION INTRAMUSCULAR; INTRAVENOUS EVERY 6 HOURS PRN
Status: DISCONTINUED | OUTPATIENT
Start: 2022-04-08 | End: 2022-04-19 | Stop reason: HOSPADM

## 2022-04-08 RX ORDER — TIZANIDINE 4 MG/1
4 TABLET ORAL EVERY 8 HOURS PRN
COMMUNITY

## 2022-04-08 RX ORDER — SODIUM CHLORIDE 9 MG/ML
INJECTION, SOLUTION INTRAVENOUS PRN
Status: DISCONTINUED | OUTPATIENT
Start: 2022-04-08 | End: 2022-04-19 | Stop reason: HOSPADM

## 2022-04-08 RX ORDER — PANTOPRAZOLE SODIUM 40 MG/1
40 TABLET, DELAYED RELEASE ORAL DAILY
COMMUNITY

## 2022-04-08 RX ADMIN — SODIUM BICARBONATE: 84 INJECTION, SOLUTION INTRAVENOUS at 23:41

## 2022-04-08 RX ADMIN — HEPARIN SODIUM 5000 UNITS: 5000 INJECTION INTRAVENOUS; SUBCUTANEOUS at 23:43

## 2022-04-08 RX ADMIN — SODIUM CHLORIDE 500 ML: 9 INJECTION, SOLUTION INTRAVENOUS at 21:26

## 2022-04-08 ASSESSMENT — PAIN DESCRIPTION - LOCATION: LOCATION: SHOULDER

## 2022-04-08 ASSESSMENT — ENCOUNTER SYMPTOMS
ABDOMINAL PAIN: 0
DIARRHEA: 1
SHORTNESS OF BREATH: 1

## 2022-04-08 ASSESSMENT — PAIN DESCRIPTION - PAIN TYPE: TYPE: CHRONIC PAIN

## 2022-04-08 ASSESSMENT — PAIN SCALES - GENERAL: PAINLEVEL_OUTOF10: 7

## 2022-04-08 NOTE — ED TRIAGE NOTES
Pt arrived ambulatory to ED Triage with c/o abnormal labs and states she was sent from Dr. Hardin Payment office in regards to her kidney function. No acute distress noted. VSS, pt afebrile. Pt c/o chronic pain denies anything new or concerning with her pain today.

## 2022-04-09 ENCOUNTER — APPOINTMENT (OUTPATIENT)
Dept: ULTRASOUND IMAGING | Age: 77
DRG: 674 | End: 2022-04-09
Payer: MEDICARE

## 2022-04-09 LAB
ALBUMIN SERPL-MCNC: 3.4 G/DL (ref 3.5–5.2)
ALP BLD-CCNC: 179 U/L (ref 35–104)
ALT SERPL-CCNC: 13 U/L (ref 5–33)
ANION GAP SERPL CALCULATED.3IONS-SCNC: 13 MMOL/L (ref 7–19)
AST SERPL-CCNC: 11 U/L (ref 5–32)
BILIRUB SERPL-MCNC: <0.2 MG/DL (ref 0.2–1.2)
BUN BLDV-MCNC: 58 MG/DL (ref 8–23)
CALCIUM SERPL-MCNC: 7.5 MG/DL (ref 8.8–10.2)
CHLORIDE BLD-SCNC: 116 MMOL/L (ref 98–111)
CO2: 13 MMOL/L (ref 22–29)
CREAT SERPL-MCNC: 3.2 MG/DL (ref 0.5–0.9)
CREATININE URINE: 51 MG/DL (ref 4.2–622)
EOSINOPHIL,URINE: NORMAL
FERRITIN: 78.2 NG/ML (ref 13–150)
FERRITIN: 85 NG/ML (ref 13–150)
FOLATE: 15.2 NG/ML (ref 4.8–37.3)
FOLATE: 15.3 NG/ML (ref 4.8–37.3)
GFR AFRICAN AMERICAN: 17
GFR NON-AFRICAN AMERICAN: 14
GLUCOSE BLD-MCNC: 86 MG/DL (ref 74–109)
HCT VFR BLD CALC: 30.7 % (ref 37–47)
HEMOGLOBIN: 8.5 G/DL (ref 12–16)
IRON SATURATION: 23 % (ref 14–50)
IRON SATURATION: 23 % (ref 14–50)
IRON: 58 UG/DL (ref 37–145)
IRON: 60 UG/DL (ref 37–145)
MAGNESIUM: 1.5 MG/DL (ref 1.6–2.4)
MCH RBC QN AUTO: 29.7 PG (ref 27–31)
MCHC RBC AUTO-ENTMCNC: 27.7 G/DL (ref 33–37)
MCV RBC AUTO: 107.3 FL (ref 81–99)
OSMOLALITY URINE: 389 MOSM/KG (ref 250–1200)
PARATHYROID HORMONE INTACT: 144.8 PG/ML (ref 15–65)
PDW BLD-RTO: 15.4 % (ref 11.5–14.5)
PHOSPHORUS: 4.8 MG/DL (ref 2.5–4.5)
PLATELET # BLD: 190 K/UL (ref 130–400)
PMV BLD AUTO: 10.7 FL (ref 9.4–12.3)
POTASSIUM REFLEX MAGNESIUM: 4.2 MMOL/L (ref 3.5–5)
PROTEIN PROTEIN: 6 MG/DL (ref 15–45)
RBC # BLD: 2.86 M/UL (ref 4.2–5.4)
SODIUM BLD-SCNC: 142 MMOL/L (ref 136–145)
SODIUM URINE: 94 MMOL/L
TOTAL IRON BINDING CAPACITY: 251 UG/DL (ref 250–400)
TOTAL IRON BINDING CAPACITY: 257 UG/DL (ref 250–400)
TOTAL PROTEIN: 5.2 G/DL (ref 6.6–8.7)
VITAMIN B-12: 796 PG/ML (ref 211–946)
VITAMIN B-12: 807 PG/ML (ref 211–946)
VITAMIN D 25-HYDROXY: 30.4 NG/ML
VITAMIN D 25-HYDROXY: 30.4 NG/ML
VITAMIN D 25-HYDROXY: 31.6 NG/ML
WBC # BLD: 7.6 K/UL (ref 4.8–10.8)

## 2022-04-09 PROCEDURE — 82570 ASSAY OF URINE CREATININE: CPT

## 2022-04-09 PROCEDURE — 80053 COMPREHEN METABOLIC PANEL: CPT

## 2022-04-09 PROCEDURE — 82306 VITAMIN D 25 HYDROXY: CPT

## 2022-04-09 PROCEDURE — 6360000002 HC RX W HCPCS

## 2022-04-09 PROCEDURE — 83540 ASSAY OF IRON: CPT

## 2022-04-09 PROCEDURE — 84156 ASSAY OF PROTEIN URINE: CPT

## 2022-04-09 PROCEDURE — 82607 VITAMIN B-12: CPT

## 2022-04-09 PROCEDURE — 6370000000 HC RX 637 (ALT 250 FOR IP): Performed by: NURSE PRACTITIONER

## 2022-04-09 PROCEDURE — 1210000000 HC MED SURG R&B

## 2022-04-09 PROCEDURE — 83970 ASSAY OF PARATHORMONE: CPT

## 2022-04-09 PROCEDURE — 2580000003 HC RX 258: Performed by: HOSPITALIST

## 2022-04-09 PROCEDURE — 2580000003 HC RX 258: Performed by: INTERNAL MEDICINE

## 2022-04-09 PROCEDURE — 2500000003 HC RX 250 WO HCPCS: Performed by: HOSPITALIST

## 2022-04-09 PROCEDURE — 82728 ASSAY OF FERRITIN: CPT

## 2022-04-09 PROCEDURE — 84100 ASSAY OF PHOSPHORUS: CPT

## 2022-04-09 PROCEDURE — 6360000002 HC RX W HCPCS: Performed by: HOSPITALIST

## 2022-04-09 PROCEDURE — 87205 SMEAR GRAM STAIN: CPT

## 2022-04-09 PROCEDURE — 82746 ASSAY OF FOLIC ACID SERUM: CPT

## 2022-04-09 PROCEDURE — 83550 IRON BINDING TEST: CPT

## 2022-04-09 PROCEDURE — 83935 ASSAY OF URINE OSMOLALITY: CPT

## 2022-04-09 PROCEDURE — 76770 US EXAM ABDO BACK WALL COMP: CPT

## 2022-04-09 PROCEDURE — 36415 COLL VENOUS BLD VENIPUNCTURE: CPT

## 2022-04-09 PROCEDURE — 84300 ASSAY OF URINE SODIUM: CPT

## 2022-04-09 PROCEDURE — 85027 COMPLETE CBC AUTOMATED: CPT

## 2022-04-09 PROCEDURE — 83735 ASSAY OF MAGNESIUM: CPT

## 2022-04-09 RX ORDER — CALCIUM CARBONATE/VITAMIN D3 500-10/5ML
400 LIQUID (ML) ORAL DAILY
Status: DISCONTINUED | OUTPATIENT
Start: 2022-04-09 | End: 2022-04-09 | Stop reason: CLARIF

## 2022-04-09 RX ORDER — TIZANIDINE 4 MG/1
4 TABLET ORAL EVERY 8 HOURS PRN
Status: DISCONTINUED | OUTPATIENT
Start: 2022-04-09 | End: 2022-04-19 | Stop reason: HOSPADM

## 2022-04-09 RX ORDER — SODIUM BICARBONATE 650 MG/1
650 TABLET ORAL 3 TIMES DAILY
Status: DISCONTINUED | OUTPATIENT
Start: 2022-04-09 | End: 2022-04-12

## 2022-04-09 RX ORDER — LEVOTHYROXINE SODIUM 137 UG/1
137 TABLET ORAL DAILY
Status: DISCONTINUED | OUTPATIENT
Start: 2022-04-09 | End: 2022-04-19 | Stop reason: HOSPADM

## 2022-04-09 RX ORDER — ESCITALOPRAM OXALATE 10 MG/1
20 TABLET ORAL DAILY
Status: DISCONTINUED | OUTPATIENT
Start: 2022-04-09 | End: 2022-04-19 | Stop reason: HOSPADM

## 2022-04-09 RX ORDER — QUETIAPINE FUMARATE 100 MG/1
200 TABLET, FILM COATED ORAL NIGHTLY
Status: DISCONTINUED | OUTPATIENT
Start: 2022-04-09 | End: 2022-04-09

## 2022-04-09 RX ORDER — UBIDECARENONE 75 MG
50 CAPSULE ORAL DAILY
Status: DISCONTINUED | OUTPATIENT
Start: 2022-04-09 | End: 2022-04-19 | Stop reason: HOSPADM

## 2022-04-09 RX ORDER — CALCITRIOL 0.25 UG/1
0.25 CAPSULE, LIQUID FILLED ORAL DAILY
Status: DISCONTINUED | OUTPATIENT
Start: 2022-04-09 | End: 2022-04-19 | Stop reason: HOSPADM

## 2022-04-09 RX ORDER — TOPIRAMATE 100 MG/1
100 TABLET, FILM COATED ORAL 2 TIMES DAILY
Status: DISCONTINUED | OUTPATIENT
Start: 2022-04-09 | End: 2022-04-12

## 2022-04-09 RX ORDER — ALLOPURINOL 100 MG/1
100 TABLET ORAL 2 TIMES DAILY
Status: DISCONTINUED | OUTPATIENT
Start: 2022-04-09 | End: 2022-04-10

## 2022-04-09 RX ORDER — GABAPENTIN 300 MG/1
300 CAPSULE ORAL NIGHTLY
Status: DISCONTINUED | OUTPATIENT
Start: 2022-04-09 | End: 2022-04-19 | Stop reason: HOSPADM

## 2022-04-09 RX ORDER — 0.9 % SODIUM CHLORIDE 0.9 %
500 INTRAVENOUS SOLUTION INTRAVENOUS ONCE
Status: COMPLETED | OUTPATIENT
Start: 2022-04-09 | End: 2022-04-09

## 2022-04-09 RX ORDER — QUETIAPINE FUMARATE 200 MG/1
400 TABLET, FILM COATED ORAL NIGHTLY
Status: ON HOLD | COMMUNITY
End: 2022-04-19 | Stop reason: SDUPTHER

## 2022-04-09 RX ORDER — LANOLIN ALCOHOL/MO/W.PET/CERES
400 CREAM (GRAM) TOPICAL DAILY
Status: DISCONTINUED | OUTPATIENT
Start: 2022-04-09 | End: 2022-04-19 | Stop reason: HOSPADM

## 2022-04-09 RX ORDER — MAGNESIUM SULFATE IN WATER 40 MG/ML
2000 INJECTION, SOLUTION INTRAVENOUS ONCE
Status: COMPLETED | OUTPATIENT
Start: 2022-04-09 | End: 2022-04-09

## 2022-04-09 RX ORDER — DIAZEPAM 5 MG/1
5 TABLET ORAL DAILY PRN
Status: DISCONTINUED | OUTPATIENT
Start: 2022-04-09 | End: 2022-04-10

## 2022-04-09 RX ORDER — QUETIAPINE FUMARATE 100 MG/1
400 TABLET, FILM COATED ORAL NIGHTLY
Status: DISCONTINUED | OUTPATIENT
Start: 2022-04-09 | End: 2022-04-12

## 2022-04-09 RX ORDER — METOPROLOL SUCCINATE 25 MG/1
25 TABLET, EXTENDED RELEASE ORAL DAILY
Status: DISCONTINUED | OUTPATIENT
Start: 2022-04-09 | End: 2022-04-19 | Stop reason: HOSPADM

## 2022-04-09 RX ORDER — DICYCLOMINE HYDROCHLORIDE 10 MG/1
10 CAPSULE ORAL DAILY
Status: DISCONTINUED | OUTPATIENT
Start: 2022-04-09 | End: 2022-04-19 | Stop reason: HOSPADM

## 2022-04-09 RX ORDER — ERGOCALCIFEROL 1.25 MG/1
50000 CAPSULE ORAL
Status: DISCONTINUED | OUTPATIENT
Start: 2022-04-09 | End: 2022-04-19 | Stop reason: HOSPADM

## 2022-04-09 RX ORDER — PANTOPRAZOLE SODIUM 40 MG/1
40 TABLET, DELAYED RELEASE ORAL DAILY
Status: DISCONTINUED | OUTPATIENT
Start: 2022-04-09 | End: 2022-04-19 | Stop reason: HOSPADM

## 2022-04-09 RX ADMIN — QUETIAPINE FUMARATE 400 MG: 100 TABLET ORAL at 20:30

## 2022-04-09 RX ADMIN — SODIUM BICARBONATE 650 MG: 650 TABLET ORAL at 20:30

## 2022-04-09 RX ADMIN — TOPIRAMATE 100 MG: 100 TABLET, FILM COATED ORAL at 09:10

## 2022-04-09 RX ADMIN — TIZANIDINE 4 MG: 4 TABLET ORAL at 20:30

## 2022-04-09 RX ADMIN — HEPARIN SODIUM 5000 UNITS: 5000 INJECTION INTRAVENOUS; SUBCUTANEOUS at 20:30

## 2022-04-09 RX ADMIN — ESCITALOPRAM OXALATE 20 MG: 10 TABLET ORAL at 09:10

## 2022-04-09 RX ADMIN — Medication 400 MG: at 09:09

## 2022-04-09 RX ADMIN — DICYCLOMINE HYDROCHLORIDE 10 MG: 10 CAPSULE ORAL at 09:10

## 2022-04-09 RX ADMIN — PANTOPRAZOLE SODIUM 40 MG: 40 TABLET, DELAYED RELEASE ORAL at 09:10

## 2022-04-09 RX ADMIN — DIAZEPAM 5 MG: 5 TABLET ORAL at 20:30

## 2022-04-09 RX ADMIN — LEVOTHYROXINE SODIUM 137 MCG: 137 TABLET ORAL at 09:40

## 2022-04-09 RX ADMIN — HEPARIN SODIUM 5000 UNITS: 5000 INJECTION INTRAVENOUS; SUBCUTANEOUS at 06:04

## 2022-04-09 RX ADMIN — SODIUM BICARBONATE: 84 INJECTION, SOLUTION INTRAVENOUS at 12:32

## 2022-04-09 RX ADMIN — METOPROLOL SUCCINATE 25 MG: 25 TABLET, EXTENDED RELEASE ORAL at 09:10

## 2022-04-09 RX ADMIN — TOPIRAMATE 100 MG: 100 TABLET, FILM COATED ORAL at 20:30

## 2022-04-09 RX ADMIN — Medication 50 MCG: at 09:11

## 2022-04-09 RX ADMIN — SODIUM BICARBONATE 650 MG: 650 TABLET ORAL at 12:29

## 2022-04-09 RX ADMIN — ERGOCALCIFEROL 50000 UNITS: 1.25 CAPSULE ORAL at 12:29

## 2022-04-09 RX ADMIN — HEPARIN SODIUM 5000 UNITS: 5000 INJECTION INTRAVENOUS; SUBCUTANEOUS at 13:30

## 2022-04-09 RX ADMIN — GABAPENTIN 300 MG: 300 CAPSULE ORAL at 20:30

## 2022-04-09 RX ADMIN — ALLOPURINOL 100 MG: 100 TABLET ORAL at 09:10

## 2022-04-09 RX ADMIN — SODIUM CHLORIDE 500 ML: 9 INJECTION, SOLUTION INTRAVENOUS at 16:10

## 2022-04-09 RX ADMIN — SODIUM BICARBONATE 650 MG: 650 TABLET ORAL at 09:09

## 2022-04-09 RX ADMIN — CALCITRIOL CAPSULES 0.25 MCG 0.25 MCG: 0.25 CAPSULE ORAL at 09:09

## 2022-04-09 RX ADMIN — ALLOPURINOL 100 MG: 100 TABLET ORAL at 20:30

## 2022-04-09 RX ADMIN — MAGNESIUM SULFATE HEPTAHYDRATE 2000 MG: 40 INJECTION, SOLUTION INTRAVENOUS at 14:02

## 2022-04-09 ASSESSMENT — PAIN SCALES - GENERAL
PAINLEVEL_OUTOF10: 0
PAINLEVEL_OUTOF10: 0

## 2022-04-09 ASSESSMENT — ENCOUNTER SYMPTOMS
ABDOMINAL PAIN: 0
BLOOD IN STOOL: 0
SHORTNESS OF BREATH: 1
COUGH: 0
DIARRHEA: 1
VOMITING: 0
ABDOMINAL DISTENTION: 0
WHEEZING: 0
NAUSEA: 0
SINUS PAIN: 0
TROUBLE SWALLOWING: 0
SORE THROAT: 0
CONSTIPATION: 0

## 2022-04-09 NOTE — PLAN OF CARE
Problem: Falls - Risk of:  Goal: Will remain free from falls  Description: Will remain free from falls  4/9/2022 1652 by Antonio Cedeno RN  Outcome: Ongoing  4/9/2022 1104 by Isidro Gonzales RN  Outcome: Ongoing  Goal: Absence of physical injury  Description: Absence of physical injury  4/9/2022 1652 by Antonio Cedeno RN  Outcome: Ongoing  4/9/2022 1104 by Isidro Gonzales RN  Outcome: Ongoing     Problem: Pain:  Goal: Pain level will decrease  Description: Pain level will decrease  4/9/2022 1652 by Antonio Cedeno RN  Outcome: Ongoing  4/9/2022 1104 by Isidro Gonzales RN  Outcome: Ongoing  Goal: Control of acute pain  Description: Control of acute pain  4/9/2022 1652 by Antonio Cedeno RN  Outcome: Ongoing  4/9/2022 1104 by Isidro Gonzales RN  Outcome: Ongoing  Goal: Control of chronic pain  Description: Control of chronic pain  4/9/2022 1652 by Antonio Cedeno RN  Outcome: Ongoing  4/9/2022 1104 by Isidro Gonzales RN  Outcome: Ongoing     Problem: Tissue Perfusion - Renal, Altered:  Goal: Electrolytes within specified parameters  Description: Electrolytes within specified parameters  4/9/2022 1652 by Antonio Cedeno RN  Outcome: Ongoing  4/9/2022 1104 by Isidro Gonzales RN  Outcome: Ongoing  Goal: Urine creatinine clearance will be within specified parameters  Description: Urine creatinine clearance will be within specified parameters  4/9/2022 1652 by Antonio Cedeno RN  Outcome: Ongoing  4/9/2022 1104 by Isidro Gonzales RN  Outcome: Ongoing  Goal: Serum creatinine will be within specified parameters  Description: Serum creatinine will be within specified parameters  4/9/2022 1652 by Antonio Cedeno RN  Outcome: Ongoing  4/9/2022 1104 by Isidro Gonzales RN  Outcome: Ongoing  Goal: Ability to achieve a balanced intake and output will improve  Description: Ability to achieve a balanced intake and output will improve  4/9/2022 1652 by Antonio Cedeno RN  Outcome: Ongoing  4/9/2022 1104 by Isidro Gonzales RN  Outcome: Ongoing Problem: Activity:  Goal: Fatigue will decrease  Description: Fatigue will decrease  4/9/2022 1652 by Brittany Mccoy RN  Outcome: Ongoing  4/9/2022 1104 by Carly Mendenhall RN  Outcome: Ongoing  Goal: Ability to tolerate increased activity will improve  Description: Ability to tolerate increased activity will improve  4/9/2022 1652 by Brittany Mccoy RN  Outcome: Ongoing  4/9/2022 1104 by Carly Mendenhall RN  Outcome: Ongoing  Goal: Ability to maintain optimal joint mobility will improve  Description: Ability to maintain optimal joint mobility will improve  4/9/2022 1652 by Brittany Mccoy RN  Outcome: Ongoing  4/9/2022 1104 by Carly Mendenhall RN  Outcome: Ongoing     Problem:  Bowel/Gastric:  Goal: Ability to achieve a regular elimination pattern will improve  Description: Ability to achieve a regular elimination pattern will improve  4/9/2022 1652 by Brittany Mccoy RN  Outcome: Ongoing  4/9/2022 1104 by Carly Mendenhall RN  Outcome: Ongoing     Problem: Cardiac:  Goal: Ability to maintain an adequate cardiac output will improve  Description: Ability to maintain an adequate cardiac output will improve  4/9/2022 1652 by Brittany Mccoy RN  Outcome: Ongoing  4/9/2022 1104 by Carly Mendenhall RN  Outcome: Ongoing  Goal: Ability to maintain adequate ventilation will improve  Description: Ability to maintain adequate ventilation will improve  4/9/2022 1652 by Brittany Mccoy RN  Outcome: Ongoing  4/9/2022 1104 by Carly Mendenhall RN  Outcome: Ongoing  Goal: Ability to achieve and maintain adequate cardiopulmonary perfusion will improve  Description: Ability to achieve and maintain adequate cardiopulmonary perfusion will improve  4/9/2022 1652 by Brittany Mccoy RN  Outcome: Ongoing  4/9/2022 1104 by Carly Mendenhall RN  Outcome: Ongoing     Problem: Coping:  Goal: Ability to adjust to condition or change in health will improve  Description: Ability to adjust to condition or change in health will improve  4/9/2022 1652 by Brittany Mccoy RN  Outcome: Ongoing  4/9/2022 1104 by Sherice Ward RN  Outcome: Ongoing  Goal: Communication of feelings regarding changes in body function or appearance will improve  Description: Communication of feelings regarding changes in body function or appearance will improve  4/9/2022 1652 by Juliano Olivares RN  Outcome: Ongoing  4/9/2022 1104 by Sherice Ward RN  Outcome: Ongoing     Problem: Health Behavior:  Goal: Identification of resources available to assist in meeting health care needs will improve  Description: Identification of resources available to assist in meeting health care needs will improve  4/9/2022 1652 by Juliano Olivares RN  Outcome: Ongoing  4/9/2022 1104 by Sherice Ward RN  Outcome: Ongoing     Problem: Nutritional:  Goal: Maintenance of adequate nutrition will improve  Description: Maintenance of adequate nutrition will improve  4/9/2022 1652 by Juliano Olivares RN  Outcome: Ongoing  4/9/2022 1104 by Sherice Ward RN  Outcome: Ongoing     Problem: Physical Regulation:  Goal: Signs and symptoms of infection will decrease  Description: Signs and symptoms of infection will decrease  4/9/2022 1652 by Juliano Olivares RN  Outcome: Ongoing  4/9/2022 1104 by Sherice Ward RN  Outcome: Ongoing  Goal: Will show no signs and symptoms of excessive bleeding  Description: Will show no signs and symptoms of excessive bleeding  4/9/2022 1652 by Juliano Olivares RN  Outcome: Ongoing  4/9/2022 1104 by Sherice Ward RN  Outcome: Ongoing  Goal: Complications related to the disease process, condition or treatment will be avoided or minimized  Description: Complications related to the disease process, condition or treatment will be avoided or minimized  4/9/2022 1652 by Juliano Olivares RN  Outcome: Ongoing  4/9/2022 1104 by Sherice Ward RN  Outcome: Ongoing     Problem: Safety:  Goal: Ability to remain free from injury will improve  Description: Ability to remain free from injury will improve  4/9/2022 1652 by Yolanda Miguel Сергей Russell RN  Outcome: Ongoing  4/9/2022 1104 by Miranda Parr RN  Outcome: Ongoing     Problem: Sensory:  Goal: Pain level will decrease  Description: Pain level will decrease  4/9/2022 1652 by Tiera Garcia RN  Outcome: Ongoing  4/9/2022 1104 by Miranda Parr RN  Outcome: Ongoing  Goal: General experience of comfort will improve  Description: General experience of comfort will improve  4/9/2022 1652 by Tiera Garcia RN  Outcome: Ongoing  4/9/2022 1104 by Miranda Parr RN  Outcome: Ongoing     Problem: Skin Integrity:  Goal: Skin integrity will improve  Description: Skin integrity will improve  4/9/2022 1652 by Tiera Garcia RN  Outcome: Ongoing  4/9/2022 1104 by Miranda Parr RN  Outcome: Ongoing  Goal: Signs of wound healing will improve  Description: Signs of wound healing will improve  4/9/2022 1652 by Tiera Garcia RN  Outcome: Ongoing  4/9/2022 1104 by Miranda Parr RN  Outcome: Ongoing     Problem: Tissue Perfusion:  Goal: Ability to maintain adequate tissue perfusion will improve  Description: Ability to maintain adequate tissue perfusion will improve  4/9/2022 1652 by Tiera Garcia RN  Outcome: Ongoing  4/9/2022 1104 by Miranda Parr RN  Outcome: Ongoing  Goal: Ability to maintain a stable neurologic state will improve  Description: Ability to maintain a stable neurologic state will improve  4/9/2022 1652 by Tiera Garcia RN  Outcome: Ongoing  4/9/2022 1104 by Miranda Parr RN  Outcome: Ongoing   Electronically signed by Tiera Garcia RN on 4/9/2022 at 4:52 PM

## 2022-04-09 NOTE — PROGRESS NOTES
4 Eyes Skin Assessment    Giovani Husbands is being assessed upon: {Blank single:15494::\"Admission\",\"Transfer to New Unit\"}    I agree that I, Lnad's, LPN, along with Areli White RN (either 2 RN's or 1 LPN and 1 RN) have performed a thorough Head to Toe Skin Assessment on the patient. ALL assessment sites listed below have been assessed. Areas assessed by both nurses:     [x]   Head, Face, and Ears   [x]   Shoulders, Back, and Chest  [x]   Arms, Elbows, and Hands   [x]   Coccyx, Sacrum, and Ischium  [x]   Legs, Feet, and Heels    Does the Patient have Skin Breakdown?  No    Kb Prevention initiated: No  Wound Care Orders initiated: No    [facility] nurse consulted for Pressure Injury (Stage 3,4, Unstageable, DTI, NWPT, and Complex wounds) and New or Established Ostomies: No        Primary Nurse eSignature: CHRISTOPHER Oro on 3/5/7608 at 7:25 AM      Co-Signer eSignature: {Esignature:[id number]}

## 2022-04-09 NOTE — PROGRESS NOTES
[facility]      Patient:  Sunny Hollins  YOB: 1945  Date of Service: 4/9/2022  MRN: [id number]   Acct: [de-identified]   Primary Care Physician: Kathia Ruvalcaba MD  Advance Directive: Full Code  Admit Date: 4/8/2022       Hospital Day: 1  Portions of this note have been copied forward, however, changed to reflect the most current clinical status of this patient. CHIEF COMPLAINT Abnormal labs    SUBJECTIVE:  States that she feels \"about the same\". Denies pain. Continues to have diarrhea. No other issues or complaints    CUMULATIVE HOSPITAL STAY:  The patient is a 77 y.o. female who presented to [facility] ER on 4/8/2022 with PMH DVT, HTN, Lupus, Thyroid disease, gastric bypass  complaining of abnormal lab. She has CKD and was having a routine FU with the APRN at the nephrology clinic. She had a significant decline in renal function. She does report chronic diarrhea, ACUÑA and decreased appetite. ED work-up revealed Hgb 9.9, HCT 32.6, creatinine 3.3 BUN 54, and urinalysis negative. (Baseline creatinine around 2) Admitted to hospital medicine and has received IVF resuscitation with bicarbonate. Nephrology has been consulted. Renal US performed on 4/9/2022 shows nonobstructing calculi involving the mid and lower pole of the left kidney. There is a tiny cortical cyst in the midpole of the right kidney. No evidence of hydronephrosis or solid mass. No significant changes in chemistries overnight. CBC shows ongoing anemia with H&H 8.5/30.7 due to chronic disease. Iron profile within normal limits. Review of Systems:   Review of Systems   Constitutional: Positive for fatigue. Negative for chills, diaphoresis and fever. HENT: Negative for congestion, ear pain, sinus pain, sore throat and trouble swallowing. Eyes: Negative for visual disturbance. Respiratory: Positive for shortness of breath. Negative for cough and wheezing. Cardiovascular: Negative for chest pain, palpitations and leg swelling. Gastrointestinal: Positive for diarrhea. Negative for abdominal distention, abdominal pain, blood in stool, constipation, nausea and vomiting. Endocrine: Negative for cold intolerance and heat intolerance. Genitourinary: Negative for difficulty urinating, flank pain, frequency and urgency. Musculoskeletal: Negative for arthralgias and myalgias. Neurological: Negative for dizziness, syncope, weakness, light-headedness, numbness and headaches. Hematological: Does not bruise/bleed easily. Psychiatric/Behavioral: Negative for agitation, confusion and dysphoric mood. 14 point review of systems is negative except as specifically addressed above. Objective:   VITALS:  /63   Pulse 70   Temp 98 °F (36.7 °C)   Resp 18   Ht 5' 4\" (1.626 m)   Wt 160 lb 3 oz (72.7 kg)   SpO2 99%   BMI 27.50 kg/m²   24HR INTAKE/OUTPUT:    Intake/Output Summary (Last 24 hours) at 4/9/2022 1322  Last data filed at 4/9/2022 0930  Gross per 24 hour   Intake 1230 ml   Output 350 ml   Net 880 ml       Physical Exam  Constitutional:       General: She is not in acute distress. Appearance: Normal appearance. She is ill-appearing. She is not toxic-appearing or diaphoretic. HENT:      Head: Normocephalic and atraumatic. Right Ear: External ear normal.      Left Ear: External ear normal.      Nose: Nose normal. No congestion or rhinorrhea. Mouth/Throat:      Mouth: Mucous membranes are dry. Pharynx: Oropharynx is clear. Eyes:      General: No scleral icterus. Extraocular Movements: Extraocular movements intact. Conjunctiva/sclera: Conjunctivae normal.   Cardiovascular:      Rate and Rhythm: Normal rate and regular rhythm. Pulses: Normal pulses. Heart sounds: Normal heart sounds. No murmur heard. No friction rub. No gallop. Pulmonary:      Effort: Pulmonary effort is normal. No respiratory distress. Breath sounds: Normal breath sounds. No wheezing, rhonchi or rales. Abdominal:      General: Abdomen is flat. Bowel sounds are normal. There is no distension. Palpations: Abdomen is soft. Tenderness: There is no abdominal tenderness. Musculoskeletal:         General: No swelling. Normal range of motion. Cervical back: Normal range of motion and neck supple. Right lower leg: No edema. Left lower leg: No edema. Skin:     General: Skin is warm and dry. Coloration: Skin is pale. Skin is not jaundiced. Findings: No erythema, lesion or rash. Neurological:      General: No focal deficit present. Mental Status: She is alert and oriented to person, place, and time. Mental status is at baseline. Cranial Nerves: No cranial nerve deficit. Sensory: No sensory deficit. Motor: No weakness. Psychiatric:         Mood and Affect: Mood normal.         Behavior: Behavior normal.         Thought Content: Thought content normal.         Judgment: Judgment normal.             Medications:      sodium bicarbonate infusion 100 mL/hr at 04/09/22 1232    sodium chloride        allopurinol  100 mg Oral BID    calcitRIOL  0.25 mcg Oral Daily    vitamin D  50,000 Units Oral Once per day on Mon Wed Fri    vitamin B-12  50 mcg Oral Daily    topiramate  100 mg Oral BID    dicyclomine  10 mg Oral Daily    escitalopram  20 mg Oral Daily    gabapentin  300 mg Oral Nightly    levothyroxine  137 mcg Oral Daily    metoprolol succinate  25 mg Oral Daily    pantoprazole  40 mg Oral Daily    sodium bicarbonate  650 mg Oral TID    magnesium oxide  400 mg Oral Daily    QUEtiapine  400 mg Oral Nightly    sodium chloride flush  5-40 mL IntraVENous 2 times per day    heparin (porcine)  5,000 Units SubCUTAneous 3 times per day     diazePAM, tiZANidine, sodium chloride flush, sodium chloride, ondansetron **OR** ondansetron, acetaminophen **OR** acetaminophen  ADULT DIET;  Regular; Low Fat/Low Chol/High Fiber/YAAKOV; Low Potassium (Less than 3000 mg/day); Low Phosphorus (Less than 1000 mg)     Lab and other Data:     Recent Labs     04/08/22  1824 04/09/22  0203   WBC 7.1 7.6   HGB 9.8* 8.5*    190     Recent Labs     04/08/22  1824 04/09/22  0203    142   K 4.4 4.2    116*   CO2 18* 13*   BUN 54* 58*   CREATININE 3.3* 3.2*   GLUCOSE 107 86     Recent Labs     04/08/22  1824 04/09/22  0203   AST 12 11   ALT 18 13   BILITOT 0.3 <0.2   ALKPHOS 195* 179*     Troponin T: No results for input(s): TROPONINI in the last 72 hours. Pro-BNP: No results for input(s): BNP in the last 72 hours. INR: No results for input(s): INR in the last 72 hours. UA:  Recent Labs     04/08/22 2045   COLORU YELLOW   PHUR 5.5   WBCUA 33*   RBCUA 1   BACTERIA NEGATIVE*   CLARITYU Clear   SPECGRAV 1.011   LEUKOCYTESUR MODERATE*   UROBILINOGEN 0.2   BILIRUBINUR Negative   BLOODU Negative   GLUCOSEU Negative       RAD:   US RENAL COMPLETE    Result Date: 4/9/2022  1. Nonobstructing calculi involving the mid and lower pole of the left kidney. There is a tiny cortical cyst in the midpole of the right kidney. No evidence of discrete solid mass or hydronephrosis. . Signed by Dr Priya Parikh    Result Date: 4/8/2022  1. No acute disease. Signed by Dr Leroy Credit       Assessment/Plan   Principal Problem:    Acute kidney injury superimposed on CKD [facility])   -Nephrology consulted-recommendations appreciated              - Creatinine 3.2  today               - IVFs as ordered              - Monitor I's and O's closely              - Monitor labs closely              - Avoid hypotension              - Avoid nephrotoxic agents       Active Problems:    Anemia due to chronic kidney disease   -Vitamin B12 supplementation   -Iron profile-WNL   -Procrit per nephro   -Daily labs  Resolved Problems:    * No resolved hospital problems.  *      DVT Prophylaxis: Heparin    GI prophylaxis: Protonix    Disposition: TBD    ODILIA Lucas, 4/9/2022 1:22 PM

## 2022-04-09 NOTE — ED PROVIDER NOTES
[facility] EMERGENCY DEPT  eMERGENCY dEPARTMENT eNCOUnter      Pt Name: Lien Briggs  MRN: [id number]  [place] 1945  Date of evaluation: 4/8/2022  Provider: ODILIA River    CHIEF COMPLAINT       Chief Complaint   Patient presents with    Abnormal Lab     referred from Dr. Carmen Watkins   (Location/Symptom, Timing/Onset,Context/Setting, Quality, Duration, Modifying Factors, Severity)  Note limiting factors. Lien Briggs is a 77 y.o. female who presents to the emergency department with worsening kidney function. Pt was sent by Shama Maciel. Pt is also short of breath. Not on dialysis. Usually sees Dr Torey Loza. Has not been sick recently. Tells me this happened suddenly     The history is provided by the patient. Shortness of Breath  Severity:  Moderate  Onset quality:  Sudden  Associated symptoms: no abdominal pain, no chest pain and no fever        NursingNotes were reviewed. REVIEW OF SYSTEMS    (2-9 systems for level 4, 10 or more for level 5)     Review of Systems   Constitutional: Negative for fever. Respiratory: Positive for shortness of breath. Cardiovascular: Negative for chest pain. Gastrointestinal: Negative for abdominal pain. Genitourinary: Negative for difficulty urinating. Except as noted above the remainder of the review of systems was reviewed and negative. PAST MEDICAL HISTORY     Past Medical History:   Diagnosis Date    Arthritis     Asthma     DVT (deep vein thrombosis) in pregnancy     History of blood transfusion     Hx of blood clots     hAD dvt WAS ON Coumadin for a year.   2006    Hypertension     Lupus ([address]     Renal failure     Splenic artery aneurysm (HCC)     Thyroid disease          SURGICALHISTORY       Past Surgical History:   Procedure Laterality Date    GASTRIC BYPASS SURGERY  1975    HYSTERECTOMY      JOINT REPLACEMENT Right     hip and shoulder    LYMPHADENECTOMY      ME TOTAL KNEE ARTHROPLASTY Left 11/27/2017    KNEE TOTAL ARTHROPLASTY performed by Barrie Rosas MD at [address] Left 7/27/2020    LLEFT REVERSE TOTAL SHOULDER POLY EXCHANGE AND BICEPS TENOTOMY performed by Barrie Rosas MD at [address] [place] [address]       Previous Medications    ALLOPURINOL (ZYLOPRIM) 100 MG TABLET    Take 100 mg by mouth daily    AMLODIPINE (NORVASC) 5 MG TABLET    Take 5 mg by mouth daily    ASPIRIN 81 MG TABLET    Take 81 mg by mouth daily    CALCITRIOL (ROCALTROL) 0.25 MCG CAPSULE    Take 0.25 mcg by mouth daily    DIAZEPAM (VALIUM) 5 MG TABLET    Take 5 mg by mouth daily.     DICYCLOMINE (BENTYL) 10 MG CAPSULE    Take 10 mg by mouth daily    EPOETIN PABLO (EPOGEN;PROCRIT) 30033 UNIT/ML INJECTION    Inject 20,000 Units into the skin every 3 months     ESCITALOPRAM (LEXAPRO) 20 MG TABLET    Take 20 mg by mouth daily    FERROUS SULFATE 325 (65 FE) MG TABLET    Take 325 mg by mouth 2 times daily    FLUOXETINE (PROZAC) 10 MG CAPSULE    Take 10 mg by mouth daily    GABAPENTIN (NEURONTIN) 300 MG CAPSULE    Take 300 mg by mouth 3 times daily as needed    LEVOTHYROXINE (SYNTHROID) 150 MCG TABLET    Take 137 mcg by mouth Daily     MAGNESIUM OXIDE 400 MG CAPS    Take by mouth 2 times daily    METHOCARBAMOL (ROBAXIN) 500 MG TABLET    Take 500 mg by mouth 3 times daily    MULTIPLE VITAMINS-MINERALS (OCUVITE EYE + MULTI PO)    Take by mouth daily    POTASSIUM CHLORIDE (KLOR-CON) 20 MEQ PACKET    Take 20 mEq by mouth 2 times daily    PRENATAL VIT-FE FUMARATE-FA (PRENATAL VITAMIN PO)    Take by mouth    PROBIOTIC PRODUCT (PROBIOTIC DAILY PO)    Take by mouth    PROMETHAZINE (PHENERGAN) 25 MG TABLET    Take 25 mg by mouth every 6 hours as needed for Nausea    QUETIAPINE (SEROQUEL) 100 MG TABLET    Take 200 mg by mouth nightly     RIVAROXABAN (XARELTO) 10 MG TABS TABLET    Take 1 tablet by mouth daily    SODIUM BICARBONATE PO    Take 10 g by mouth 2 times daily     TELMISARTAN (MICARDIS) 40 MG TABLET    Take 40 mg by mouth daily    TOPIRAMATE (TOPAMAX) 100 MG TABLET    Take 100 mg by mouth daily    VITAMIN B-12 (CYANOCOBALAMIN) 100 MCG TABLET    Take 50 mcg by mouth daily    VITAMIN D (ERGOCALCIFEROL) 11830 UNITS CAPS CAPSULE    Take 50,000 Units by mouth twice a week Sunday/wednesday       ALLERGIES     Codeine    FAMILY HISTORY     History reviewed. No pertinent family history. SOCIAL HISTORY       Social History     Socioeconomic History    Marital status:      Spouse name: None    Number of children: None    Years of education: None    Highest education level: None   Occupational History    None   Tobacco Use    Smoking status: Never Smoker    Smokeless tobacco: Never Used   Substance and Sexual Activity    Alcohol use: No    Drug use: No    Sexual activity: None   Other Topics Concern    None   Social History Narrative    None     Social Determinants of Health     Financial Resource Strain:     Difficulty of Paying Living Expenses: Not on file   Food Insecurity:     Worried About Running Out of Food in the Last Year: Not on file    Jeff of Food in the Last Year: Not on file   Transportation Needs:     Lack of Transportation (Medical): Not on file    Lack of Transportation (Non-Medical):  Not on file   Physical Activity:     Days of Exercise per Week: Not on file    Minutes of Exercise per Session: Not on file   Stress:     Feeling of Stress : Not on file   Social Connections:     Frequency of Communication with Friends and Family: Not on file    Frequency of Social Gatherings with Friends and Family: Not on file    Attends Jehov[employer] witness Services: Not on file    Active Member of Clubs or Organizations: Not on file    Attends Club or Organization Meetings: Not on file    Marital Status: Not on file   Intimate Partner Violence:     Fear of Current or Ex-Partner: Not on file    Emotionally Abused: Not on file    Physically Abused: Not on file    Sexually Abused: Not on file   Housing Stability:     Unable to Pay for Housing in the Last Year: Not on file    Number of Places Lived in the Last Year: Not on file    Unstable Housing in the Last Year: Not on file       SCREENINGS    Harmony Coma Scale  Eye Opening: Spontaneous  Best Verbal Response: Oriented  Best Motor Response: Obeys commands  Bhavin Coma Scale Score: 15 @FLOW([id number])@      PHYSICAL EXAM    (up to 7 for level 4, 8 or more for level 5)     ED Triage Vitals [04/08/22 1801]   BP Temp Temp Source Pulse Resp SpO2 Height Weight   (!) 143/78 98.1 °F (36.7 °C) Oral 85 17 97 % 5' 4\" (1.626 m) 150 lb (68 kg)       Physical Exam  Vitals and nursing note reviewed. Constitutional:       Appearance: She is well-developed. HENT:      Head: Normocephalic and atraumatic. Eyes:      General: No scleral icterus. Right eye: No discharge. Left eye: No discharge. Cardiovascular:      Rate and Rhythm: Normal rate and regular rhythm. Heart sounds: Normal heart sounds. Pulmonary:      Effort: No respiratory distress. Breath sounds: Normal breath sounds. Abdominal:      Tenderness: There is no abdominal tenderness. Musculoskeletal:      Cervical back: Normal range of motion and neck supple. Skin:     General: Skin is warm and dry. Coloration: Skin is not pale. Neurological:      General: No focal deficit present. Mental Status: She is alert and oriented to person, place, and time.    Psychiatric:         Behavior: Behavior normal.         DIAGNOSTIC RESULTS     EKG: All EKG's are interpreted by the Emergency Department Physician who either signs or Co-signsthis chart in the absence of a cardiologist.  77 sinus rhythm DC interval is 164 no ST elevation or depression read at 2118 by Dr. Lexa Angel:   Domenic Headley such as CT, Ultrasound and MRI are read by the radiologist. Plain radiographic images are visualized and preliminarily interpreted by the emergency physician with the below findings:      Interpretation per the Radiologist below, if available at the time of this note:    XR CHEST PORTABLE   Final Result   1. No acute disease. Signed by Dr Wendel Aase            ED BEDSIDEULTRASOUND:   Performed by ED Physician -none    LABS:  Labs Reviewed   CBC WITH AUTO DIFFERENTIAL - Abnormal; Notable for the following components:       Result Value    RBC 3.32 (*)     Hemoglobin 9.8 (*)     Hematocrit 32.6 (*)     MCHC 30.1 (*)     RDW 15.5 (*)     Neutrophils % 48.9 (*)     Monocytes % 10.6 (*)     Eosinophils % 8.2 (*)     Basophils % 1.3 (*)     All other components within normal limits   COMPREHENSIVE METABOLIC PANEL W/ REFLEX TO MG FOR LOW K - Abnormal; Notable for the following components:    CO2 18 (*)     BUN 54 (*)     CREATININE 3.3 (*)     GFR Non- 14 (*)     GFR  16 (*)     Calcium 8.0 (*)     Alkaline Phosphatase 195 (*)     All other components within normal limits   URINALYSIS WITH REFLEX TO CULTURE - Abnormal; Notable for the following components:    Leukocyte Esterase, Urine MODERATE (*)     All other components within normal limits   MICROSCOPIC URINALYSIS - Abnormal; Notable for the following components:    Bacteria, UA NEGATIVE (*)     Crystals, UA NEG (*)     WBC, UA 33 (*)     All other components within normal limits   CULTURE, URINE   COMPREHENSIVE METABOLIC PANEL W/ REFLEX TO MG FOR LOW K   CBC   URINALYSIS       All other labs were within normal range or not returned as of this dictation.     EMERGENCY DEPARTMENT COURSE and DIFFERENTIALDIAGNOSIS/MDM:   Vitals:    Vitals:    04/08/22 1801 04/08/22 1827 04/08/22 2020 04/08/22 2123   BP: (!) 143/78 100/80 90/69 138/86   Pulse: 85 84 75 77   Resp: 17 16  16   Temp: 98.1 °F (36.7 °C)   98.1 °F (36.7 °C)   TempSrc: Oral      SpO2: 97% 98% 99% 99%   Weight: 150 lb (68 kg)      Height: 5' 4\" (1.626 m)              MDM spoke to Dr. Roxi Dennison for admission      CONSULTS:  [facility]    PROCEDURES:  Unless otherwise noted below, none     Procedures    FINAL IMPRESSION      1. Acute kidney injury superimposed on chronic kidney disease ([facility] [address]    2. Anemia, unspecified type        DISPOSITION/PLAN   DISPOSITION Admitted 04/08/2022 09:09:31 PM      PATIENT REFERRED TO:  No follow-up provider specified.     DISCHARGE MEDICATIONS:  New Prescriptions    No medications on file          (Please note that portions of this note were completed with a voice recognitionprogram.  Efforts were made to edit the dictations but occasionally words are mis-transcribed.)    ODILIA Madera (electronically signed)          ODILIA Madera  04/08/22 8085

## 2022-04-09 NOTE — PLAN OF CARE
Problem: Falls - Risk of:  Goal: Will remain free from falls  Description: Will remain free from falls  4/9/2022 1104 by Elizabeth Carter RN  Outcome: Ongoing  4/9/2022 0014 by Dickson Rodriguez LPN  Outcome: Ongoing  Goal: Absence of physical injury  Description: Absence of physical injury  4/9/2022 1104 by Elizabeth Carter RN  Outcome: Ongoing  4/9/2022 0014 by Dickson Rodriguez LPN  Outcome: Ongoing

## 2022-04-09 NOTE — PLAN OF CARE
Problem: Falls - Risk of:  Goal: Will remain free from falls  Description: Will remain free from falls  Outcome: Ongoing  Goal: Absence of physical injury  Description: Absence of physical injury  Outcome: Ongoing     Problem: Pain:  Goal: Pain level will decrease  Description: Pain level will decrease  Outcome: Ongoing  Goal: Control of acute pain  Description: Control of acute pain  Outcome: Ongoing  Goal: Control of chronic pain  Description: Control of chronic pain  Outcome: Ongoing     Problem: Tissue Perfusion - Renal, Altered:  Goal: Electrolytes within specified parameters  Description: Electrolytes within specified parameters  Outcome: Ongoing  Goal: Urine creatinine clearance will be within specified parameters  Description: Urine creatinine clearance will be within specified parameters  Outcome: Ongoing  Goal: Serum creatinine will be within specified parameters  Description: Serum creatinine will be within specified parameters  Outcome: Ongoing  Goal: Ability to achieve a balanced intake and output will improve  Description: Ability to achieve a balanced intake and output will improve  Outcome: Ongoing     Problem: Activity:  Goal: Fatigue will decrease  Description: Fatigue will decrease  Outcome: Ongoing  Goal: Ability to tolerate increased activity will improve  Description: Ability to tolerate increased activity will improve  Outcome: Ongoing  Goal: Ability to maintain optimal joint mobility will improve  Description: Ability to maintain optimal joint mobility will improve  Outcome: Ongoing     Problem:  Bowel/Gastric:  Goal: Ability to achieve a regular elimination pattern will improve  Description: Ability to achieve a regular elimination pattern will improve  Outcome: Ongoing     Problem: Cardiac:  Goal: Ability to maintain an adequate cardiac output will improve  Description: Ability to maintain an adequate cardiac output will improve  Outcome: Ongoing  Goal: Ability to maintain adequate ventilation will improve  Description: Ability to maintain adequate ventilation will improve  Outcome: Ongoing  Goal: Ability to achieve and maintain adequate cardiopulmonary perfusion will improve  Description: Ability to achieve and maintain adequate cardiopulmonary perfusion will improve  Outcome: Ongoing     Problem: Coping:  Goal: Ability to adjust to condition or change in health will improve  Description: Ability to adjust to condition or change in health will improve  Outcome: Ongoing  Goal: Communication of feelings regarding changes in body function or appearance will improve  Description: Communication of feelings regarding changes in body function or appearance will improve  Outcome: Ongoing     Problem: Health Behavior:  Goal: Identification of resources available to assist in meeting health care needs will improve  Description: Identification of resources available to assist in meeting health care needs will improve  Outcome: Ongoing     Problem: Nutritional:  Goal: Maintenance of adequate nutrition will improve  Description: Maintenance of adequate nutrition will improve  Outcome: Ongoing     Problem: Physical Regulation:  Goal: Signs and symptoms of infection will decrease  Description: Signs and symptoms of infection will decrease  Outcome: Ongoing  Goal: Will show no signs and symptoms of excessive bleeding  Description: Will show no signs and symptoms of excessive bleeding  Outcome: Ongoing  Goal: Complications related to the disease process, condition or treatment will be avoided or minimized  Description: Complications related to the disease process, condition or treatment will be avoided or minimized  Outcome: Ongoing     Problem: Safety:  Goal: Ability to remain free from injury will improve  Description: Ability to remain free from injury will improve  Outcome: Ongoing     Problem: Sensory:  Goal: Pain level will decrease  Description: Pain level will decrease  Outcome: Ongoing  Goal: General experience of comfort will improve  Description: General experience of comfort will improve  Outcome: Ongoing     Problem: Skin Integrity:  Goal: Skin integrity will improve  Description: Skin integrity will improve  Outcome: Ongoing  Goal: Signs of wound healing will improve  Description: Signs of wound healing will improve  Outcome: Ongoing     Problem: Tissue Perfusion:  Goal: Ability to maintain adequate tissue perfusion will improve  Description: Ability to maintain adequate tissue perfusion will improve  Outcome: Ongoing  Goal: Ability to maintain a stable neurologic state will improve  Description: Ability to maintain a stable neurologic state will improve  Outcome: Ongoing

## 2022-04-09 NOTE — H&P
[facility]      Hospitalist - History & Physical      PCP: Welton Lombard, MD    Date of Admission: 4/8/2022    Date of Service: 4/8/2022    Chief Complaint:  Abnormal lab     History Of Present Illness: The patient is a 77 y.o. female who presented to [facility] ER with PMH DVT, HTN, Lupus, Thyroid disease, gastric bypass  complaining of abnormal lab. [facility] with worsening kidney function. Endorses decreased appetite, chronic diarrhea, and dyspnea on exertion. Denies recent illness, fever, chills, nausea, vomiting, abdominal pain, and chest pain. Work up in [address] no acute disease, Hgb 9.9, HCT 32.6, creatinine 3.3 BUN 54, and urinalysis negative. Received 500cc NS bolus. Patient is admitted to the hospitalist service due to LUCY on CKD with consultation to nephrology. Past Medical History:        Diagnosis Date    Arthritis     Asthma     DVT (deep vein thrombosis) in pregnancy     History of blood transfusion     Hx of blood clots     hAD dvt WAS ON Coumadin for a year. 2006    Hypertension     Lupus ([address]     Renal failure     Splenic artery [facility] [address] [address]     Thyroid disease        Past Surgical History:        Procedure Laterality Date    GASTRIC BYPASS SURGERY  1975    HYSTERECTOMY      JOINT REPLACEMENT Right     hip and shoulder    LYMPHADENECTOMY      CO TOTAL KNEE ARTHROPLASTY Left 11/27/2017    KNEE TOTAL ARTHROPLASTY performed by Maria Esther Xiao MD at [facility] Left 7/27/2020    LLEFT REVERSE TOTAL SHOULDER POLY EXCHANGE AND BICEPS TENOTOMY performed by Maria Esther Xiao MD at [facility] Medications:  Prior to Admission medications    Medication Sig Start Date End Date Taking? Authorizing Provider   allopurinol (ZYLOPRIM) 100 MG tablet Take 100 mg by mouth daily    Historical Provider, MD   diazePAM (VALIUM) 5 MG tablet Take 5 mg by mouth daily.     Historical Provider, MD   FLUoxetine (PROZAC) 10 MG capsule Take 10 mg by mouth daily Historical Provider, MD   dicyclomine (BENTYL) 10 MG capsule Take 10 mg by mouth daily    Historical Provider, MD   topiramate (TOPAMAX) 100 MG tablet Take 100 mg by mouth daily    Historical Provider, MD   promethazine (PHENERGAN) 25 MG tablet Take 25 mg by mouth every 6 hours as needed for Nausea    Historical Provider, MD   calcitRIOL (ROCALTROL) 0.25 MCG capsule Take 0.25 mcg by mouth daily    Historical Provider, MD   rivaroxaban (XARELTO) 10 MG TABS tablet Take 1 tablet by mouth daily 11/30/17   Kingsley Babinski, MD   amLODIPine (NORVASC) 5 MG tablet Take 5 mg by mouth daily    Historical Provider, MD   QUEtiapine (SEROQUEL) 100 MG tablet Take 200 mg by mouth nightly     Historical Provider, MD   potassium chloride (KLOR-CON) 20 MEQ packet Take 20 mEq by mouth 2 times daily    Historical Provider, MD   Magnesium Oxide 400 MG CAPS Take by mouth 2 times daily    Historical Provider, MD   escitalopram (LEXAPRO) 20 MG tablet Take 20 mg by mouth daily    Historical Provider, MD   ferrous sulfate 325 (65 Fe) MG tablet Take 325 mg by mouth 2 times daily    Historical Provider, MD   SODIUM BICARBONATE PO Take 10 g by mouth 2 times daily     Historical Provider, MD   Multiple Vitamins-Minerals (OCUVITE EYE + MULTI PO) Take by mouth daily    Historical Provider, MD   gabapentin (NEURONTIN) 300 MG capsule Take 300 mg by mouth 3 times daily as needed    Historical Provider, MD   epoetin niurka (EPOGEN;PROCRIT) 80178 UNIT/ML injection Inject 20,000 Units into the skin every 3 months     Historical Provider, MD   aspirin 81 MG tablet Take 81 mg by mouth daily    Historical Provider, MD   vitamin B-12 (CYANOCOBALAMIN) 100 MCG tablet Take 50 mcg by mouth daily    Historical Provider, MD   vitamin D (ERGOCALCIFEROL) 61223 UNITS CAPS capsule Take 50,000 Units by mouth twice a week Sunday/wednesday    Historical Provider, MD   telmisartan (MICARDIS) 40 MG tablet Take 40 mg by mouth daily    Historical Provider, MD   methocarbamol (ROBAXIN) 500 MG tablet Take 500 mg by mouth 3 times daily    Historical Provider, MD   levothyroxine (SYNTHROID) 150 MCG tablet Take 137 mcg by mouth Daily     Historical Provider, MD   Prenatal Vit-Fe Fumarate-FA (PRENATAL VITAMIN PO) Take by mouth    Historical Provider, MD   Probiotic Product (PROBIOTIC DAILY PO) Take by mouth    Historical Provider, MD       Allergies:    Codeine    Social History:    The patient currently lives home  Tobacco:   reports that she has never smoked. She has never used smokeless tobacco.  Alcohol:   reports no history of alcohol use. Illicit Drugs: denies    Family History:  History reviewed. No pertinent family history. Review of Systems:   Review of Systems   Constitutional: Positive for activity change and fatigue. HENT: Negative. Respiratory: Positive for shortness of breath. Cardiovascular: Negative. Gastrointestinal: Positive for diarrhea (chronic). Musculoskeletal: Negative. Skin: Negative. Neurological: Positive for weakness. Psychiatric/Behavioral: Negative. 14 point review of systems is negative except as specifically addressed above. Physical Examination:  /84   Pulse 78   Temp 97.5 °F (36.4 °C) (Temporal)   Resp 20   Ht 5' 4\" (1.626 m)   Wt 160 lb 3 oz (72.7 kg)   SpO2 98%   BMI 27.50 kg/m²   Physical Exam  Constitutional:       General: She is not in acute distress. Appearance: Normal appearance. HENT:      Mouth/Throat:      Mouth: Mucous membranes are moist.      Pharynx: Oropharynx is clear. Eyes:      Extraocular Movements: Extraocular movements intact. Conjunctiva/sclera: Conjunctivae normal.      Pupils: Pupils are equal, round, and reactive to light. Cardiovascular:      Rate and Rhythm: Normal rate and regular rhythm. Pulses: Normal pulses. Heart sounds: Normal heart sounds. No murmur heard. Pulmonary:      Effort: Pulmonary effort is normal. No respiratory distress.       Breath sounds: Normal breath sounds. Chest:      Chest wall: No tenderness. Abdominal:      General: Bowel sounds are normal. There is no distension. Palpations: Abdomen is soft. Tenderness: There is no abdominal tenderness. There is no guarding or rebound. Musculoskeletal:         General: No swelling. Normal range of motion. Cervical back: Normal range of motion and neck supple. No rigidity or tenderness. Right lower leg: No edema. Left lower leg: No edema. Skin:     General: Skin is warm and dry. Neurological:      General: No focal deficit present. Mental Status: She is alert and oriented to person, place, and time. Cranial Nerves: No cranial nerve deficit. Motor: No weakness. Psychiatric:         Mood and Affect: Mood normal.         Behavior: Behavior normal.          Diagnostic Data:  CBC:  Recent Labs     04/08/22 1824   WBC 7.1   HGB 9.8*   HCT 32.6*        BMP:  Recent Labs     04/08/22 1824      K 4.4      CO2 18*   BUN 54*   CREATININE 3.3*   CALCIUM 8.0*     Recent Labs     04/08/22 1824   AST 12   ALT 18   BILITOT 0.3   ALKPHOS 195*     Urinalysis:  Lab Results   Component Value Date    NITRU Negative 04/08/2022    WBCUA 33 04/08/2022    BACTERIA NEGATIVE 04/08/2022    RBCUA 1 04/08/2022    BLOODU Negative 04/08/2022    SPECGRAV 1.011 04/08/2022    GLUCOSEU Negative 04/08/2022       XR CHEST PORTABLE    Result Date: 4/8/2022  XR CHEST PORTABLE 4/8/2022 8:15 PM History: Shortness of breath. One view chest x-ray. Heart size is within normal limits. The mediastinum has a normal appearance. The lungs are adequately expanded with no pneumonia or pneumothorax. There is mild chronic interstitial disease with scattered calcified granulomas. There is no significant pleural fluid. No congestive failure changes. 1. No acute disease.  Signed by Dr Linus Nickerson Shields      Assessment/Plan:    Acute kidney injury superimposed on CKD        - Nephrology consultation and recommendations  appreciated              - Monitor I's and O's closely   -Urinalysis & Culture               - Monitor labs closely              - Avoid hypotension              - Avoid nephrotoxic agents   -Daily labs   Anemia due to CKD   -Currently at Hgb 9.8    -Iron & TIBC    -Ferritin    -Vitamin B12 & Folate    -Monitor I & O   -Monitor for signs of bleeding   -Monitor on telemetry      DVT prophylactic: Heparin, SCD     Signed:  ODILIA Hernandes - CNP, 4/8/2022 9:57 PM

## 2022-04-09 NOTE — CONSULTS
Nephrology ([facility]) Consult Note      Patient:  Colt Rock  YOB: 1945  Date of Service: 4/9/2022  MRN: [id number]   Acct: [de-identified]   Primary Care Physician: Adrian Bob MD  Advance Directive: Full Code  Admit Date: 4/8/2022       Hospital Day: 1  Referring Provider: Carey Bernardo MD    Patient independently seen and examined, Chart, Consults, Notes, Operative notes, Labs, Cardiology, and Radiology studies reviewed as available. Chief complaint: Abnormal labs. Subjective:  Colt Rock is a 77 y.o. female for whom we were consulted for evaluation and treatment of acute kidney injury/chronic kidney disease. She has history of stage IV chronic kidney disease, follows ODILIA Evangelista in the office. She was directed to go to the hospital as she has significant decline in renal function on routine labs. Patient has been complaining of chronic diarrhea. Her p.o. intake of fluid has been fairly low. Patient also has history of hypertension, lupus, hypothyroidism and gastric bypass surgery. Hospital course remarkable for IV fluid administration and has improvement of renal function. This afternoon she feels well and had noticed large urine output.     Allergies:  Codeine    Medicines:  Current Facility-Administered Medications   Medication Dose Route Frequency Provider Last Rate Last Admin    allopurinol (ZYLOPRIM) tablet 100 mg  100 mg Oral BID ODILIA Mcdonald   100 mg at 04/09/22 0910    calcitRIOL (ROCALTROL) capsule 0.25 mcg  0.25 mcg Oral Daily ODILIA Mcdonald   0.25 mcg at 04/09/22 5672    diazePAM (VALIUM) tablet 5 mg  5 mg Oral Daily PRN ODILIA Mcdonald        vitamin D (ERGOCALCIFEROL) capsule 50,000 Units  50,000 Units Oral Once per day on Mon Wed Fri ODILIA Mcdonald   50,000 Units at 04/09/22 1229    vitamin B-12 (CYANOCOBALAMIN) tablet 50 mcg  50 mcg Oral Daily ODILIA Mcdonald   50 mcg at 04/09/22 1319  topiramate (TOPAMAX) tablet 100 mg  100 mg Oral BID Bary Draft, APRN   100 mg at 04/09/22 0910    tiZANidine (ZANAFLEX) tablet 4 mg  4 mg Oral Q8H PRN Bary Draft, APRN        dicyclomine (BENTYL) capsule 10 mg  10 mg Oral Daily Bary Draft, APRN   10 mg at 04/09/22 0910    escitalopram (LEXAPRO) tablet 20 mg  20 mg Oral Daily Bary Draft, APRN   20 mg at 04/09/22 0910    gabapentin (NEURONTIN) capsule 300 mg  300 mg Oral Nightly Bary Draft, APRN        levothyroxine (SYNTHROID) tablet 137 mcg  137 mcg Oral Daily Bary Draft, APRN   137 mcg at 04/09/22 0940    metoprolol succinate (TOPROL XL) extended release tablet 25 mg  25 mg Oral Daily Bary Draft, APRN   25 mg at 04/09/22 0910    pantoprazole (PROTONIX) tablet 40 mg  40 mg Oral Daily Bary Draft, APRN   40 mg at 04/09/22 0910    sodium bicarbonate tablet 650 mg  650 mg Oral TID Bary Draft, APRN   650 mg at 04/09/22 1229    magnesium oxide (MAG-OX) tablet 400 mg  400 mg Oral Daily Bary Draft, APRN   400 mg at 04/09/22 1954    QUEtiapine (SEROQUEL) tablet 400 mg  400 mg Oral Nightly Bary Draft, APRN        sodium bicarbonate 75 mEq in sodium chloride 0.45 % 1,000 mL infusion   IntraVENous Continuous Rigo Kumar  mL/hr at 04/09/22 1232 New Bag at 04/09/22 1232    magnesium sulfate 2000 mg in 50 mL IVPB premix  2,000 mg IntraVENous Once Rigo Kumar MD 25 mL/hr at 04/09/22 1402 2,000 mg at 04/09/22 1402    sodium chloride flush 0.9 % injection 5-40 mL  5-40 mL IntraVENous 2 times per day Alverto Hernandez, APRMARISOL - CNP        sodium chloride flush 0.9 % injection 5-40 mL  5-40 mL IntraVENous PRN Alverto Hernandez, APRN - CNP        0.9 % sodium chloride infusion   IntraVENous PRN Alvertoralf Hernandez, APRN - CNP        heparin (porcine) injection 5,000 Units  5,000 Units SubCUTAneous 3 times per day Alverto Hernandez ODILIA - CNP   5,000 Units at 04/09/22 1330    ondansetron (ZOFRAN-ODT) disintegrating tablet 4 mg  4 mg Oral Q8H PRN Lenord Sabot, APRN - CNP        Or    ondansetron [facility] injection 4 mg  4 mg IntraVENous Q6H PRN Lenord Sabot, APRN - CNP        acetaminophen (TYLENOL) tablet 650 mg  650 mg Oral Q6H PRN Lenord Sabot, APRN - CNP        Or    acetaminophen (TYLENOL) suppository 650 mg  650 mg Rectal Q6H PRN Lenord Sabot, APRN - CNP           Past Medical History:  Past Medical History:   Diagnosis Date    Arthritis     Asthma     DVT (deep vein thrombosis) in pregnancy     History of blood transfusion     Hx of blood clots     hAD dvt WAS ON Coumadin for a year. 2006    Hypertension     [address] [facility] [address]     Renal failure     Splenic artery aneurysm ([facility] [facility]     Thyroid disease        Past Surgical History:  Past Surgical History:   Procedure Laterality Date    GASTRIC BYPASS SURGERY  1975    HYSTERECTOMY      JOINT REPLACEMENT Right     hip and shoulder    LYMPHADENECTOMY      DC TOTAL KNEE ARTHROPLASTY Left 11/27/2017    KNEE TOTAL ARTHROPLASTY performed by David Moeller MD at [facility] Left 7/27/2020    LLEFT REVERSE TOTAL SHOULDER POLY EXCHANGE AND BICEPS TENOTOMY performed by David Moeller MD at [facility] History  History reviewed. No pertinent family history.     Social History  Social History     Socioeconomic History    Marital status:      Spouse name: Not on file    Number of children: Not on file    Years of education: Not on file    Highest education level: Not on file   Occupational History    Not on file   Tobacco Use    Smoking status: Never Smoker    Smokeless tobacco: Never Used   Substance and Sexual Activity    Alcohol use: No    Drug use: No    Sexual activity: Not on file   Other Topics Concern    Not on file   Social History Narrative    Not on file     Social Determinants of Health     Financial Resource Strain:     Difficulty of Paying Living Expenses: Not on file   Food Insecurity:     Worried About [facility]] in the Last Year: Not on file    Jeff of Food in the Last Year: Not on file   Transportation Needs:     Lack of Transportation (Medical): Not on file    Lack of Transportation (Non-Medical): Not on file   Physical Activity:     Days of Exercise per Week: Not on file    Minutes of Exercise per Session: Not on file   Stress:     Feeling of Stress : Not on file   Social Connections:     Frequency of Communication with Friends and Family: Not on file    Frequency of Social Gatherings with Friends and Family: Not on file    Attends Yazidism Services: Not on file    Active Member of [address] [place] [address] or Organizations: Not on file    Attends Club or Organization Meetings: Not on file    Marital Status: Not on file   Intimate Partner Violence:     Fear of Current or Ex-Partner: Not on file    Emotionally Abused: Not on file    Physically Abused: Not on file    Sexually Abused: Not on file   Housing Stability:     Unable to Pay for Housing in the Last Year: Not on file    Number of [place] in the Last Year: Not on file    Unstable Housing in the Last Year: Not on file         Review of Systems:  History obtained from chart review and the patient  General ROS: No fever or chills  Respiratory ROS: No cough, shortness of breath, wheezing  Cardiovascular ROS: No chest pain or palpitations  Gastrointestinal ROS: positive for - diarrhea  Genito-Urinary ROS: No dysuria or hematuria  Musculoskeletal ROS: No joint pain or swelling   14 point ROS reviewed with the patient and negative except as noted above and in the HPI unless unable to obtain.     Objective:  Patient Vitals for the past 24 hrs:   BP Temp Temp src Pulse Resp SpO2 Height Weight   04/09/22 1100 100/63 98 °F (36.7 °C) -- 70 18 99 % -- --   04/09/22 0631 114/73 98.2 °F (36.8 °C) Temporal 68 16 96 % -- --   04/09/22 0047 115/70 97.3 °F (36.3 °C) Temporal 74 18 96 % -- --   04/08/22 2151 133/84 97.5 °F (36.4 °C) Temporal 78 20 98 % 5' 4\" (1.626 m) 160 lb 3 oz (72.7 kg)   04/08/22 2123 138/86 98.1 °F (36.7 °C) -- 77 16 99 % -- --   04/08/22 2020 90/69 -- -- 75 -- 99 % -- --   04/08/22 1827 100/80 -- -- 84 16 98 % -- --   04/08/22 1801 (!) 143/78 98.1 °F (36.7 °C) Oral 85 17 97 % 5' 4\" (1.626 m) 150 lb (68 kg)       Intake/Output Summary (Last 24 hours) at 4/9/2022 1422  Last data filed at 4/9/2022 1347  Gross per 24 hour   Intake 1710 ml   Output 350 ml   Net 1360 ml     General: awake/alert   HEENT: Normocephalic atraumatic head  Neck: Supple with no JVD or carotid bruits. Chest:  clear to auscultation bilaterally  CVS: regular rate and rhythm  Abdominal: soft, nontender, normal bowel sounds  Extremities: no cyanosis or edema  Skin: warm and dry without rash      Labs:  BMP:   Recent Labs     04/08/22 1824 04/09/22 0203 04/09/22  0211    142  --    K 4.4 4.2  --     116*  --    CO2 18* 13*  --    PHOS  --   --  4.8*   BUN 54* 58*  --    CREATININE 3.3* 3.2*  --    CALCIUM 8.0* 7.5*  --      CBC:   Recent Labs     04/08/22 1824 04/09/22 0203   WBC 7.1 7.6   HGB 9.8* 8.5*   HCT 32.6* 30.7*   MCV 98.2 107.3*    190     LIVER PROFILE:   Recent Labs     04/08/22 1824 04/09/22 0203   AST 12 11   ALT 18 13   BILITOT 0.3 <0.2   ALKPHOS 195* 179*     PT/INR: No results for input(s): PROTIME, INR in the last 72 hours. APTT: No results for input(s): APTT in the last 72 hours. BNP:  No results for input(s): BNP in the last 72 hours. Ionized Calcium:No results for input(s): IONCA in the last 72 hours. Magnesium:  Recent Labs     04/09/22  0211   MG 1.5*     Phosphorus:  Recent Labs     04/09/22  0211   PHOS 4.8*     HgbA1C: No results for input(s): LABA1C in the last 72 hours. Hepatic:   Recent Labs     04/08/22  1824 04/09/22  0203   ALKPHOS 195* 179*   ALT 18 13   AST 12 11   PROT 6.6 5.2*   BILITOT 0.3 <0.2   LABALBU 4.3 3.4*     Lactic Acid: No results for input(s): LACTA in the last 72 hours.   Troponin: No results for input(s): CKTOTAL, CKMB, TROPONINT in the last 72 hours. ABGs: No results for input(s): PH, PCO2, PO2, HCO3, O2SAT in the last 72 hours. CRP:  No results for input(s): CRP in the last 72 hours. Sed Rate:  No results for input(s): SEDRATE in the last 72 hours. Cultures:   No results for input(s): CULTURE in the last 72 hours. No results for input(s): BC, Mary Ruts in the last 72 hours. No results for input(s): CXSURG in the last 72 hours. Radiology reports as per the Radiologist  Radiology: US RENAL COMPLETE    Result Date: 4/9/2022  RENAL ULTRASOUND COMPLETE 4/9/2022 11:20 AM REASON FOR EXAM: Acute renal failure in the background of chronic kidney disease  COMPARISON: None  TECHNIQUE: Multiple longitudinal and transverse realtime sonographic images of the kidneys and urinary bladder are obtained. FINDINGS: The right kidney measures 8.8 cm. The cortical thickness within the right kidney is 9 mm and 6 mm respectively in the upper and lower pole. The right kidney is normal in size, shape, contour and position. There is a small cortical cyst involving the midpole measuring 8 x 4 x 6 mm in size. The cortical thickness is normal, with preservation of the corticomedullary differentiation. The central echo complex is compact with no evidence for hydronephrosis. No nephrolithiasis or abnormal perinephric fluid collections are seen. No hydroureter. The left kidney measures 9.0 cm. The cortical thickness within the left kidney is 13 mm  and 10 mm respectively in the upper and lower pole. The left kidney is normal in size, shape, contour and position. The cortical thickness is normal, with preservation of the corticomedullary differentiation. The central echo complex is compact with no evidence for hydronephrosis. There is a shadowing calculus involving the lower pole of the left kidney measuring 9 x 5 x 9 mm in size as well as a mid pole calculus measuring 1.3 x 0.5 x 0.8 cm. . No hydroureter. Scanning through the pelvis reveals the bladder to be moderately distended with anechoic urine. The wall thickness and contour are normal. There is no surrounding ascites. 1. Nonobstructing calculi involving the mid and lower pole of the left kidney. There is a tiny cortical cyst in the midpole of the right kidney. No evidence of discrete solid mass or hydronephrosis. . Signed by Dr Nehemias Kemp    Result Date: 4/8/2022  XR CHEST PORTABLE 4/8/2022 8:15 PM History: Shortness of breath. One view chest x-ray. Heart size is within normal limits. The mediastinum has a normal appearance. The lungs are adequately expanded with no pneumonia or pneumothorax. There is mild chronic interstitial disease with scattered calcified granulomas. There is no significant pleural fluid. No congestive failure changes. 1. No acute disease. Signed by Dr Mari Hull   1. Acute kidney injury stage II. 2.  Intravascular volume depletion. 3.  Metabolic acidemia. 4.  Hyperphosphatemia. 5.  Stage IV chronic kidney disease baseline. 6.  Hypertensive renal disease. 7.  Abdominal obesity. 8.  Anemia of chronic kidney disease. 9.  Secondary hyperparathyroidism. Plan:  1. IV fluid bolus. 2.  Change maintenance IV fluid. 3.  Urinary electrolytes. 4.  Renal ultrasound was reviewed      Thank you for the consult, we appreciate the opportunity to provide care to your patients. Feel free to contact me if I can be of any further assistance.       Aparna Floyd MD  04/09/22  2:22 PM

## 2022-04-10 PROBLEM — R82.90 ABNORMAL FINDING ON URINALYSIS: Status: ACTIVE | Noted: 2022-04-10

## 2022-04-10 PROBLEM — R11.0 NAUSEA: Status: ACTIVE | Noted: 2022-04-10

## 2022-04-10 LAB
ALBUMIN SERPL-MCNC: 3.1 G/DL (ref 3.5–5.2)
ALP BLD-CCNC: 203 U/L (ref 35–104)
ALT SERPL-CCNC: 210 U/L (ref 5–33)
ANION GAP SERPL CALCULATED.3IONS-SCNC: 15 MMOL/L (ref 7–19)
AST SERPL-CCNC: 314 U/L (ref 5–32)
BILIRUB SERPL-MCNC: 0.5 MG/DL (ref 0.2–1.2)
BUN BLDV-MCNC: 53 MG/DL (ref 8–23)
CALCIUM SERPL-MCNC: 7.6 MG/DL (ref 8.8–10.2)
CHLORIDE BLD-SCNC: 110 MMOL/L (ref 98–111)
CO2: 17 MMOL/L (ref 22–29)
CREAT SERPL-MCNC: 3.2 MG/DL (ref 0.5–0.9)
CREATININE URINE: 50 MG/DL (ref 4.2–622)
GAMMA GLUTAMYL TRANSFERASE: 53 U/L (ref 7–54)
GFR AFRICAN AMERICAN: 17
GFR NON-AFRICAN AMERICAN: 14
GLUCOSE BLD-MCNC: 93 MG/DL (ref 74–109)
HAV IGM SER IA-ACNC: NORMAL
HCT VFR BLD CALC: 27.7 % (ref 37–47)
HEMOGLOBIN: 8.4 G/DL (ref 12–16)
HEPATITIS B CORE IGM ANTIBODY: NORMAL
HEPATITIS B SURFACE ANTIGEN INTERPRETATION: NORMAL
HEPATITIS C ANTIBODY INTERPRETATION: NORMAL
MAGNESIUM: 2.1 MG/DL (ref 1.6–2.4)
MCH RBC QN AUTO: 29.7 PG (ref 27–31)
MCHC RBC AUTO-ENTMCNC: 30.3 G/DL (ref 33–37)
MCV RBC AUTO: 97.9 FL (ref 81–99)
MICROALBUMIN UR-MCNC: <1.2 MG/DL (ref 0–19)
MICROALBUMIN/CREAT UR-RTO: NORMAL MG/G
ORGANISM: ABNORMAL
PDW BLD-RTO: 15.7 % (ref 11.5–14.5)
PLATELET # BLD: 188 K/UL (ref 130–400)
PMV BLD AUTO: 11.1 FL (ref 9.4–12.3)
POTASSIUM SERPL-SCNC: 4.6 MMOL/L (ref 3.5–5)
RBC # BLD: 2.83 M/UL (ref 4.2–5.4)
SODIUM BLD-SCNC: 142 MMOL/L (ref 136–145)
SODIUM URINE: 85 MMOL/L
TOTAL PROTEIN: 5.2 G/DL (ref 6.6–8.7)
URINE CULTURE, ROUTINE: ABNORMAL
URINE CULTURE, ROUTINE: ABNORMAL
WBC # BLD: 4.8 K/UL (ref 4.8–10.8)

## 2022-04-10 PROCEDURE — 82570 ASSAY OF URINE CREATININE: CPT

## 2022-04-10 PROCEDURE — 83735 ASSAY OF MAGNESIUM: CPT

## 2022-04-10 PROCEDURE — 99222 1ST HOSP IP/OBS MODERATE 55: CPT | Performed by: INTERNAL MEDICINE

## 2022-04-10 PROCEDURE — 2500000003 HC RX 250 WO HCPCS: Performed by: HOSPITALIST

## 2022-04-10 PROCEDURE — 80053 COMPREHEN METABOLIC PANEL: CPT

## 2022-04-10 PROCEDURE — 6360000002 HC RX W HCPCS

## 2022-04-10 PROCEDURE — 2580000003 HC RX 258: Performed by: HOSPITALIST

## 2022-04-10 PROCEDURE — 82977 ASSAY OF GGT: CPT

## 2022-04-10 PROCEDURE — 84300 ASSAY OF URINE SODIUM: CPT

## 2022-04-10 PROCEDURE — 6370000000 HC RX 637 (ALT 250 FOR IP): Performed by: NURSE PRACTITIONER

## 2022-04-10 PROCEDURE — 6360000002 HC RX W HCPCS: Performed by: INTERNAL MEDICINE

## 2022-04-10 PROCEDURE — 36415 COLL VENOUS BLD VENIPUNCTURE: CPT

## 2022-04-10 PROCEDURE — 85027 COMPLETE CBC AUTOMATED: CPT

## 2022-04-10 PROCEDURE — 82043 UR ALBUMIN QUANTITATIVE: CPT

## 2022-04-10 PROCEDURE — 80074 ACUTE HEPATITIS PANEL: CPT

## 2022-04-10 PROCEDURE — 1210000000 HC MED SURG R&B

## 2022-04-10 PROCEDURE — 87086 URINE CULTURE/COLONY COUNT: CPT

## 2022-04-10 RX ORDER — METOCLOPRAMIDE 5 MG/1
5 TABLET ORAL EVERY 6 HOURS PRN
Status: DISCONTINUED | OUTPATIENT
Start: 2022-04-10 | End: 2022-04-10

## 2022-04-10 RX ORDER — DIAZEPAM 5 MG/1
5 TABLET ORAL EVERY 8 HOURS PRN
Status: DISCONTINUED | OUTPATIENT
Start: 2022-04-10 | End: 2022-04-17

## 2022-04-10 RX ADMIN — SODIUM BICARBONATE 650 MG: 650 TABLET ORAL at 15:15

## 2022-04-10 RX ADMIN — TIZANIDINE 4 MG: 4 TABLET ORAL at 20:21

## 2022-04-10 RX ADMIN — ALLOPURINOL 100 MG: 100 TABLET ORAL at 11:56

## 2022-04-10 RX ADMIN — LEVOTHYROXINE SODIUM 137 MCG: 137 TABLET ORAL at 06:23

## 2022-04-10 RX ADMIN — GABAPENTIN 300 MG: 300 CAPSULE ORAL at 20:21

## 2022-04-10 RX ADMIN — HEPARIN SODIUM 5000 UNITS: 5000 INJECTION INTRAVENOUS; SUBCUTANEOUS at 15:15

## 2022-04-10 RX ADMIN — Medication 50 MCG: at 11:56

## 2022-04-10 RX ADMIN — DICYCLOMINE HYDROCHLORIDE 10 MG: 10 CAPSULE ORAL at 11:56

## 2022-04-10 RX ADMIN — METOPROLOL SUCCINATE 25 MG: 25 TABLET, EXTENDED RELEASE ORAL at 11:56

## 2022-04-10 RX ADMIN — Medication 400 MG: at 11:56

## 2022-04-10 RX ADMIN — IRON SUCROSE 200 MG: 20 INJECTION, SOLUTION INTRAVENOUS at 15:16

## 2022-04-10 RX ADMIN — DIAZEPAM 5 MG: 5 TABLET ORAL at 20:21

## 2022-04-10 RX ADMIN — ESCITALOPRAM OXALATE 20 MG: 10 TABLET ORAL at 11:56

## 2022-04-10 RX ADMIN — PANTOPRAZOLE SODIUM 40 MG: 40 TABLET, DELAYED RELEASE ORAL at 11:56

## 2022-04-10 RX ADMIN — HEPARIN SODIUM 5000 UNITS: 5000 INJECTION INTRAVENOUS; SUBCUTANEOUS at 20:26

## 2022-04-10 RX ADMIN — HEPARIN SODIUM 5000 UNITS: 5000 INJECTION INTRAVENOUS; SUBCUTANEOUS at 06:23

## 2022-04-10 RX ADMIN — TOPIRAMATE 100 MG: 100 TABLET, FILM COATED ORAL at 20:21

## 2022-04-10 RX ADMIN — ONDANSETRON 4 MG: 2 INJECTION INTRAMUSCULAR; INTRAVENOUS at 08:44

## 2022-04-10 RX ADMIN — SODIUM BICARBONATE 650 MG: 650 TABLET ORAL at 20:21

## 2022-04-10 RX ADMIN — SODIUM BICARBONATE: 84 INJECTION, SOLUTION INTRAVENOUS at 12:56

## 2022-04-10 RX ADMIN — SODIUM BICARBONATE 650 MG: 650 TABLET ORAL at 11:56

## 2022-04-10 RX ADMIN — QUETIAPINE FUMARATE 400 MG: 100 TABLET ORAL at 20:21

## 2022-04-10 RX ADMIN — TOPIRAMATE 100 MG: 100 TABLET, FILM COATED ORAL at 11:57

## 2022-04-10 RX ADMIN — CALCITRIOL CAPSULES 0.25 MCG 0.25 MCG: 0.25 CAPSULE ORAL at 11:56

## 2022-04-10 RX ADMIN — DIAZEPAM 5 MG: 5 TABLET ORAL at 10:47

## 2022-04-10 ASSESSMENT — ENCOUNTER SYMPTOMS
TROUBLE SWALLOWING: 0
VOMITING: 0
SINUS PAIN: 0
NAUSEA: 0
WHEEZING: 0
ABDOMINAL PAIN: 0
SORE THROAT: 0
SHORTNESS OF BREATH: 1
ABDOMINAL DISTENTION: 0
CONSTIPATION: 0
BLOOD IN STOOL: 0
COUGH: 0
DIARRHEA: 1

## 2022-04-10 ASSESSMENT — PAIN SCALES - GENERAL: PAINLEVEL_OUTOF10: 0

## 2022-04-10 NOTE — PROGRESS NOTES
[facility]      Patient:  Violeta Ramírez  YOB: 1945  Date of Service: 4/10/2022  MRN: [id number]   Acct: [de-identified]   Primary Care Physician: Cara Wilson MD  Advance Directive: Full Code  Admit Date: 4/8/2022       Hospital Day: 2  Portions of this note have been copied forward, however, changed to reflect the most current clinical status of this patient. CHIEF COMPLAINT Abnormal labs    SUBJECTIVE:  States that she continues to feel the same. C/O chronic diarrhea since her gastric bypass. Has worsening nausea today. Denies pain. CUMULATIVE HOSPITAL STAY:  The patient is a 77 y.o. female who presented to [facility] ER on 4/8/2022 with PMH DVT, HTN, Lupus, Thyroid disease, gastric bypass  complaining of abnormal lab. She has CKD and was having a routine FU with the APRN at the nephrology clinic. She had a significant decline in renal function. She does report chronic diarrhea, ACUÑA and decreased appetite. ED work-up revealed Hgb 9.9, HCT 32.6, creatinine 3.3 BUN 54, and urinalysis negative. (Baseline creatinine around 2) Admitted to hospital medicine and has received IVF resuscitation with bicarbonate. Nephrology has been consulted. Renal US performed on 4/9/2022 shows nonobstructing calculi involving the mid and lower pole of the left kidney. There is a tiny cortical cyst in the midpole of the right kidney. No evidence of hydronephrosis or solid mass. No significant changes in chemistries overnight. CBC shows ongoing anemia with H&H 8.4/27.7 due to chronic disease. Iron profile within normal limits. Tranaminitis noted on labs on 4/10/2022-GI consulted for transaminitis and chronic diarrhea. UA from ED + for Corynebacterium, discussed with lab, advised to repeat urine culture for probable contaminant. Increased nausea-Valium increased, unable to order reglan due to drug to drug interaction with Seroquel. Review of Systems:   Review of Systems   Constitutional: Positive for fatigue. Negative for chills, diaphoresis and fever. HENT: Negative for congestion, ear pain, sinus pain, sore throat and trouble swallowing. Eyes: Negative for visual disturbance. Respiratory: Positive for shortness of breath. Negative for cough and wheezing. Cardiovascular: Negative for chest pain, palpitations and leg swelling. Gastrointestinal: Positive for diarrhea. Negative for abdominal distention, abdominal pain, blood in stool, constipation, nausea and vomiting. Endocrine: Negative for cold intolerance and heat intolerance. Genitourinary: Negative for difficulty urinating, flank pain, frequency and urgency. Musculoskeletal: Negative for arthralgias and myalgias. Neurological: Negative for dizziness, syncope, weakness, light-headedness, numbness and headaches. Hematological: Does not bruise/bleed easily. Psychiatric/Behavioral: Negative for agitation, confusion and dysphoric mood. 14 point review of systems is negative except as specifically addressed above. Objective:   VITALS:  /62   Pulse 81   Temp 98.2 °F (36.8 °C)   Resp 16   Ht 5' 4\" (1.626 m)   Wt 160 lb 3 oz (72.7 kg)   SpO2 96%   BMI 27.50 kg/m²   24HR INTAKE/OUTPUT:      Intake/Output Summary (Last 24 hours) at 4/10/2022 1405  Last data filed at 4/10/2022 2022  Gross per 24 hour   Intake 120 ml   Output 100 ml   Net 20 ml       Physical Exam  Constitutional:       General: She is not in acute distress. Appearance: Normal appearance. She is ill-appearing. She is not toxic-appearing or diaphoretic. HENT:      Head: Normocephalic and atraumatic. Right Ear: External ear normal.      Left Ear: External ear normal.      Nose: Nose normal. No congestion or rhinorrhea. Mouth/Throat:      Mouth: Mucous membranes are dry. Pharynx: Oropharynx is clear. Eyes:      General: No scleral icterus. Extraocular Movements: Extraocular movements intact.       Conjunctiva/sclera: Conjunctivae normal. Cardiovascular:      Rate and Rhythm: Normal rate and regular rhythm. Pulses: Normal pulses. Heart sounds: Normal heart sounds. No murmur heard. No friction rub. No gallop. Pulmonary:      Effort: Pulmonary effort is normal. No respiratory distress. Breath sounds: Normal breath sounds. No wheezing, rhonchi or rales. Abdominal:      General: Abdomen is flat. Bowel sounds are normal. There is no distension. Palpations: Abdomen is soft. Tenderness: There is no abdominal tenderness. Musculoskeletal:         General: No swelling. Normal range of motion. Cervical back: Normal range of motion and neck supple. Right lower leg: No edema. Left lower leg: No edema. Skin:     General: Skin is warm and dry. Coloration: Skin is pale. Skin is not jaundiced. Findings: No erythema, lesion or rash. Neurological:      General: No focal deficit present. Mental Status: She is alert and oriented to person, place, and time. Mental status is at baseline. Cranial Nerves: No cranial nerve deficit. Sensory: No sensory deficit. Motor: No weakness. Psychiatric:         Mood and Affect: Mood normal.         Behavior: Behavior normal.         Thought Content:  Thought content normal.         Judgment: Judgment normal.           Medications:      sodium bicarbonate infusion 125 mL/hr at 04/10/22 1256    sodium chloride        allopurinol  100 mg Oral BID    calcitRIOL  0.25 mcg Oral Daily    vitamin D  50,000 Units Oral Once per day on Mon Wed Fri    vitamin B-12  50 mcg Oral Daily    topiramate  100 mg Oral BID    dicyclomine  10 mg Oral Daily    escitalopram  20 mg Oral Daily    gabapentin  300 mg Oral Nightly    levothyroxine  137 mcg Oral Daily    metoprolol succinate  25 mg Oral Daily    pantoprazole  40 mg Oral Daily    sodium bicarbonate  650 mg Oral TID    magnesium oxide  400 mg Oral Daily    QUEtiapine  400 mg Oral Nightly    sodium chloride flush  5-40 mL IntraVENous 2 times per day    heparin (porcine)  5,000 Units SubCUTAneous 3 times per day     diazePAM, tiZANidine, sodium chloride flush, sodium chloride, ondansetron **OR** ondansetron, acetaminophen **OR** acetaminophen  ADULT DIET; Regular; Low Fat/Low Chol/High Fiber/YAAKOV; Low Potassium (Less than 3000 mg/day); Low Phosphorus (Less than 1000 mg)     Lab and other Data:     Recent Labs     04/08/22  1824 04/09/22  0203 04/10/22  0234   WBC 7.1 7.6 4.8   HGB 9.8* 8.5* 8.4*    190 188     Recent Labs     04/08/22  1824 04/09/22  0203 04/10/22  0234    142 142   K 4.4 4.2 4.6    116* 110   CO2 18* 13* 17*   BUN 54* 58* 53*   CREATININE 3.3* 3.2* 3.2*   GLUCOSE 107 86 93     Recent Labs     04/08/22  1824 04/09/22  0203 04/10/22  0234   AST 12 11 314*   ALT 18 13 210*   BILITOT 0.3 <0.2 0.5   ALKPHOS 195* 179* 203*     Troponin T: No results for input(s): TROPONINI in the last 72 hours. Pro-BNP: No results for input(s): BNP in the last 72 hours. INR: No results for input(s): INR in the last 72 hours. UA:  Recent Labs     04/08/22  2045   COLORU YELLOW   PHUR 5.5   WBCUA 33*   RBCUA 1   BACTERIA NEGATIVE*   CLARITYU Clear   SPECGRAV 1.011   LEUKOCYTESUR MODERATE*   UROBILINOGEN 0.2   BILIRUBINUR Negative   BLOODU Negative   GLUCOSEU Negative       RAD:   US RENAL COMPLETE    Result Date: 4/9/2022  1. Nonobstructing calculi involving the mid and lower pole of the left kidney. There is a tiny cortical cyst in the midpole of the right kidney. No evidence of discrete solid mass or hydronephrosis. . Signed by Dr Amaya Cope    Result Date: 4/8/2022  1. No acute disease.  Signed by Dr Alize Freitas       Assessment/Plan   Principal Problem:    Acute kidney injury superimposed on CKD [facility])   -Nephrology consulted-recommendations appreciated              - Creatinine 3.2  today               - IVFs as ordered              - Monitor I's and O's closely              - Monitor labs closely              - Avoid hypotension              - Avoid nephrotoxic agents       Active Problems:    Anemia due to chronic kidney disease   -Vitamin B12 supplementation   -Iron profile-WNL   -Procrit per nephro   -Daily labs    Abnormal UA   -Repeat UA and culture.   -Continue to follow    Nausea   -Unable to order reglan due to drug to drug interaction with seroquel per pharmacy   -continue Zofran   -Increase Po Valium to assist with N/V   -Continue PPI    Resolved Problems:    * No resolved hospital problems.  *      DVT Prophylaxis: Heparin    GI prophylaxis: Protonix    Disposition: ARAMIS Bridges, APRN, 4/10/2022 2:05 PM

## 2022-04-10 NOTE — PROGRESS NOTES
Nephrology ([facility]) Progress Note      Patient:  Rinku Schumacher  YOB: 1945  Date of Service: 4/10/2022  MRN: [id number]   Acct: [de-identified]   Primary Care Physician: Aislinn Dewitt MD  Advance Directive: Full Code  Admit Date: 4/8/2022       Hospital Day: 2  Referring Provider: Oralia Phillips MD    Patient independently seen and examined, Chart, Consults, Notes, Operative notes, Labs, Cardiology, and Radiology studies reviewed as available. Chief complaint: Abnormal labs. Subjective:  Rinku Schumacher is a 77 y.o. female for whom we were consulted for evaluation and treatment of acute kidney injury/chronic kidney disease. She has history of stage IV chronic kidney disease, follows ODILIA Dalton in the office. She was directed to go to the hospital as she has significant decline in renal function on routine labs. Patient has been complaining of chronic diarrhea. Her p.o. intake of fluid has been fairly low. Patient also has history of hypertension, lupus, hypothyroidism and gastric bypass surgery. Hospital course remarkable for IV fluid administration and has very slow improvement of renal function. This morning she is still very weak and has poor appetite. However she has good urine output.     Allergies:  Codeine    Medicines:  Current Facility-Administered Medications   Medication Dose Route Frequency Provider Last Rate Last Admin    sodium bicarbonate 150 mEq in dextrose 5 % 1,000 mL infusion   IntraVENous Continuous Rigo Kumar  mL/hr at 04/10/22 1256 New Bag at 04/10/22 1256    diazePAM (VALIUM) tablet 5 mg  5 mg Oral Q8H PRN Nancey Mauricio, APRN   5 mg at 04/10/22 1047    allopurinol (ZYLOPRIM) tablet 100 mg  100 mg Oral BID Nancey Mauricio, APRN   100 mg at 04/10/22 1156    calcitRIOL (ROCALTROL) capsule 0.25 mcg  0.25 mcg Oral Daily Nancey Mauricio, APRN   0.25 mcg at 04/10/22 1156    vitamin D (ERGOCALCIFEROL) capsule 50,000 Units 50,000 Units Oral Once per day on Mon Wed Fri Shelli Bamberger, APRN   50,000 Units at 04/09/22 1229    vitamin B-12 (CYANOCOBALAMIN) tablet 50 mcg  50 mcg Oral Daily Shelli Bamberger, APRN   50 mcg at 04/10/22 1156    topiramate (TOPAMAX) tablet 100 mg  100 mg Oral BID Shelli Bamberger, APRN   100 mg at 04/10/22 1157    tiZANidine (ZANAFLEX) tablet 4 mg  4 mg Oral Q8H PRN Shelli Bamberger, APRN   4 mg at 04/09/22 2030    dicyclomine (BENTYL) capsule 10 mg  10 mg Oral Daily Shelli Bamberger, APRN   10 mg at 04/10/22 1156    escitalopram (LEXAPRO) tablet 20 mg  20 mg Oral Daily Shelli Bamberger, APRN   20 mg at 04/10/22 1156    gabapentin (NEURONTIN) capsule 300 mg  300 mg Oral Nightly Shelli Bamberger, APRN   300 mg at 04/09/22 2030    levothyroxine (SYNTHROID) tablet 137 mcg  137 mcg Oral Daily Shelli Bamberger, APRN   137 mcg at 04/10/22 1937    metoprolol succinate (TOPROL XL) extended release tablet 25 mg  25 mg Oral Daily Shelli Bamberger, APRN   25 mg at 04/10/22 1156    pantoprazole (PROTONIX) tablet 40 mg  40 mg Oral Daily Shelli Bamberger, APRN   40 mg at 04/10/22 1156    sodium bicarbonate tablet 650 mg  650 mg Oral TID Shelli Bamberger, APRN   650 mg at 04/10/22 1156    magnesium oxide (MAG-OX) tablet 400 mg  400 mg Oral Daily Shelli Bamberger, APRN   400 mg at 04/10/22 1156    QUEtiapine (SEROQUEL) tablet 400 mg  400 mg Oral Nightly Shelli Bamberger, APRN   400 mg at 04/09/22 2030    sodium chloride flush 0.9 % injection 5-40 mL  5-40 mL IntraVENous 2 times per day ODILIA Wilson - CNP        sodium chloride flush 0.9 % injection 5-40 mL  5-40 mL IntraVENous PRN ODILIA Wilson - CNP        0.9 % sodium chloride infusion   IntraVENous PRN ODILIA Wilson CNP        heparin (porcine) injection 5,000 Units  5,000 Units SubCUTAneous 3 times per day ODILIA Wilson - CNP   5,000 Units at 04/10/22 7650    ondansetron (ZOFRAN-ODT) disintegrating tablet 4 mg 4 mg Oral Q8H PRN Donnita Finder, APRN - CNP        Or    ondansetron [facility]) injection 4 mg  4 mg IntraVENous Q6H PRN Donnita Finder, APRN - CNP   4 mg at 04/10/22 0844    acetaminophen (TYLENOL) tablet 650 mg  650 mg Oral Q6H PRN Donnita Finder, APRN - CNP        Or    acetaminophen (TYLENOL) suppository 650 mg  650 mg Rectal Q6H PRN Donnita Finder, APRN - CNP           Past Medical History:  Past Medical History:   Diagnosis Date    Arthritis     Asthma     DVT (deep vein thrombosis) in pregnancy     History of blood transfusion     Hx of blood clots     hAD dvt WAS ON Coumadin for a year. 2006    Hypertension     [facility]     Renal failure     Splenic artery aneurysm ([facility]     Thyroid disease        Past Surgical History:  Past Surgical History:   Procedure Laterality Date    GASTRIC BYPASS SURGERY  1975    HYSTERECTOMY      JOINT REPLACEMENT Right     hip and shoulder    LYMPHADENECTOMY      PA TOTAL KNEE ARTHROPLASTY Left 11/27/2017    KNEE TOTAL ARTHROPLASTY performed by Kingsley Babinski, MD at [facility] Left 7/27/2020    LLEFT REVERSE TOTAL SHOULDER POLY EXCHANGE AND BICEPS TENOTOMY performed by Kingsley Babinski, MD at [facility] History  History reviewed. No pertinent family history.     Social History  Social History     Socioeconomic History    Marital status:      Spouse name: Not on file    Number of children: Not on file    Years of education: Not on file    Highest education level: Not on file   Occupational History    Not on file   Tobacco Use    Smoking status: Never Smoker    Smokeless tobacco: Never Used   Substance and Sexual Activity    Alcohol use: No    Drug use: No    Sexual activity: Not on file   Other Topics Concern    Not on file   Social History Narrative    Not on file     Social Determinants of Health     Financial Resource Strain:     Difficulty of Paying Living Expenses: Not on file   Food Insecurity:     Worried About Running Out of Food in the Last Year: Not on file    Ran Out of Food in the Last Year: Not on file   Transportation Needs:     Lack of Transportation (Medical): Not on file    Lack of Transportation (Non-Medical): Not on file   Physical Activity:     Days of Exercise per Week: Not on file    Minutes of Exercise per Session: Not on file   Stress:     Feeling of Stress : Not on file   Social Connections:     Frequency of Communication with Friends and Family: Not on file    Frequency of Social Gatherings with Friends and Family: Not on file    Attends Rastafarian Services: Not on file    Active Member of [address] [place] [address] [employer] or Organizations: Not on file    Attends Club or Organization Meetings: Not on file    Marital Status: Not on file   Intimate Partner Violence:     Fear of Current or Ex-Partner: Not on file    Emotionally Abused: Not on file    Physically Abused: Not on file    Sexually Abused: Not on file   Housing Stability:     Unable to Pay for Housing in the Last Year: Not on file    Number of [place] in the Last Year: Not on file    Unstable Housing in the Last Year: Not on file         Review of Systems:  History obtained from chart review and the patient  General ROS: No fever or chills  Respiratory ROS: No cough, shortness of breath, wheezing  Cardiovascular ROS: No chest pain or palpitations  Gastrointestinal ROS: positive for - diarrhea  Genito-Urinary ROS: No dysuria or hematuria  Musculoskeletal ROS: No joint pain or swelling   14 point ROS reviewed with the patient and negative except as noted above and in the HPI unless unable to obtain.     Objective:  Patient Vitals for the past 24 hrs:   BP Temp Temp src Pulse Resp SpO2   04/10/22 1215 113/62 98.2 °F (36.8 °C) -- 81 16 96 %   04/10/22 0557 106/70 97.7 °F (36.5 °C) Temporal 76 18 95 %   04/10/22 0059 93/71 97 °F (36.1 °C) Temporal 72 18 93 %   04/09/22 2140 -- -- -- 69 -- --   04/09/22 1700 109/69 96.9 °F (36.1 °C) -- 66 16 99 %       Intake/Output Summary (Last 24 hours) at 4/10/2022 1412  Last data filed at 4/10/2022 5560  Gross per 24 hour   Intake 120 ml   Output 100 ml   Net 20 ml     General: awake/alert   HEENT: Normocephalic atraumatic head  Neck: Supple with no JVD or carotid bruits. Chest:  clear to auscultation bilaterally  CVS: regular rate and rhythm  Abdominal: soft, nontender, normal bowel sounds  Extremities: no cyanosis or edema  Skin: warm and dry without rash      Labs:  BMP:   Recent Labs     04/08/22  1824 04/09/22  0203 04/09/22  0211 04/10/22  0234    142  --  142   K 4.4 4.2  --  4.6    116*  --  110   CO2 18* 13*  --  17*   PHOS  --   --  4.8*  --    BUN 54* 58*  --  53*   CREATININE 3.3* 3.2*  --  3.2*   CALCIUM 8.0* 7.5*  --  7.6*     CBC:   Recent Labs     04/08/22  1824 04/09/22  0203 04/10/22  0234   WBC 7.1 7.6 4.8   HGB 9.8* 8.5* 8.4*   HCT 32.6* 30.7* 27.7*   MCV 98.2 107.3* 97.9    190 188     LIVER PROFILE:   Recent Labs     04/08/22  1824 04/09/22  0203 04/10/22  0234   AST 12 11 314*   ALT 18 13 210*   BILITOT 0.3 <0.2 0.5   ALKPHOS 195* 179* 203*     PT/INR: No results for input(s): PROTIME, INR in the last 72 hours. APTT: No results for input(s): APTT in the last 72 hours. BNP:  No results for input(s): BNP in the last 72 hours. Ionized Calcium:No results for input(s): IONCA in the last 72 hours. Magnesium:  Recent Labs     04/09/22  0211 04/10/22  0234   MG 1.5* 2.1     Phosphorus:  Recent Labs     04/09/22 0211   PHOS 4.8*     HgbA1C: No results for input(s): LABA1C in the last 72 hours. Hepatic:   Recent Labs     04/08/22  1824 04/09/22  0203 04/10/22  0234   ALKPHOS 195* 179* 203*   ALT 18 13 210*   AST 12 11 314*   PROT 6.6 5.2* 5.2*   BILITOT 0.3 <0.2 0.5   LABALBU 4.3 3.4* 3.1*     Lactic Acid: No results for input(s): LACTA in the last 72 hours. Troponin: No results for input(s): CKTOTAL, CKMB, TROPONINT in the last 72 hours.   ABGs: No results for input(s): PH, PCO2, PO2, HCO3, O2SAT in the last 72 hours. CRP:  No results for input(s): CRP in the last 72 hours. Sed Rate:  No results for input(s): SEDRATE in the last 72 hours. Cultures:   No results for input(s): CULTURE in the last 72 hours. No results for input(s): BC, Rekha [place] in the last 72 hours. No results for input(s): CXSURG in the last 72 hours. Radiology reports as per the Radiologist  Radiology: US RENAL COMPLETE    Result Date: 4/9/2022  RENAL ULTRASOUND COMPLETE 4/9/2022 11:20 AM REASON FOR EXAM: Acute renal failure in the background of chronic kidney disease  COMPARISON: None  TECHNIQUE: Multiple longitudinal and transverse realtime sonographic images of the kidneys and urinary bladder are obtained. FINDINGS: The right kidney measures 8.8 cm. The cortical thickness within the right kidney is 9 mm and 6 mm respectively in the upper and lower pole. The right kidney is normal in size, shape, contour and position. There is a small cortical cyst involving the midpole measuring 8 x 4 x 6 mm in size. The cortical thickness is normal, with preservation of the corticomedullary differentiation. The central echo complex is compact with no evidence for hydronephrosis. No nephrolithiasis or abnormal perinephric fluid collections are seen. No hydroureter. The left kidney measures 9.0 cm. The cortical thickness within the left kidney is 13 mm  and 10 mm respectively in the upper and lower pole. The left kidney is normal in size, shape, contour and position. The cortical thickness is normal, with preservation of the corticomedullary differentiation. The central echo complex is compact with no evidence for hydronephrosis. There is a shadowing calculus involving the lower pole of the left kidney measuring 9 x 5 x 9 mm in size as well as a mid pole calculus measuring 1.3 x 0.5 x 0.8 cm. . No hydroureter. Scanning through the pelvis reveals the bladder to be moderately distended with anechoic urine.  The wall thickness and contour are normal. There is no surrounding ascites. 1. Nonobstructing calculi involving the mid and lower pole of the left kidney. There is a tiny cortical cyst in the midpole of the right kidney. No evidence of discrete solid mass or hydronephrosis. . Signed by Dr Perry Salgado    Result Date: 4/8/2022  XR CHEST PORTABLE 4/8/2022 8:15 PM History: Shortness of breath. One view chest x-ray. Heart size is within normal limits. The mediastinum has a normal appearance. The lungs are adequately expanded with no pneumonia or pneumothorax. There is mild chronic interstitial disease with scattered calcified granulomas. There is no significant pleural fluid. No congestive failure changes. 1. No acute disease. Signed by Dr Lisandro Quinteros   1. Acute kidney injury stage II. 2.  Acute tubular necrosis/high fena  3. Metabolic acidemia. 4.  Hyperphosphatemia. 5.  Stage IV chronic kidney disease baseline. 6.  Hypertensive renal disease. 7.  Abdominal obesity. 8.  Anemia of chronic kidney disease. 9.  Secondary hyperparathyroidism. Plan:  1. Continue maintenance IV fluid. .  2.  Intravenous Venofer. .  3.  Monitor renal function closely.   4.  May need dialysis if no renal recovery        Emre Palma MD  04/10/22  2:12 PM

## 2022-04-10 NOTE — PLAN OF CARE
Problem: Falls - Risk of:  Goal: Will remain free from falls  Description: Will remain free from falls  4/9/2022 2350 by Dickson [place] Michael, LPN  Outcome: Ongoing  4/9/2022 1652 by Katy Harrington RN  Outcome: Ongoing  4/9/2022 1104 by Red Mena RN  Outcome: Ongoing  Goal: Absence of physical injury  Description: Absence of physical injury  4/9/2022 2350 by Dickson [place] Michael, LPN  Outcome: Ongoing  4/9/2022 1652 by Katy Harrington RN  Outcome: Ongoing  4/9/2022 1104 by Red Mena RN  Outcome: Ongoing     Problem: Pain:  Goal: Pain level will decrease  Description: Pain level will decrease  4/9/2022 2350 by Dickson Rodriguez, LPN  Outcome: Ongoing  4/9/2022 1652 by Katy Harrington RN  Outcome: Ongoing  4/9/2022 1104 by Red Mena RN  Outcome: Ongoing  Goal: Control of acute pain  Description: Control of acute pain  4/9/2022 2350 by [address], LPN  Outcome: Ongoing  4/9/2022 1652 by Katy Harrington RN  Outcome: Ongoing  4/9/2022 1104 by Red Mena RN  Outcome: Ongoing  Goal: Control of chronic pain  Description: Control of chronic pain  4/9/2022 2350 by [address], LPN  Outcome: Ongoing  4/9/2022 1652 by Katy Harrington RN  Outcome: Ongoing  4/9/2022 1104 by Red Mena RN  Outcome: Ongoing     Problem: Tissue Perfusion - Renal, Altered:  Goal: Electrolytes within specified parameters  Description: Electrolytes within specified parameters  4/9/2022 2350 by [address], LPN  Outcome: Ongoing  4/9/2022 1652 by Katy Harrington RN  Outcome: Ongoing  4/9/2022 1104 by Red Mena RN  Outcome: Ongoing  Goal: Urine creatinine clearance will be within specified parameters  Description: Urine creatinine clearance will be within specified parameters  4/9/2022 2350 by Dickson Rodriguez, LPN  Outcome: Ongoing  4/9/2022 1652 by Katy Harrington RN  Outcome: Ongoing  4/9/2022 1104 by Red Mena, RN  Outcome: Ongoing  Goal: Serum creatinine will be within specified parameters  Description: Serum creatinine will be within specified parameters  4/9/2022 2350 by [address], LPN  Outcome: Ongoing  4/9/2022 1652 by Kira Hartman RN  Outcome: Ongoing  4/9/2022 1104 by Vikas Staples RN  Outcome: Ongoing  Goal: Ability to achieve a balanced intake and output will improve  Description: Ability to achieve a balanced intake and output will improve  4/9/2022 2350 by [address], LPN  Outcome: Ongoing  4/9/2022 1652 by Kira Hartman RN  Outcome: Ongoing  4/9/2022 1104 by Vikas Staples RN  Outcome: Ongoing     Problem: Activity:  Goal: Fatigue will decrease  Description: Fatigue will decrease  4/9/2022 2350 by [address], LPN  Outcome: Ongoing  4/9/2022 1652 by Kira Hartman RN  Outcome: Ongoing  4/9/2022 1104 by Vikas Staples RN  Outcome: Ongoing  Goal: Ability to tolerate increased activity will improve  Description: Ability to tolerate increased activity will improve  4/9/2022 2350 by [address], LPN  Outcome: Ongoing  4/9/2022 1652 by Kira Hartman RN  Outcome: Ongoing  4/9/2022 1104 by Vikas Staples RN  Outcome: Ongoing  Goal: Ability to maintain optimal joint mobility will improve  Description: Ability to maintain optimal joint mobility will improve  4/9/2022 2350 by [address], LPN  Outcome: Ongoing  4/9/2022 1652 by Kira Hartman RN  Outcome: Ongoing  4/9/2022 1104 by Vikas Staples RN  Outcome: Ongoing     Problem:  Bowel/Gastric:  Goal: Ability to achieve a regular elimination pattern will improve  Description: Ability to achieve a regular elimination pattern will improve  4/9/2022 2350 by [address], LPN  Outcome: Ongoing  4/9/2022 1652 by Kira Hartman RN  Outcome: Ongoing  4/9/2022 1104 by Vikas Staples RN  Outcome: Ongoing     Problem: Cardiac:  Goal: Ability to maintain an adequate cardiac output will improve  Description: Ability to maintain an adequate cardiac output will improve  4/9/2022 2350 by [address], LPN  Outcome: Ongoing  4/9/2022 1652 by Huy Vincent RN  Outcome: Ongoing  4/9/2022 1104 by Lev Hewitt RN  Outcome: Ongoing  Goal: Ability to maintain adequate ventilation will improve  Description: Ability to maintain adequate ventilation will improve  4/9/2022 2350 by [address], LPN  Outcome: Ongoing  4/9/2022 1652 by Huy Vincent RN  Outcome: Ongoing  4/9/2022 1104 by Lev Hewitt RN  Outcome: Ongoing  Goal: Ability to achieve and maintain adequate cardiopulmonary perfusion will improve  Description: Ability to achieve and maintain adequate cardiopulmonary perfusion will improve  4/9/2022 2350 by [address], LPN  Outcome: Ongoing  4/9/2022 1652 by Huy Vincent RN  Outcome: Ongoing  4/9/2022 1104 by Lev Hewitt RN  Outcome: Ongoing     Problem: Coping:  Goal: Ability to adjust to condition or change in health will improve  Description: Ability to adjust to condition or change in health will improve  4/9/2022 2350 by Dickson Rodriguez, LPN  Outcome: Ongoing  4/9/2022 1652 by Huy Vincent RN  Outcome: Ongoing  4/9/2022 1104 by Lev Hewitt RN  Outcome: Ongoing  Goal: Communication of feelings regarding changes in body function or appearance will improve  Description: Communication of feelings regarding changes in body function or appearance will improve  4/9/2022 2350 by Dickson Rodriguez, LPN  Outcome: Ongoing  4/9/2022 1652 by Huy Vincent RN  Outcome: Ongoing  4/9/2022 1104 by Lev Hewitt RN  Outcome: Ongoing     Problem: Health Behavior:  Goal: Identification of resources available to assist in meeting health care needs will improve  Description: Identification of resources available to assist in meeting health care needs will improve  4/9/2022 2350 by Dickson Rodriguez, LPN  Outcome: Ongoing  4/9/2022 1652 by Huy Vincent RN  Outcome: Ongoing  4/9/2022 1104 by Lev Hewitt RN  Outcome: Ongoing     Problem: Nutritional:  Goal: Maintenance of adequate nutrition will improve  Description: Maintenance of adequate nutrition will improve  4/9/2022 2350 by Dickson Rodriguez, LPN  Outcome: Ongoing  4/9/2022 1652 by Huy Vincent RN  Outcome: Ongoing  4/9/2022 1104 by Lev Hewitt RN  Outcome: Ongoing     Problem: Physical Regulation:  Goal: Signs and symptoms of infection will decrease  Description: Signs and symptoms of infection will decrease  4/9/2022 2350 by Dickson Rodriguez, LPN  Outcome: Ongoing  4/9/2022 1652 by Huy Vincent RN  Outcome: Ongoing  4/9/2022 1104 by Lev Hewitt RN  Outcome: Ongoing  Goal: Will show no signs and symptoms of excessive bleeding  Description: Will show no signs and symptoms of excessive bleeding  4/9/2022 2350 by Dickson Rodriguez, LPN  Outcome: Ongoing  4/9/2022 1652 by Huy Vincent RN  Outcome: Ongoing  4/9/2022 1104 by Lev Hewitt RN  Outcome: Ongoing  Goal: Complications related to the disease process, condition or treatment will be avoided or minimized  Description: Complications related to the disease process, condition or treatment will be avoided or minimized  4/9/2022 2350 by Dickson Rodriguez, LPN  Outcome: Ongoing  4/9/2022 1652 by Huy Vincent RN  Outcome: Ongoing  4/9/2022 1104 by Lev Hewitt RN  Outcome: Ongoing     Problem: Safety:  Goal: Ability to remain free from injury will improve  Description: Ability to remain free from injury will improve  4/9/2022 2350 by Dickson Rodriguez, LPN  Outcome: Ongoing  4/9/2022 1652 by Huy Vincent RN  Outcome: Ongoing  4/9/2022 1104 by Lev Hewitt RN  Outcome: Ongoing     Problem: Sensory:  Goal: Pain level will decrease  Description: Pain level will decrease  4/9/2022 2350 by Dickson Rodriguez, LPN  Outcome: Ongoing  4/9/2022 1652 by Huy Vincent RN  Outcome: Ongoing  4/9/2022 1104 by Lev Hewitt RN  Outcome: Ongoing  Goal: General experience of comfort will improve  Description: General experience of comfort will improve  4/9/2022 2350 by [address], LPN  Outcome: Ongoing  4/9/2022 1652 by Juancho Arnold RN  Outcome: Ongoing  4/9/2022 1104 by Lew Moser RN  Outcome: Ongoing     Problem: Skin Integrity:  Goal: Skin integrity will improve  Description: Skin integrity will improve  4/9/2022 2350 by [address], LPN  Outcome: Ongoing  4/9/2022 1652 by Juancho Arnold RN  Outcome: Ongoing  4/9/2022 1104 by Lew Moser RN  Outcome: Ongoing  Goal: Signs of wound healing will improve  Description: Signs of wound healing will improve  4/9/2022 2350 by [address], LPN  Outcome: Ongoing  4/9/2022 1652 by Juancho Arnold RN  Outcome: Ongoing  4/9/2022 1104 by Lew Moser RN  Outcome: Ongoing     Problem: Tissue Perfusion:  Goal: Ability to maintain adequate tissue perfusion will improve  Description: Ability to maintain adequate tissue perfusion will improve  4/9/2022 2350 by [address], LPN  Outcome: Ongoing  4/9/2022 1652 by Juancho Arnold RN  Outcome: Ongoing  4/9/2022 1104 by Lew Moser RN  Outcome: Ongoing  Goal: Ability to maintain a stable neurologic state will improve  Description: Ability to maintain a stable neurologic state will improve  4/9/2022 2350 by [address], LPN  Outcome: Ongoing  4/9/2022 1652 by Juancho Arnold RN  Outcome: Ongoing  4/9/2022 1104 by Lew Moser RN  Outcome: Ongoing

## 2022-04-11 ENCOUNTER — APPOINTMENT (OUTPATIENT)
Dept: GENERAL RADIOLOGY | Age: 77
DRG: 674 | End: 2022-04-11
Payer: MEDICARE

## 2022-04-11 LAB
ALBUMIN SERPL-MCNC: 3.3 G/DL (ref 3.5–5.2)
ALP BLD-CCNC: 281 U/L (ref 35–104)
ALT SERPL-CCNC: 250 U/L (ref 5–33)
ANION GAP SERPL CALCULATED.3IONS-SCNC: 18 MMOL/L (ref 7–19)
AST SERPL-CCNC: 224 U/L (ref 5–32)
BILIRUB SERPL-MCNC: 0.6 MG/DL (ref 0.2–1.2)
BUN BLDV-MCNC: 52 MG/DL (ref 8–23)
CALCIUM SERPL-MCNC: 7.4 MG/DL (ref 8.8–10.2)
CHLORIDE BLD-SCNC: 100 MMOL/L (ref 98–111)
CO2: 20 MMOL/L (ref 22–29)
CREAT SERPL-MCNC: 3.4 MG/DL (ref 0.5–0.9)
EKG P AXIS: 90 DEGREES
EKG P-R INTERVAL: 164 MS
EKG Q-T INTERVAL: 380 MS
EKG QRS DURATION: 82 MS
EKG QTC CALCULATION (BAZETT): 409 MS
EKG T AXIS: 60 DEGREES
GFR AFRICAN AMERICAN: 16
GFR NON-AFRICAN AMERICAN: 13
GLUCOSE BLD-MCNC: 116 MG/DL (ref 74–109)
HCT VFR BLD CALC: 29.4 % (ref 37–47)
HEMOGLOBIN: 8.7 G/DL (ref 12–16)
MCH RBC QN AUTO: 30 PG (ref 27–31)
MCHC RBC AUTO-ENTMCNC: 29.6 G/DL (ref 33–37)
MCV RBC AUTO: 101.4 FL (ref 81–99)
PDW BLD-RTO: 15.6 % (ref 11.5–14.5)
PLATELET # BLD: 169 K/UL (ref 130–400)
PMV BLD AUTO: 11.3 FL (ref 9.4–12.3)
POTASSIUM SERPL-SCNC: 4.3 MMOL/L (ref 3.5–5)
RBC # BLD: 2.9 M/UL (ref 4.2–5.4)
SODIUM BLD-SCNC: 138 MMOL/L (ref 136–145)
TOTAL PROTEIN: 5.1 G/DL (ref 6.6–8.7)
WBC # BLD: 6.2 K/UL (ref 4.8–10.8)

## 2022-04-11 PROCEDURE — 6370000000 HC RX 637 (ALT 250 FOR IP): Performed by: NURSE PRACTITIONER

## 2022-04-11 PROCEDURE — 71046 X-RAY EXAM CHEST 2 VIEWS: CPT

## 2022-04-11 PROCEDURE — 93010 ELECTROCARDIOGRAM REPORT: CPT | Performed by: INTERNAL MEDICINE

## 2022-04-11 PROCEDURE — 6360000002 HC RX W HCPCS

## 2022-04-11 PROCEDURE — 83516 IMMUNOASSAY NONANTIBODY: CPT

## 2022-04-11 PROCEDURE — 1210000000 HC MED SURG R&B

## 2022-04-11 PROCEDURE — 36415 COLL VENOUS BLD VENIPUNCTURE: CPT

## 2022-04-11 PROCEDURE — 2500000003 HC RX 250 WO HCPCS: Performed by: HOSPITALIST

## 2022-04-11 PROCEDURE — 85027 COMPLETE CBC AUTOMATED: CPT

## 2022-04-11 PROCEDURE — 84165 PROTEIN E-PHORESIS SERUM: CPT

## 2022-04-11 PROCEDURE — 2580000003 HC RX 258: Performed by: HOSPITALIST

## 2022-04-11 PROCEDURE — 6360000002 HC RX W HCPCS: Performed by: INTERNAL MEDICINE

## 2022-04-11 PROCEDURE — 84155 ASSAY OF PROTEIN SERUM: CPT

## 2022-04-11 PROCEDURE — 82784 ASSAY IGA/IGD/IGG/IGM EACH: CPT

## 2022-04-11 PROCEDURE — 80053 COMPREHEN METABOLIC PANEL: CPT

## 2022-04-11 PROCEDURE — 82103 ALPHA-1-ANTITRYPSIN TOTAL: CPT

## 2022-04-11 PROCEDURE — 86038 ANTINUCLEAR ANTIBODIES: CPT

## 2022-04-11 PROCEDURE — 99232 SBSQ HOSP IP/OBS MODERATE 35: CPT | Performed by: INTERNAL MEDICINE

## 2022-04-11 RX ORDER — POLYETHYLENE GLYCOL 3350 17 G/17G
17 POWDER, FOR SOLUTION ORAL 2 TIMES DAILY
Status: DISCONTINUED | OUTPATIENT
Start: 2022-04-11 | End: 2022-04-19 | Stop reason: HOSPADM

## 2022-04-11 RX ADMIN — Medication 400 MG: at 09:36

## 2022-04-11 RX ADMIN — METOPROLOL SUCCINATE 25 MG: 25 TABLET, EXTENDED RELEASE ORAL at 09:36

## 2022-04-11 RX ADMIN — GABAPENTIN 300 MG: 300 CAPSULE ORAL at 20:02

## 2022-04-11 RX ADMIN — ERGOCALCIFEROL 50000 UNITS: 1.25 CAPSULE ORAL at 09:36

## 2022-04-11 RX ADMIN — IRON SUCROSE 200 MG: 20 INJECTION, SOLUTION INTRAVENOUS at 14:27

## 2022-04-11 RX ADMIN — ESCITALOPRAM OXALATE 20 MG: 10 TABLET ORAL at 09:36

## 2022-04-11 RX ADMIN — SODIUM BICARBONATE: 84 INJECTION, SOLUTION INTRAVENOUS at 18:34

## 2022-04-11 RX ADMIN — SODIUM BICARBONATE 650 MG: 650 TABLET ORAL at 14:26

## 2022-04-11 RX ADMIN — TOPIRAMATE 100 MG: 100 TABLET, FILM COATED ORAL at 20:02

## 2022-04-11 RX ADMIN — HEPARIN SODIUM 5000 UNITS: 5000 INJECTION INTRAVENOUS; SUBCUTANEOUS at 14:27

## 2022-04-11 RX ADMIN — Medication 50 MCG: at 09:36

## 2022-04-11 RX ADMIN — POLYETHYLENE GLYCOL 3350 17 G: 17 POWDER, FOR SOLUTION ORAL at 20:03

## 2022-04-11 RX ADMIN — DICYCLOMINE HYDROCHLORIDE 10 MG: 10 CAPSULE ORAL at 09:36

## 2022-04-11 RX ADMIN — TOPIRAMATE 100 MG: 100 TABLET, FILM COATED ORAL at 09:36

## 2022-04-11 RX ADMIN — PANTOPRAZOLE SODIUM 40 MG: 40 TABLET, DELAYED RELEASE ORAL at 09:36

## 2022-04-11 RX ADMIN — HEPARIN SODIUM 5000 UNITS: 5000 INJECTION INTRAVENOUS; SUBCUTANEOUS at 05:48

## 2022-04-11 RX ADMIN — CALCITRIOL CAPSULES 0.25 MCG 0.25 MCG: 0.25 CAPSULE ORAL at 09:36

## 2022-04-11 RX ADMIN — SODIUM BICARBONATE 650 MG: 650 TABLET ORAL at 20:01

## 2022-04-11 RX ADMIN — LEVOTHYROXINE SODIUM 137 MCG: 137 TABLET ORAL at 05:49

## 2022-04-11 RX ADMIN — QUETIAPINE FUMARATE 400 MG: 100 TABLET ORAL at 20:02

## 2022-04-11 RX ADMIN — HEPARIN SODIUM 5000 UNITS: 5000 INJECTION INTRAVENOUS; SUBCUTANEOUS at 21:33

## 2022-04-11 RX ADMIN — SODIUM BICARBONATE: 84 INJECTION, SOLUTION INTRAVENOUS at 07:48

## 2022-04-11 RX ADMIN — SODIUM BICARBONATE 650 MG: 650 TABLET ORAL at 09:36

## 2022-04-11 ASSESSMENT — PAIN SCALES - GENERAL: PAINLEVEL_OUTOF10: 0

## 2022-04-11 ASSESSMENT — ENCOUNTER SYMPTOMS
VOMITING: 0
COUGH: 1
DIARRHEA: 0
CHEST TIGHTNESS: 0
SHORTNESS OF BREATH: 1
BACK PAIN: 0
NAUSEA: 1
COLOR CHANGE: 0
ABDOMINAL PAIN: 0
WHEEZING: 0

## 2022-04-11 NOTE — PROGRESS NOTES
[facility] Hospitalists      Patient:  Ana Perdomo  YOB: 1945  Date of Service: 4/11/2022  MRN: [id number]   Acct: [de-identified]   Primary Care Physician: Georgi Ortega MD  Advance Directive: Full Code  Admit Date: 4/8/2022       Hospital Day: 3  Portions of this note have been copied forward, however, changed to reflect the most current clinical status of this patient. CHIEF COMPLAINT abnormal labs    SUBJECTIVE:  Patient reports decreased appetite, cough, and chest congestion today. CUMULATIVE HOSPITAL COURSE:  The patient is a 78-year-old female with past medical history of DVT, hypertension, lupus, thyroid disease, gastric bypass, and CKD who presented to [address] [place] [address] ED complaining of abnormal labs. Patient reported she was following up with a nurse practitioner in nephrology clinic and was noted to have decline in renal function. Patient reports chronic diarrhea, dyspnea on exertion, and decreased appetite. ED work-up revealed hemoglobin of 9.9, hematocrit 32.6, creatinine 3.3, BUN 54, and negative urinalysis. Patient was admitted to the hospitalist service for LUCY on CKD. Patient was started on IV resuscitation with bicarbonate fluids. Nephrology was consulted. Renal ultrasound indicated nonobstructing calculi involving the mid and lower pole of the left kidney, a tiny cortical cyst in the midpole of the right kidney. Transaminitis was indicated and GI was consulted to assess transaminitis and chronic diarrhea. UA in ED indicated coryneabacterium, however patient asymptomatic, discussed with lab and advise repeat urine culture. Repeat urine culture with no antibiotic coverage as indicated no growth at 24 hours. Nephrology recommends to continue IV hydration. Patient noted to have some wheezing and indicated cough. Repeat chest x-ray obtained. Review of Systems:   Review of Systems   Constitutional: Positive for appetite change and fatigue. Negative for chills and fever.    Respiratory: Positive for cough and shortness of breath. Negative for chest tightness and wheezing. Cardiovascular: Negative for chest pain, palpitations and leg swelling. Gastrointestinal: Positive for nausea. Negative for abdominal pain, diarrhea and vomiting. Reports no bowel movement since admission   Genitourinary: Negative for dysuria, flank pain, frequency and urgency. Musculoskeletal: Negative for back pain and myalgias. Skin: Negative for color change and wound. Neurological: Negative for dizziness, light-headedness and headaches. Psychiatric/Behavioral: Negative for confusion. 14 point review of systems is negative except as specifically addressed above. Objective:   VITALS:  /74   Pulse 82   Temp 99.5 °F (37.5 °C)   Resp 16   Ht 5' 4\" (1.626 m)   Wt 164 lb 1 oz (74.4 kg)   SpO2 92%   BMI 28.16 kg/m²   24HR INTAKE/OUTPUT:    Intake/Output Summary (Last 24 hours) at 4/11/2022 1528  Last data filed at 4/11/2022 1021  Gross per 24 hour   Intake 742 ml   Output 400 ml   Net 342 ml       Physical Exam  Vitals reviewed. Constitutional:       Appearance: She is not ill-appearing. HENT:      Head: Normocephalic. Nose: Nose normal.      Mouth/Throat:      Mouth: Mucous membranes are moist.   Eyes:      Pupils: Pupils are equal, round, and reactive to light. Cardiovascular:      Rate and Rhythm: Normal rate and regular rhythm. Pulses: Normal pulses. Heart sounds: Normal heart sounds. Pulmonary:      Effort: Pulmonary effort is normal.      Breath sounds: Rales present. Abdominal:      General: Abdomen is flat. Bowel sounds are normal. There is no distension. Palpations: Abdomen is soft. Tenderness: There is no abdominal tenderness. There is no guarding. Musculoskeletal:         General: Normal range of motion. Cervical back: Normal range of motion and neck supple. Right lower leg: No edema. Left lower leg: No edema.    Skin: General: Skin is warm and dry. Capillary Refill: Capillary refill takes less than 2 seconds. Neurological:      General: No focal deficit present. Mental Status: She is alert and oriented to person, place, and time. Medications:      sodium bicarbonate infusion 125 mL/hr at 04/11/22 0748    sodium chloride        iron sucrose  200 mg IntraVENous Q24H    calcitRIOL  0.25 mcg Oral Daily    vitamin D  50,000 Units Oral Once per day on Mon Wed Fri    vitamin B-12  50 mcg Oral Daily    topiramate  100 mg Oral BID    dicyclomine  10 mg Oral Daily    escitalopram  20 mg Oral Daily    gabapentin  300 mg Oral Nightly    levothyroxine  137 mcg Oral Daily    metoprolol succinate  25 mg Oral Daily    pantoprazole  40 mg Oral Daily    sodium bicarbonate  650 mg Oral TID    magnesium oxide  400 mg Oral Daily    QUEtiapine  400 mg Oral Nightly    sodium chloride flush  5-40 mL IntraVENous 2 times per day    heparin (porcine)  5,000 Units SubCUTAneous 3 times per day     diazePAM, tiZANidine, sodium chloride flush, sodium chloride, ondansetron **OR** ondansetron  ADULT DIET; Regular; Low Fat/Low Chol/High Fiber/YAAKOV; Low Potassium (Less than 3000 mg/day); Low Phosphorus (Less than 1000 mg)     Lab and other Data:     Recent Labs     04/09/22  0203 04/10/22  0234 04/11/22  0225   WBC 7.6 4.8 6.2   HGB 8.5* 8.4* 8.7*    188 169     Recent Labs     04/09/22  0203 04/10/22  0234 04/11/22  0225    142 138   K 4.2 4.6 4.3   * 110 100   CO2 13* 17* 20*   BUN 58* 53* 52*   CREATININE 3.2* 3.2* 3.4*   GLUCOSE 86 93 116*     Recent Labs     04/09/22  0203 04/10/22  0234 04/11/22  0225   AST 11 314* 224*   ALT 13 210* 250*   BILITOT <0.2 0.5 0.6   ALKPHOS 179* 203* 281*     Troponin T: No results for input(s): TROPONINI in the last 72 hours. Pro-BNP: No results for input(s): BNP in the last 72 hours. INR: No results for input(s): INR in the last 72 hours.   UA:  Recent Labs 04/08/22 2045   COLORU YELLOW   PHUR 5.5   WBCUA 33*   RBCUA 1   BACTERIA NEGATIVE*   CLARITYU Clear   SPECGRAV 1.011   LEUKOCYTESUR MODERATE*   UROBILINOGEN 0.2   BILIRUBINUR Negative   BLOODU Negative   GLUCOSEU Negative     A1C: No results for input(s): LABA1C in the last 72 hours. ABG:No results for input(s): PHART, MTD7RNQ, PO2ART, ASS6GAC, BEART, HGBAE, M5IBNKXR, CARBOXHGBART in the last 72 hours. RAD:   US RENAL COMPLETE    Result Date: 4/9/2022  1. Nonobstructing calculi involving the mid and lower pole of the left kidney. There is a tiny cortical cyst in the midpole of the right kidney. No evidence of discrete solid mass or hydronephrosis. . Signed by Dr Cristi Costa    Result Date: 4/8/2022  1. No acute disease. Signed by Dr Alamo Check:   Culture, Urine  Order: [id number]   Status: Preliminary result     Visible to patient: No (not released)     Next appt: None    Specimen Information: Urine, clean catch         0 Result Notes    Component 4/10/22 1651   Urine Culture, Routine No growth P    Resulting Agency [address] [place] [address] Lab             Narrative  Performed by: [address] [place] [address] Lab  ORDER#: [id number]                          ORDERED BY: Josie Mack   SOURCE: Urine Clean Catch                  COLLECTED:  04/10/22 16:51   ANTIBIOTICS AT [facility]. :                      RECEIVED :  04/10/22 18:23      Specimen Collected: 04/10/22 16:51 Last Resulted: 04/11/22 12:54               Assessment/Plan   Principal Problem:    Acute kidney injury superimposed on CKD [facility])   -nephrology on board   -continue IV hydration, slow rate   -chest x-ray   -monitor intake and output    Active Problems:    Anemia due to chronic kidney disease   -procrit per nephrology   -monitor CC      Abnormal finding on urinalysis   -repeat urine culture no growth at 24 hours without antibiotic coverage      Nausea   -continue antiemetics    DVT Prophylaxis: heparin    Godoy Leskarla ODILIA Gabriel - CNP, 4/11/2022 3:28 PM

## 2022-04-11 NOTE — PLAN OF CARE
Problem: Falls - Risk of:  Goal: Will remain free from falls  Description: Will remain free from falls  4/11/2022 1026 by Chandler Raza RN  Outcome: Ongoing  4/11/2022 0135 by Dickson Rodriguez, LPN  Outcome: Ongoing  Goal: Absence of physical injury  Description: Absence of physical injury  4/11/2022 1026 by Chandler Raza RN  Outcome: Ongoing  4/11/2022 0135 by Dickson Rodriguez, LPN  Outcome: Ongoing     Problem: Pain:  Goal: Pain level will decrease  Description: Pain level will decrease  4/11/2022 1026 by Chandler Raza RN  Outcome: Ongoing  4/11/2022 0135 by Dickson Rodriguez, LPN  Outcome: Ongoing  Goal: Control of acute pain  Description: Control of acute pain  4/11/2022 1026 by Chandler Raza RN  Outcome: Ongoing  4/11/2022 0135 by Dickson Rodriguez, LPN  Outcome: Ongoing  Goal: Control of chronic pain  Description: Control of chronic pain  4/11/2022 1026 by Chandler Raza RN  Outcome: Ongoing  4/11/2022 0135 by Dickson Rodriguez LPN  Outcome: Ongoing     Problem: Tissue Perfusion - Renal, Altered:  Goal: Electrolytes within specified parameters  Description: Electrolytes within specified parameters  4/11/2022 1026 by Chandler Raza RN  Outcome: Ongoing  4/11/2022 0135 by Dickson Rodriguez, LPN  Outcome: Ongoing  Goal: Urine creatinine clearance will be within specified parameters  Description: Urine creatinine clearance will be within specified parameters  4/11/2022 1026 by Chandler Raza RN  Outcome: Ongoing  4/11/2022 0135 by Dickson Rodriguez LPN  Outcome: Ongoing  Goal: Serum creatinine will be within specified parameters  Description: Serum creatinine will be within specified parameters  4/11/2022 1026 by Chandler Raza RN  Outcome: Ongoing  4/11/2022 0135 by Dickson Rodriguez LPN  Outcome: Ongoing  Goal: Ability to achieve a balanced intake and output will improve  Description: Ability to achieve a balanced intake and output will improve  4/11/2022 1026 by Chandler Raza RN  Outcome: Ongoing  4/11/2022 0135 by [address], LPN  Outcome: Ongoing     Problem: Activity:  Goal: Fatigue will decrease  Description: Fatigue will decrease  4/11/2022 1026 by Francheska Judd RN  Outcome: Ongoing  4/11/2022 0135 by Dickson Rodriguez LPN  Outcome: Ongoing  Goal: Ability to tolerate increased activity will improve  Description: Ability to tolerate increased activity will improve  4/11/2022 1026 by Francheska Judd RN  Outcome: Ongoing  4/11/2022 0135 by Dickson Rodriguez, LPN  Outcome: Ongoing  Goal: Ability to maintain optimal joint mobility will improve  Description: Ability to maintain optimal joint mobility will improve  4/11/2022 1026 by Francheska Judd RN  Outcome: Ongoing  4/11/2022 0135 by Dickson Rodriguez LPN  Outcome: Ongoing     Problem:  Bowel/Gastric:  Goal: Ability to achieve a regular elimination pattern will improve  Description: Ability to achieve a regular elimination pattern will improve  4/11/2022 1026 by Francheska Judd RN  Outcome: Ongoing  4/11/2022 0135 by Dickson Rodriguez LPN  Outcome: Ongoing     Problem: Cardiac:  Goal: Ability to maintain an adequate cardiac output will improve  Description: Ability to maintain an adequate cardiac output will improve  4/11/2022 1026 by Francheska Judd RN  Outcome: Ongoing  4/11/2022 0135 by Dickson Rodriguez LPN  Outcome: Ongoing  Goal: Ability to maintain adequate ventilation will improve  Description: Ability to maintain adequate ventilation will improve  4/11/2022 1026 by Francheska Judd RN  Outcome: Ongoing  4/11/2022 0135 by Dickson Rodriguez LPN  Outcome: Ongoing  Goal: Ability to achieve and maintain adequate cardiopulmonary perfusion will improve  Description: Ability to achieve and maintain adequate cardiopulmonary perfusion will improve  4/11/2022 1026 by Francheska Judd RN  Outcome: Ongoing  4/11/2022 0135 by Dickson Rodriguez LPN  Outcome: Ongoing     Problem: Coping:  Goal: Ability to adjust to condition or change in health will improve  Description: Ability to adjust to condition or change in health will improve  4/11/2022 1026 by Cally Mcknight RN  Outcome: Ongoing  4/11/2022 0135 by Dickson Rodriguez LPN  Outcome: Ongoing  Goal: Communication of feelings regarding changes in body function or appearance will improve  Description: Communication of feelings regarding changes in body function or appearance will improve  4/11/2022 1026 by Cally Mcknight RN  Outcome: Ongoing  4/11/2022 0135 by Dickson Rodriguez LPN  Outcome: Ongoing     Problem: Health Behavior:  Goal: Identification of resources available to assist in meeting health care needs will improve  Description: Identification of resources available to assist in meeting health care needs will improve  4/11/2022 1026 by Cally Mcknight RN  Outcome: Ongoing  4/11/2022 0135 by Dickson Rodriguez LPN  Outcome: Ongoing     Problem: Nutritional:  Goal: Maintenance of adequate nutrition will improve  Description: Maintenance of adequate nutrition will improve  4/11/2022 1026 by Cally Mcknight RN  Outcome: Ongoing  4/11/2022 0135 by Dickson Rodriguez LPN  Outcome: Ongoing     Problem: Physical Regulation:  Goal: Signs and symptoms of infection will decrease  Description: Signs and symptoms of infection will decrease  4/11/2022 1026 by Cally Mcknight RN  Outcome: Ongoing  4/11/2022 0135 by Dickson Rodriguez LPN  Outcome: Ongoing  Goal: Will show no signs and symptoms of excessive bleeding  Description: Will show no signs and symptoms of excessive bleeding  4/11/2022 1026 by Cally Mcknight RN  Outcome: Ongoing  4/11/2022 0135 by Dickson Rodriguez LPN  Outcome: Ongoing  Goal: Complications related to the disease process, condition or treatment will be avoided or minimized  Description: Complications related to the disease process, condition or treatment will be avoided or minimized  4/11/2022 1026 by Cally Mcknight RN  Outcome: Ongoing  4/11/2022 0135 by Dickson Rodriguez LPN  Outcome: Ongoing     Problem: Safety:  Goal: Ability to remain free from injury will improve  Description: Ability to remain free from injury will improve  4/11/2022 1026 by Nuris Stewart RN  Outcome: Ongoing  4/11/2022 0135 by [address], LPN  Outcome: Ongoing     Problem: Sensory:  Goal: Pain level will decrease  Description: Pain level will decrease  4/11/2022 1026 by Nuris Stewart RN  Outcome: Ongoing  4/11/2022 0135 by [address], LPN  Outcome: Ongoing  Goal: General experience of comfort will improve  Description: General experience of comfort will improve  4/11/2022 1026 by Nuris Stewart RN  Outcome: Ongoing  4/11/2022 0135 by [address], LPN  Outcome: Ongoing     Problem: Skin Integrity:  Goal: Skin integrity will improve  Description: Skin integrity will improve  4/11/2022 1026 by Nuris Stewart RN  Outcome: Ongoing  4/11/2022 0135 by [address], LPN  Outcome: Ongoing  Goal: Signs of wound healing will improve  Description: Signs of wound healing will improve  4/11/2022 1026 by Nuris Stewart RN  Outcome: Ongoing  4/11/2022 0135 by [address], LPN  Outcome: Ongoing     Problem: Tissue Perfusion:  Goal: Ability to maintain adequate tissue perfusion will improve  Description: Ability to maintain adequate tissue perfusion will improve  4/11/2022 1026 by Nuris Stewart RN  Outcome: Ongoing  4/11/2022 0135 by [address], LPN  Outcome: Ongoing  Goal: Ability to maintain a stable neurologic state will improve  Description: Ability to maintain a stable neurologic state will improve  4/11/2022 1026 by Nuris Stewart RN  Outcome: Ongoing  4/11/2022 0135 by [address], LPN  Outcome: Ongoing

## 2022-04-11 NOTE — PROGRESS NOTES
Nephrology ([facility]) Progress Note    Patient:  Collis Meckel  YOB: 1945  Date of Service: 4/11/2022  MRN: [id number]   Acct: [de-identified]   Primary Care Physician: Kathe Birmingham MD  Advance Directive: Full Code  Admit Date: 4/8/2022       Hospital Day: 3  Referring Provider: Priya Art MD    Patient independently seen and examined, Chart, Consults, Notes, Operative notes, Labs, Cardiology, and Radiology studies reviewed as available. Subjective:  Collis Meckel is a 77 y.o. female for whom we were consulted for evaluation and treatment of acute kidney injury/chronic kidney disease. She has history of stage IV chronic kidney disease and follows ODILIA Morris in the office. She was directed to go to the hospital as she has significant decline in renal function on routine labs. Patient has been complaining of chronic diarrhea. Her p.o. intake of fluid has been fairly low. Patient also has history of hypertension, lupus, hypothyroidism and gastric bypass surgery. Hospital course remarkable for IV fluid administration and has very slow improvement of renal function.       Today, no overnight events. She noted poor appetite.   Denied chest pain, nausea or vomiting    Allergies:  Codeine    Medicines:  Current Facility-Administered Medications   Medication Dose Route Frequency Provider Last Rate Last Admin    sodium bicarbonate 150 mEq in dextrose 5 % 1,000 mL infusion   IntraVENous Continuous Rigo Kumar  mL/hr at 04/11/22 0748 New Bag at 04/11/22 0748    diazePAM (VALIUM) tablet 5 mg  5 mg Oral Q8H PRN Shelli Bamberger, APRN   5 mg at 04/10/22 2021    iron sucrose (VENOFER) injection 200 mg  200 mg IntraVENous Q24H Domi Guardado MD   200 mg at 04/10/22 1516    calcitRIOL (ROCALTROL) capsule 0.25 mcg  0.25 mcg Oral Daily Shelli Bamberger, APRN   0.25 mcg at 04/11/22 0936    vitamin D (ERGOCALCIFEROL) capsule 50,000 Units  50,000 Units Oral Once per day on Mon Wed Fri E[place], APRN   50,000 Units at 04/11/22 8000    vitamin B-12 (CYANOCOBALAMIN) tablet 50 mcg  50 mcg Oral Daily Elex [place], APRN   50 mcg at 04/11/22 0936    topiramate (TOPAMAX) tablet 100 mg  100 mg Oral BID Elex [place], APRN   100 mg at 04/11/22 0936    tiZANidine (ZANAFLEX) tablet 4 mg  4 mg Oral Q8H PRN Elex Square, APRN   4 mg at 04/10/22 2021    dicyclomine (BENTYL) capsule 10 mg  10 mg Oral Daily Elex [place], APRN   10 mg at 04/11/22 0936    escitalopram (LEXAPRO) tablet 20 mg  20 mg Oral Daily Elex [place], APRN   20 mg at 04/11/22 5244    gabapentin (NEURONTIN) capsule 300 mg  300 mg Oral Nightly Elex Square, APRN   300 mg at 04/10/22 2021    levothyroxine (SYNTHROID) tablet 137 mcg  137 mcg Oral Daily Elex [place], APRN   137 mcg at 04/11/22 0549    metoprolol succinate (TOPROL XL) extended release tablet 25 mg  25 mg Oral Daily Elex [place], APRN   25 mg at 04/11/22 0936    pantoprazole (PROTONIX) tablet 40 mg  40 mg Oral Daily Elex [place], APRN   40 mg at 04/11/22 1887    sodium bicarbonate tablet 650 mg  650 mg Oral TID Elex [place], APRN   650 mg at 04/11/22 0936    magnesium oxide (MAG-OX) tablet 400 mg  400 mg Oral Daily Elex [place], APRN   400 mg at 04/11/22 4280    QUEtiapine (SEROQUEL) tablet 400 mg  400 mg Oral Nightly Elex Square, APRN   400 mg at 04/10/22 2021    sodium chloride flush 0.9 % injection 5-40 mL  5-40 mL IntraVENous 2 times per day Merrily Tomy, APRN - CNP        sodium chloride flush 0.9 % injection 5-40 mL  5-40 mL IntraVENous PRN Merrily Tomy, APRN - CNP        0.9 % sodium chloride infusion   IntraVENous PRN Merrily Tomy, APRN - CNP        heparin (porcine) injection 5,000 Units  5,000 Units SubCUTAneous 3 times per day Merrily Tomy, APRN - CNP   5,000 Units at 04/11/22 0548    ondansetron (ZOFRAN-ODT) disintegrating tablet 4 mg  4 mg Oral Q8H PRN Rasheed Huitron APRN - CNP        Or    ondansetron (ZOFRAN) injection 4 mg  4 mg IntraVENous Q6H PRN Armin Perea, APRN - CNP   4 mg at 04/10/22 0844       Past Medical History:  Past Medical History:   Diagnosis Date    Arthritis     Asthma     DVT (deep vein thrombosis) in pregnancy     History of blood transfusion     Hx of blood clots     hAD dvt WAS ON Coumadin for a year. 2006    Hypertension     Lupus ([address]     Renal failure     Splenic artery aneurysm [address] [address]     Thyroid disease        Past Surgical History:  Past Surgical History:   Procedure Laterality Date    GASTRIC BYPASS SURGERY  1975    HYSTERECTOMY      JOINT REPLACEMENT Right     hip and shoulder    LYMPHADENECTOMY      AL TOTAL KNEE ARTHROPLASTY Left 11/27/2017    KNEE TOTAL ARTHROPLASTY performed by Laureen Aquino MD at [facility] Left 7/27/2020    LLEFT REVERSE TOTAL SHOULDER POLY EXCHANGE AND BICEPS TENOTOMY performed by Laureen Aquino MD at [facility] History  History reviewed. No pertinent family history. Social History  Social History     Socioeconomic History    Marital status:      Spouse name: Not on file    Number of children: Not on file    Years of education: Not on file    Highest education level: Not on file   Occupational History    Not on file   Tobacco Use    Smoking status: Never Smoker    Smokeless tobacco: Never Used   Substance and Sexual Activity    Alcohol use: No    Drug use: No    Sexual activity: Not on file   Other Topics Concern    Not on file   Social History Narrative    Not on file     Social Determinants of Health     Financial Resource Strain:     Difficulty of Paying Living Expenses: Not on file   Food Insecurity:     Worried About Running Out of Food in the Last Year: Not on file    Jeff of Food in the Last Year: Not on file   Transportation Needs:     Lack of Transportation (Medical): Not on file    Lack of Transportation (Non-Medical):  Not on file   Physical Activity:     Days of Exercise per Week: Not on file    Minutes of Exercise per Session: Not on file   Stress:     Feeling of Stress : Not on file   Social Connections:     Frequency of Communication with Friends and Family: Not on file    Frequency of Social Gatherings with Friends and Family: Not on file    Attends Hindu Services: Not on file    Active Member of Clubs or Organizations: Not on file    Attends Club or Organization Meetings: Not on file    Marital Status: Not on file   Intimate Partner Violence:     Fear of Current or Ex-Partner: Not on file    Emotionally Abused: Not on file    Physically Abused: Not on file    Sexually Abused: Not on file   Housing Stability:     Unable to Pay for Housing in the Last Year: Not on file    Number of Places Lived in the Last Year: Not on file    Unstable Housing in the Last Year: Not on file         Review of Systems:  History obtained from chart review and the patient  General ROS: No fever or chills  Respiratory ROS: No cough, shortness of breath, wheezing  Cardiovascular ROS: No chest pain or palpitations  Gastrointestinal ROS: No abdominal pain or melena  Genito-Urinary ROS: No dysuria or hematuria  Musculoskeletal ROS: No joint pain or swelling         Objective:  Patient Vitals for the past 24 hrs:   BP Temp Temp src Pulse Resp SpO2 Weight   04/11/22 0612 102/67 99.7 °F (37.6 °C) Temporal 84 16 91 % 164 lb 1 oz (74.4 kg)   04/11/22 0056 94/71 98.2 °F (36.8 °C) Temporal 82 16 90 % --   04/10/22 1745 114/76 98 °F (36.7 °C) -- 57 16 97 % --   04/10/22 1215 113/62 98.2 °F (36.8 °C) -- 81 16 96 % --       Intake/Output Summary (Last 24 hours) at 4/11/2022 1128  Last data filed at 4/11/2022 1021  Gross per 24 hour   Intake 1222 ml   Output 400 ml   Net 822 ml     General: awake/alert   Chest:  clear to auscultation bilaterally  CVS: regular rate and rhythm  Abdominal: soft, nontender, normal bowel sounds  Extremities: no cyanosis, ble edema  Skin: warm and dry without rash    Labs:  BMP:   Recent Labs     04/09/22  0203 04/09/22  0211 04/10/22  0234 04/11/22  0225     --  142 138   K 4.2  --  4.6 4.3   *  --  110 100   CO2 13*  --  17* 20*   PHOS  --  4.8*  --   --    BUN 58*  --  53* 52*   CREATININE 3.2*  --  3.2* 3.4*   CALCIUM 7.5*  --  7.6* 7.4*     CBC:   Recent Labs     04/09/22  0203 04/10/22  0234 04/11/22  0225   WBC 7.6 4.8 6.2   HGB 8.5* 8.4* 8.7*   HCT 30.7* 27.7* 29.4*   .3* 97.9 101.4*    188 169     LIVER PROFILE:   Recent Labs     04/09/22  0203 04/10/22  0234 04/11/22  0225   AST 11 314* 224*   ALT 13 210* 250*   BILITOT <0.2 0.5 0.6   ALKPHOS 179* 203* 281*     PT/INR: No results for input(s): PROTIME, INR in the last 72 hours. APTT: No results for input(s): APTT in the last 72 hours. BNP:  No results for input(s): BNP in the last 72 hours. Ionized Calcium:No results for input(s): IONCA in the last 72 hours. Magnesium:  Recent Labs     04/09/22  0211 04/10/22  0234   MG 1.5* 2.1     Phosphorus:  Recent Labs     04/09/22 0211   PHOS 4.8*     HgbA1C: No results for input(s): LABA1C in the last 72 hours. Hepatic:   Recent Labs     04/09/22  0203 04/10/22  0234 04/11/22  0225   ALKPHOS 179* 203* 281*   ALT 13 210* 250*   AST 11 314* 224*   PROT 5.2* 5.2* 5.1*   BILITOT <0.2 0.5 0.6   LABALBU 3.4* 3.1* 3.3*     Lactic Acid: No results for input(s): LACTA in the last 72 hours. Troponin: No results for input(s): CKTOTAL, CKMB, TROPONINT in the last 72 hours. ABGs: No results found for: PHART, PO2ART, [id number]  CRP:  No results for input(s): CRP in the last 72 hours. Sed Rate:  No results for input(s): SEDRATE in the last 72 hours. Culture Results:   Blood Culture Recent: No results for input(s): BC in the last 720 hours.     Urine Culture Recent :   Recent Labs     04/08/22 2045   LABURIN >50,000 CFU/ml*  Moderate growth  Corynebacterium aurimucosum  Susceptibility testing for this isolate is restricted to  normally sterile sources and is a reference laboratory  referral.  Please contact the laboratory to discuss further testing  options if clinically indicated. Radiology reports as per the Radiologist  Radiology: US RENAL COMPLETE    Result Date: 4/9/2022  RENAL ULTRASOUND COMPLETE 4/9/2022 11:20 AM REASON FOR EXAM: Acute renal failure in the background of chronic kidney disease  COMPARISON: None  TECHNIQUE: Multiple longitudinal and transverse realtime sonographic images of the kidneys and urinary bladder are obtained. FINDINGS: The right kidney measures 8.8 cm. The cortical thickness within the right kidney is 9 mm and 6 mm respectively in the upper and lower pole. The right kidney is normal in size, shape, contour and position. There is a small cortical cyst involving the midpole measuring 8 x 4 x 6 mm in size. The cortical thickness is normal, with preservation of the corticomedullary differentiation. The central echo complex is compact with no evidence for hydronephrosis. No nephrolithiasis or abnormal perinephric fluid collections are seen. No hydroureter. The left kidney measures 9.0 cm. The cortical thickness within the left kidney is 13 mm  and 10 mm respectively in the upper and lower pole. The left kidney is normal in size, shape, contour and position. The cortical thickness is normal, with preservation of the corticomedullary differentiation. The central echo complex is compact with no evidence for hydronephrosis. There is a shadowing calculus involving the lower pole of the left kidney measuring 9 x 5 x 9 mm in size as well as a mid pole calculus measuring 1.3 x 0.5 x 0.8 cm. . No hydroureter. Scanning through the pelvis reveals the bladder to be moderately distended with anechoic urine. The wall thickness and contour are normal. There is no surrounding ascites. 1. Nonobstructing calculi involving the mid and lower pole of the left kidney.  There is a tiny cortical cyst in the midpole of the right kidney. No evidence of discrete solid mass or hydronephrosis. . Signed by Dr Fer Velez    Result Date: 4/8/2022  XR CHEST PORTABLE 4/8/2022 8:15 PM History: Shortness of breath. One view chest x-ray. Heart size is within normal limits. The mediastinum has a normal appearance. The lungs are adequately expanded with no pneumonia or pneumothorax. There is mild chronic interstitial disease with scattered calcified granulomas. There is no significant pleural fluid. No congestive failure changes. 1. No acute disease.  Signed by Dr Nory Arellano kidney injury/ATN  Chronic kidney disease stage IV  Hypertensive nephrosclerosis  Metabolic acidosis  Hyperphosphatemia  Anemia chronic kidney disease  Secondary hyperparathyroidism  Elevated AST/ALT    Plan:  Discussed with patient, nursing  Monitor labs  IV fluid trial  GI work-up ongoing    Jessica Iglesias MD  04/11/22  11:28 AM

## 2022-04-11 NOTE — CONSULTS
[employer] [facility] SUSHILA [address] 79, [address] [facility]                                  CONSULTATION    PATIENT NAME: Florinda Awad                      :        1945  MED REC NO:   [id number]                              ROOM:       [facility]  ACCOUNT NO:   [de-identified]                           ADMIT DATE: 2022  PROVIDER:     Jana Joseph MD    CONSULT DATE:  04/10/2022    GI CONSULTATION    ASSESSMENT:  1.  New onset of mild asymptomatic transaminase elevations of the liver,  significance to be clarified. History does not suggest any specific  etiology. Differential diagnosis includes nonalcoholic steatohepatitis,  drug-induced transaminasemia, viral hepatitis, alpha-1 antitrypsin  deficiency, autoimmune hepatitis associated with lupus or  hypergammaglobulinemia and extrahepatic causes such as celiac disease. The patient is not thought to have any chronic hemolytic anemia or  G-6-PD deficiency or muscular disorder. 2.  Chronic episodic bowel cramping, diarrhea and bloating with known  lactose intolerance. EGD and colonoscopy last year at [facility] was reportedly normal.    RECOMMENDATIONS:  1. Consider drug-induced transaminasemia with allopurinol and  acetaminophen discontinued for this reason. 2.  Further hepatic workup has been ordered including RANDA, smooth muscle  antibody, microsomal antibody, serum protein electrophoresis and celiac  serology. 3.  Additional workup is dependent on laboratory results and clinical  course. If the above workup is unrevealing, then would simply continue  to monitor liver function tests periodically for trend as an outpatient  since the patient is asymptomatic, consider hepatic ultrasound if there  is progressive elevation of liver function tests.     HISTORY OF PRESENT ILLNESS:  This 81-year-old white female has a  background medical history of hypertension, DVT, SLE, hypothyroidism and  chronic kidney disease stage IV. There is also a surgical history of  the patient having undergone gastric bypass surgery remotely more than  40 years ago for treatment of obesity. The patient is seen in GI consultation because of mildly elevated  transaminase levels without any preceding history of liver disease or  hepatobiliary complaints. Previous liver function tests were normal,  but current liver function tests show  and . She recalls  having cholecystectomy in 1980. There is no background history of  jaundice or cirrhosis in the past.  Clinically, she has had longstanding  chronic intermittent bowel cramping, episodic diarrhea and bloating. Dietary history identifies that the patient has lactose intolerance. She is not aware of ever being evaluated for possible celiac disease. She has anemia of renal disease with a hemoglobin of 8.4 gm but no  history of GI bleeding while on Xarelto for DVT. She reports having  occasional headaches for which she takes Excedrin. There is no NSAID  use or abuse. She is currently not experiencing any daily diarrhea. There is no history of exposures or travel. She underwent previous EGD  last year at [facility] and was told that everything was  okay. PAST MEDICAL HISTORY:  Hypertension; remote history of lupus; DVT in  2006, originally treated with Coumadin and currently treated with  Xarelto without recurrence; chronic kidney disease; hypothyroidism;  splenic artery aneurysm; osteoarthritis. PAST SURGICAL HISTORY:  Gastric bypass surgery in 1975, hysterectomy,  right hip and shoulder replacement, left total knee arthroplasty in  2017, cholecystectomy in 1980. ALLERGIES TO MEDICATIONS:  CODEINE caused rash. CURRENT MAINTENANCE MEDICATIONS:  Reviewed in the admission medication  reconciliation list.    SOCIAL HISTORY:  .   No habits of alcohol or tobacco.    FAMILY HISTORY:  No history of hereditary liver disease such as  hemochromatosis or Rodríguez's disease. REVIEW OF SYSTEMS:  Decreased appetite, bowel cramping postprandially,  episodic diarrhea for decades, no weight loss. PHYSICAL EXAMINATION:  GENERAL:  Afebrile. Hydrated by IV fluids, alert, oriented. VITAL SIGNS:  Normal.  HEENT:  Sclerae white. Conjunctivae pink. Buccal mucosa moist.  No  aphthous ulcerations of the mouth. NECK:  No adenopathy. LUNGS:  Clear. HEART:  No S3 or murmur. ABDOMEN:  Soft abdomen. No localizing abdominal tenderness. No  organomegaly. RECTUM:  Empty.         Rinku Madison MD    D: 04/10/2022 21:50:24      T: 04/10/2022 21:57:19     [email or web address]  Job#: [id number]     Doc#: [id number]    CC:

## 2022-04-11 NOTE — PROGRESS NOTES
No hepatobiliary complaints, no fever or RUQ tenderness, normal bilirubin and GGT, other hepatic lab evaluation in progress. Lexipro, Prozac, Seroquel and Topamax each have the potential to occasionally cause elevated LFT's. All of these medications seemingly had been taken long term without liver changes PTA. However, the dosages of Seroquel and Topamax had been increased on admission. We may need to consider tapering the doses if drug induced LFT's become suspect.

## 2022-04-11 NOTE — CARE COORDINATION
Date / Time of Evaluation:   4/11/2022    1:28 PM  Assessment Completed by:   Sylvia Vines RN      Patient Name:   Ana Perdomo  MRN:   [id number]  YOB: 1945    Patient Admission Status:   Inpatient     Patient Contact Information:    Carlos Garcia  [phone] (home)   Telephone Information:   Mobile [phone]     Above information verified? [x]   Yes  []   No    (Best Practice:   Have patient/caregiver verify above address and phone number by stating out loud their current address and reachable phone number. Initial Assessment Completed at bedside with:      [x]   Patient  [x]   Family/Caregiver/Guardian   []   Other:      Current PCP:    Georgi Ortega MD    PCP verified? [x]   Yes  []   No    Emergency Contacts:    Extended Emergency Contact Information  Primary Emergency Contact: Acsendo  Address: [place] [address], [address] Phone: [phone]  Relation: Spouse  Hearing or visual needs: None  Other needs: None  Preferred language: English   needed? No  Secondary Emergency Contact: Mil Paul  Address: [place] [address], [address] Phone: [phone]  Relation: Child  Hearing or visual needs: None  Other needs: None  Preferred language: English   needed? No    Advance Directives:    Does Ms. Jean Miller have an advance directive in her electronic medical record? []   Yes  [x]   No    Code Status:   Full Code      Have you been vaccinated for COVID-19 (SARS-CoV-2)? [x]   Yes  []   No                   If so, when?     Which :         []   [employer]  [x]   Modern[employer]  []   Rosa Mole  []   Other:       Do you have any of the following unmet social needs that would keep you from returning home safely:    []   Yes  [x]   No                    Unmet Social Needs:           []   Living Situation/Housing  []   Food  []   Stroke Education   []   Utilities  []   Personal Safety  []   Financial Strain  []   Employment  []   Mental Health  []   Substance Abuse  []   Transportation Barriers    Additional Unmet Social Needs Notes:           Financial:    Payor: MEDICARE / Plan: MEDICARE PART A AND B / Product Type: *No Product type* /     Pre-Cert required for SNF:     []   Yes  [x]   No    Have Long Term Care Insurance:      []   Yes  [x]   No      Pharmacy:    [address], [address] [place] [address] [phone] Bernice Habermann [phone]  [place] [address]  Phone: [phone] Fax: [phone]    Potential assistance purchasing medications? []   Yes  [x]   No      ADLS:       Support System:   Children,Spouse/Significant Other,[employer],Friends/Neighbors      Current Home Environment:       Steps:       []   Yes  [x]   No    If yes, how many? Plans to RETURN to current housing:     [x]   Yes  []   No    Barriers to RETURNING to current housing:        Currently ACTIVE with Home Health CARE:      []   Yes  [x]   No    Home Health Care Agency:        DME Provider:   Svetlana Moreno  But doesn't use any of them currently    Had HOME OXYGEN prior to admission:      []   Yes  [x]   No    Oxygen Company:       Has a pulse oximetry unit at home:     []   Yes  []   No    Informed of need to bring portable home O2 tank to hospital on day of DISCHARGE:      []   Yes  []   No    Name of person committed to bringing portable tank at discharge:          Active with HD/PD prior to admission:             []   Yes  [x]   No    Nephrologist:     HD Center:         Transition Plan:       Transportation PLAN for Discharge:  Spouse    Factors facilitating achievement of predicted outcomes:     Barriers to discharge:        Patient Deficits:    []   Yes   []   No    If yes:    []   Confusion/Memory  []   Visual  []   Motor/Sensory         []   Right arm         [] Right leg         []   Left arm         []   Left leg  []   Language/Speech         []   Aphasia         []   Dysarthria         []   Swallow         Bhavin Coma Scale  Eye Opening: Spontaneous  Best Verbal Response: Oriented  Best Motor Response: Obeys commands  Bhavin Coma Scale Score: 15    Patient Deficit Notes: Additional CM/SW Notes: pt lives at home with spouse, plans same upon dc. Pt independent with adl's and is able to cook. Has all dme at home but does not currently require them. Has had HH in the past but not current. Spouse able to transport pt to all appts as needed. Following case for posssible new hd set up. Will cont to follow. Electronically signed by Zola Schlatter, RN on 4/11/2022 at 1:31 PM          Zana Binder and/or her family were provided with choice of provider:    []   Yes   [x]   No        []   Stroke education booklet reviewed and given to patient/family/caregiver/guardian. All questions answered all questions answered appropriately and efficiently per family.       Zola Schlatter, RN  [facility]  Care Management  Phone:   [phone]          Fax:

## 2022-04-11 NOTE — PLAN OF CARE
Problem: Falls - Risk of:  Goal: Will remain free from falls  Description: Will remain free from falls  4/11/2022 0135 by Dickson Rodriguez, LPN  Outcome: Ongoing  4/10/2022 1648 by Brittany Armstrong RN  Outcome: Ongoing  Goal: Absence of physical injury  Description: Absence of physical injury  4/11/2022 0135 by Dickson Rodriguez, LPN  Outcome: Ongoing  4/10/2022 1648 by Brittany Armstrong RN  Outcome: Ongoing     Problem: Pain:  Goal: Pain level will decrease  Description: Pain level will decrease  4/11/2022 0135 by Dickson Rodriguez, LPN  Outcome: Ongoing  4/10/2022 1648 by Brittany Armstrong RN  Outcome: Ongoing  Goal: Control of acute pain  Description: Control of acute pain  4/11/2022 0135 by Dickson Rodriguez, LPN  Outcome: Ongoing  4/10/2022 1648 by Brittany Armstrong RN  Outcome: Ongoing  Goal: Control of chronic pain  Description: Control of chronic pain  4/11/2022 0135 by Dickson Rodriguez LPN  Outcome: Ongoing  4/10/2022 1648 by Brittany Armstrong RN  Outcome: Ongoing     Problem: Tissue Perfusion - Renal, Altered:  Goal: Electrolytes within specified parameters  Description: Electrolytes within specified parameters  4/11/2022 0135 by Dickson Rodriguez LPN  Outcome: Ongoing  4/10/2022 1648 by Brittany Armstrong RN  Outcome: Ongoing  Goal: Urine creatinine clearance will be within specified parameters  Description: Urine creatinine clearance will be within specified parameters  4/11/2022 0135 by Dickson Rodriguez LPN  Outcome: Ongoing  4/10/2022 1648 by Brittany Armstrong RN  Outcome: Ongoing  Goal: Serum creatinine will be within specified parameters  Description: Serum creatinine will be within specified parameters  4/11/2022 0135 by Dickson Rodriguez LPN  Outcome: Ongoing  4/10/2022 1648 by Brittany Armstrong RN  Outcome: Ongoing  Goal: Ability to achieve a balanced intake and output will improve  Description: Ability to achieve a balanced intake and output will improve  4/11/2022 0135 by Dickson Rodriguez LPN  Outcome: Ongoing  4/10/2022 1648 by Lluvia Oropeza RN  Outcome: Ongoing     Problem: Activity:  Goal: Fatigue will decrease  Description: Fatigue will decrease  4/11/2022 0135 by [address] Rodriguez, LPN  Outcome: Ongoing  4/10/2022 1648 by Lluvia Oropeza RN  Outcome: Ongoing  Goal: Ability to tolerate increased activity will improve  Description: Ability to tolerate increased activity will improve  4/11/2022 0135 by [address] Rodriguez, LPN  Outcome: Ongoing  4/10/2022 1648 by Lluvia Oropeza RN  Outcome: Ongoing  Goal: Ability to maintain optimal joint mobility will improve  Description: Ability to maintain optimal joint mobility will improve  4/11/2022 0135 by [address] Rodriguez, LPN  Outcome: Ongoing  4/10/2022 1648 by Lluvia Oropeza RN  Outcome: Ongoing     Problem:  Bowel/Gastric:  Goal: Ability to achieve a regular elimination pattern will improve  Description: Ability to achieve a regular elimination pattern will improve  4/11/2022 0135 by [address] Rodriguez, LPN  Outcome: Ongoing  4/10/2022 1648 by Lluvia Oropeza RN  Outcome: Ongoing     Problem: Cardiac:  Goal: Ability to maintain an adequate cardiac output will improve  Description: Ability to maintain an adequate cardiac output will improve  4/11/2022 0135 by [address] Rodriguez, LPN  Outcome: Ongoing  4/10/2022 1648 by Lluvia Oropeza RN  Outcome: Ongoing  Goal: Ability to maintain adequate ventilation will improve  Description: Ability to maintain adequate ventilation will improve  4/11/2022 0135 by [address] Vern Rodriguez, LPN  Outcome: Ongoing  4/10/2022 1648 by Lluvia Oropeza RN  Outcome: Ongoing  Goal: Ability to achieve and maintain adequate cardiopulmonary perfusion will improve  Description: Ability to achieve and maintain adequate cardiopulmonary perfusion will improve  4/11/2022 0135 by [address] Rodriguez, LPN  Outcome: Ongoing  4/10/2022 1648 by Lluvia Oropeza RN  Outcome: Ongoing     Problem: Coping:  Goal: Ability to adjust to condition or change in health will improve  Description: Ability to adjust to condition or change in health will improve  4/11/2022 0135 by Dickson Rodriguez, LPN  Outcome: Ongoing  4/10/2022 1648 by Rangel Carpenter RN  Outcome: Ongoing  Goal: Communication of feelings regarding changes in body function or appearance will improve  Description: Communication of feelings regarding changes in body function or appearance will improve  4/11/2022 0135 by Dickson Rodriguez, LPN  Outcome: Ongoing  4/10/2022 1648 by Rangel Carpenter RN  Outcome: Ongoing     Problem: Health Behavior:  Goal: Identification of resources available to assist in meeting health care needs will improve  Description: Identification of resources available to assist in meeting health care needs will improve  4/11/2022 0135 by Dickson Rodriguez LPN  Outcome: Ongoing  4/10/2022 1648 by Rangel Carpenter RN  Outcome: Ongoing     Problem: Nutritional:  Goal: Maintenance of adequate nutrition will improve  Description: Maintenance of adequate nutrition will improve  4/11/2022 0135 by Dickson Rodriguez LPN  Outcome: Ongoing  4/10/2022 1648 by Rangel Carpenter RN  Outcome: Ongoing     Problem: Physical Regulation:  Goal: Signs and symptoms of infection will decrease  Description: Signs and symptoms of infection will decrease  4/11/2022 0135 by Dickson Rodriguez, LPN  Outcome: Ongoing  4/10/2022 1648 by Rangel Carpenter RN  Outcome: Ongoing  Goal: Will show no signs and symptoms of excessive bleeding  Description: Will show no signs and symptoms of excessive bleeding  4/11/2022 0135 by Dickson Rodriguez LPN  Outcome: Ongoing  4/10/2022 1648 by Rangel Carpenter RN  Outcome: Ongoing  Goal: Complications related to the disease process, condition or treatment will be avoided or minimized  Description: Complications related to the disease process, condition or treatment will be avoided or minimized  4/11/2022 0135 by Dickson Rodriguez LPN  Outcome: Ongoing  4/10/2022 1648 by Rangel Carpenter RN  Outcome: Ongoing     Problem: Safety:  Goal: Ability to remain free from injury will improve  Description: Ability to remain free from injury will improve  4/11/2022 0135 by [address] Rodriguez, LPN  Outcome: Ongoing  4/10/2022 1648 by Arvind Durán RN  Outcome: Ongoing     Problem: Sensory:  Goal: Pain level will decrease  Description: Pain level will decrease  4/11/2022 0135 by [address], LPN  Outcome: Ongoing  4/10/2022 1648 by Arvind Durán RN  Outcome: Ongoing  Goal: General experience of comfort will improve  Description: General experience of comfort will improve  4/11/2022 0135 by [address], LPN  Outcome: Ongoing  4/10/2022 1648 by Arvind Durán RN  Outcome: Ongoing     Problem: Skin Integrity:  Goal: Skin integrity will improve  Description: Skin integrity will improve  4/11/2022 0135 by [address] Rodriguez, LPN  Outcome: Ongoing  4/10/2022 1648 by Arvind Durán RN  Outcome: Ongoing  Goal: Signs of wound healing will improve  Description: Signs of wound healing will improve  4/11/2022 0135 by [address] Rodriguez, LPN  Outcome: Ongoing  4/10/2022 1648 by Arvind Durán RN  Outcome: Ongoing     Problem: Tissue Perfusion:  Goal: Ability to maintain adequate tissue perfusion will improve  Description: Ability to maintain adequate tissue perfusion will improve  4/11/2022 0135 by [address] Rodriguez, LPN  Outcome: Ongoing  4/10/2022 1648 by Arvind Durán RN  Outcome: Ongoing  Goal: Ability to maintain a stable neurologic state will improve  Description: Ability to maintain a stable neurologic state will improve  4/11/2022 0135 by Dickson Rodriguez, LPN  Outcome: Ongoing  4/10/2022 1648 by Arvind Durán RN  Outcome: Ongoing

## 2022-04-12 LAB
ALBUMIN SERPL-MCNC: 3.1 G/DL (ref 3.5–5.2)
ALP BLD-CCNC: 274 U/L (ref 35–104)
ALT SERPL-CCNC: 163 U/L (ref 5–33)
ANION GAP SERPL CALCULATED.3IONS-SCNC: 13 MMOL/L (ref 7–19)
AST SERPL-CCNC: 101 U/L (ref 5–32)
BILIRUB SERPL-MCNC: 0.4 MG/DL (ref 0.2–1.2)
BUN BLDV-MCNC: 43 MG/DL (ref 8–23)
CALCIUM SERPL-MCNC: 7.5 MG/DL (ref 8.8–10.2)
CHLORIDE BLD-SCNC: 97 MMOL/L (ref 98–111)
CO2: 33 MMOL/L (ref 22–29)
CREAT SERPL-MCNC: 3.4 MG/DL (ref 0.5–0.9)
GFR AFRICAN AMERICAN: 16
GFR NON-AFRICAN AMERICAN: 13
GLUCOSE BLD-MCNC: 106 MG/DL (ref 74–109)
HCT VFR BLD CALC: 28.7 % (ref 37–47)
HEMOGLOBIN: 8.4 G/DL (ref 12–16)
MCH RBC QN AUTO: 29.3 PG (ref 27–31)
MCHC RBC AUTO-ENTMCNC: 29.3 G/DL (ref 33–37)
MCV RBC AUTO: 100 FL (ref 81–99)
PDW BLD-RTO: 15.2 % (ref 11.5–14.5)
PLATELET # BLD: 173 K/UL (ref 130–400)
PMV BLD AUTO: 11.5 FL (ref 9.4–12.3)
POTASSIUM REFLEX MAGNESIUM: 4.1 MMOL/L (ref 3.5–5)
RBC # BLD: 2.87 M/UL (ref 4.2–5.4)
SODIUM BLD-SCNC: 143 MMOL/L (ref 136–145)
TOTAL PROTEIN: 4.9 G/DL (ref 6.6–8.7)
URINE CULTURE, ROUTINE: NORMAL
WBC # BLD: 7.6 K/UL (ref 4.8–10.8)

## 2022-04-12 PROCEDURE — 85027 COMPLETE CBC AUTOMATED: CPT

## 2022-04-12 PROCEDURE — 6370000000 HC RX 637 (ALT 250 FOR IP): Performed by: NURSE PRACTITIONER

## 2022-04-12 PROCEDURE — 2580000003 HC RX 258: Performed by: INTERNAL MEDICINE

## 2022-04-12 PROCEDURE — 80053 COMPREHEN METABOLIC PANEL: CPT

## 2022-04-12 PROCEDURE — 6360000002 HC RX W HCPCS

## 2022-04-12 PROCEDURE — 2500000003 HC RX 250 WO HCPCS: Performed by: HOSPITALIST

## 2022-04-12 PROCEDURE — 99232 SBSQ HOSP IP/OBS MODERATE 35: CPT | Performed by: INTERNAL MEDICINE

## 2022-04-12 PROCEDURE — 2580000003 HC RX 258

## 2022-04-12 PROCEDURE — 6370000000 HC RX 637 (ALT 250 FOR IP): Performed by: HOSPITALIST

## 2022-04-12 PROCEDURE — 1210000000 HC MED SURG R&B

## 2022-04-12 PROCEDURE — 6360000002 HC RX W HCPCS: Performed by: INTERNAL MEDICINE

## 2022-04-12 PROCEDURE — 2580000003 HC RX 258: Performed by: HOSPITALIST

## 2022-04-12 PROCEDURE — 36415 COLL VENOUS BLD VENIPUNCTURE: CPT

## 2022-04-12 RX ORDER — TOPIRAMATE 50 MG/1
50 TABLET, FILM COATED ORAL 2 TIMES DAILY
Status: DISCONTINUED | OUTPATIENT
Start: 2022-04-12 | End: 2022-04-19 | Stop reason: HOSPADM

## 2022-04-12 RX ORDER — GUAIFENESIN/DEXTROMETHORPHAN 100-10MG/5
5 SYRUP ORAL EVERY 4 HOURS PRN
Status: DISCONTINUED | OUTPATIENT
Start: 2022-04-12 | End: 2022-04-19 | Stop reason: HOSPADM

## 2022-04-12 RX ORDER — SODIUM CHLORIDE, SODIUM LACTATE, POTASSIUM CHLORIDE, CALCIUM CHLORIDE 600; 310; 30; 20 MG/100ML; MG/100ML; MG/100ML; MG/100ML
INJECTION, SOLUTION INTRAVENOUS CONTINUOUS
Status: DISCONTINUED | OUTPATIENT
Start: 2022-04-12 | End: 2022-04-13

## 2022-04-12 RX ORDER — QUETIAPINE FUMARATE 100 MG/1
300 TABLET, FILM COATED ORAL NIGHTLY
Status: DISCONTINUED | OUTPATIENT
Start: 2022-04-12 | End: 2022-04-14

## 2022-04-12 RX ADMIN — DICYCLOMINE HYDROCHLORIDE 10 MG: 10 CAPSULE ORAL at 09:00

## 2022-04-12 RX ADMIN — TOPIRAMATE 100 MG: 100 TABLET, FILM COATED ORAL at 08:59

## 2022-04-12 RX ADMIN — POLYETHYLENE GLYCOL 3350 17 G: 17 POWDER, FOR SOLUTION ORAL at 09:03

## 2022-04-12 RX ADMIN — Medication 50 MCG: at 09:00

## 2022-04-12 RX ADMIN — HEPARIN SODIUM 5000 UNITS: 5000 INJECTION INTRAVENOUS; SUBCUTANEOUS at 21:03

## 2022-04-12 RX ADMIN — TOPIRAMATE 50 MG: 50 TABLET, FILM COATED ORAL at 21:05

## 2022-04-12 RX ADMIN — TIZANIDINE 4 MG: 4 TABLET ORAL at 21:05

## 2022-04-12 RX ADMIN — METOPROLOL SUCCINATE 25 MG: 25 TABLET, EXTENDED RELEASE ORAL at 09:00

## 2022-04-12 RX ADMIN — GABAPENTIN 300 MG: 300 CAPSULE ORAL at 21:05

## 2022-04-12 RX ADMIN — DIAZEPAM 5 MG: 5 TABLET ORAL at 12:22

## 2022-04-12 RX ADMIN — GUAIFENESIN SYRUP AND DEXTROMETHORPHAN 5 ML: 100; 10 SYRUP ORAL at 15:24

## 2022-04-12 RX ADMIN — SODIUM BICARBONATE 650 MG: 650 TABLET ORAL at 13:33

## 2022-04-12 RX ADMIN — DIAZEPAM 5 MG: 5 TABLET ORAL at 21:05

## 2022-04-12 RX ADMIN — SODIUM CHLORIDE, PRESERVATIVE FREE 10 ML: 5 INJECTION INTRAVENOUS at 09:05

## 2022-04-12 RX ADMIN — Medication 400 MG: at 08:59

## 2022-04-12 RX ADMIN — SODIUM CHLORIDE, POTASSIUM CHLORIDE, SODIUM LACTATE AND CALCIUM CHLORIDE: 600; 310; 30; 20 INJECTION, SOLUTION INTRAVENOUS at 14:55

## 2022-04-12 RX ADMIN — SODIUM BICARBONATE: 84 INJECTION, SOLUTION INTRAVENOUS at 12:22

## 2022-04-12 RX ADMIN — ESCITALOPRAM OXALATE 20 MG: 10 TABLET ORAL at 09:00

## 2022-04-12 RX ADMIN — SODIUM CHLORIDE, PRESERVATIVE FREE 10 ML: 5 INJECTION INTRAVENOUS at 13:35

## 2022-04-12 RX ADMIN — QUETIAPINE FUMARATE 300 MG: 100 TABLET ORAL at 21:05

## 2022-04-12 RX ADMIN — HEPARIN SODIUM 5000 UNITS: 5000 INJECTION INTRAVENOUS; SUBCUTANEOUS at 04:00

## 2022-04-12 RX ADMIN — SODIUM BICARBONATE: 84 INJECTION, SOLUTION INTRAVENOUS at 04:00

## 2022-04-12 RX ADMIN — CALCITRIOL CAPSULES 0.25 MCG 0.25 MCG: 0.25 CAPSULE ORAL at 09:00

## 2022-04-12 RX ADMIN — TIZANIDINE 4 MG: 4 TABLET ORAL at 15:24

## 2022-04-12 RX ADMIN — PANTOPRAZOLE SODIUM 40 MG: 40 TABLET, DELAYED RELEASE ORAL at 09:00

## 2022-04-12 RX ADMIN — SODIUM BICARBONATE 650 MG: 650 TABLET ORAL at 08:59

## 2022-04-12 RX ADMIN — LEVOTHYROXINE SODIUM 137 MCG: 137 TABLET ORAL at 04:00

## 2022-04-12 RX ADMIN — IRON SUCROSE 200 MG: 20 INJECTION, SOLUTION INTRAVENOUS at 13:33

## 2022-04-12 RX ADMIN — HEPARIN SODIUM 5000 UNITS: 5000 INJECTION INTRAVENOUS; SUBCUTANEOUS at 13:33

## 2022-04-12 ASSESSMENT — ENCOUNTER SYMPTOMS
WHEEZING: 0
CHEST TIGHTNESS: 0
BACK PAIN: 0
COLOR CHANGE: 0
COUGH: 1
VOMITING: 0
DIARRHEA: 0
ABDOMINAL PAIN: 0
SHORTNESS OF BREATH: 1
NAUSEA: 1

## 2022-04-12 ASSESSMENT — PAIN SCALES - GENERAL: PAINLEVEL_OUTOF10: 0

## 2022-04-12 NOTE — PLAN OF CARE
Problem: Falls - Risk of:  Goal: Will remain free from falls  Description: Will remain free from falls  Outcome: Ongoing  Goal: Absence of physical injury  Description: Absence of physical injury  Outcome: Ongoing     Problem: Pain:  Description: Pain management should include both nonpharmacologic and pharmacologic interventions. Goal: Pain level will decrease  Description: Pain level will decrease  Outcome: Ongoing  Goal: Control of acute pain  Description: Control of acute pain  Outcome: Ongoing  Goal: Control of chronic pain  Description: Control of chronic pain  Outcome: Ongoing     Problem: Tissue Perfusion - Renal, Altered:  Goal: Electrolytes within specified parameters  Description: Electrolytes within specified parameters  Outcome: Ongoing  Goal: Urine creatinine clearance will be within specified parameters  Description: Urine creatinine clearance will be within specified parameters  Outcome: Ongoing  Goal: Serum creatinine will be within specified parameters  Description: Serum creatinine will be within specified parameters  Outcome: Ongoing  Goal: Ability to achieve a balanced intake and output will improve  Description: Ability to achieve a balanced intake and output will improve  Outcome: Ongoing     Problem: Activity:  Goal: Fatigue will decrease  Description: Fatigue will decrease  Outcome: Ongoing  Goal: Ability to tolerate increased activity will improve  Description: Ability to tolerate increased activity will improve  Outcome: Ongoing  Goal: Ability to maintain optimal joint mobility will improve  Description: Ability to maintain optimal joint mobility will improve  Outcome: Ongoing     Problem:  Bowel/Gastric:  Goal: Ability to achieve a regular elimination pattern will improve  Description: Ability to achieve a regular elimination pattern will improve  Outcome: Ongoing     Problem: Cardiac:  Goal: Ability to maintain an adequate cardiac output will improve  Description: Ability to maintain an adequate cardiac output will improve  Outcome: Ongoing  Goal: Ability to maintain adequate ventilation will improve  Description: Ability to maintain adequate ventilation will improve  Outcome: Ongoing  Goal: Ability to achieve and maintain adequate cardiopulmonary perfusion will improve  Description: Ability to achieve and maintain adequate cardiopulmonary perfusion will improve  Outcome: Ongoing     Problem: Coping:  Goal: Ability to adjust to condition or change in health will improve  Description: Ability to adjust to condition or change in health will improve  Outcome: Ongoing  Goal: Communication of feelings regarding changes in body function or appearance will improve  Description: Communication of feelings regarding changes in body function or appearance will improve  Outcome: Ongoing     Problem: Health Behavior:  Goal: Identification of resources available to assist in meeting health care needs will improve  Description: Identification of resources available to assist in meeting health care needs will improve  Outcome: Ongoing     Problem: Nutritional:  Goal: Maintenance of adequate nutrition will improve  Description: Maintenance of adequate nutrition will improve  Outcome: Ongoing     Problem: Physical Regulation:  Goal: Signs and symptoms of infection will decrease  Description: Signs and symptoms of infection will decrease  Outcome: Ongoing  Goal: Will show no signs and symptoms of excessive bleeding  Description: Will show no signs and symptoms of excessive bleeding  Outcome: Ongoing  Goal: Complications related to the disease process, condition or treatment will be avoided or minimized  Description: Complications related to the disease process, condition or treatment will be avoided or minimized  Outcome: Ongoing     Problem: Safety:  Goal: Ability to remain free from injury will improve  Description: Ability to remain free from injury will improve  Outcome: Ongoing     Problem: Sensory:  Goal: Pain level will decrease  Description: Pain level will decrease  Outcome: Ongoing  Goal: General experience of comfort will improve  Description: General experience of comfort will improve  Outcome: Ongoing     Problem: Skin Integrity:  Goal: Skin integrity will improve  Description: Skin integrity will improve  Outcome: Ongoing  Goal: Signs of wound healing will improve  Description: Signs of wound healing will improve  Outcome: Ongoing     Problem: Tissue Perfusion:  Goal: Ability to maintain adequate tissue perfusion will improve  Description: Ability to maintain adequate tissue perfusion will improve  Outcome: Ongoing  Goal: Ability to maintain a stable neurologic state will improve  Description: Ability to maintain a stable neurologic state will improve  Outcome: Ongoing

## 2022-04-12 NOTE — PROGRESS NOTES
Clinically unchanged, LFT's slightly better, await mail off lab results for hepatic work-up, RUQ US today, consider tapering doses and /or consolidating her psych medications where possible.

## 2022-04-12 NOTE — PROGRESS NOTES
Nephrology ([facility]) Progress Note    Patient:  Yossi Kendall  YOB: 1945  Date of Service: 4/12/2022  MRN: [id number]   Acct: [de-identified]   Primary Care Physician: Ketty Naqvi MD  Advance Directive: Full Code  Admit Date: 4/8/2022       Hospital Day: 4  Referring Provider: Alma Baeza MD    Patient independently seen and examined, Chart, Consults, Notes, Operative notes, Labs, Cardiology, and Radiology studies reviewed as available. Subjective:  Yossi Kendall is a 77 y.o. female for whom we were consulted for evaluation and treatment of acute kidney injury/chronic kidney disease. She has history of stage IV chronic kidney disease and follows ODILIA Hale in the office. She was directed to go to the hospital as she has significant decline in renal function on routine labs. Patient has been complaining of chronic diarrhea. Her p.o. intake of fluid has been fairly low. Patient also has history of hypertension, lupus, hypothyroidism and gastric bypass surgery. Hospital course remarkable for IV fluid administration and has very slow improvement of renal function.     Today, no overnight events. She noted continued poor appetite.   Denied chest pain, nausea or vomiting    Allergies:  Codeine    Medicines:  Current Facility-Administered Medications   Medication Dose Route Frequency Provider Last Rate Last Admin    polyethylene glycol (GLYCOLAX) packet 17 g  17 g Oral BID ODILIA Sierra - CNP   17 g at 04/12/22 0903    sodium bicarbonate 150 mEq in dextrose 5 % 1,000 mL infusion   IntraVENous Continuous Rigo Kumar  mL/hr at 04/12/22 1222 New Bag at 04/12/22 1222    diazePAM (VALIUM) tablet 5 mg  5 mg Oral Q8H PRN ODILIA hOara   5 mg at 04/12/22 1222    iron sucrose (VENOFER) injection 200 mg  200 mg IntraVENous Q24H Sergio Brooks MD   200 mg at 04/12/22 1333    calcitRIOL (ROCALTROL) capsule 0.25 mcg  0.25 mcg Oral Daily Con Schmidt Beau Gong, APRN   0.25 mcg at 04/12/22 0900    vitamin D (ERGOCALCIFEROL) capsule 50,000 Units  50,000 Units Oral Once per day on Mon Wed Fri Slater Binet, APRN   50,000 Units at 04/11/22 6807    vitamin B-12 (CYANOCOBALAMIN) tablet 50 mcg  50 mcg Oral Daily Shultz Binet, APRN   50 mcg at 04/12/22 0900    topiramate (TOPAMAX) tablet 100 mg  100 mg Oral BID Shultz Binet, APRN   100 mg at 04/12/22 0859    tiZANidine (ZANAFLEX) tablet 4 mg  4 mg Oral Q8H PRN Shultz Binet, APRN   4 mg at 04/10/22 2021    dicyclomine (BENTYL) capsule 10 mg  10 mg Oral Daily Shultz Binet, APRN   10 mg at 04/12/22 0900    escitalopram (LEXAPRO) tablet 20 mg  20 mg Oral Daily Shultz Binet, APRN   20 mg at 04/12/22 0900    gabapentin (NEURONTIN) capsule 300 mg  300 mg Oral Nightly Shultz Binet, APRN   300 mg at 04/11/22 2002    levothyroxine (SYNTHROID) tablet 137 mcg  137 mcg Oral Daily Shultz Binet, APRN   137 mcg at 04/12/22 0400    metoprolol succinate (TOPROL XL) extended release tablet 25 mg  25 mg Oral Daily Shultz Binet, APRN   25 mg at 04/12/22 0900    pantoprazole (PROTONIX) tablet 40 mg  40 mg Oral Daily Shultz Binet, APRN   40 mg at 04/12/22 0900    sodium bicarbonate tablet 650 mg  650 mg Oral TID Shultz Binet, APRN   650 mg at 04/12/22 1333    magnesium oxide (MAG-OX) tablet 400 mg  400 mg Oral Daily Shultz Binet, APRN   400 mg at 04/12/22 0859    QUEtiapine (SEROQUEL) tablet 400 mg  400 mg Oral Nightly Shultz Binet, APRN   400 mg at 04/11/22 2002    sodium chloride flush 0.9 % injection 5-40 mL  5-40 mL IntraVENous 2 times per day Loura Millet, APRN - CNP   10 mL at 04/12/22 0905    sodium chloride flush 0.9 % injection 5-40 mL  5-40 mL IntraVENous PRN Loura Millet, APRN - CNP   10 mL at 04/12/22 1335    0.9 % sodium chloride infusion   IntraVENous PRN Loura Millet, APRN - CNP        heparin (porcine) injection 5,000 Units  5,000 Units SubCUTAneous 3 times per day Dellis Coup, APRN - CNP   5,000 Units at 04/12/22 1333    ondansetron (ZOFRAN-ODT) disintegrating tablet 4 mg  4 mg Oral Q8H PRN Dellis Coup, APRN - CNP        Or    ondansetron [facility] injection 4 mg  4 mg IntraVENous Q6H PRN Dellis Coup, APRN - CNP   4 mg at 04/10/22 5347       Past Medical History:  Past Medical History:   Diagnosis Date    Arthritis     Asthma     DVT (deep vein thrombosis) in pregnancy     History of blood transfusion     Hx of blood clots     hAD dvt WAS ON Coumadin for a year. 2006    Hypertension     [facility] [address]     Renal failure     Splenic artery aneurysm ([facility] [address]     Thyroid disease        Past Surgical History:  Past Surgical History:   Procedure Laterality Date    GASTRIC BYPASS SURGERY  1975    HYSTERECTOMY      JOINT REPLACEMENT Right     hip and shoulder    LYMPHADENECTOMY      MA TOTAL KNEE ARTHROPLASTY Left 11/27/2017    KNEE TOTAL ARTHROPLASTY performed by Keiry Moy MD at [place] [address] Left 7/27/2020    LLEFT REVERSE TOTAL SHOULDER POLY EXCHANGE AND BICEPS TENOTOMY performed by Keiry Moy MD at [address] [place] [address] History  History reviewed. No pertinent family history.     Social History  Social History     Socioeconomic History    Marital status:      Spouse name: Not on file    Number of children: Not on file    Years of education: Not on file    Highest education level: Not on file   Occupational History    Not on file   Tobacco Use    Smoking status: Never Smoker    Smokeless tobacco: Never Used   Substance and Sexual Activity    Alcohol use: No    Drug use: No    Sexual activity: Not on file   Other Topics Concern    Not on file   Social History Narrative    Not on file     Social Determinants of Health     Financial Resource Strain:     Difficulty of Paying Living Expenses: Not on file   Food Insecurity:     Worried About Running Out of Food in the Last Year: Not on file    Jeff hughes Food in the Last Year: Not on file   Transportation Needs:     Lack of Transportation (Medical): Not on file    Lack of Transportation (Non-Medical):  Not on file   Physical Activity:     Days of Exercise per Week: Not on file    Minutes of Exercise per Session: Not on file   Stress:     Feeling of Stress : Not on file   Social Connections:     Frequency of Communication with Friends and Family: Not on file    Frequency of Social Gatherings with Friends and Family: Not on file    Attends [employer] Services: Not on file    Active Member of Clubs or Organizations: Not on file    Attends Club or Organization Meetings: Not on file    Marital Status: Not on file   Intimate Partner Violence:     Fear of Current or Ex-Partner: Not on file    Emotionally Abused: Not on file    Physically Abused: Not on file    Sexually Abused: Not on file   Housing Stability:     Unable to Pay for Housing in the Last Year: Not on file    Number of Places Lived in the Last Year: Not on file    Unstable Housing in the Last Year: Not on file         Review of Systems:  History obtained from chart review and the patient  General ROS: No fever or chills  Respiratory ROS: No cough, shortness of breath, wheezing  Cardiovascular ROS: No chest pain or palpitations  Gastrointestinal ROS: No abdominal pain or melena  Genito-Urinary ROS: No dysuria or hematuria  Musculoskeletal ROS: No joint pain or swelling         Objective:  Patient Vitals for the past 24 hrs:   BP Temp Temp src Pulse Resp SpO2   04/12/22 1220 115/72 97.2 °F (36.2 °C) Temporal 79 18 91 %   04/12/22 0617 94/70 96.6 °F (35.9 °C) Temporal 79 18 95 %   04/11/22 2351 97/63 96.8 °F (36 °C) Temporal 72 16 90 %   04/11/22 2000 98/63 97.8 °F (36.6 °C) Temporal 72 16 92 %   04/11/22 1706 102/65 99.1 °F (37.3 °C) -- 77 16 91 %       Intake/Output Summary (Last 24 hours) at 4/12/2022 1401  Last data filed at 4/12/2022 1335  Gross per 24 hour   Intake 1232 ml   Output 100 ml Net 1132 ml     General: awake/alert   Chest:  clear to auscultation bilaterally  CVS: regular rate and rhythm  Abdominal: soft, nontender, normal bowel sounds  Extremities: no cyanosis, ble edema  Skin: warm and dry without rash    Labs:  BMP:   Recent Labs     04/10/22  0234 04/11/22  0225 04/12/22  0326    138 143   K 4.6 4.3 4.1    100 97*   CO2 17* 20* 33*   BUN 53* 52* 43*   CREATININE 3.2* 3.4* 3.4*   CALCIUM 7.6* 7.4* 7.5*     CBC:   Recent Labs     04/10/22  0234 04/11/22  0225 04/12/22  0326   WBC 4.8 6.2 7.6   HGB 8.4* 8.7* 8.4*   HCT 27.7* 29.4* 28.7*   MCV 97.9 101.4* 100.0*    169 173     LIVER PROFILE:   Recent Labs     04/10/22  0234 04/11/22  0225 04/12/22  0326   * 224* 101*   * 250* 163*   BILITOT 0.5 0.6 0.4   ALKPHOS 203* 281* 274*     PT/INR: No results for input(s): PROTIME, INR in the last 72 hours. APTT: No results for input(s): APTT in the last 72 hours. BNP:  No results for input(s): BNP in the last 72 hours. Ionized Calcium:No results for input(s): IONCA in the last 72 hours. Magnesium:  Recent Labs     04/10/22  0234   MG 2.1     Phosphorus:  No results for input(s): PHOS in the last 72 hours. HgbA1C: No results for input(s): LABA1C in the last 72 hours. Hepatic:   Recent Labs     04/10/22  0234 04/11/22  0225 04/12/22  0326   ALKPHOS 203* 281* 274*   * 250* 163*   * 224* 101*   PROT 5.2* 5.1* 4.9*   BILITOT 0.5 0.6 0.4   LABALBU 3.1* 3.3* 3.1*     Lactic Acid: No results for input(s): LACTA in the last 72 hours. Troponin: No results for input(s): CKTOTAL, CKMB, TROPONINT in the last 72 hours. ABGs: No results found for: PHART, PO2ART, [id number]  CRP:  No results for input(s): CRP in the last 72 hours. Sed Rate:  No results for input(s): SEDRATE in the last 72 hours. Culture Results:   Blood Culture Recent: No results for input(s): BC in the last 720 hours.     Urine Culture Recent :   Recent Labs     04/10/22  1651   Yung Pereyra <10,000 CFU/ml mixed skin/urogenital eileen. No further workup       Radiology reports as per the Radiologist  Radiology: US RENAL COMPLETE    Result Date: 4/9/2022  RENAL ULTRASOUND COMPLETE 4/9/2022 11:20 AM REASON FOR EXAM: Acute renal failure in the background of chronic kidney disease  COMPARISON: None  TECHNIQUE: Multiple longitudinal and transverse realtime sonographic images of the kidneys and urinary bladder are obtained. FINDINGS: The right kidney measures 8.8 cm. The cortical thickness within the right kidney is 9 mm and 6 mm respectively in the upper and lower pole. The right kidney is normal in size, shape, contour and position. There is a small cortical cyst involving the midpole measuring 8 x 4 x 6 mm in size. The cortical thickness is normal, with preservation of the corticomedullary differentiation. The central echo complex is compact with no evidence for hydronephrosis. No nephrolithiasis or abnormal perinephric fluid collections are seen. No hydroureter. The left kidney measures 9.0 cm. The cortical thickness within the left kidney is 13 mm  and 10 mm respectively in the upper and lower pole. The left kidney is normal in size, shape, contour and position. The cortical thickness is normal, with preservation of the corticomedullary differentiation. The central echo complex is compact with no evidence for hydronephrosis. There is a shadowing calculus involving the lower pole of the left kidney measuring 9 x 5 x 9 mm in size as well as a mid pole calculus measuring 1.3 x 0.5 x 0.8 cm. . No hydroureter. Scanning through the pelvis reveals the bladder to be moderately distended with anechoic urine. The wall thickness and contour are normal. There is no surrounding ascites. 1. Nonobstructing calculi involving the mid and lower pole of the left kidney. There is a tiny cortical cyst in the midpole of the right kidney. No evidence of discrete solid mass or hydronephrosis. . Signed by Dr Gabriel Ramirez [place]hill    XR CHEST PORTABLE    Result Date: 4/8/2022  XR CHEST PORTABLE 4/8/2022 8:15 PM History: Shortness of breath. One view chest x-ray. Heart size is within normal limits. The mediastinum has a normal appearance. The lungs are adequately expanded with no pneumonia or pneumothorax. There is mild chronic interstitial disease with scattered calcified granulomas. There is no significant pleural fluid. No congestive failure changes. 1. No acute disease.  Signed by Dr Mota Nickel kidney injury/ATN  Chronic kidney disease stage IV  Hypertensive nephrosclerosis  Metabolic acidosis  Hyperphosphatemia  Anemia chronic kidney disease  Secondary hyperparathyroidism  Elevated AST/ALT    Plan:  Discussed with patient, nursing, family   Monitor labs  IV fluid trial with adjustments as per orders  GI work-up ongoing  Hold juan Mcknight MD  04/12/22  2:01 PM

## 2022-04-12 NOTE — PROGRESS NOTES
[facility]      Patient:  Tonja Mireles  YOB: 1945  Date of Service: 4/12/2022  MRN: [id number]   Acct: [de-identified]   Primary Care Physician: Jaylen Mock MD  Advance Directive: Full Code  Admit Date: 4/8/2022       Hospital Day: 4  Portions of this note have been copied forward, however, changed to reflect the most current clinical status of this patient. CHIEF COMPLAINT abnormal labs    SUBJECTIVE: Patient lying in bed during my morning rounds. She is feeling a little better. Contemplating starting hemodialysis with family. CUMULATIVE HOSPITAL COURSE:  The patient is a 56-year-old female with past medical history of DVT, hypertension, lupus, thyroid disease, gastric bypass, and CKD who presented to [facility] ED complaining of abnormal labs. Patient reported she was following up with a nurse practitioner in nephrology clinic and was noted to have decline in renal function. Patient reports chronic diarrhea, dyspnea on exertion, and decreased appetite. ED work-up revealed hemoglobin of 9.9, hematocrit 32.6, creatinine 3.3, BUN 54, and negative urinalysis. Patient was admitted to the hospitalist service for LUCY on CKD. Patient was started on IV resuscitation with bicarbonate fluids. Nephrology was consulted. Renal ultrasound indicated nonobstructing calculi involving the mid and lower pole of the left kidney, a tiny cortical cyst in the midpole of the right kidney. Transaminitis was indicated and GI was consulted to assess transaminitis and chronic diarrhea. UA in ED indicated coryneabacterium, however patient asymptomatic, discussed with lab and advise repeat urine culture. Repeat urine culture with no antibiotic coverage as indicated no growth at 24 hours. Nephrology recommends to continue IV hydration. Patient noted to have some wheezing and indicated cough. Repeat chest x-ray obtained which was normal.  GI recommending tapering down on Seroquel and Topamax.   Patient contemplating with family regarding starting hemodialysis. Review of Systems:   Review of Systems   Constitutional: Positive for appetite change and fatigue. Negative for chills and fever. HENT: Positive for ear pain. Respiratory: Positive for cough and shortness of breath. Negative for chest tightness and wheezing. Cardiovascular: Negative for chest pain, palpitations and leg swelling. Gastrointestinal: Positive for nausea. Negative for abdominal pain, diarrhea and vomiting. Reports no bowel movement since admission   Genitourinary: Negative for dysuria, flank pain, frequency and urgency. Musculoskeletal: Negative for back pain and myalgias. Skin: Negative for color change and wound. Neurological: Negative for dizziness, light-headedness and headaches. Psychiatric/Behavioral: Negative for confusion. 14 point review of systems is negative except as specifically addressed above. Objective:   VITALS:  /72   Pulse 79   Temp 97.2 °F (36.2 °C) (Temporal)   Resp 18   Ht 5' 4\" (1.626 m)   Wt 164 lb 1 oz (74.4 kg)   SpO2 91%   BMI 28.16 kg/m²   24HR INTAKE/OUTPUT:      Intake/Output Summary (Last 24 hours) at 4/12/2022 1507  Last data filed at 4/12/2022 1504  Gross per 24 hour   Intake 1352 ml   Output 100 ml   Net 1252 ml       Physical Exam  Vitals reviewed. Constitutional:       Appearance: She is not ill-appearing. HENT:      Head: Normocephalic. Nose: Nose normal.      Mouth/Throat:      Mouth: Mucous membranes are moist.   Eyes:      Pupils: Pupils are equal, round, and reactive to light. Cardiovascular:      Rate and Rhythm: Normal rate and regular rhythm. Pulses: Normal pulses. Heart sounds: Normal heart sounds. Pulmonary:      Effort: Pulmonary effort is normal.      Breath sounds: Rales present. Abdominal:      General: Abdomen is flat. Bowel sounds are normal. There is no distension. Palpations: Abdomen is soft.       Tenderness: There is no abdominal tenderness. There is no guarding. Musculoskeletal:         General: Normal range of motion. Cervical back: Normal range of motion and neck supple. Right lower leg: No edema. Left lower leg: No edema. Skin:     General: Skin is warm and dry. Capillary Refill: Capillary refill takes less than 2 seconds. Neurological:      General: No focal deficit present. Mental Status: She is alert and oriented to person, place, and time. Medications:      lactated ringers 100 mL/hr at 04/12/22 1455    sodium chloride        polyethylene glycol  17 g Oral BID    iron sucrose  200 mg IntraVENous Q24H    calcitRIOL  0.25 mcg Oral Daily    vitamin D  50,000 Units Oral Once per day on Mon Wed Fri    vitamin B-12  50 mcg Oral Daily    topiramate  100 mg Oral BID    dicyclomine  10 mg Oral Daily    escitalopram  20 mg Oral Daily    gabapentin  300 mg Oral Nightly    levothyroxine  137 mcg Oral Daily    metoprolol succinate  25 mg Oral Daily    pantoprazole  40 mg Oral Daily    magnesium oxide  400 mg Oral Daily    QUEtiapine  400 mg Oral Nightly    sodium chloride flush  5-40 mL IntraVENous 2 times per day    heparin (porcine)  5,000 Units SubCUTAneous 3 times per day     diazePAM, tiZANidine, sodium chloride flush, sodium chloride, ondansetron **OR** ondansetron  ADULT DIET; Regular; Low Fat/Low Chol/High Fiber/YAAKOV; Low Potassium (Less than 3000 mg/day);  Low Phosphorus (Less than 1000 mg)     Lab and other Data:     Recent Labs     04/10/22  0234 04/11/22  0225 04/12/22  0326   WBC 4.8 6.2 7.6   HGB 8.4* 8.7* 8.4*    169 173     Recent Labs     04/10/22  0234 04/11/22  0225 04/12/22  0326    138 143   K 4.6 4.3 4.1    100 97*   CO2 17* 20* 33*   BUN 53* 52* 43*   CREATININE 3.2* 3.4* 3.4*   GLUCOSE 93 116* 106     Recent Labs     04/10/22  0234 04/11/22  0225 04/12/22  0326   * 224* 101*   * 250* 163*   BILITOT 0.5 0.6 0.4 ALKPHOS 203* 281* 274*     Troponin T: No results for input(s): TROPONINI in the last 72 hours. Pro-BNP: No results for input(s): BNP in the last 72 hours. INR: No results for input(s): INR in the last 72 hours. UA:  No results for input(s): NITRITE, COLORU, PHUR, LABCAST, WBCUA, RBCUA, MUCUS, TRICHOMONAS, YEAST, BACTERIA, CLARITYU, SPECGRAV, LEUKOCYTESUR, UROBILINOGEN, BILIRUBINUR, BLOODU, GLUCOSEU, AMORPHOUS in the last 72 hours. Invalid input(s): Balaji Acralaina  A1C: No results for input(s): LABA1C in the last 72 hours. ABG:No results for input(s): PHART, FTU4PBT, PO2ART, TTJ7YZN, BEART, HGBAE, N5HALGJT, CARBOXHGBART in the last 72 hours. RAD:   US RENAL COMPLETE    Result Date: 4/9/2022  1. Nonobstructing calculi involving the mid and lower pole of the left kidney. There is a tiny cortical cyst in the midpole of the right kidney. No evidence of discrete solid mass or hydronephrosis. . Signed by Dr Jordana Fernandez    Result Date: 4/8/2022  1. No acute disease. Signed by Dr Lisette Martinez:   Culture, Urine  Order: [id number]   Status: Preliminary result     Visible to patient: No (not released)     Next appt: None    Specimen Information: Urine, clean catch         0 Result Notes    Component 4/10/22 1651   Urine Culture, Routine No growth P    Resulting Agency [address] [place] [address] Lab             Narrative  Performed by: [address] [place] [address] Lab  ORDER#: [id number]                          ORDERED BY: Lynette Davis   SOURCE: Urine Clean Catch                  COLLECTED:  04/10/22 16:51   ANTIBIOTICS AT [facility]. :                      RECEIVED :  04/10/22 18:23      Specimen Collected: 04/10/22 16:51 Last Resulted: 04/11/22 12:54               Assessment/Plan   Principal Problem:    Acute kidney injury superimposed on CKD [facility])   -nephrology on board   -continue IV hydration, slow rate   -chest x-ray   -monitor intake and output   -Patient contemplating starting hemodialysis in coordination with family and nephrology    Active Problems:    Anemia due to chronic kidney disease   -procrit per nephrology   -monitor CC      Abnormal finding on urinalysis   -repeat urine culture no growth at 24 hours without antibiotic coverage      Nausea   -continue antiemetics    Elevated LFTs   -GI recommendations reviewed and appreciated   -GI recommending cutting down on Topamax and Seroquel   -Topamax cut down from 100 to 50 mg p.o. twice daily   -Decrease Seroquel from 400 to 300 mg p.o. every night   -Monitor liver functions closely    Chronic medical issues . .. Continue with home meds. Monitor patient closely while admitted. Advised very close f/u with patient's PCP as an outpatient to address chronic medical issues. Repeat labs in a.m. Electrolyte replacement as per protocol. Patient will be monitored very closely on the floor. Further recommendations as per the hospital course. DC planning : To be determined as per the hospital course    Patient  is on DVT prophylaxis  Current medications reviewed  Lab work reviewed  Radiology/Chest x-ray films reviewed  Treatment recommendations from suspecialities reviewed, appreciated and agreed with  Discussed with the nurse and addressed all questions/concerns  Discussed with Patient and/or Family at the bedside in detail . .. they understand and agree with the management plan.       DVT Prophylaxis: heparin    Rigo Kumar MD, 4/12/2022 3:07 PM

## 2022-04-13 LAB
ALBUMIN SERPL-MCNC: 2.8 G/DL (ref 3.5–5.2)
ALP BLD-CCNC: 242 U/L (ref 35–104)
ALPHA-1 ANTITRYPSIN: 128 MG/DL (ref 90–200)
ALT SERPL-CCNC: 115 U/L (ref 5–33)
ANION GAP SERPL CALCULATED.3IONS-SCNC: 12 MMOL/L (ref 7–19)
AST SERPL-CCNC: 61 U/L (ref 5–32)
BILIRUB SERPL-MCNC: 0.3 MG/DL (ref 0.2–1.2)
BUN BLDV-MCNC: 38 MG/DL (ref 8–23)
CALCIUM SERPL-MCNC: 7.3 MG/DL (ref 8.8–10.2)
CHLORIDE BLD-SCNC: 94 MMOL/L (ref 98–111)
CO2: 33 MMOL/L (ref 22–29)
CREAT SERPL-MCNC: 3.3 MG/DL (ref 0.5–0.9)
GFR AFRICAN AMERICAN: 16
GFR NON-AFRICAN AMERICAN: 14
GLUCOSE BLD-MCNC: 103 MG/DL (ref 74–109)
HAPTOGLOBIN: 145 MG/DL (ref 30–200)
HCT VFR BLD CALC: 24.9 % (ref 37–47)
HCT VFR BLD CALC: 25.7 % (ref 37–47)
HEMOGLOBIN: 7.2 G/DL (ref 12–16)
MCH RBC QN AUTO: 29.3 PG (ref 27–31)
MCHC RBC AUTO-ENTMCNC: 28.9 G/DL (ref 33–37)
MCV RBC AUTO: 101.2 FL (ref 81–99)
PDW BLD-RTO: 14.7 % (ref 11.5–14.5)
PLATELET # BLD: 142 K/UL (ref 130–400)
PMV BLD AUTO: 11.4 FL (ref 9.4–12.3)
POTASSIUM REFLEX MAGNESIUM: 4.1 MMOL/L (ref 3.5–5)
RBC # BLD: 2.46 M/UL (ref 4.2–5.4)
RETICULOCYTE ABSOLUTE COUNT: 0.05 M/UL (ref 0.03–0.12)
RETICULOCYTE COUNT PCT: 1.92 % (ref 0.5–1.5)
SODIUM BLD-SCNC: 139 MMOL/L (ref 136–145)
TOTAL PROTEIN: 4.4 G/DL (ref 6.6–8.7)
WBC # BLD: 7.7 K/UL (ref 4.8–10.8)

## 2022-04-13 PROCEDURE — 2580000003 HC RX 258: Performed by: INTERNAL MEDICINE

## 2022-04-13 PROCEDURE — 6360000002 HC RX W HCPCS

## 2022-04-13 PROCEDURE — 80053 COMPREHEN METABOLIC PANEL: CPT

## 2022-04-13 PROCEDURE — 1210000000 HC MED SURG R&B

## 2022-04-13 PROCEDURE — 6370000000 HC RX 637 (ALT 250 FOR IP): Performed by: NURSE PRACTITIONER

## 2022-04-13 PROCEDURE — 6370000000 HC RX 637 (ALT 250 FOR IP): Performed by: HOSPITALIST

## 2022-04-13 PROCEDURE — 99232 SBSQ HOSP IP/OBS MODERATE 35: CPT | Performed by: INTERNAL MEDICINE

## 2022-04-13 PROCEDURE — 2580000003 HC RX 258

## 2022-04-13 PROCEDURE — 85045 AUTOMATED RETICULOCYTE COUNT: CPT

## 2022-04-13 PROCEDURE — 6360000002 HC RX W HCPCS: Performed by: INTERNAL MEDICINE

## 2022-04-13 PROCEDURE — 83010 ASSAY OF HAPTOGLOBIN QUANT: CPT

## 2022-04-13 PROCEDURE — 36415 COLL VENOUS BLD VENIPUNCTURE: CPT

## 2022-04-13 PROCEDURE — 85027 COMPLETE CBC AUTOMATED: CPT

## 2022-04-13 RX ADMIN — SODIUM CHLORIDE, POTASSIUM CHLORIDE, SODIUM LACTATE AND CALCIUM CHLORIDE: 600; 310; 30; 20 INJECTION, SOLUTION INTRAVENOUS at 00:41

## 2022-04-13 RX ADMIN — DIAZEPAM 5 MG: 5 TABLET ORAL at 20:10

## 2022-04-13 RX ADMIN — Medication 50 MCG: at 09:12

## 2022-04-13 RX ADMIN — LEVOTHYROXINE SODIUM 137 MCG: 137 TABLET ORAL at 05:58

## 2022-04-13 RX ADMIN — POLYETHYLENE GLYCOL 3350 17 G: 17 POWDER, FOR SOLUTION ORAL at 09:13

## 2022-04-13 RX ADMIN — QUETIAPINE FUMARATE 300 MG: 100 TABLET ORAL at 20:10

## 2022-04-13 RX ADMIN — GABAPENTIN 300 MG: 300 CAPSULE ORAL at 20:11

## 2022-04-13 RX ADMIN — TOPIRAMATE 50 MG: 50 TABLET, FILM COATED ORAL at 09:13

## 2022-04-13 RX ADMIN — SODIUM CHLORIDE, PRESERVATIVE FREE 10 ML: 5 INJECTION INTRAVENOUS at 09:13

## 2022-04-13 RX ADMIN — HEPARIN SODIUM 5000 UNITS: 5000 INJECTION INTRAVENOUS; SUBCUTANEOUS at 14:15

## 2022-04-13 RX ADMIN — ESCITALOPRAM OXALATE 20 MG: 10 TABLET ORAL at 09:12

## 2022-04-13 RX ADMIN — DICYCLOMINE HYDROCHLORIDE 10 MG: 10 CAPSULE ORAL at 09:13

## 2022-04-13 RX ADMIN — IRON SUCROSE 200 MG: 20 INJECTION, SOLUTION INTRAVENOUS at 14:15

## 2022-04-13 RX ADMIN — HEPARIN SODIUM 5000 UNITS: 5000 INJECTION INTRAVENOUS; SUBCUTANEOUS at 20:11

## 2022-04-13 RX ADMIN — TOPIRAMATE 50 MG: 50 TABLET, FILM COATED ORAL at 20:11

## 2022-04-13 RX ADMIN — PANTOPRAZOLE SODIUM 40 MG: 40 TABLET, DELAYED RELEASE ORAL at 09:13

## 2022-04-13 RX ADMIN — HEPARIN SODIUM 5000 UNITS: 5000 INJECTION INTRAVENOUS; SUBCUTANEOUS at 05:57

## 2022-04-13 RX ADMIN — SODIUM CHLORIDE, PRESERVATIVE FREE 10 ML: 5 INJECTION INTRAVENOUS at 20:15

## 2022-04-13 RX ADMIN — ERGOCALCIFEROL 50000 UNITS: 1.25 CAPSULE ORAL at 09:12

## 2022-04-13 RX ADMIN — TIZANIDINE 4 MG: 4 TABLET ORAL at 20:10

## 2022-04-13 RX ADMIN — CALCITRIOL CAPSULES 0.25 MCG 0.25 MCG: 0.25 CAPSULE ORAL at 09:12

## 2022-04-13 RX ADMIN — Medication 400 MG: at 09:13

## 2022-04-13 RX ADMIN — POLYETHYLENE GLYCOL 3350 17 G: 17 POWDER, FOR SOLUTION ORAL at 20:10

## 2022-04-13 RX ADMIN — GUAIFENESIN SYRUP AND DEXTROMETHORPHAN 5 ML: 100; 10 SYRUP ORAL at 14:32

## 2022-04-13 ASSESSMENT — ENCOUNTER SYMPTOMS
SHORTNESS OF BREATH: 1
BACK PAIN: 0
DIARRHEA: 0
WHEEZING: 0
COUGH: 1
VOMITING: 0
COLOR CHANGE: 0
ABDOMINAL PAIN: 0
CHEST TIGHTNESS: 0
NAUSEA: 1

## 2022-04-13 NOTE — PROGRESS NOTES
Apetitie fair, no GI symptoms other than constipation, afebrile, abdomen soft, elevated LFT's continue to improve after tapering psych medications, patient denies depression today, send out labs still pending, new problem of declining Hgb to 7.2 gm, no nutritional deficiencies of iron, B12 or folate. Plan:  anemia of renal disease but will check stool hemoccult, retic count and haptoglobin.

## 2022-04-13 NOTE — PROGRESS NOTES
[facility] Hospitalists      Patient:  Cely Daniel  YOB: 1945  Date of Service: 4/13/2022  MRN: [id number]   Acct: [de-identified]   Primary Care Physician: Melissa Solorzano MD  Advance Directive: Full Code  Admit Date: 4/8/2022       Hospital Day: 5  Portions of this note have been copied forward, however, changed to reflect the most current clinical status of this patient. CHIEF COMPLAINT abnormal labs    SUBJECTIVE: Patient seen and evaluated during my morning rounds. No major issues! CUMULATIVE HOSPITAL COURSE:  The patient is a 23-year-old female with past medical history of DVT, hypertension, lupus, thyroid disease, gastric bypass, and CKD who presented to [facility] ED complaining of abnormal labs. Patient reported she was following up with a nurse practitioner in nephrology clinic and was noted to have decline in renal function. Patient reports chronic diarrhea, dyspnea on exertion, and decreased appetite. ED work-up revealed hemoglobin of 9.9, hematocrit 32.6, creatinine 3.3, BUN 54, and negative urinalysis. Patient was admitted to the hospitalist service for LUCY on CKD. Patient was started on IV resuscitation with bicarbonate fluids. Nephrology was consulted. Renal ultrasound indicated nonobstructing calculi involving the mid and lower pole of the left kidney, a tiny cortical cyst in the midpole of the right kidney. Transaminitis was indicated and GI was consulted to assess transaminitis and chronic diarrhea. UA in ED indicated coryneabacterium, however patient asymptomatic, discussed with lab and advise repeat urine culture. Repeat urine culture with no antibiotic coverage as indicated no growth at 24 hours. Nephrology recommends to continue IV hydration. Patient noted to have some wheezing and indicated cough. Repeat chest x-ray obtained which was normal.  GI recommending tapering down on Seroquel and Topamax. Patient contemplating with family regarding starting hemodialysis.     Review of Systems:   Review of Systems   Constitutional: Positive for appetite change and fatigue. Negative for chills and fever. HENT: Positive for ear pain. Respiratory: Positive for cough and shortness of breath. Negative for chest tightness and wheezing. Cardiovascular: Negative for chest pain, palpitations and leg swelling. Gastrointestinal: Positive for nausea. Negative for abdominal pain, diarrhea and vomiting. Reports no bowel movement since admission   Genitourinary: Negative for dysuria, flank pain, frequency and urgency. Musculoskeletal: Negative for back pain and myalgias. Skin: Negative for color change and wound. Neurological: Negative for dizziness, light-headedness and headaches. Psychiatric/Behavioral: Negative for confusion. 14 point review of systems is negative except as specifically addressed above. Objective:   VITALS:  /62   Pulse 78   Temp 97.2 °F (36.2 °C)   Resp 16   Ht 5' 4\" (1.626 m)   Wt 184 lb 4 oz (83.6 kg)   SpO2 91%   BMI 31.63 kg/m²   24HR INTAKE/OUTPUT:      Intake/Output Summary (Last 24 hours) at 4/13/2022 1718  Last data filed at 4/13/2022 1342  Gross per 24 hour   Intake 4404.57 ml   Output 800 ml   Net 3604.57 ml       Physical Exam  Vitals reviewed. Constitutional:       Appearance: She is not ill-appearing. HENT:      Head: Normocephalic. Nose: Nose normal.      Mouth/Throat:      Mouth: Mucous membranes are moist.   Eyes:      Pupils: Pupils are equal, round, and reactive to light. Cardiovascular:      Rate and Rhythm: Normal rate and regular rhythm. Pulses: Normal pulses. Heart sounds: Normal heart sounds. Pulmonary:      Effort: Pulmonary effort is normal.      Breath sounds: Rales present. Abdominal:      General: Abdomen is flat. Bowel sounds are normal. There is no distension. Palpations: Abdomen is soft. Tenderness: There is no abdominal tenderness. There is no guarding.    Musculoskeletal: General: Normal range of motion. Cervical back: Normal range of motion and neck supple. Right lower leg: No edema. Left lower leg: No edema. Skin:     General: Skin is warm and dry. Capillary Refill: Capillary refill takes less than 2 seconds. Neurological:      General: No focal deficit present. Mental Status: She is alert and oriented to person, place, and time. Medications:      sodium chloride        topiramate  50 mg Oral BID    QUEtiapine  300 mg Oral Nightly    polyethylene glycol  17 g Oral BID    calcitRIOL  0.25 mcg Oral Daily    vitamin D  50,000 Units Oral Once per day on Mon Wed Fri    vitamin B-12  50 mcg Oral Daily    dicyclomine  10 mg Oral Daily    escitalopram  20 mg Oral Daily    gabapentin  300 mg Oral Nightly    levothyroxine  137 mcg Oral Daily    metoprolol succinate  25 mg Oral Daily    pantoprazole  40 mg Oral Daily    magnesium oxide  400 mg Oral Daily    sodium chloride flush  5-40 mL IntraVENous 2 times per day    heparin (porcine)  5,000 Units SubCUTAneous 3 times per day     guaiFENesin-dextromethorphan, diazePAM, tiZANidine, sodium chloride flush, sodium chloride, ondansetron **OR** ondansetron  ADULT DIET; Regular; Low Fat/Low Chol/High Fiber/YAAKOV; Low Potassium (Less than 3000 mg/day); Low Phosphorus (Less than 1000 mg)     Lab and other Data:     Recent Labs     04/11/22 0225 04/12/22 0326 04/13/22 0127   WBC 6.2 7.6 7.7   HGB 8.7* 8.4* 7.2*    173 142     Recent Labs     04/11/22 0225 04/12/22 0326 04/13/22  0127    143 139   K 4.3 4.1 4.1    97* 94*   CO2 20* 33* 33*   BUN 52* 43* 38*   CREATININE 3.4* 3.4* 3.3*   GLUCOSE 116* 106 103     Recent Labs     04/11/22 0225 04/12/22 0326 04/13/22 0127   * 101* 61*   * 163* 115*   BILITOT 0.6 0.4 0.3   ALKPHOS 281* 274* 242*     Troponin T: No results for input(s): TROPONINI in the last 72 hours.   Pro-BNP: No results for input(s): BNP in the last 72 hours. INR: No results for input(s): INR in the last 72 hours. UA:  No results for input(s): NITRITE, COLORU, PHUR, LABCAST, WBCUA, RBCUA, MUCUS, TRICHOMONAS, YEAST, BACTERIA, CLARITYU, SPECGRAV, LEUKOCYTESUR, UROBILINOGEN, BILIRUBINUR, BLOODU, GLUCOSEU, AMORPHOUS in the last 72 hours. Invalid input(s): John An  A1C: No results for input(s): LABA1C in the last 72 hours. ABG:No results for input(s): PHART, NEY1BRD, PO2ART, XMO7KEE, BEART, HGBAE, G0QDNBEB, CARBOXHGBART in the last 72 hours. RAD:   US RENAL COMPLETE    Result Date: 4/9/2022  1. Nonobstructing calculi involving the mid and lower pole of the left kidney. There is a tiny cortical cyst in the midpole of the right kidney. No evidence of discrete solid mass or hydronephrosis. . Signed by Dr Silva Sanches    Result Date: 4/8/2022  1. No acute disease. Signed by Dr Patricia Jacques:   Culture, Urine  Order: [id number]   Status: Preliminary result     Visible to patient: No (not released)     Next appt: None    Specimen Information: Urine, clean catch         0 Result Notes    Component 4/10/22 1651   Urine Culture, Routine No growth P    Resulting Agency [address] [place] [address] Lab             Narrative  Performed by: [address] [place] [address] Lab  ORDER#: [id number]                          ORDERED BY: Ivon Llanos   SOURCE: Urine Clean Catch                  COLLECTED:  04/10/22 16:51   ANTIBIOTICS AT [facility]. :                      RECEIVED :  04/10/22 18:23      Specimen Collected: 04/10/22 16:51 Last Resulted: 04/11/22 12:54               Assessment/Plan   Principal Problem:    Acute kidney injury superimposed on CKD [facility])   -nephrology on board   -Nephrology holding hydration and want to monitor closely   -chest x-ray   -monitor intake and output   -Patient contemplating starting hemodialysis in coordination with family and nephrology    Active Problems:    Anemia due to chronic kidney disease   -procrit per nephrology   -monitor CC      Abnormal finding on urinalysis   -repeat urine culture no growth at 24 hours without antibiotic coverage      Nausea   -continue antiemetics    Elevated LFTs   -GI recommendations reviewed and appreciated   -GI recommending cutting down on Topamax and Seroquel   -Topamax cut down from 100 to 50 mg p.o. twice daily   -Decrease Seroquel from 400 to 300 mg p.o. every night   -Monitor liver functions closely which are improving after cutting down on Topamax and Seroquel    Chronic medical issues . .. Continue with home meds. Monitor patient closely while admitted. Advised very close f/u with patient's PCP as an outpatient to address chronic medical issues. Repeat labs in a.m. Electrolyte replacement as per protocol. Patient will be monitored very closely on the floor. Further recommendations as per the hospital course. DC planning : To be determined as per the hospital course    Patient  is on DVT prophylaxis  Current medications reviewed  Lab work reviewed  Radiology/Chest x-ray films reviewed  Treatment recommendations from suspecialities reviewed, appreciated and agreed with  Discussed with the nurse and addressed all questions/concerns  Discussed with Patient and/or Family at the bedside in detail . .. they understand and agree with the management plan.       DVT Prophylaxis: heparin    Rigo Kumar MD, 4/13/2022 5:18 PM

## 2022-04-13 NOTE — PROGRESS NOTES
Nephrology ([facility]) Progress Note    Patient:  Brianna Sagastume  YOB: 1945  Date of Service: 4/13/2022  MRN: [id number]   Acct: [de-identified]   Primary Care Physician: Sowmya Garay MD  Advance Directive: Full Code  Admit Date: 4/8/2022       Hospital Day: 5  Referring Provider: Vivi Kahn MD    Patient independently seen and examined, Chart, Consults, Notes, Operative notes, Labs, Cardiology, and Radiology studies reviewed as available. Subjective:  Brianna Sagastume is a 77 y.o. female for whom we were consulted for evaluation and treatment of acute kidney injury/chronic kidney disease. She has history of stage IV chronic kidney disease and follows ODILIA Albarran in the office. She was directed to go to the hospital as she has significant decline in renal function on routine labs. Patient has been complaining of chronic diarrhea. Her p.o. intake of fluid has been fairly low. Patient also has history of hypertension, lupus, hypothyroidism and gastric bypass surgery. Hospital course remarkable for IV fluid administration and has very slow improvement of renal function.     Today, no overnight events. No family present today. Still with decreased appetite but may be a bit better today.   Denied chest pain, nausea or vomiting    Allergies:  Codeine    Medicines:  Current Facility-Administered Medications   Medication Dose Route Frequency Provider Last Rate Last Admin    lactated ringers infusion   IntraVENous Continuous Any Ryan  mL/hr at 04/13/22 0557 Rate Verify at 04/13/22 0557    topiramate (TOPAMAX) tablet 50 mg  50 mg Oral BID Rigo Kumar MD   50 mg at 04/13/22 0913    QUEtiapine (SEROQUEL) tablet 300 mg  300 mg Oral Nightly Rigo Kumar MD   300 mg at 04/12/22 2105    guaiFENesin-dextromethorphan (ROBITUSSIN DM) 100-10 MG/5ML syrup 5 mL  5 mL Oral Q4H PRN Rigo Kumar MD   5 mL at 04/12/22 1524    polyethylene glycol (GLYCOLAX) packet 17 g 17 g Oral BID Maliha Mesa, APRN - CNP   17 g at 04/13/22 0913    diazePAM (VALIUM) tablet 5 mg  5 mg Oral Q8H PRN Tivis Santoyo, APRN   5 mg at 04/12/22 2105    iron sucrose (VENOFER) injection 200 mg  200 mg IntraVENous Q24H Hardeep Lubin MD   200 mg at 04/12/22 1333    calcitRIOL (ROCALTROL) capsule 0.25 mcg  0.25 mcg Oral Daily Tivis Santoyo, APRN   0.25 mcg at 04/13/22 0912    vitamin D (ERGOCALCIFEROL) capsule 50,000 Units  50,000 Units Oral Once per day on Mon Wed Fri Tivis Santoyo, APRN   50,000 Units at 04/13/22 0912    vitamin B-12 (CYANOCOBALAMIN) tablet 50 mcg  50 mcg Oral Daily Tivis Santoyo, APRN   50 mcg at 04/13/22 0912    tiZANidine (ZANAFLEX) tablet 4 mg  4 mg Oral Q8H PRN Tivis Santoyo, APRN   4 mg at 04/12/22 2105    dicyclomine (BENTYL) capsule 10 mg  10 mg Oral Daily Tivis Santoyo, APRN   10 mg at 04/13/22 0913    escitalopram (LEXAPRO) tablet 20 mg  20 mg Oral Daily Tivis Santoyo, APRN   20 mg at 04/13/22 0912    gabapentin (NEURONTIN) capsule 300 mg  300 mg Oral Nightly Tivis Santoyo, APRN   300 mg at 04/12/22 2105    levothyroxine (SYNTHROID) tablet 137 mcg  137 mcg Oral Daily Tivis Santoyo, APRN   137 mcg at 04/13/22 0558    metoprolol succinate (TOPROL XL) extended release tablet 25 mg  25 mg Oral Daily Tivis Santoyo, APRN   25 mg at 04/12/22 0900    pantoprazole (PROTONIX) tablet 40 mg  40 mg Oral Daily Tivis Santoyo, APRN   40 mg at 04/13/22 0913    magnesium oxide (MAG-OX) tablet 400 mg  400 mg Oral Daily Tivis Santoyo, APRN   400 mg at 04/13/22 0913    sodium chloride flush 0.9 % injection 5-40 mL  5-40 mL IntraVENous 2 times per day Villaseñor Elmira, APRN - CNP   10 mL at 04/13/22 0913    sodium chloride flush 0.9 % injection 5-40 mL  5-40 mL IntraVENous PRN Villaseñor Ebbs, APRN - CNP   10 mL at 04/12/22 1335    0.9 % sodium chloride infusion   IntraVENous PRN Villaseñor Ebbs, APRN - CNP        heparin (porcine) Last Year: Not on file    Ran Out of Food in the Last Year: Not on file   Transportation Needs:     Lack of Transportation (Medical): Not on file    Lack of Transportation (Non-Medical):  Not on file   Physical Activity:     Days of Exercise per Week: Not on file    Minutes of Exercise per Session: Not on file   Stress:     Feeling of Stress : Not on file   Social Connections:     Frequency of Communication with Friends and Family: Not on file    Frequency of Social Gatherings with Friends and Family: Not on file    Attends Evangelical Services: Not on file    Active Member of Clubs or Organizations: Not on file    Attends Club or Organization Meetings: Not on file    Marital Status: Not on file   Intimate Partner Violence:     Fear of Current or Ex-Partner: Not on file    Emotionally Abused: Not on file    Physically Abused: Not on file    Sexually Abused: Not on file   Housing Stability:     Unable to Pay for Housing in the Last Year: Not on file    Number of Places Lived in the Last Year: Not on file    Unstable Housing in the Last Year: Not on file         Review of Systems:  History obtained from chart review and the patient  General ROS: No fever or chills  Respiratory ROS: No cough, shortness of breath, wheezing  Cardiovascular ROS: No chest pain or palpitations  Gastrointestinal ROS: No abdominal pain or melena  Genito-Urinary ROS: No dysuria or hematuria  Musculoskeletal ROS: No joint pain or swelling         Objective:  Patient Vitals for the past 24 hrs:   BP Temp Temp src Pulse Resp SpO2 Weight   04/13/22 0748 97/68 -- -- -- -- -- --   04/13/22 0603 (!) 81/55 97.5 °F (36.4 °C) Temporal 69 12 94 % 184 lb 4 oz (83.6 kg)   04/13/22 0038 (!) 78/59 97.9 °F (36.6 °C) Temporal 65 10 92 % --   04/12/22 1729 (!) 96/53 97.3 °F (36.3 °C) Temporal 67 18 90 % --   04/12/22 1220 115/72 97.2 °F (36.2 °C) Temporal 79 18 91 % --       Intake/Output Summary (Last 24 hours) at 4/13/2022 1118  Last data filed at 4/13/2022 1038  Gross per 24 hour   Intake 4234.57 ml   Output 800 ml   Net 3434.57 ml     General: awake/alert   Chest:  clear to auscultation bilaterally  CVS: regular rate and rhythm  Abdominal: soft, nontender, normal bowel sounds  Extremities: no cyanosis, ble edema  Skin: warm and dry without rash    Labs:  BMP:   Recent Labs     04/11/22 0225 04/12/22 0326 04/13/22 0127    143 139   K 4.3 4.1 4.1    97* 94*   CO2 20* 33* 33*   BUN 52* 43* 38*   CREATININE 3.4* 3.4* 3.3*   CALCIUM 7.4* 7.5* 7.3*     CBC:   Recent Labs     04/11/22 0225 04/12/22 0326 04/13/22 0127   WBC 6.2 7.6 7.7   HGB 8.7* 8.4* 7.2*   HCT 29.4* 28.7* 25.7*  24.9*   .4* 100.0* 101.2*    173 142     LIVER PROFILE:   Recent Labs     04/11/22 0225 04/12/22 0326 04/13/22 0127   * 101* 61*   * 163* 115*   BILITOT 0.6 0.4 0.3   ALKPHOS 281* 274* 242*     PT/INR: No results for input(s): PROTIME, INR in the last 72 hours. APTT: No results for input(s): APTT in the last 72 hours. BNP:  No results for input(s): BNP in the last 72 hours. Ionized Calcium:No results for input(s): IONCA in the last 72 hours. Magnesium:  No results for input(s): MG in the last 72 hours. Phosphorus:  No results for input(s): PHOS in the last 72 hours. HgbA1C: No results for input(s): LABA1C in the last 72 hours. Hepatic:   Recent Labs     04/11/22 0225 04/12/22 0326 04/13/22 0127   ALKPHOS 281* 274* 242*   * 163* 115*   * 101* 61*   PROT 5.1* 4.9* 4.4*   BILITOT 0.6 0.4 0.3   LABALBU 3.3* 3.1* 2.8*     Lactic Acid: No results for input(s): LACTA in the last 72 hours. Troponin: No results for input(s): CKTOTAL, CKMB, TROPONINT in the last 72 hours. ABGs: No results found for: PHART, PO2ART, IPE0ZSE  CRP:  No results for input(s): CRP in the last 72 hours. Sed Rate:  No results for input(s): SEDRATE in the last 72 hours.     Culture Results:   Blood Culture Recent: No results for input(s): BC in the last 720 hours. Urine Culture Recent :   Recent Labs     04/10/22  1651   LABURIN <10,000 CFU/ml mixed skin/urogenital eileen. No further workup       Radiology reports as per the Radiologist  Radiology: US RENAL COMPLETE    Result Date: 4/9/2022  RENAL ULTRASOUND COMPLETE 4/9/2022 11:20 AM REASON FOR EXAM: Acute renal failure in the background of chronic kidney disease  COMPARISON: None  TECHNIQUE: Multiple longitudinal and transverse realtime sonographic images of the kidneys and urinary bladder are obtained. FINDINGS: The right kidney measures 8.8 cm. The cortical thickness within the right kidney is 9 mm and 6 mm respectively in the upper and lower pole. The right kidney is normal in size, shape, contour and position. There is a small cortical cyst involving the midpole measuring 8 x 4 x 6 mm in size. The cortical thickness is normal, with preservation of the corticomedullary differentiation. The central echo complex is compact with no evidence for hydronephrosis. No nephrolithiasis or abnormal perinephric fluid collections are seen. No hydroureter. The left kidney measures 9.0 cm. The cortical thickness within the left kidney is 13 mm  and 10 mm respectively in the upper and lower pole. The left kidney is normal in size, shape, contour and position. The cortical thickness is normal, with preservation of the corticomedullary differentiation. The central echo complex is compact with no evidence for hydronephrosis. There is a shadowing calculus involving the lower pole of the left kidney measuring 9 x 5 x 9 mm in size as well as a mid pole calculus measuring 1.3 x 0.5 x 0.8 cm. . No hydroureter. Scanning through the pelvis reveals the bladder to be moderately distended with anechoic urine. The wall thickness and contour are normal. There is no surrounding ascites. 1. Nonobstructing calculi involving the mid and lower pole of the left kidney.  There is a tiny cortical cyst in the midpole of the right kidney. No evidence of discrete solid mass or hydronephrosis. . Signed by Dr Rosas Numbers    Result Date: 4/8/2022  XR CHEST PORTABLE 4/8/2022 8:15 PM History: Shortness of breath. One view chest x-ray. Heart size is within normal limits. The mediastinum has a normal appearance. The lungs are adequately expanded with no pneumonia or pneumothorax. There is mild chronic interstitial disease with scattered calcified granulomas. There is no significant pleural fluid. No congestive failure changes. 1. No acute disease.  Signed by Dr Louise Bubba kidney injury/ATN  Chronic kidney disease stage IV  Hypertensive nephrosclerosis  Metabolic acidosis  Hyperphosphatemia  Anemia chronic kidney disease  Secondary hyperparathyroidism  Elevated AST/ALT    Plan:  Discussed with patient, nursing  Monitor labs  IV fluid trial with adjustments as per orders, will hold today  GI work-up ongoing  Holding juan Sanchez MD  04/13/22  11:18 AM

## 2022-04-14 ENCOUNTER — ANESTHESIA EVENT (OUTPATIENT)
Dept: OPERATING ROOM | Age: 77
DRG: 674 | End: 2022-04-14
Payer: MEDICARE

## 2022-04-14 LAB
ALBUMIN SERPL-MCNC: 2.8 G/DL (ref 3.5–5.2)
ALP BLD-CCNC: 271 U/L (ref 35–104)
ALT SERPL-CCNC: 94 U/L (ref 5–33)
ANA IGG, ELISA: NORMAL
ANION GAP SERPL CALCULATED.3IONS-SCNC: 12 MMOL/L (ref 7–19)
AST SERPL-CCNC: 43 U/L (ref 5–32)
BILIRUB SERPL-MCNC: 0.3 MG/DL (ref 0.2–1.2)
BUN BLDV-MCNC: 42 MG/DL (ref 8–23)
CALCIUM SERPL-MCNC: 7.7 MG/DL (ref 8.8–10.2)
CHLORIDE BLD-SCNC: 98 MMOL/L (ref 98–111)
CO2: 32 MMOL/L (ref 22–29)
CREAT SERPL-MCNC: 3.5 MG/DL (ref 0.5–0.9)
F-ACTIN AB IGG: 8 UNITS (ref 0–19)
GFR AFRICAN AMERICAN: 15
GFR NON-AFRICAN AMERICAN: 13
GLUCOSE BLD-MCNC: 95 MG/DL (ref 74–109)
HCT VFR BLD CALC: 26.3 % (ref 37–47)
HEMOGLOBIN: 7.8 G/DL (ref 12–16)
MCH RBC QN AUTO: 30 PG (ref 27–31)
MCHC RBC AUTO-ENTMCNC: 29.7 G/DL (ref 33–37)
MCV RBC AUTO: 101.2 FL (ref 81–99)
MITOCHONDRIAL M2 AB, IGG: 1.7 UNITS (ref 0–24.9)
OCCULT BLOOD DIAGNOSTIC: NORMAL
OCCULT BLOOD QC: NORMAL
PDW BLD-RTO: 14.6 % (ref 11.5–14.5)
PLATELET # BLD: 168 K/UL (ref 130–400)
PMV BLD AUTO: 11.6 FL (ref 9.4–12.3)
POTASSIUM REFLEX MAGNESIUM: 4.6 MMOL/L (ref 3.5–5)
RBC # BLD: 2.6 M/UL (ref 4.2–5.4)
SODIUM BLD-SCNC: 142 MMOL/L (ref 136–145)
TOTAL PROTEIN: 4.6 G/DL (ref 6.6–8.7)
WBC # BLD: 6.5 K/UL (ref 4.8–10.8)

## 2022-04-14 PROCEDURE — 87641 MR-STAPH DNA AMP PROBE: CPT

## 2022-04-14 PROCEDURE — 2700000000 HC OXYGEN THERAPY PER DAY

## 2022-04-14 PROCEDURE — 1210000000 HC MED SURG R&B

## 2022-04-14 PROCEDURE — 2580000003 HC RX 258

## 2022-04-14 PROCEDURE — 6370000000 HC RX 637 (ALT 250 FOR IP): Performed by: NURSE PRACTITIONER

## 2022-04-14 PROCEDURE — 6360000002 HC RX W HCPCS

## 2022-04-14 PROCEDURE — 80053 COMPREHEN METABOLIC PANEL: CPT

## 2022-04-14 PROCEDURE — 36415 COLL VENOUS BLD VENIPUNCTURE: CPT

## 2022-04-14 PROCEDURE — 82272 OCCULT BLD FECES 1-3 TESTS: CPT

## 2022-04-14 PROCEDURE — 99222 1ST HOSP IP/OBS MODERATE 55: CPT | Performed by: NURSE PRACTITIONER

## 2022-04-14 PROCEDURE — 99232 SBSQ HOSP IP/OBS MODERATE 35: CPT | Performed by: INTERNAL MEDICINE

## 2022-04-14 PROCEDURE — 6370000000 HC RX 637 (ALT 250 FOR IP): Performed by: HOSPITALIST

## 2022-04-14 PROCEDURE — 85027 COMPLETE CBC AUTOMATED: CPT

## 2022-04-14 RX ORDER — QUETIAPINE FUMARATE 100 MG/1
200 TABLET, FILM COATED ORAL NIGHTLY
Status: DISCONTINUED | OUTPATIENT
Start: 2022-04-14 | End: 2022-04-17

## 2022-04-14 RX ADMIN — TOPIRAMATE 50 MG: 50 TABLET, FILM COATED ORAL at 08:25

## 2022-04-14 RX ADMIN — GUAIFENESIN SYRUP AND DEXTROMETHORPHAN 5 ML: 100; 10 SYRUP ORAL at 15:30

## 2022-04-14 RX ADMIN — HEPARIN SODIUM 5000 UNITS: 5000 INJECTION INTRAVENOUS; SUBCUTANEOUS at 15:07

## 2022-04-14 RX ADMIN — ESCITALOPRAM OXALATE 20 MG: 10 TABLET ORAL at 08:25

## 2022-04-14 RX ADMIN — GABAPENTIN 300 MG: 300 CAPSULE ORAL at 21:37

## 2022-04-14 RX ADMIN — HEPARIN SODIUM 5000 UNITS: 5000 INJECTION INTRAVENOUS; SUBCUTANEOUS at 06:00

## 2022-04-14 RX ADMIN — DICYCLOMINE HYDROCHLORIDE 10 MG: 10 CAPSULE ORAL at 08:25

## 2022-04-14 RX ADMIN — SODIUM CHLORIDE, PRESERVATIVE FREE 10 ML: 5 INJECTION INTRAVENOUS at 21:38

## 2022-04-14 RX ADMIN — POLYETHYLENE GLYCOL 3350 17 G: 17 POWDER, FOR SOLUTION ORAL at 21:35

## 2022-04-14 RX ADMIN — TOPIRAMATE 50 MG: 50 TABLET, FILM COATED ORAL at 21:37

## 2022-04-14 RX ADMIN — QUETIAPINE FUMARATE 200 MG: 100 TABLET ORAL at 21:36

## 2022-04-14 RX ADMIN — PANTOPRAZOLE SODIUM 40 MG: 40 TABLET, DELAYED RELEASE ORAL at 08:25

## 2022-04-14 RX ADMIN — SODIUM CHLORIDE, PRESERVATIVE FREE 10 ML: 5 INJECTION INTRAVENOUS at 08:25

## 2022-04-14 RX ADMIN — Medication 50 MCG: at 08:25

## 2022-04-14 RX ADMIN — CALCITRIOL CAPSULES 0.25 MCG 0.25 MCG: 0.25 CAPSULE ORAL at 08:25

## 2022-04-14 RX ADMIN — CHLORHEXIDINE GLUCONATE: 4 LIQUID TOPICAL at 21:38

## 2022-04-14 RX ADMIN — GUAIFENESIN SYRUP AND DEXTROMETHORPHAN 5 ML: 100; 10 SYRUP ORAL at 08:42

## 2022-04-14 RX ADMIN — POLYETHYLENE GLYCOL 3350 17 G: 17 POWDER, FOR SOLUTION ORAL at 08:25

## 2022-04-14 RX ADMIN — Medication 400 MG: at 08:25

## 2022-04-14 RX ADMIN — HEPARIN SODIUM 5000 UNITS: 5000 INJECTION INTRAVENOUS; SUBCUTANEOUS at 21:35

## 2022-04-14 RX ADMIN — MUPIROCIN: 20 OINTMENT TOPICAL at 21:35

## 2022-04-14 RX ADMIN — LEVOTHYROXINE SODIUM 137 MCG: 137 TABLET ORAL at 06:00

## 2022-04-14 ASSESSMENT — ENCOUNTER SYMPTOMS
CHEST TIGHTNESS: 0
DIARRHEA: 0
VOMITING: 0
WHEEZING: 0
SHORTNESS OF BREATH: 1
SINUS PRESSURE: 1
ABDOMINAL PAIN: 0
COUGH: 1
COLOR CHANGE: 0
BACK PAIN: 0
NAUSEA: 1

## 2022-04-14 ASSESSMENT — PAIN SCALES - GENERAL: PAINLEVEL_OUTOF10: 0

## 2022-04-14 NOTE — PROGRESS NOTES
No GI complaints, abdomen soft, LFT's improving with reduction of psych medications, mail off labs do no indicate any intrinsic liver disease, anemia improving, retic count and haptoglobin normal, no signs of GI bleeding, no further GI work-up planned as inpatient, please arrange for follow-up in outpatient GI clinic in 3 weeks.

## 2022-04-14 NOTE — ACP (ADVANCE CARE PLANNING)
Advance Care Planning   Healthcare Decision Maker:    Primary Decision Maker: Jack Lopez - Spouse - [phone]    Secondary Decision Maker: Yaa Yang Child - [phone]    Note:  Patient stated - being fine to have her  to be Primary Decision Maker, and named the Secondary DM among her children.       Electronically signed by Carmelina Tyson  "[email or web address] on 4/14/2022 at 3:01 PM.

## 2022-04-14 NOTE — PROGRESS NOTES
Comprehensive Nutrition Assessment    Type and Reason for Visit:  Initial,RD Nutrition Re-Screen/LOS    Nutrition Recommendations/Plan: continue current POC    Nutrition Assessment:  Following patient for LOS. Pt appears adequately nourished AEB fat and muscle mass. PO intake has been variable, but is improving. Intake % today. Aware family and p t discussing starting dialysis    Malnutrition Assessment:  Malnutrition Status: At risk for malnutrition (Comment)    Context:  Acute Illness     Findings of the 6 clinical characteristics of malnutrition:  Energy Intake:  Mild decrease in energy intake (Comment)  Weight Loss:  No significant weight loss     Body Fat Loss:  No significant body fat loss     Muscle Mass Loss:  No significant muscle mass loss    Fluid Accumulation:  7 - Moderate to Severe Extremities   Strength:  Not Performed    Nutrition Related Findings:  +1, +2 LE edema,   hx-Gastric bypass      Wounds:  None       Current Nutrition Therapies:    ADULT DIET; Regular; Low Fat/Low Chol/High Fiber/YAAKOV; Low Potassium (Less than 3000 mg/day); Low Phosphorus (Less than 1000 mg)    Anthropometric Measures:  · Height: 5' 4\" (162.6 cm)  · Current Body Weight: 185 lb 3.2 oz (84 kg)   · Admission Body Weight: 150 lb (68 kg) (stated)    · Usual Body Weight: 150 lb 9.6 oz (68.3 kg) (1/2022)     · Ideal Body Weight: 120 lbs; % Ideal Body Weight 154.3 %   · BMI: 31.8  · Adjusted Body Weight:  ; No Adjustment   · BMI Categories: Obese Class 1 (BMI 30.0-34. 9)       Nutrition Diagnosis:   · Inadequate protein-energy intake,Altered nutrition-related lab values related to early satiety,renal dysfunction as evidenced by intake 0-25%,intake 26-50%,intake 51-75%,localized or generalized fluid accumulation,diarrhea      Nutrition Interventions:   Food and/or Nutrient Delivery:  Continue Current Diet  Nutrition Education/Counseling:  No recommendation at this time   Coordination of Nutrition Care:  Continue to monitor while inpatient    Goals:  PO intake 50% or greater for all meals.   Increased weight d/t fluid retention lost       Nutrition Monitoring and Evaluation:   Behavioral-Environmental Outcomes:  None Identified   Food/Nutrient Intake Outcomes:  Food and Nutrient Intake  Physical Signs/Symptoms Outcomes:  Biochemical Data,Weight,Skin,Nutrition Focused Physical Findings,Fluid Status or Edema     Discharge Planning:    Continue current diet     Electronically signed by Danni Faustin MS, RD, LD on 4/14/22 at 12:47 PM CDT    Contact: [phone]

## 2022-04-14 NOTE — PLAN OF CARE
Nutrition Problem #1: Inadequate protein-energy intake,Altered nutrition-related lab values  Intervention: Food and/or Nutrient Delivery: Continue Current Diet  Nutritional Goals: PO intake 50% or greater for all meals.   Increased weight d/t fluid retention lost

## 2022-04-14 NOTE — CONSULTS
Vascular Surgery  Consultation    Ms. Faith Cross is a 77year old female who was sent to the ER with worsening renal function by ODILIA Bright. Patient has known CKD, stage IV. Patient stated she has noticed worsening shortness of breath. Work-up in the ER, CR 3.4 with BUN 52. No hyperkalemia. She was admitted to the hospitalist service with nephrology consultation. Patient was seen and evaluated by Dr. Mykel Clinton, Nephrology, who ordered IVF and renal ultrasound. No hydronephrosis identified. Despite IVF, patient's renal function did not improve. She continued to complain of weakness and poor appetite, therefore, vascular surgery consulted for placement of Permcath to initiate hemodialysis.      Lab Results   Component Value Date    CREATININE 3.5 (H) 04/14/2022    BUN 42 (H) 04/14/2022     04/14/2022    K 4.6 04/14/2022    CL 98 04/14/2022    CO2 32 (H) 04/14/2022       Hellen Campbell is a 77 y.o. 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    Loose body of left shoulder 07/28/2020    Shoulder arthritis 07/27/2020    Arthritis of knee 11/27/2017     Current Facility-Administered Medications   Medication Dose Route Frequency Provider Last Rate Last Admin    topiramate (TOPAMAX) tablet 50 mg  50 mg Oral BID Rigo Kumar MD   50 mg at 04/14/22 0825    QUEtiapine (SEROQUEL) tablet 300 mg  300 mg Oral Nightly Rigo Kumar MD   300 mg at 04/13/22 2010    guaiFENesin-dextromethorphan (ROBITUSSIN DM) 100-10 MG/5ML syrup 5 mL  5 mL Oral Q4H PRN Rigo Kumar MD   5 mL at 04/14/22 0842    polyethylene glycol (GLYCOLAX) packet 17 g  17 g Oral BID Rosalea Iron, APRN - CNP   17 g at 04/14/22 0825    diazePAM (VALIUM) tablet 5 mg  5 mg Oral Q8H PRN Lucyann Clutter, APRN   5 mg at 04/13/22 2010    calcitRIOL (ROCALTROL) capsule 0.25 mcg  0.25 mcg Oral Daily Lucyann Clutter, APRN   0.25 mcg at 04/14/22 0825    vitamin D (ERGOCALCIFEROL) capsule 50,000 Units  50,000 Units Oral Once per day on Mon Wed Fri Lucyann Clutter, APRN   50,000 Units at 04/13/22 0912    vitamin B-12 (CYANOCOBALAMIN) tablet 50 mcg  50 mcg Oral Daily Lucyann Clutter, APRN   50 mcg at 04/14/22 0825    tiZANidine (ZANAFLEX) tablet 4 mg  4 mg Oral Q8H PRN Lucyann Clutter, APRN   4 mg at 04/13/22 2010    dicyclomine (BENTYL) capsule 10 mg  10 mg Oral Daily Lucyann Clutter, APRN   10 mg at 04/14/22 0825    escitalopram (LEXAPRO) tablet 20 mg  20 mg Oral Daily Lucyann Clutter, APRN   20 mg at 04/14/22 0825    gabapentin (NEURONTIN) capsule 300 mg  300 mg Oral Nightly Lucyann Clutter, APRN   300 mg at 04/13/22 2011    levothyroxine (SYNTHROID) tablet 137 mcg  137 mcg Oral Daily Lucyann Clutter, APRN   137 mcg at 04/14/22 0600    metoprolol succinate (TOPROL XL) extended release tablet 25 mg  25 mg Oral Daily Lucyann Clutter, APRN   25 mg at 04/12/22 0900    pantoprazole (PROTONIX) tablet 40 mg  40 mg Oral Daily Lucyann Clutter, APRN   40 mg at 04/14/22 0825    magnesium oxide (MAG-OX) tablet 400 mg  400 mg Oral Daily Lucyann Clutter, APRN   400 mg at 04/14/22 0825    sodium chloride flush 0.9 % injection 5-40 mL  5-40 mL IntraVENous 2 times per day Ирина Lamb, ODILIA - CNP   10 mL at 04/14/22 0825    sodium chloride flush 0.9 % injection 5-40 mL  5-40 mL IntraVENous PRN ODILIA Natarajan CNP   10 mL at 04/12/22 1335    0.9 % sodium chloride infusion   IntraVENous PRN ODILIA Natarajan CNP        heparin (porcine) injection 5,000 Units  5,000 Units SubCUTAneous 3 times per day ODILIA Nataraajn CNP   5,000 Units at 04/14/22 1507    ondansetron (ZOFRAN-ODT) disintegrating tablet 4 mg  4 mg Oral Q8H PRN Gene Tye, APRN - CNP        Or    ondansetron [facility] injection 4 mg  4 mg IntraVENous Q6H PRN Gene Tye, APRN - CNP   4 mg at 04/10/22 8203     Allergies: Codeine  Past Medical History:   Diagnosis Date    Arthritis     Asthma     DVT (deep vein thrombosis) in pregnancy     History of blood transfusion     Hx of blood clots     hAD dvt WAS ON Coumadin for a year. 2006    Hypertension     Lupus ([facility] [address]     Renal failure     Splenic artery aneurysm ([facility] [address]     Thyroid disease      Past Surgical History:   Procedure Laterality Date    GASTRIC BYPASS SURGERY  1975    HYSTERECTOMY      JOINT REPLACEMENT Right     hip and shoulder    LYMPHADENECTOMY      MD TOTAL KNEE ARTHROPLASTY Left 11/27/2017    KNEE TOTAL ARTHROPLASTY performed by Ad Fierro MD at [facility] Left 7/27/2020    LLEFT REVERSE TOTAL SHOULDER POLY EXCHANGE AND BICEPS TENOTOMY performed by Ad Fierro MD at [address]     History reviewed. No pertinent family history. Social History     Tobacco Use    Smoking status: Never Smoker    Smokeless tobacco: Never Used   Substance Use Topics    Alcohol use: No       Old records have been obtained from the referring provider. These records have been reviewed and summarized. Review of Systems    Constitutional - No significant activity change, appetite change, or unexpected weight change. No fever or chills. No diaphoresis or significant fatigue. Weakness. HENT - No significant rhinorrhea or epistaxis. No tinnitus or significant hearing loss. Eyes - No sudden vision change or amaurosis. Respiratory - No wheezing or stridor. No apnea, cough, or chest tightness associated with shortness of breath. Shortness of breath on admission. Still short of breath with activity. Cardiovascular - No chest pain, syncope, or significant dizziness.  No palpitations or significant leg Superimposed on CKD, Stage IV - Now in Need of Hemodialysis  2. Essential HTN  3. Acquired Hypothyroidism  4. Anemia of CKD  5. Hx DVT - Was Treated with Coumadin x 1 Year  6. Transaminitis (POA) - Improving. Followed by GI      Plan    1. Proceed with permacath placement tomorrow.        Tri William, APRN-BC

## 2022-04-14 NOTE — PROGRESS NOTES
Nephrology ([facility]) Progress Note    Patient:  Magalis Ventura  YOB: 1945  Date of Service: 4/14/2022  MRN: [id number]   Acct: [de-identified]   Primary Care Physician: Gregorio Daugherty MD  Advance Directive: Full Code  Admit Date: 4/8/2022       Hospital Day: 6  Referring Provider: Taina Barker MD    Patient independently seen and examined, Chart, Consults, Notes, Operative notes, Labs, Cardiology, and Radiology studies reviewed as available. Subjective:  Magalis Ventura is a 77 y.o. female for whom we were consulted for evaluation and treatment of acute kidney injury/chronic kidney disease. She has history of stage IV chronic kidney disease and follows ODILIA Reis in the office. She was directed to go to the hospital as she has significant decline in renal function on routine labs. Patient has been complaining of chronic diarrhea. Her p.o. intake of fluid has been fairly low. Patient also has history of hypertension, lupus, hypothyroidism and gastric bypass surgery. Hospital course remarkable for IV fluid administration and has very slow improvement of renal function.     Today, no overnight events. Family present today.   Denied chest pain, nausea or vomiting    Allergies:  Codeine    Medicines:  Current Facility-Administered Medications   Medication Dose Route Frequency Provider Last Rate Last Admin    topiramate (TOPAMAX) tablet 50 mg  50 mg Oral BID Rigo Kumar MD   50 mg at 04/14/22 0825    QUEtiapine (SEROQUEL) tablet 300 mg  300 mg Oral Nightly Rigo Kumar MD   300 mg at 04/13/22 2010    guaiFENesin-dextromethorphan (ROBITUSSIN DM) 100-10 MG/5ML syrup 5 mL  5 mL Oral Q4H PRN Rigo Kumar MD   5 mL at 04/14/22 0842    polyethylene glycol (GLYCOLAX) packet 17 g  17 g Oral BID Ursula Lan APRMARISOL - CNP   17 g at 04/14/22 0825    diazePAM (VALIUM) tablet 5 mg  5 mg Oral Q8H PRN ODILIA Veliz   5 mg at 04/13/22 2010    calcitRIOL (ROCALTROL) capsule 0.25 mcg  0.25 mcg Oral Daily Yaritza Maldonado, APRN   0.25 mcg at 04/14/22 0825    vitamin D (ERGOCALCIFEROL) capsule 50,000 Units  50,000 Units Oral Once per day on Mon Wed Fri Yaritza Maldonado, APRN   50,000 Units at 04/13/22 0912    vitamin B-12 (CYANOCOBALAMIN) tablet 50 mcg  50 mcg Oral Daily Yaritza Maldonado, APRN   50 mcg at 04/14/22 0825    tiZANidine (ZANAFLEX) tablet 4 mg  4 mg Oral Q8H PRN Yaritza Maldonado, APRN   4 mg at 04/13/22 2010    dicyclomine (BENTYL) capsule 10 mg  10 mg Oral Daily Yaritza Maldonado, APRN   10 mg at 04/14/22 0825    escitalopram (LEXAPRO) tablet 20 mg  20 mg Oral Daily Yaritza Maldonado, APRN   20 mg at 04/14/22 0825    gabapentin (NEURONTIN) capsule 300 mg  300 mg Oral Nightly Yaritza Maldonado, APRN   300 mg at 04/13/22 2011    levothyroxine (SYNTHROID) tablet 137 mcg  137 mcg Oral Daily Yaritza Maldonado, APRN   137 mcg at 04/14/22 0600    metoprolol succinate (TOPROL XL) extended release tablet 25 mg  25 mg Oral Daily Yaritza Maldonado, APRN   25 mg at 04/12/22 0900    pantoprazole (PROTONIX) tablet 40 mg  40 mg Oral Daily Yaritza Joel, APRN   40 mg at 04/14/22 0825    magnesium oxide (MAG-OX) tablet 400 mg  400 mg Oral Daily Yaritzajimena Maldonado, APRN   400 mg at 04/14/22 0825    sodium chloride flush 0.9 % injection 5-40 mL  5-40 mL IntraVENous 2 times per day Stevie Peers, APRN - CNP   10 mL at 04/14/22 0825    sodium chloride flush 0.9 % injection 5-40 mL  5-40 mL IntraVENous PRN Stevie Peers, APRN - CNP   10 mL at 04/12/22 1335    0.9 % sodium chloride infusion   IntraVENous PRN Stevie Peers, APRN - CNP        heparin (porcine) injection 5,000 Units  5,000 Units SubCUTAneous 3 times per day Stevie Maria Eugenia, APRN - CNP   5,000 Units at 04/14/22 0600    ondansetron (ZOFRAN-ODT) disintegrating tablet 4 mg  4 mg Oral Q8H PRN ODILIA Brunner CNP        Or    ondansetron [facility]) injection 4 mg  4 mg IntraVENous Q6H PRN Marcy Singh Jose Alberto Castro, ODILIA - CNP   4 mg at 04/10/22 0090       Past Medical History:  Past Medical History:   Diagnosis Date    Arthritis     Asthma     DVT (deep vein thrombosis) in pregnancy     History of blood transfusion     Hx of blood clots     hAD dvt WAS ON Coumadin for a year. 2006    Hypertension     [facility] [address]     Renal failure     Splenic artery aneurysm ([facility] [address]     Thyroid disease        Past Surgical History:  Past Surgical History:   Procedure Laterality Date    GASTRIC BYPASS SURGERY  1975    HYSTERECTOMY      JOINT REPLACEMENT Right     hip and shoulder    LYMPHADENECTOMY      [place] TOTAL KNEE ARTHROPLASTY Left 11/27/2017    KNEE TOTAL ARTHROPLASTY performed by Ad Fierro MD at [address] Left 7/27/2020    LLEFT REVERSE TOTAL SHOULDER POLY EXCHANGE AND BICEPS TENOTOMY performed by [facility] History  History reviewed. No pertinent family history. Social History  Social History     Socioeconomic History    Marital status:      Spouse name: Not on file    Number of children: Not on file    Years of education: Not on file    Highest education level: Not on file   Occupational History    Not on file   Tobacco Use    Smoking status: Never Smoker    Smokeless tobacco: Never Used   Substance and Sexual Activity    Alcohol use: No    Drug use: No    Sexual activity: Not on file   Other Topics Concern    Not on file   Social History Narrative    Not on file     Social Determinants of Health     Financial Resource Strain:     Difficulty of Paying Living Expenses: Not on file   Food Insecurity:     Worried About Running Out of Food in the Last Year: Not on file    Jeff of Food in the Last Year: Not on file   Transportation Needs:     Lack of Transportation (Medical): Not on file    Lack of Transportation (Non-Medical):  Not on file   Physical Activity:     Days of Exercise per Week: Not on file    Minutes of Exercise per Session: Not on file   Stress:     Feeling of Stress : Not on file   Social Connections:     Frequency of Communication with Friends and Family: Not on file    Frequency of Social Gatherings with Friends and Family: Not on file    Attends [employer] Services: Not on file    Active Member of [employer] or Organizations: Not on file    Attends Club or Organization Meetings: Not on file    Marital Status: Not on file   Intimate Partner Violence:     Fear of Current or Ex-Partner: Not on file    Emotionally Abused: Not on file    Physically Abused: Not on file    Sexually Abused: Not on file   Housing Stability:     Unable to Pay for Housing in the Last Year: Not on file    Number of Anastacia in the Last Year: Not on file    Unstable Housing in the Last Year: Not on file         Review of Systems:  History obtained from chart review and the patient  General ROS: No fever or chills  Respiratory ROS: No cough, shortness of breath, wheezing  Cardiovascular ROS: No chest pain or palpitations  Gastrointestinal ROS: No abdominal pain or melena  Genito-Urinary ROS: No dysuria or hematuria  Musculoskeletal ROS: No joint pain or swelling         Objective:  Patient Vitals for the past 24 hrs:   BP Temp Temp src Pulse Resp SpO2 Height Weight   04/14/22 1248 127/71 98.4 °F (36.9 °C) Temporal 83 16 90 % -- --   04/14/22 1237 -- -- -- -- -- -- 5' 4\" (1.626 m) --   04/14/22 0946 -- -- -- -- -- -- -- 185 lb 3.2 oz (84 kg)   04/14/22 0647 98/66 97.3 °F (36.3 °C) -- 75 18 90 % -- --   04/14/22 0041 -- 96.8 °F (36 °C) -- 68 20 91 % -- --   04/13/22 1643 106/62 97.2 °F (36.2 °C) -- 78 16 91 % -- --       Intake/Output Summary (Last 24 hours) at 4/14/2022 1344  Last data filed at 4/14/2022 1342  Gross per 24 hour   Intake 600 ml   Output --   Net 600 ml     General: awake/alert   Chest:  clear to auscultation bilaterally  CVS: regular rate and rhythm  Abdominal: soft, nontender, normal bowel sounds  Extremities: no cyanosis, ble edema  Skin: warm and dry without rash    Labs:  BMP:   Recent Labs     04/12/22 0326 04/13/22 0127 04/14/22 0219    139 142   K 4.1 4.1 4.6   CL 97* 94* 98   CO2 33* 33* 32*   BUN 43* 38* 42*   CREATININE 3.4* 3.3* 3.5*   CALCIUM 7.5* 7.3* 7.7*     CBC:   Recent Labs     04/12/22 0326 04/13/22 0127 04/14/22 0219   WBC 7.6 7.7 6.5   HGB 8.4* 7.2* 7.8*   HCT 28.7* 25.7*  24.9* 26.3*   .0* 101.2* 101.2*    142 168     LIVER PROFILE:   Recent Labs     04/12/22 0326 04/13/22 0127 04/14/22 0219   * 61* 43*   * 115* 94*   BILITOT 0.4 0.3 0.3   ALKPHOS 274* 242* 271*     PT/INR: No results for input(s): PROTIME, INR in the last 72 hours. APTT: No results for input(s): APTT in the last 72 hours. BNP:  No results for input(s): BNP in the last 72 hours. Ionized Calcium:No results for input(s): IONCA in the last 72 hours. Magnesium:  No results for input(s): MG in the last 72 hours. Phosphorus:  No results for input(s): PHOS in the last 72 hours. HgbA1C: No results for input(s): LABA1C in the last 72 hours. Hepatic:   Recent Labs     04/12/22 0326 04/13/22 0127 04/14/22 0219   ALKPHOS 274* 242* 271*   * 115* 94*   * 61* 43*   PROT 4.9* 4.4* 4.6*   BILITOT 0.4 0.3 0.3   LABALBU 3.1* 2.8* 2.8*     Lactic Acid: No results for input(s): LACTA in the last 72 hours. Troponin: No results for input(s): CKTOTAL, CKMB, TROPONINT in the last 72 hours. ABGs: No results found for: PHART, PO2ART, [id number]  CRP:  No results for input(s): CRP in the last 72 hours. Sed Rate:  No results for input(s): SEDRATE in the last 72 hours. Culture Results:   Blood Culture Recent: No results for input(s): BC in the last 720 hours. Urine Culture Recent :   Recent Labs     04/10/22  1651   LABURIN <10,000 CFU/ml mixed skin/urogenital eileen.  No further workup       Radiology reports as per the Radiologist  Radiology: US RENAL COMPLETE    Result Date: 4/9/2022  RENAL ULTRASOUND COMPLETE 4/9/2022 11:20 AM REASON FOR EXAM: Acute renal failure in the background of chronic kidney disease  COMPARISON: None  TECHNIQUE: Multiple longitudinal and transverse realtime sonographic images of the kidneys and urinary bladder are obtained. FINDINGS: The right kidney measures 8.8 cm. The cortical thickness within the right kidney is 9 mm and 6 mm respectively in the upper and lower pole. The right kidney is normal in size, shape, contour and position. There is a small cortical cyst involving the midpole measuring 8 x 4 x 6 mm in size. The cortical thickness is normal, with preservation of the corticomedullary differentiation. The central echo complex is compact with no evidence for hydronephrosis. No nephrolithiasis or abnormal perinephric fluid collections are seen. No hydroureter. The left kidney measures 9.0 cm. The cortical thickness within the left kidney is 13 mm  and 10 mm respectively in the upper and lower pole. The left kidney is normal in size, shape, contour and position. The cortical thickness is normal, with preservation of the corticomedullary differentiation. The central echo complex is compact with no evidence for hydronephrosis. There is a shadowing calculus involving the lower pole of the left kidney measuring 9 x 5 x 9 mm in size as well as a mid pole calculus measuring 1.3 x 0.5 x 0.8 cm. . No hydroureter. Scanning through the pelvis reveals the bladder to be moderately distended with anechoic urine. The wall thickness and contour are normal. There is no surrounding ascites. 1. Nonobstructing calculi involving the mid and lower pole of the left kidney. There is a tiny cortical cyst in the midpole of the right kidney. No evidence of discrete solid mass or hydronephrosis. . Signed by Dr Tish Lugo    Result Date: 4/8/2022  XR CHEST PORTABLE 4/8/2022 8:15 PM History: Shortness of breath. One view chest x-ray. Heart size is within normal limits.  The mediastinum has a normal appearance. The lungs are adequately expanded with no pneumonia or pneumothorax. There is mild chronic interstitial disease with scattered calcified granulomas. There is no significant pleural fluid. No congestive failure changes. 1. No acute disease.  Signed by Dr Antonieta Deng kidney injury/ATN  Chronic kidney disease stage IV  Hypertensive nephrosclerosis  Metabolic acidosis  Hyperphosphatemia  Anemia chronic kidney disease  Secondary hyperparathyroidism  Elevated AST/ALT    Plan:  Discussed with patient, nursing  Monitor labs  BUN/creatinine had worsened a bit off IV fluids, patient family agreeable continue with dialysis we will consult vascular for PermCath placement and begin initiation tomorrow  GI work-up ongoing  Holding bicarb    Eli Marshall MD  04/14/22  1:44 PM

## 2022-04-14 NOTE — PROGRESS NOTES
[facility] Hospital[facility]      Patient:  Collis Meckel  YOB: 1945  Date of Service: 4/14/2022  MRN: [id number]   Acct: [de-identified]   Primary Care Physician: Kathe Birmingham MD  Advance Directive: Full Code  Admit Date: 4/8/2022       Hospital Day: 6  Portions of this note have been copied forward, however, changed to reflect the most current clinical status of this patient. CHIEF COMPLAINT abnormal labs    SUBJECTIVE: Patient lying in bed during my morning rounds. She has made up her mind up about hemodialysis and wants to pursue it! CUMULATIVE HOSPITAL COURSE:  The patient is a 36-year-old female with past medical history of DVT, hypertension, lupus, thyroid disease, gastric bypass, and CKD who presented to [facility] ED complaining of abnormal labs. Patient reported she was following up with a nurse practitioner in nephrology clinic and was noted to have decline in renal function. Patient reports chronic diarrhea, dyspnea on exertion, and decreased appetite. ED work-up revealed hemoglobin of 9.9, hematocrit 32.6, creatinine 3.3, BUN 54, and negative urinalysis. Patient was admitted to the hospitalist service for LUCY on CKD. Patient was started on IV resuscitation with bicarbonate fluids. Nephrology was consulted. Renal ultrasound indicated nonobstructing calculi involving the mid and lower pole of the left kidney, a tiny cortical cyst in the midpole of the right kidney. Transaminitis was indicated and GI was consulted to assess transaminitis and chronic diarrhea. UA in ED indicated coryneabacterium, however patient asymptomatic, discussed with lab and advise repeat urine culture. Repeat urine culture with no antibiotic coverage as indicated no growth at 24 hours. Nephrology recommends to continue IV hydration. Patient noted to have some wheezing and indicated cough. Repeat chest x-ray obtained which was normal.  GI recommending tapering down on Seroquel and Topamax.   Patient contemplating with family regarding starting hemodialysis and has finally made her mind. Likely surgery consult given we will place a permacath tomorrow for initiation of hemodialysis. Review of Systems:   Review of Systems   Constitutional: Positive for appetite change and fatigue. Negative for chills and fever. HENT: Positive for ear pain and sinus pressure. Respiratory: Positive for cough and shortness of breath. Negative for chest tightness and wheezing. Cardiovascular: Negative for chest pain, palpitations and leg swelling. Gastrointestinal: Positive for nausea. Negative for abdominal pain, diarrhea and vomiting. Reports no bowel movement since admission   Genitourinary: Negative for dysuria, flank pain, frequency and urgency. Musculoskeletal: Negative for back pain and myalgias. Skin: Negative for color change and wound. Neurological: Negative for dizziness, light-headedness and headaches. Psychiatric/Behavioral: Negative for confusion. 14 point review of systems is negative except as specifically addressed above. Objective:   VITALS:  /71   Pulse 83   Temp 97.9 °F (36.6 °C) (Temporal)   Resp 16   Ht 5' 4\" (1.626 m)   Wt 185 lb 3.2 oz (84 kg)   SpO2 90%   BMI 31.79 kg/m²   24HR INTAKE/OUTPUT:      Intake/Output Summary (Last 24 hours) at 4/14/2022 1825  Last data filed at 4/14/2022 1342  Gross per 24 hour   Intake 600 ml   Output --   Net 600 ml       Physical Exam  Vitals reviewed. Constitutional:       Appearance: She is not ill-appearing. HENT:      Head: Normocephalic. Nose: Nose normal.      Mouth/Throat:      Mouth: Mucous membranes are moist.   Eyes:      Pupils: Pupils are equal, round, and reactive to light. Cardiovascular:      Rate and Rhythm: Normal rate and regular rhythm. Pulses: Normal pulses. Heart sounds: Normal heart sounds. Pulmonary:      Effort: Pulmonary effort is normal.      Breath sounds: Rales present.    Abdominal: General: Abdomen is flat. Bowel sounds are normal. There is no distension. Palpations: Abdomen is soft. Tenderness: There is no abdominal tenderness. There is no guarding. Musculoskeletal:         General: Normal range of motion. Cervical back: Normal range of motion and neck supple. Right lower leg: No edema. Left lower leg: No edema. Skin:     General: Skin is warm and dry. Capillary Refill: Capillary refill takes less than 2 seconds. Neurological:      General: No focal deficit present. Mental Status: She is alert and oriented to person, place, and time. Medications:      sodium chloride        mupirocin   Nasal BID    [START ON 4/15/2022] vancomycin  1,250 mg IntraVENous Once    chlorhexidine gluconate   Topical BID    QUEtiapine  200 mg Oral Nightly    topiramate  50 mg Oral BID    polyethylene glycol  17 g Oral BID    calcitRIOL  0.25 mcg Oral Daily    vitamin D  50,000 Units Oral Once per day on Mon Wed Fri    vitamin B-12  50 mcg Oral Daily    dicyclomine  10 mg Oral Daily    escitalopram  20 mg Oral Daily    gabapentin  300 mg Oral Nightly    levothyroxine  137 mcg Oral Daily    metoprolol succinate  25 mg Oral Daily    pantoprazole  40 mg Oral Daily    magnesium oxide  400 mg Oral Daily    sodium chloride flush  5-40 mL IntraVENous 2 times per day    heparin (porcine)  5,000 Units SubCUTAneous 3 times per day     guaiFENesin-dextromethorphan, diazePAM, tiZANidine, sodium chloride flush, sodium chloride, ondansetron **OR** ondansetron  ADULT DIET; Regular; Low Fat/Low Chol/High Fiber/YAAKOV; Low Potassium (Less than 3000 mg/day);  Low Phosphorus (Less than 1000 mg)  Diet NPO     Lab and other Data:     Recent Labs     04/12/22 0326 04/13/22 0127 04/14/22 0219   WBC 7.6 7.7 6.5   HGB 8.4* 7.2* 7.8*    142 168     Recent Labs     04/12/22 0326 04/13/22 0127 04/14/22 0219    139 142   K 4.1 4.1 4.6   CL 97* 94* 98   CO2 33* 33* 32*   BUN 43* 38* 42*   CREATININE 3.4* 3.3* 3.5*   GLUCOSE 106 103 95     Recent Labs     04/12/22  0326 04/13/22  0127 04/14/22  0219   * 61* 43*   * 115* 94*   BILITOT 0.4 0.3 0.3   ALKPHOS 274* 242* 271*     Troponin T: No results for input(s): TROPONINI in the last 72 hours. Pro-BNP: No results for input(s): BNP in the last 72 hours. INR: No results for input(s): INR in the last 72 hours. UA:  No results for input(s): NITRITE, COLORU, PHUR, LABCAST, WBCUA, RBCUA, MUCUS, TRICHOMONAS, YEAST, BACTERIA, CLARITYU, SPECGRAV, LEUKOCYTESUR, UROBILINOGEN, BILIRUBINUR, BLOODU, GLUCOSEU, AMORPHOUS in the last 72 hours. Invalid input(s): Maira Masters  A1C: No results for input(s): LABA1C in the last 72 hours. ABG:No results for input(s): PHART, FPV9IYE, PO2ART, ABI7MMF, BEART, HGBAE, T9EGOKUV, CARBOXHGBART in the last 72 hours. RAD:   US RENAL COMPLETE    Result Date: 4/9/2022  1. Nonobstructing calculi involving the mid and lower pole of the left kidney. There is a tiny cortical cyst in the midpole of the right kidney. No evidence of discrete solid mass or hydronephrosis. . Signed by Dr Abena Sánchez    Result Date: 4/8/2022  1. No acute disease. Signed by Dr Ybarra Ranks:   Culture, Urine  Order: [id number]   Status: Preliminary result     Visible to patient: No (not released)     Next appt: None    Specimen Information: Urine, clean catch         0 Result Notes    Component 4/10/22 1651   Urine Culture, Routine No growth P    Resulting Agency [address] [place] [address] Lab             Narrative  Performed by: [address] [place] [address] Lab  ORDER#: [id number]                          ORDERED BY: Carmen Loza   SOURCE: Urine Clean Catch                  COLLECTED:  04/10/22 16:51   ANTIBIOTICS AT [facility]. :                      RECEIVED :  04/10/22 18:23      Specimen Collected: 04/10/22 16:51 Last Resulted: 04/11/22 12:54               Assessment/Plan   Principal Problem:    Acute kidney injury superimposed on CKD [facility])   -nephrology on board   -Nephrology holding hydration and want to monitor closely   -chest x-ray   -monitor intake and output   -Patient finally made up her mind regarding starting hemodialysis in coordination with family and   -Vascular surgery consult given for permacath placement will be done tomorrow   -Hemodialysis to be initiated tomorrow     Active Problems:    Anemia due to chronic kidney disease   -procrit per nephrology   -monitor CC      Abnormal finding on urinalysis   -repeat urine culture no growth at 24 hours without antibiotic coverage      Nausea   -continue antiemetics    Elevated LFTs   -GI recommendations reviewed and appreciated   -GI recommending cutting down on Topamax and Seroquel   -Topamax cut down from 100 to 50 mg p.o. twice daily   -Decrease Seroquel from 400 to 200 mg p.o. every night   -Monitor liver functions closely which are improving after cutting down on Topamax and Seroquel   -GI has signed off on the case and want to follow-up with her as an outpatient in 3 weeks after hospital discharge    Chronic medical issues . .. Continue with home meds. Monitor patient closely while admitted. Advised very close f/u with patient's PCP as an outpatient to address chronic medical issues. Repeat labs in a.m. Electrolyte replacement as per protocol. Patient will be monitored very closely on the floor. Further recommendations as per the hospital course. The patient's management will be taken over by our covering Hospitalist Physician, Dr. Rudy Lutz, in a.m . .. I am signing off! DC planning :  To be determined as per the hospital course    Patient  is on DVT prophylaxis  Current medications reviewed  Lab work reviewed  Radiology/Chest x-ray films reviewed  Treatment recommendations from suspecialities reviewed, appreciated and agreed with  Discussed with the nurse and addressed all questions/concerns  Discussed with Patient and/or Family at the bedside in detail . .. they understand and agree with the management plan.       DVT Prophylaxis: heparin    Rigo Kumar MD, 4/14/2022 6:25 PM

## 2022-04-15 ENCOUNTER — ANESTHESIA (OUTPATIENT)
Dept: OPERATING ROOM | Age: 77
DRG: 674 | End: 2022-04-15
Payer: MEDICARE

## 2022-04-15 ENCOUNTER — APPOINTMENT (OUTPATIENT)
Dept: INTERVENTIONAL RADIOLOGY/VASCULAR | Age: 77
DRG: 674 | End: 2022-04-15
Payer: MEDICARE

## 2022-04-15 VITALS — SYSTOLIC BLOOD PRESSURE: 74 MMHG | OXYGEN SATURATION: 96 % | DIASTOLIC BLOOD PRESSURE: 50 MMHG | TEMPERATURE: 98 F

## 2022-04-15 LAB
ALBUMIN SERPL-MCNC: 2.94 G/DL (ref 3.75–5.01)
ALBUMIN SERPL-MCNC: 3 G/DL (ref 3.5–5.2)
ALP BLD-CCNC: 281 U/L (ref 35–104)
ALPHA-1-GLOBULIN: 0.28 G/DL (ref 0.19–0.46)
ALPHA-2-GLOBULIN: 0.77 G/DL (ref 0.48–1.05)
ALT SERPL-CCNC: 81 U/L (ref 5–33)
ANION GAP SERPL CALCULATED.3IONS-SCNC: 11 MMOL/L (ref 7–19)
ANION GAP SERPL CALCULATED.3IONS-SCNC: 15 MMOL/L (ref 7–19)
AST SERPL-CCNC: 37 U/L (ref 5–32)
BETA GLOBULIN: 0.69 G/DL (ref 0.48–1.1)
BILIRUB SERPL-MCNC: 0.3 MG/DL (ref 0.2–1.2)
BUN BLDV-MCNC: 40 MG/DL (ref 8–23)
BUN BLDV-MCNC: 40 MG/DL (ref 8–23)
CALCIUM SERPL-MCNC: 8.3 MG/DL (ref 8.8–10.2)
CALCIUM SERPL-MCNC: 8.6 MG/DL (ref 8.8–10.2)
CELIAC PANEL: 10 UNITS (ref 0–19)
CHLORIDE BLD-SCNC: 102 MMOL/L (ref 98–111)
CHLORIDE BLD-SCNC: 102 MMOL/L (ref 98–111)
CO2: 25 MMOL/L (ref 22–29)
CO2: 30 MMOL/L (ref 22–29)
CREAT SERPL-MCNC: 3.5 MG/DL (ref 0.5–0.9)
CREAT SERPL-MCNC: 3.6 MG/DL (ref 0.5–0.9)
GAMMA GLOBULIN: 0.61 G/DL (ref 0.62–1.51)
GFR AFRICAN AMERICAN: 15
GFR AFRICAN AMERICAN: 15
GFR NON-AFRICAN AMERICAN: 12
GFR NON-AFRICAN AMERICAN: 13
GLUCOSE BLD-MCNC: 89 MG/DL (ref 74–109)
GLUCOSE BLD-MCNC: 94 MG/DL (ref 74–109)
HBV SURFACE AB TITR SER: NORMAL {TITER}
HCT VFR BLD CALC: 30.6 % (ref 37–47)
HEMOGLOBIN: 8.6 G/DL (ref 12–16)
IGA: 250 MG/DL (ref 68–408)
IGG: 624 MG/DL (ref 768–1632)
IGM: 20 MG/DL (ref 35–263)
IMMUNOFIXATION REFLEX: ABNORMAL
MCH RBC QN AUTO: 29.9 PG (ref 27–31)
MCHC RBC AUTO-ENTMCNC: 28.1 G/DL (ref 33–37)
MCV RBC AUTO: 106.3 FL (ref 81–99)
MRSA SCREEN RT-PCR: DETECTED
PDW BLD-RTO: 14.6 % (ref 11.5–14.5)
PLATELET # BLD: 188 K/UL (ref 130–400)
PMV BLD AUTO: 11.3 FL (ref 9.4–12.3)
POTASSIUM REFLEX MAGNESIUM: 5.1 MMOL/L (ref 3.5–5)
POTASSIUM SERPL-SCNC: 5.4 MMOL/L (ref 3.5–5)
RBC # BLD: 2.88 M/UL (ref 4.2–5.4)
SODIUM BLD-SCNC: 142 MMOL/L (ref 136–145)
SODIUM BLD-SCNC: 143 MMOL/L (ref 136–145)
SPE/IFE INTERPRETATION: ABNORMAL
TOTAL PROTEIN: 5.1 G/DL (ref 6.6–8.7)
TOTAL PROTEIN: 5.3 G/DL (ref 6.3–8.2)
WBC # BLD: 7.1 K/UL (ref 4.8–10.8)

## 2022-04-15 PROCEDURE — 2580000003 HC RX 258: Performed by: NURSE PRACTITIONER

## 2022-04-15 PROCEDURE — 3700000001 HC ADD 15 MINUTES (ANESTHESIA): Performed by: SURGERY

## 2022-04-15 PROCEDURE — 6370000000 HC RX 637 (ALT 250 FOR IP): Performed by: SURGERY

## 2022-04-15 PROCEDURE — 2700000000 HC OXYGEN THERAPY PER DAY

## 2022-04-15 PROCEDURE — 6360000002 HC RX W HCPCS

## 2022-04-15 PROCEDURE — 02HV33Z INSERTION OF INFUSION DEVICE INTO SUPERIOR VENA CAVA, PERCUTANEOUS APPROACH: ICD-10-PCS | Performed by: SURGERY

## 2022-04-15 PROCEDURE — 3600000013 HC SURGERY LEVEL 3 ADDTL 15MIN: Performed by: SURGERY

## 2022-04-15 PROCEDURE — B5181ZA FLUOROSCOPY OF SUPERIOR VENA CAVA USING LOW OSMOLAR CONTRAST, GUIDANCE: ICD-10-PCS | Performed by: SURGERY

## 2022-04-15 PROCEDURE — 6360000002 HC RX W HCPCS: Performed by: SURGERY

## 2022-04-15 PROCEDURE — 3700000000 HC ANESTHESIA ATTENDED CARE: Performed by: SURGERY

## 2022-04-15 PROCEDURE — 6360000002 HC RX W HCPCS: Performed by: NURSE PRACTITIONER

## 2022-04-15 PROCEDURE — 85027 COMPLETE CBC AUTOMATED: CPT

## 2022-04-15 PROCEDURE — 2500000003 HC RX 250 WO HCPCS: Performed by: SURGERY

## 2022-04-15 PROCEDURE — 6370000000 HC RX 637 (ALT 250 FOR IP): Performed by: HOSPITALIST

## 2022-04-15 PROCEDURE — 2709999900 HC NON-CHARGEABLE SUPPLY: Performed by: SURGERY

## 2022-04-15 PROCEDURE — 7100000001 HC PACU RECOVERY - ADDTL 15 MIN: Performed by: SURGERY

## 2022-04-15 PROCEDURE — 6370000000 HC RX 637 (ALT 250 FOR IP): Performed by: NURSE PRACTITIONER

## 2022-04-15 PROCEDURE — C1769 GUIDE WIRE: HCPCS | Performed by: SURGERY

## 2022-04-15 PROCEDURE — 36415 COLL VENOUS BLD VENIPUNCTURE: CPT

## 2022-04-15 PROCEDURE — 36558 INSERT TUNNELED CV CATH: CPT | Performed by: SURGERY

## 2022-04-15 PROCEDURE — 6360000002 HC RX W HCPCS: Performed by: ANESTHESIOLOGY

## 2022-04-15 PROCEDURE — 2580000003 HC RX 258: Performed by: NURSE ANESTHETIST, CERTIFIED REGISTERED

## 2022-04-15 PROCEDURE — 7100000000 HC PACU RECOVERY - FIRST 15 MIN: Performed by: SURGERY

## 2022-04-15 PROCEDURE — 2580000003 HC RX 258

## 2022-04-15 PROCEDURE — 80053 COMPREHEN METABOLIC PANEL: CPT

## 2022-04-15 PROCEDURE — 3600000003 HC SURGERY LEVEL 3 BASE: Performed by: SURGERY

## 2022-04-15 PROCEDURE — 6360000002 HC RX W HCPCS: Performed by: NURSE ANESTHETIST, CERTIFIED REGISTERED

## 2022-04-15 PROCEDURE — 86706 HEP B SURFACE ANTIBODY: CPT

## 2022-04-15 PROCEDURE — C1750 CATH, HEMODIALYSIS,LONG-TERM: HCPCS | Performed by: SURGERY

## 2022-04-15 PROCEDURE — 2580000003 HC RX 258: Performed by: SURGERY

## 2022-04-15 PROCEDURE — 0JH63XZ INSERTION OF TUNNELED VASCULAR ACCESS DEVICE INTO CHEST SUBCUTANEOUS TISSUE AND FASCIA, PERCUTANEOUS APPROACH: ICD-10-PCS | Performed by: SURGERY

## 2022-04-15 PROCEDURE — 1210000000 HC MED SURG R&B

## 2022-04-15 RX ORDER — HYDROCODONE BITARTRATE AND ACETAMINOPHEN 5; 325 MG/1; MG/1
1 TABLET ORAL EVERY 4 HOURS PRN
Status: DISCONTINUED | OUTPATIENT
Start: 2022-04-15 | End: 2022-04-19 | Stop reason: HOSPADM

## 2022-04-15 RX ORDER — SODIUM CHLORIDE 450 MG/100ML
INJECTION, SOLUTION INTRAVENOUS CONTINUOUS PRN
Status: DISCONTINUED | OUTPATIENT
Start: 2022-04-15 | End: 2022-04-15 | Stop reason: SDUPTHER

## 2022-04-15 RX ORDER — HEPARIN SODIUM 1000 [USP'U]/ML
INJECTION, SOLUTION INTRAVENOUS; SUBCUTANEOUS PRN
Status: DISCONTINUED | OUTPATIENT
Start: 2022-04-15 | End: 2022-04-15 | Stop reason: ALTCHOICE

## 2022-04-15 RX ORDER — HYDROCODONE BITARTRATE AND ACETAMINOPHEN 5; 325 MG/1; MG/1
2 TABLET ORAL EVERY 4 HOURS PRN
Status: DISCONTINUED | OUTPATIENT
Start: 2022-04-15 | End: 2022-04-17

## 2022-04-15 RX ORDER — MIDODRINE HYDROCHLORIDE 5 MG/1
5 TABLET ORAL
Status: DISCONTINUED | OUTPATIENT
Start: 2022-04-15 | End: 2022-04-18

## 2022-04-15 RX ORDER — PROPOFOL 10 MG/ML
INJECTION, EMULSION INTRAVENOUS PRN
Status: DISCONTINUED | OUTPATIENT
Start: 2022-04-15 | End: 2022-04-15

## 2022-04-15 RX ORDER — PROPOFOL 10 MG/ML
INJECTION, EMULSION INTRAVENOUS CONTINUOUS PRN
Status: DISCONTINUED | OUTPATIENT
Start: 2022-04-15 | End: 2022-04-15 | Stop reason: SDUPTHER

## 2022-04-15 RX ORDER — HYDROMORPHONE HYDROCHLORIDE 1 MG/ML
0.5 INJECTION, SOLUTION INTRAMUSCULAR; INTRAVENOUS; SUBCUTANEOUS EVERY 5 MIN PRN
Status: DISCONTINUED | OUTPATIENT
Start: 2022-04-15 | End: 2022-04-15 | Stop reason: HOSPADM

## 2022-04-15 RX ORDER — ONDANSETRON 2 MG/ML
4 INJECTION INTRAMUSCULAR; INTRAVENOUS
Status: DISCONTINUED | OUTPATIENT
Start: 2022-04-15 | End: 2022-04-15 | Stop reason: HOSPADM

## 2022-04-15 RX ORDER — LIDOCAINE HYDROCHLORIDE 10 MG/ML
INJECTION, SOLUTION INFILTRATION; PERINEURAL PRN
Status: DISCONTINUED | OUTPATIENT
Start: 2022-04-15 | End: 2022-04-15 | Stop reason: ALTCHOICE

## 2022-04-15 RX ORDER — HYDROMORPHONE HYDROCHLORIDE 1 MG/ML
0.25 INJECTION, SOLUTION INTRAMUSCULAR; INTRAVENOUS; SUBCUTANEOUS EVERY 5 MIN PRN
Status: DISCONTINUED | OUTPATIENT
Start: 2022-04-15 | End: 2022-04-15 | Stop reason: HOSPADM

## 2022-04-15 RX ADMIN — SODIUM ZIRCONIUM CYCLOSILICATE 10 G: 10 POWDER, FOR SUSPENSION ORAL at 07:55

## 2022-04-15 RX ADMIN — CALCITRIOL CAPSULES 0.25 MCG 0.25 MCG: 0.25 CAPSULE ORAL at 07:55

## 2022-04-15 RX ADMIN — CHLORHEXIDINE GLUCONATE: 4 LIQUID TOPICAL at 19:49

## 2022-04-15 RX ADMIN — TIZANIDINE 4 MG: 4 TABLET ORAL at 19:48

## 2022-04-15 RX ADMIN — Medication 50 MCG: at 07:55

## 2022-04-15 RX ADMIN — SODIUM CHLORIDE, PRESERVATIVE FREE 10 ML: 5 INJECTION INTRAVENOUS at 07:55

## 2022-04-15 RX ADMIN — PROPOFOL 160 MCG/KG/MIN: 10 INJECTION, EMULSION INTRAVENOUS at 15:56

## 2022-04-15 RX ADMIN — PANTOPRAZOLE SODIUM 40 MG: 40 TABLET, DELAYED RELEASE ORAL at 07:55

## 2022-04-15 RX ADMIN — Medication 400 MG: at 07:55

## 2022-04-15 RX ADMIN — ERGOCALCIFEROL 50000 UNITS: 1.25 CAPSULE ORAL at 07:55

## 2022-04-15 RX ADMIN — HYDROMORPHONE HYDROCHLORIDE 0.5 MG: 1 INJECTION, SOLUTION INTRAMUSCULAR; INTRAVENOUS; SUBCUTANEOUS at 16:59

## 2022-04-15 RX ADMIN — CHLORHEXIDINE GLUCONATE: 4 LIQUID TOPICAL at 07:56

## 2022-04-15 RX ADMIN — LEVOTHYROXINE SODIUM 137 MCG: 137 TABLET ORAL at 05:58

## 2022-04-15 RX ADMIN — TOPIRAMATE 50 MG: 50 TABLET, FILM COATED ORAL at 19:48

## 2022-04-15 RX ADMIN — MUPIROCIN: 20 OINTMENT TOPICAL at 19:43

## 2022-04-15 RX ADMIN — TOPIRAMATE 50 MG: 50 TABLET, FILM COATED ORAL at 07:55

## 2022-04-15 RX ADMIN — METOPROLOL SUCCINATE 25 MG: 25 TABLET, EXTENDED RELEASE ORAL at 07:55

## 2022-04-15 RX ADMIN — GUAIFENESIN SYRUP AND DEXTROMETHORPHAN 5 ML: 100; 10 SYRUP ORAL at 06:01

## 2022-04-15 RX ADMIN — HYDROCODONE BITARTRATE AND ACETAMINOPHEN 1 TABLET: 5; 325 TABLET ORAL at 17:32

## 2022-04-15 RX ADMIN — HEPARIN SODIUM 5000 UNITS: 5000 INJECTION INTRAVENOUS; SUBCUTANEOUS at 12:04

## 2022-04-15 RX ADMIN — MUPIROCIN: 20 OINTMENT TOPICAL at 08:02

## 2022-04-15 RX ADMIN — HEPARIN SODIUM 5000 UNITS: 5000 INJECTION INTRAVENOUS; SUBCUTANEOUS at 22:53

## 2022-04-15 RX ADMIN — ESCITALOPRAM OXALATE 20 MG: 10 TABLET ORAL at 07:55

## 2022-04-15 RX ADMIN — HYDROCODONE BITARTRATE AND ACETAMINOPHEN 2 TABLET: 5; 325 TABLET ORAL at 22:53

## 2022-04-15 RX ADMIN — VANCOMYCIN HYDROCHLORIDE 1250 MG: 10 INJECTION, POWDER, LYOPHILIZED, FOR SOLUTION INTRAVENOUS at 15:30

## 2022-04-15 RX ADMIN — GUAIFENESIN SYRUP AND DEXTROMETHORPHAN 5 ML: 100; 10 SYRUP ORAL at 19:42

## 2022-04-15 RX ADMIN — QUETIAPINE FUMARATE 200 MG: 100 TABLET ORAL at 19:47

## 2022-04-15 RX ADMIN — MIDODRINE HYDROCHLORIDE 5 MG: 5 TABLET ORAL at 12:04

## 2022-04-15 RX ADMIN — SODIUM CHLORIDE, PRESERVATIVE FREE 10 ML: 5 INJECTION INTRAVENOUS at 19:51

## 2022-04-15 RX ADMIN — MIDODRINE HYDROCHLORIDE 5 MG: 5 TABLET ORAL at 07:54

## 2022-04-15 RX ADMIN — HEPARIN SODIUM 5000 UNITS: 5000 INJECTION INTRAVENOUS; SUBCUTANEOUS at 05:59

## 2022-04-15 RX ADMIN — GABAPENTIN 300 MG: 300 CAPSULE ORAL at 19:47

## 2022-04-15 RX ADMIN — SODIUM CHLORIDE: 4.5 INJECTION, SOLUTION INTRAVENOUS at 15:53

## 2022-04-15 RX ADMIN — POLYETHYLENE GLYCOL 3350 17 G: 17 POWDER, FOR SOLUTION ORAL at 19:46

## 2022-04-15 RX ADMIN — DICYCLOMINE HYDROCHLORIDE 10 MG: 10 CAPSULE ORAL at 07:55

## 2022-04-15 ASSESSMENT — PAIN SCALES - GENERAL
PAINLEVEL_OUTOF10: 7
PAINLEVEL_OUTOF10: 6
PAINLEVEL_OUTOF10: 6
PAINLEVEL_OUTOF10: 9
PAINLEVEL_OUTOF10: 0

## 2022-04-15 ASSESSMENT — PAIN DESCRIPTION - PAIN TYPE: TYPE: SURGICAL PAIN

## 2022-04-15 ASSESSMENT — PAIN DESCRIPTION - LOCATION
LOCATION: NECK
LOCATION: NECK

## 2022-04-15 ASSESSMENT — LIFESTYLE VARIABLES: SMOKING_STATUS: 0

## 2022-04-15 NOTE — ANESTHESIA POSTPROCEDURE EVALUATION
Department of Anesthesiology  Postprocedure Note    Patient: Brody Land  MRN: [id number]  YOB: 1945  Date of evaluation: 4/15/2022  Time:  4:35 PM     Procedure Summary     Date: 04/15/22 Room / Location: [address]    Anesthesia Start: 0765 Anesthesia Stop:     Procedure: PLACEMENT OF TUNNELED PERM-CATHETER (N/A ) Diagnosis: (N17.9)    Surgeons: Nicki Mcclain MD Responsible Provider: Ephriam Duane, APRN - CRNA    Anesthesia Type: MAC ASA Status: 4          Anesthesia Type: No value filed. Kenn Phase I: Kenn Score: 10    Kenn Phase II:      Last vitals: Reviewed and per EMR flowsheets. Anesthesia Post Evaluation    Patient location during evaluation: PACU  Patient participation: waiting for patient participation  Level of consciousness: awake and lethargic  Pain score: 0  Airway patency: patent  Nausea & Vomiting: no nausea and no vomiting  Complications: no  Cardiovascular status: blood pressure returned to baseline  Respiratory status: acceptable  Hydration status: euvolemic  Comments: Pt transported with oxygen.   Report to RN

## 2022-04-15 NOTE — OP NOTE
layers with 3-0 vicryl subcutaneous, 3-0 nylon sutures and dermabond skin adhesive. The exit site was secured around the catheter with 3-0 vicryl subcutaneous purstring suture. The catheter itself was secured to the chest with two 2-0 nylon sutures. Sterile dressings were placed. Both ports of the catheter aspirated and flushed easily with heparinized saline and heparin lock. The catheter is ready for use.

## 2022-04-15 NOTE — CARE COORDINATION
Outpt HD set up in progress, all info that is available at this time was entered into [employer] portal.  Electronically signed by Rene Mendoza RN on 4/15/2022 at 3:50 PM

## 2022-04-15 NOTE — PROGRESS NOTES
Nephrology ([facility]) Progress Note    Patient:  Brody Land  YOB: 1945  Date of Service: 4/15/2022  MRN: [id number]   Acct: [de-identified]   Primary Care Physician: Viktoria Huff MD  Advance Directive: Full Code  Admit Date: 4/8/2022       Hospital Day: 7  Referring Provider: Thi Rucker MD    Patient independently seen and examined, Chart, Consults, Notes, Operative notes, Labs, Cardiology, and Radiology studies reviewed as available. Subjective:  Brody Land is a 77 y.o. female for whom we were consulted for evaluation and treatment of acute kidney injury/chronic kidney disease. She has history of stage IV chronic kidney disease and follows ODILIA Bender in the office. She was directed to go to the hospital as she has significant decline in renal function on routine labs. Patient has been complaining of chronic diarrhea. Her p.o. intake of fluid has been fairly low. Patient also has history of hypertension, lupus, hypothyroidism and gastric bypass surgery. Hospital course remarkable for IV fluid administration and has very slow improvement of renal function.     Today, no overnight events. Family not present today.   Denied chest pain, nausea or vomiting    Allergies:  Codeine    Medicines:  Current Facility-Administered Medications   Medication Dose Route Frequency Provider Last Rate Last Admin    midodrine (PROAMATINE) tablet 5 mg  5 mg Oral TID WC Thi Rucker MD   5 mg at 04/15/22 1204    mupirocin (BACTROBAN) 2 % ointment   Nasal BID Latkobe Crenshaw APRN   Given at 04/15/22 0802    vancomycin (VANCOCIN) 1,250 mg in dextrose 5 % 250 mL IVPB  1,250 mg IntraVENous Once Latkobe Peñauser APRN        chlorhexidine gluconate (ANTISEPTIC SKIN CLEANSER) 4 % solution   Topical BID Latgiftye Flower APRN   Given at 04/15/22 0756    QUEtiapine (SEROQUEL) tablet 200 mg  200 mg Oral Nightly Rigo Kumar MD   200 mg at 04/14/22 2136    topiramate (TOPAMAX) tablet 50 mg  50 mg Oral BID Rigo Kumar MD   50 mg at 04/15/22 0755    guaiFENesin-dextromethorphan (ROBITUSSIN DM) 100-10 MG/5ML syrup 5 mL  5 mL Oral Q4H PRN Rigo Kumar MD   5 mL at 04/15/22 0601    polyethylene glycol (GLYCOLAX) packet 17 g  17 g Oral BID ODILIA Chaudhary - CNP   17 g at 04/14/22 2135    diazePAM (VALIUM) tablet 5 mg  5 mg Oral Q8H PRN Alma Rinne, APRN   5 mg at 04/13/22 2010    calcitRIOL (ROCALTROL) capsule 0.25 mcg  0.25 mcg Oral Daily Alma Rinne, APRN   0.25 mcg at 04/15/22 0755    vitamin D (ERGOCALCIFEROL) capsule 50,000 Units  50,000 Units Oral Once per day on Mon Wed Fri Alma Rinne, APRN   50,000 Units at 04/15/22 0755    vitamin B-12 (CYANOCOBALAMIN) tablet 50 mcg  50 mcg Oral Daily Alma Rinne, APRN   50 mcg at 04/15/22 0755    tiZANidine (ZANAFLEX) tablet 4 mg  4 mg Oral Q8H PRN Alma Rinne, APRN   4 mg at 04/13/22 2010    dicyclomine (BENTYL) capsule 10 mg  10 mg Oral Daily Alma Rinne, APRN   10 mg at 04/15/22 0755    escitalopram (LEXAPRO) tablet 20 mg  20 mg Oral Daily Alma Rinne, APRN   20 mg at 04/15/22 0755    gabapentin (NEURONTIN) capsule 300 mg  300 mg Oral Nightly Alma Rinne, APRN   300 mg at 04/14/22 2137    levothyroxine (SYNTHROID) tablet 137 mcg  137 mcg Oral Daily Alma Rinne, APRN   137 mcg at 04/15/22 0558    metoprolol succinate (TOPROL XL) extended release tablet 25 mg  25 mg Oral Daily Alma Rinne, APRN   25 mg at 04/15/22 0755    pantoprazole (PROTONIX) tablet 40 mg  40 mg Oral Daily Alma Rinne, APRN   40 mg at 04/15/22 0755    magnesium oxide (MAG-OX) tablet 400 mg  400 mg Oral Daily Alma Rinne, APRN   400 mg at 04/15/22 0755    sodium chloride flush 0.9 % injection 5-40 mL  5-40 mL IntraVENous 2 times per day Arvel Channel, APRN - CNP   10 mL at 04/15/22 0755    sodium chloride flush 0.9 % injection 5-40 mL  5-40 mL IntraVENous PRN Arvel Channel, APRN - CNP   10 mL at 04/12/22 1335    0.9 % sodium chloride infusion   IntraVENous PRN Dellis Coup, APRN - CNP        heparin (porcine) injection 5,000 Units  5,000 Units SubCUTAneous 3 times per day Dellis Coup, APRN - CNP   5,000 Units at 04/15/22 1204    ondansetron (ZOFRAN-ODT) disintegrating tablet 4 mg  4 mg Oral Q8H PRN Dellis Coup, APRN - CNP        Or    ondansetron [facility] injection 4 mg  4 mg IntraVENous Q6H PRN Dellis Coup, APRN - CNP   4 mg at 04/10/22 6098       Past Medical History:  Past Medical History:   Diagnosis Date    Arthritis     Asthma     DVT (deep vein thrombosis) in pregnancy     History of blood transfusion     Hx of blood clots     hAD dvt WAS ON Coumadin for a year. 2006    Hypertension     [facility]     Renal failure     Splenic artery aneurysm ([facility]     Thyroid disease        Past Surgical History:  Past Surgical History:   Procedure Laterality Date    GASTRIC BYPASS SURGERY  1975    HYSTERECTOMY      JOINT REPLACEMENT Right     hip and shoulder    LYMPHADENECTOMY      SD TOTAL KNEE ARTHROPLASTY Left 11/27/2017    KNEE TOTAL ARTHROPLASTY performed by Keiry Moy MD at [facility] Left 7/27/2020    LLEFT REVERSE TOTAL SHOULDER POLY EXCHANGE AND BICEPS TENOTOMY performed by Keiry Moy MD at [facility] History  History reviewed. No pertinent family history.     Social History  Social History     Socioeconomic History    Marital status:      Spouse name: Not on file    Number of children: Not on file    Years of education: Not on file    Highest education level: Not on file   Occupational History    Not on file   Tobacco Use    Smoking status: Never Smoker    Smokeless tobacco: Never Used   Substance and Sexual Activity    Alcohol use: No    Drug use: No    Sexual activity: Not on file   Other Topics Concern    Not on file   Social History Narrative    Not on file     Social Determinants of Health     Financial Resource Strain:     Difficulty of Paying Living Expenses: Not on file   Food Insecurity:     Worried About Running Out of Food in the Last Year: Not on file    Jeff of Food in the Last Year: Not on file   Transportation Needs:     Lack of Transportation (Medical): Not on file    Lack of Transportation (Non-Medical):  Not on file   Physical Activity:     Days of Exercise per Week: Not on file    Minutes of Exercise per Session: Not on file   Stress:     Feeling of Stress : Not on file   Social Connections:     Frequency of Communication with Friends and Family: Not on file    Frequency of Social Gatherings with Friends and Family: Not on file    Attends Sikh Services: Not on file    Active Member of [address] [place] [address] [employer] Group or Organizations: Not on file    Attends Club or Organization Meetings: Not on file    Marital Status: Not on file   Intimate Partner Violence:     Fear of Current or Ex-Partner: Not on file    Emotionally Abused: Not on file    Physically Abused: Not on file    Sexually Abused: Not on file   Housing Stability:     Unable to Pay for Housing in the Last Year: Not on file    Number of [place] in the Last Year: Not on file    Unstable Housing in the Last Year: Not on file         Review of Systems:  History obtained from chart review and the patient  General ROS: No fever or chills  Respiratory ROS: No cough, shortness of breath, wheezing  Cardiovascular ROS: No chest pain or palpitations  Gastrointestinal ROS: No abdominal pain or melena  Genito-Urinary ROS: No dysuria or hematuria  Musculoskeletal ROS: No joint pain or swelling         Objective:  Patient Vitals for the past 24 hrs:   BP Temp Temp src Pulse Resp SpO2   04/15/22 1142 116/73 97.7 °F (36.5 °C) Temporal 81 18 90 %   04/15/22 0714 -- -- -- -- 18 93 %   04/15/22 0610 99/83 97.5 °F (36.4 °C) -- 82 18 92 %   04/15/22 0026 103/60 96.8 °F (36 °C) -- 88 20 94 %   04/14/22 2210 -- -- -- 88 -- --   04/14/22 1729 112/71 97.9 °F (36.6 °C) Temporal 83 16 90 %       Intake/Output Summary (Last 24 hours) at 4/15/2022 1351  Last data filed at 4/15/2022 1200  Gross per 24 hour   Intake 0 ml   Output --   Net 0 ml     General: awake/alert   Chest:  clear to auscultation bilaterally  CVS: regular rate and rhythm  Abdominal: soft, nontender, normal bowel sounds  Extremities: no cyanosis, ble edema  Skin: warm and dry without rash    Labs:  BMP:   Recent Labs     04/14/22  0219 04/15/22  0358 04/15/22  1127    142 143   K 4.6 5.1* 5.4*   CL 98 102 102   CO2 32* 25 30*   BUN 42* 40* 40*   CREATININE 3.5* 3.5* 3.6*   CALCIUM 7.7* 8.3* 8.6*     CBC:   Recent Labs     04/13/22  0127 04/14/22  0219 04/15/22  0358   WBC 7.7 6.5 7.1   HGB 7.2* 7.8* 8.6*   HCT 25.7*  24.9* 26.3* 30.6*   .2* 101.2* 106.3*    168 188     LIVER PROFILE:   Recent Labs     04/13/22  0127 04/14/22  0219 04/15/22  0358   AST 61* 43* 37*   * 94* 81*   BILITOT 0.3 0.3 0.3   ALKPHOS 242* 271* 281*     PT/INR: No results for input(s): PROTIME, INR in the last 72 hours. APTT: No results for input(s): APTT in the last 72 hours. BNP:  No results for input(s): BNP in the last 72 hours. Ionized Calcium:No results for input(s): IONCA in the last 72 hours. Magnesium:  No results for input(s): MG in the last 72 hours. Phosphorus:  No results for input(s): PHOS in the last 72 hours. HgbA1C: No results for input(s): LABA1C in the last 72 hours. Hepatic:   Recent Labs     04/13/22  0127 04/14/22  0219 04/15/22  0358   ALKPHOS 242* 271* 281*   * 94* 81*   AST 61* 43* 37*   PROT 4.4* 4.6* 5.1*   BILITOT 0.3 0.3 0.3   LABALBU 2.8* 2.8* 3.0*     Lactic Acid: No results for input(s): LACTA in the last 72 hours. Troponin: No results for input(s): CKTOTAL, CKMB, TROPONINT in the last 72 hours. ABGs: No results found for: PHART, PO2ART, [id number]  CRP:  No results for input(s): CRP in the last 72 hours.   Sed Rate:  No results for input(s): SEDRATE in the last 72 hours.    Culture Results:   Blood Culture Recent: No results for input(s): BC in the last 720 hours. Urine Culture Recent :   Recent Labs     04/10/22  1651   LABURIN <10,000 CFU/ml mixed skin/urogenital eileen. No further workup       Radiology reports as per the Radiologist  Radiology: US RENAL COMPLETE    Result Date: 4/9/2022  RENAL ULTRASOUND COMPLETE 4/9/2022 11:20 AM REASON FOR EXAM: Acute renal failure in the background of chronic kidney disease  COMPARISON: None  TECHNIQUE: Multiple longitudinal and transverse realtime sonographic images of the kidneys and urinary bladder are obtained. FINDINGS: The right kidney measures 8.8 cm. The cortical thickness within the right kidney is 9 mm and 6 mm respectively in the upper and lower pole. The right kidney is normal in size, shape, contour and position. There is a small cortical cyst involving the midpole measuring 8 x 4 x 6 mm in size. The cortical thickness is normal, with preservation of the corticomedullary differentiation. The central echo complex is compact with no evidence for hydronephrosis. No nephrolithiasis or abnormal perinephric fluid collections are seen. No hydroureter. The left kidney measures 9.0 cm. The cortical thickness within the left kidney is 13 mm  and 10 mm respectively in the upper and lower pole. The left kidney is normal in size, shape, contour and position. The cortical thickness is normal, with preservation of the corticomedullary differentiation. The central echo complex is compact with no evidence for hydronephrosis. There is a shadowing calculus involving the lower pole of the left kidney measuring 9 x 5 x 9 mm in size as well as a mid pole calculus measuring 1.3 x 0.5 x 0.8 cm. . No hydroureter. Scanning through the pelvis reveals the bladder to be moderately distended with anechoic urine. The wall thickness and contour are normal. There is no surrounding ascites.      1. Nonobstructing calculi involving the mid and lower pole of the left kidney. There is a tiny cortical cyst in the midpole of the right kidney. No evidence of discrete solid mass or hydronephrosis. . Signed by Dr Amaya Cope    Result Date: 4/8/2022  XR CHEST PORTABLE 4/8/2022 8:15 PM History: Shortness of breath. One view chest x-ray. Heart size is within normal limits. The mediastinum has a normal appearance. The lungs are adequately expanded with no pneumonia or pneumothorax. There is mild chronic interstitial disease with scattered calcified granulomas. There is no significant pleural fluid. No congestive failure changes. 1. No acute disease.  Signed by Dr Antonieta Deng kidney injury/ATN  Chronic kidney disease stage IV  Hypertensive nephrosclerosis  Metabolic acidosis  Hyperphosphatemia  Anemia chronic kidney disease  Secondary hyperparathyroidism  Elevated AST/ALT    Plan:  Discussed with patient, nursing  Monitor labs  GI work-up reviewed  Holding bicarb  Planning dialysis initiation after PermCath placement later today    Eli Marshall MD  04/15/22  1:51 PM

## 2022-04-15 NOTE — PROGRESS NOTES
Progress Note  Date:4/15/2022       Room:0308/308-01  Patient Name:Kendra Arreguin     YOB: 1945     Age:76 y.o. Subjective    Subjective: First encounter with pt 4/15/22    78-year-old female with past medical history of DVT, hypertension, lupus, thyroid disease, gastric bypass, and CKD who presented to [address] [place] [address] ED complaining of abnormal labs. Patient reported she was following up with a nurse practitioner in nephrology clinic and was noted to have decline in renal function. Patient was started on IV resuscitation with bicarbonate fluids. Nephrology following, renal ultrasound indicated nonobstructing calculi involving the mid and lower pole of the left kidney, a tiny cortical cyst in the midpole of the right kidney. UA in ED indicated coryneabacterium, however patient asymptomatic, Repeat urine culture with no growth. Nephrology recommends to continue IV hydration. Patient noted to have some wheezing and indicated cough. Repeat chest x-ray obtained which was normal.  GI recommending tapering down on Seroquel and Topamax given transaminitis. Due to no significant improvement in renal function, plan to initiate dialysis. Vascular surgery consulted for PermCath placement. Seen in house this morning, denied any acute overnight event, denied any chest pain shortness of breath, nausea vomiting abdominal pain. Awaiting permacath placement. Review of Systems: 12 point system review negative suggested above. Objective         Vitals Last 24 Hours:  TEMPERATURE:  Temp  Av.5 °F (36.4 °C)  Min: 96.8 °F (36 °C)  Max: 97.9 °F (36.6 °C)  RESPIRATIONS RANGE: Resp  Av  Min: 16  Max: 20  PULSE OXIMETRY RANGE: SpO2  Av.8 %  Min: 90 %  Max: 94 %  PULSE RANGE: Pulse  Av.4  Min: 81  Max: 88  BLOOD PRESSURE RANGE: Systolic (38VDP), RNQ:456 , Min:99 , TWY:765   ; Diastolic (14BHI), HZZ:93, Min:60, Max:83    I/O (24Hr):     Intake/Output Summary (Last 24 hours) at 4/15/2022 1346  Last data filed at 4/15/2022 1200  Gross per 24 hour   Intake 0 ml   Output --   Net 0 ml         Physical Examination:  General: Well-developed, no acute distress lying comfortably in bed. HEENT: Atraumatic normocephalic, range of motion normal, no JVD, no tracheal deviation noted. Cardiac: Normal S1-S2  Respiratory: clear To auscultation bilaterally, no rhonchi or rales, no wheezing  Abdomen: Soft, positive bowel sounds in all quadrants, no distention, nontender to palpation. Extremities: no tenderness, no edema, moves all extremities  Psych: Affect normal and good eye contact, behavioral normal.        Labs/Imaging/Diagnostics    Labs:  CBC:  Recent Labs     04/13/22  0127 04/14/22  0219 04/15/22  0358   WBC 7.7 6.5 7.1   RBC 2.46* 2.60* 2.88*   HGB 7.2* 7.8* 8.6*   HCT 25.7*  24.9* 26.3* 30.6*   .2* 101.2* 106.3*   RDW 14.7* 14.6* 14.6*    168 188     CHEMISTRIES:  Recent Labs     04/14/22  0219 04/15/22  0358 04/15/22  1127    142 143   K 4.6 5.1* 5.4*   CL 98 102 102   CO2 32* 25 30*   BUN 42* 40* 40*   CREATININE 3.5* 3.5* 3.6*   GLUCOSE 95 89 94     PT/INR:No results for input(s): PROTIME, INR in the last 72 hours. APTT:No results for input(s): APTT in the last 72 hours. LIVER PROFILE:  Recent Labs     04/13/22  0127 04/14/22  0219 04/15/22  0358   AST 61* 43* 37*   * 94* 81*   BILITOT 0.3 0.3 0.3   ALKPHOS 242* 271* 281*       Imaging Last 24 Hours:  No results found. Assessment//Plan           Hospital Problems           Last Modified POA    * (Principal) Acute kidney injury superimposed on CKD ([facility] 4/8/2022 Yes    Anemia due to chronic kidney disease 4/8/2022 Yes    Abnormal finding on urinalysis 4/10/2022 Yes    Nausea 4/10/2022 Yes    Acute kidney injury superimposed on chronic kidney disease ([facility] [address] 4/10/2022 Yes        Assessment & Plan      LUCY/ATN on CKD stage IV now requiring dialysis.   Metabolic acidosis  Anemia of chronic disease  Hyperkalemia  Followed in-house by nephrology  Evaluated by vascular surgery team plan for PermCath placement today. Dialysis session to be implemented post PermCath placement. Avoid nephrotoxic agent. Hold bicarb given increasing CO2 levels. Transaminitis  Noticed on downtrending levels  Adjust medications as can exacerbate liver function levels. Hypertension  Currently with low blood pressure levels  Monitor closely and avoid significant hypotension  Initiated on midodrine. Asymptomatic bacteriuria: Monitor off antibiotics. Hypothyroidism: Continue Synthroid        Electronically signed by   Melquiades Massey MD   Internal Medicine Hospitalist  On 4/15/2022  At 2:07 PM    EMR Dragon/Transcription disclaimer:   Much of this encounter note is an electronic transcription/translation of spoken language to printed text.  The electronic translation of spoken language may permit erroneous, or at times, nonsensical words or phrases to be inadvertently transcribed; although attempts have made to review the note for such errors, some may still exist.

## 2022-04-15 NOTE — ANESTHESIA PRE PROCEDURE
Department of Anesthesiology  Preprocedure Note       Name:  Yossi Kendall   Age:  77 y.o.  :  1945                                          MRN:  [id number]         Date:  4/15/2022      Surgeon: Balaji Medina):  Chavez Sin MD    Procedure: Procedure(s):  PLACEMENT OF TUNNELED PERM-CATHETER    Medications prior to admission:   Prior to Admission medications    Medication Sig Start Date End Date Taking? Authorizing Provider   QUEtiapine (SEROQUEL) 200 MG tablet Take 400 mg by mouth at bedtime   Yes Historical Provider, MD   apixaban (ELIQUIS) 5 MG TABS tablet Take 5 mg by mouth 2 times daily   Yes Historical Provider, MD   metoprolol succinate (TOPROL XL) 25 MG extended release tablet Take 25 mg by mouth daily   Yes Historical Provider, MD   pantoprazole (PROTONIX) 40 MG tablet Take 40 mg by mouth daily   Yes Historical Provider, MD   tiZANidine (ZANAFLEX) 4 MG tablet Take 4 mg by mouth every 8 hours as needed   Yes Historical Provider, MD   allopurinol (ZYLOPRIM) 100 MG tablet Take 100 mg by mouth 2 times daily     Historical Provider, MD   diazePAM (VALIUM) 5 MG tablet Take 5 mg by mouth daily as needed.      Historical Provider, MD   dicyclomine (BENTYL) 10 MG capsule Take 10 mg by mouth daily    Historical Provider, MD   topiramate (TOPAMAX) 100 MG tablet Take 100 mg by mouth 2 times daily     Historical Provider, MD   promethazine (PHENERGAN) 25 MG tablet Take 25 mg by mouth every 6 hours as needed for Nausea    Historical Provider, MD   calcitRIOL (ROCALTROL) 0.25 MCG capsule Take 0.25 mcg by mouth daily    Historical Provider, MD   potassium chloride (KLOR-CON) 20 MEQ packet Take 20 mEq by mouth 2 times daily    Historical Provider, MD   Magnesium Oxide 400 MG CAPS Take by mouth daily     Historical Provider, MD   escitalopram (LEXAPRO) 20 MG tablet Take 20 mg by mouth daily    Historical Provider, MD   SODIUM BICARBONATE PO Take 650 mg by mouth 3 times daily 2 tabs 3 times a day    Historical Provider, MD   gabapentin (NEURONTIN) 300 MG capsule Take 300 mg by mouth at bedtime.      Historical Provider, MD   epoetin niurka (EPOGEN;PROCRIT) 44211 UNIT/ML injection Inject 20,000 Units into the skin every 3 months     Historical Provider, MD   vitamin B-12 (CYANOCOBALAMIN) 100 MCG tablet Take 50 mcg by mouth daily    Historical Provider, MD   vitamin D (ERGOCALCIFEROL) 55346 UNITS CAPS capsule Take 50,000 Units by mouth three times a week Sunday/wednesday    Historical Provider, MD   levothyroxine (SYNTHROID) 150 MCG tablet Take 137 mcg by mouth Daily Take 4 days per week    Historical Provider, MD   Prenatal Vit-Fe Fumarate-FA (PRENATAL VITAMIN PO) Take by mouth daily     Historical Provider, MD   Probiotic Product (PROBIOTIC DAILY PO) Take by mouth    Historical Provider, MD       Current medications:    Current Facility-Administered Medications   Medication Dose Route Frequency Provider Last Rate Last Admin    [MAR Hold] midodrine (PROAMATINE) tablet 5 mg  5 mg Oral TID WC Bety Gomez MD   5 mg at 04/15/22 1204    [MAR Hold] mupirocin (BACTROBAN) 2 % ointment   Nasal BID Ra Ferrer APRN   Given at 04/15/22 0802    [MAR Hold] vancomycin (VANCOCIN) 1,250 mg in dextrose 5 % 250 mL IVPB  1,250 mg IntraVENous Once Ra Ferrer APRN        [MAR Hold] chlorhexidine gluconate (ANTISEPTIC SKIN CLEANSER) 4 % solution   Topical BID Ra Ferrer APRN   Given at 04/15/22 0756    [MAR Hold] QUEtiapine (SEROQUEL) tablet 200 mg  200 mg Oral Nightly Rigo Kumar MD   200 mg at 04/14/22 2136    [MAR Hold] topiramate (TOPAMAX) tablet 50 mg  50 mg Oral BID Rigo Kumar MD   50 mg at 04/15/22 0755    [MAR Hold] guaiFENesin-dextromethorphan (ROBITUSSIN DM) 100-10 MG/5ML syrup 5 mL  5 mL Oral Q4H PRN Rigo Kumar MD   5 mL at 04/15/22 0601    [MAR Hold] polyethylene glycol (GLYCOLAX) packet 17 g  17 g Oral BID ODILIA Quiroga - CNP   17 g at 04/14/22 2135    [MAR Hold] diazePAM (VALIUM) tablet 5 mg  5 mg Oral Q8H PRN Serene Iron, APRN   5 mg at 04/13/22 2010    [MAR Hold] calcitRIOL (ROCALTROL) capsule 0.25 mcg  0.25 mcg Oral Daily Serene Iron, APRN   0.25 mcg at 04/15/22 0755    [MAR Hold] vitamin D (ERGOCALCIFEROL) capsule 50,000 Units  50,000 Units Oral Once per day on Mon Wed Fri Serene Iron, APRN   50,000 Units at 04/15/22 0755    [MAR Hold] vitamin B-12 (CYANOCOBALAMIN) tablet 50 mcg  50 mcg Oral Daily Serene Iron, APRN   50 mcg at 04/15/22 0755    [MAR Hold] tiZANidine (ZANAFLEX) tablet 4 mg  4 mg Oral Q8H PRN Serene Iron, APRN   4 mg at 04/13/22 2010    [MAR Hold] dicyclomine (BENTYL) capsule 10 mg  10 mg Oral Daily Serene Iron, APRN   10 mg at 04/15/22 0755    [MAR Hold] escitalopram (LEXAPRO) tablet 20 mg  20 mg Oral Daily Serene Iron, APRN   20 mg at 04/15/22 0755    [MAR Hold] gabapentin (NEURONTIN) capsule 300 mg  300 mg Oral Nightly Serene Iron, APRN   300 mg at 04/14/22 2137    [MAR Hold] levothyroxine (SYNTHROID) tablet 137 mcg  137 mcg Oral Daily Serene Iron, APRN   137 mcg at 04/15/22 0558    [MAR Hold] metoprolol succinate (TOPROL XL) extended release tablet 25 mg  25 mg Oral Daily Serene Iron, APRN   25 mg at 04/15/22 0755    [MAR Hold] pantoprazole (PROTONIX) tablet 40 mg  40 mg Oral Daily Serene Iron, APRN   40 mg at 04/15/22 0755    [MAR Hold] magnesium oxide (MAG-OX) tablet 400 mg  400 mg Oral Daily Serene Iron, APRN   400 mg at 04/15/22 0755    [MAR Hold] sodium chloride flush 0.9 % injection 5-40 mL  5-40 mL IntraVENous 2 times per day ODILIA Corral - CNP   10 mL at 04/15/22 0755    [MAR Hold] sodium chloride flush 0.9 % injection 5-40 mL  5-40 mL IntraVENous PRN Christy Alea, APRN - CNP   10 mL at 04/12/22 1335    [MAR Hold] 0.9 % sodium chloride infusion   IntraVENous PRN ODILIA Corral CNP        [facility] Hold] heparin (porcine) injection 5,000 Units  5,000 Units SubCUTAneous 3 times per Anesthesia Evaluation  Patient summary reviewed no history of anesthetic complications:   Airway: Mallampati: II  TM distance: >3 FB   Neck ROM: full  Mouth opening: > = 3 FB Dental:    (+) edentulous      Pulmonary:normal exam  breath sounds clear to auscultation  (+) asthma (30 years ago, no further problems):     (-) recent URI, sleep apnea and not a current smoker          Patient did not smoke on day of surgery. Cardiovascular:  Exercise tolerance: good (>4 METS),   (+) hypertension:,     (-) pacemaker, past MI, CABG/stent and  angina    ECG reviewed  Rhythm: regular  Rate: normal           Beta Blocker:  Dose within 24 Hrs         Neuro/Psych:      (-) seizures, TIA and CVA           GI/Hepatic/Renal:   (+) GERD: well controlled, renal disease: ARF,      (-) liver disease      ROS comment: Gastric bypass. Endo/Other:    (+) hypothyroidism, blood dyscrasia (Eliquis): arthritis (Lupus):., .    (-) diabetes mellitus, hyperthyroidism               Abdominal:             Vascular:   + DVT (25 yrs ago), . Other Findings:           Anesthesia Plan      MAC     ASA 4     (Discussed conversion to GETA if necessary)  Induction: intravenous. MIPS: Postoperative opioids intended and Prophylactic antiemetics administered. Anesthetic plan and risks discussed with patient. Use of blood products discussed with patient whom consented to blood products.                  Belgica Albright MD   4/15/2022

## 2022-04-15 NOTE — PROGRESS NOTES
I agree with the history and physical exam in chart as well as the consult in chart. In addition, today's complete ROS was performed and the only positives are noted in the HPI. I examined the patient preoperatively and discussed the surgery, including the risks, benefits, and alternatives. They seem to understand and agree to proceed.

## 2022-04-16 LAB
ALBUMIN SERPL-MCNC: 2.7 G/DL (ref 3.5–5.2)
ALP BLD-CCNC: 242 U/L (ref 35–104)
ALT SERPL-CCNC: 59 U/L (ref 5–33)
ANION GAP SERPL CALCULATED.3IONS-SCNC: 12 MMOL/L (ref 7–19)
AST SERPL-CCNC: 27 U/L (ref 5–32)
BILIRUB SERPL-MCNC: 0.3 MG/DL (ref 0.2–1.2)
BUN BLDV-MCNC: 39 MG/DL (ref 8–23)
CALCIUM SERPL-MCNC: 8.1 MG/DL (ref 8.8–10.2)
CHLORIDE BLD-SCNC: 102 MMOL/L (ref 98–111)
CO2: 25 MMOL/L (ref 22–29)
CREAT SERPL-MCNC: 3.7 MG/DL (ref 0.5–0.9)
GFR AFRICAN AMERICAN: 14
GFR NON-AFRICAN AMERICAN: 12
GLUCOSE BLD-MCNC: 101 MG/DL (ref 74–109)
HCT VFR BLD CALC: 28.4 % (ref 37–47)
HEMOGLOBIN: 7.9 G/DL (ref 12–16)
MCH RBC QN AUTO: 29.8 PG (ref 27–31)
MCHC RBC AUTO-ENTMCNC: 27.8 G/DL (ref 33–37)
MCV RBC AUTO: 107.2 FL (ref 81–99)
PDW BLD-RTO: 14.4 % (ref 11.5–14.5)
PLATELET # BLD: 182 K/UL (ref 130–400)
PMV BLD AUTO: 11.1 FL (ref 9.4–12.3)
POTASSIUM REFLEX MAGNESIUM: 5 MMOL/L (ref 3.5–5)
RBC # BLD: 2.65 M/UL (ref 4.2–5.4)
SODIUM BLD-SCNC: 139 MMOL/L (ref 136–145)
TOTAL PROTEIN: 5.1 G/DL (ref 6.6–8.7)
WBC # BLD: 7 K/UL (ref 4.8–10.8)

## 2022-04-16 PROCEDURE — 2700000000 HC OXYGEN THERAPY PER DAY

## 2022-04-16 PROCEDURE — 8010000000 HC HEMODIALYSIS ACUTE INPT

## 2022-04-16 PROCEDURE — 85027 COMPLETE CBC AUTOMATED: CPT

## 2022-04-16 PROCEDURE — 6370000000 HC RX 637 (ALT 250 FOR IP): Performed by: SURGERY

## 2022-04-16 PROCEDURE — 99231 SBSQ HOSP IP/OBS SF/LOW 25: CPT | Performed by: SURGERY

## 2022-04-16 PROCEDURE — 6360000002 HC RX W HCPCS: Performed by: SURGERY

## 2022-04-16 PROCEDURE — 2580000003 HC RX 258: Performed by: SURGERY

## 2022-04-16 PROCEDURE — 1210000000 HC MED SURG R&B

## 2022-04-16 PROCEDURE — 36415 COLL VENOUS BLD VENIPUNCTURE: CPT

## 2022-04-16 PROCEDURE — 5A1D70Z PERFORMANCE OF URINARY FILTRATION, INTERMITTENT, LESS THAN 6 HOURS PER DAY: ICD-10-PCS | Performed by: INTERNAL MEDICINE

## 2022-04-16 PROCEDURE — 80053 COMPREHEN METABOLIC PANEL: CPT

## 2022-04-16 RX ADMIN — DICYCLOMINE HYDROCHLORIDE 10 MG: 10 CAPSULE ORAL at 12:23

## 2022-04-16 RX ADMIN — GUAIFENESIN SYRUP AND DEXTROMETHORPHAN 5 ML: 100; 10 SYRUP ORAL at 20:11

## 2022-04-16 RX ADMIN — HEPARIN SODIUM 5000 UNITS: 5000 INJECTION INTRAVENOUS; SUBCUTANEOUS at 14:39

## 2022-04-16 RX ADMIN — CALCITRIOL CAPSULES 0.25 MCG 0.25 MCG: 0.25 CAPSULE ORAL at 12:23

## 2022-04-16 RX ADMIN — MIDODRINE HYDROCHLORIDE 5 MG: 5 TABLET ORAL at 12:22

## 2022-04-16 RX ADMIN — Medication 400 MG: at 12:22

## 2022-04-16 RX ADMIN — MUPIROCIN: 20 OINTMENT TOPICAL at 12:24

## 2022-04-16 RX ADMIN — TOPIRAMATE 50 MG: 50 TABLET, FILM COATED ORAL at 20:11

## 2022-04-16 RX ADMIN — MUPIROCIN: 20 OINTMENT TOPICAL at 20:10

## 2022-04-16 RX ADMIN — SODIUM CHLORIDE, PRESERVATIVE FREE 10 ML: 5 INJECTION INTRAVENOUS at 20:14

## 2022-04-16 RX ADMIN — MIDODRINE HYDROCHLORIDE 5 MG: 5 TABLET ORAL at 07:44

## 2022-04-16 RX ADMIN — ESCITALOPRAM OXALATE 20 MG: 10 TABLET ORAL at 12:22

## 2022-04-16 RX ADMIN — TIZANIDINE 4 MG: 4 TABLET ORAL at 07:44

## 2022-04-16 RX ADMIN — GABAPENTIN 300 MG: 300 CAPSULE ORAL at 20:11

## 2022-04-16 RX ADMIN — MIDODRINE HYDROCHLORIDE 5 MG: 5 TABLET ORAL at 00:37

## 2022-04-16 RX ADMIN — HYDROCODONE BITARTRATE AND ACETAMINOPHEN 1 TABLET: 5; 325 TABLET ORAL at 11:56

## 2022-04-16 RX ADMIN — QUETIAPINE FUMARATE 200 MG: 100 TABLET ORAL at 20:11

## 2022-04-16 RX ADMIN — HYDROCODONE BITARTRATE AND ACETAMINOPHEN 2 TABLET: 5; 325 TABLET ORAL at 18:09

## 2022-04-16 RX ADMIN — TOPIRAMATE 50 MG: 50 TABLET, FILM COATED ORAL at 12:22

## 2022-04-16 RX ADMIN — DIAZEPAM 5 MG: 5 TABLET ORAL at 20:11

## 2022-04-16 RX ADMIN — HEPARIN SODIUM 5000 UNITS: 5000 INJECTION INTRAVENOUS; SUBCUTANEOUS at 06:03

## 2022-04-16 RX ADMIN — LEVOTHYROXINE SODIUM 137 MCG: 137 TABLET ORAL at 06:02

## 2022-04-16 RX ADMIN — HEPARIN SODIUM 5000 UNITS: 5000 INJECTION INTRAVENOUS; SUBCUTANEOUS at 20:10

## 2022-04-16 RX ADMIN — MIDODRINE HYDROCHLORIDE 5 MG: 5 TABLET ORAL at 18:09

## 2022-04-16 RX ADMIN — PANTOPRAZOLE SODIUM 40 MG: 40 TABLET, DELAYED RELEASE ORAL at 12:22

## 2022-04-16 RX ADMIN — Medication 50 MCG: at 12:22

## 2022-04-16 RX ADMIN — CHLORHEXIDINE GLUCONATE: 4 LIQUID TOPICAL at 12:27

## 2022-04-16 ASSESSMENT — PAIN SCALES - GENERAL
PAINLEVEL_OUTOF10: 10
PAINLEVEL_OUTOF10: 0
PAINLEVEL_OUTOF10: 8
PAINLEVEL_OUTOF10: 0

## 2022-04-16 NOTE — PROGRESS NOTES
bp 78/56, messaged hospitalist, ok to give the missed dose of midodrine.   Last dose was given at 750 1404

## 2022-04-16 NOTE — PROGRESS NOTES
Nephrology ([facility]) Progress Note    Patient:  Tonja Mireles  YOB: 1945  Date of Service: 4/16/2022  MRN: [id number]   Acct: [de-identified]   Primary Care Physician: Jaylen Mock MD  Advance Directive: Full Code  Admit Date: 4/8/2022       Hospital Day: 8  Referring Provider: Trenton Ordaz MD    Patient independently seen and examined, Chart, Consults, Notes, Operative notes, Labs, Cardiology, and Radiology studies reviewed as available. Subjective:  Tonja Mireles is a 77 y.o. female for whom we were consulted for evaluation and treatment of acute kidney injury/chronic kidney disease. She has history of stage IV chronic kidney disease and follows ODILIA Doss in the office. She was directed to go to the hospital as she has significant decline in renal function on routine labs. Patient has been complaining of chronic diarrhea. Her p.o. intake of fluid has been fairly low. Patient also has history of hypertension, lupus, hypothyroidism and gastric bypass surgery. Hospital course remarkable for IV fluid administration and has very slow improvement of renal function.     Today, no overnight events. Family not present today. Denied chest pain, nausea or vomiting. Tolerating first dialysis treatment without issue.     Dialysis   Pt was seen on renal replacement therapy and I have personally seen and evaluated the patient and directed the therapy  Modality: Hemodialysis  Access: Catheter  Location: right upper  QB: 400  QD: 600  UF: 360      Allergies:  Codeine    Medicines:  Current Facility-Administered Medications   Medication Dose Route Frequency Provider Last Rate Last Admin    midodrine (PROAMATINE) tablet 5 mg  5 mg Oral TID WC Claudia Lazcano MD   5 mg at 04/16/22 0744    HYDROcodone-acetaminophen (NORCO) 5-325 MG per tablet 1 tablet  1 tablet Oral Q4H PRN Claudia Lazcano MD   1 tablet at 04/15/22 1732    Or    HYDROcodone-acetaminophen (NORCO) 5-325 MG per tablet 2 tablet  2 tablet Oral Q4H PRN Lilia Bean MD   2 tablet at 04/15/22 2253    mupirocin (BACTROBAN) 2 % ointment   Nasal BID Lilia Bean MD   Given at 04/15/22 1943    chlorhexidine gluconate (ANTISEPTIC SKIN CLEANSER) 4 % solution   Topical BID Lilia Bean MD   Given at 04/15/22 1949    QUEtiapine (SEROQUEL) tablet 200 mg  200 mg Oral Nightly Lilia Bean MD   200 mg at 04/15/22 1947    topiramate (TOPAMAX) tablet 50 mg  50 mg Oral BID Lilia Bean MD   50 mg at 04/15/22 1948    guaiFENesin-dextromethorphan (ROBITUSSIN DM) 100-10 MG/5ML syrup 5 mL  5 mL Oral Q4H PRN Lilia Bean MD   5 mL at 04/15/22 1942    polyethylene glycol (GLYCOLAX) packet 17 g  17 g Oral BID Lilia Bean MD   17 g at 04/1945    diazePAM (VALIUM) tablet 5 mg  5 mg Oral Q8H PRN Lilia Bean MD   5 mg at 04/13/22 2010    calcitRIOL (ROCALTROL) capsule 0.25 mcg  0.25 mcg Oral Daily Lilia Bean MD   0.25 mcg at 04/15/22 0755    vitamin D (ERGOCALCIFEROL) capsule 50,000 Units  50,000 Units Oral Once per day on Mon Wed Fri Lilia Bean MD   50,000 Units at 04/15/22 0755    vitamin B-12 (CYANOCOBALAMIN) tablet 50 mcg  50 mcg Oral Daily Lilia Bean MD   50 mcg at 04/15/22 0755    tiZANidine (ZANAFLEX) tablet 4 mg  4 mg Oral Q8H PRN Lilia Bean MD   4 mg at 04/16/22 0744    dicyclomine (BENTYL) capsule 10 mg  10 mg Oral Daily Lilia Bean MD   10 mg at 04/15/22 0755    escitalopram (LEXAPRO) tablet 20 mg  20 mg Oral Daily Lilia Bean MD   20 mg at 04/15/22 0755    gabapentin (NEURONTIN) capsule 300 mg  300 mg Oral Nightly Lilia Bean MD   300 mg at 04/15/22 1947    levothyroxine (SYNTHROID) tablet 137 mcg  137 mcg Oral Daily Lilia Bean MD   137 mcg at 04/16/22 0602    metoprolol succinate (TOPROL XL) extended release tablet 25 mg  25 mg Oral Daily Lilia Bean MD   25 mg at 04/15/22 0755    pantoprazole (PROTONIX) tablet 40 mg  40 mg Oral Daily Prachi Casarez MD   40 mg at 04/15/22 0755    magnesium oxide (MAG-OX) tablet 400 mg  400 mg Oral Daily Prachi Casarez MD   400 mg at 04/15/22 0755    sodium chloride flush 0.9 % injection 5-40 mL  5-40 mL IntraVENous 2 times per day Prachi Casarez MD   10 mL at 04/15/22 1951    sodium chloride flush 0.9 % injection 5-40 mL  5-40 mL IntraVENous PRN Prachi Casarez MD   10 mL at 04/12/22 1335    0.9 % sodium chloride infusion   IntraVENous PRN Prachi Casarez MD        heparin (porcine) injection 5,000 Units  5,000 Units SubCUTAneous 3 times per day Prachi Casarez MD   5,000 Units at 04/16/22 0603    ondansetron (ZOFRAN-ODT) disintegrating tablet 4 mg  4 mg Oral Q8H PRN Prachi Casarez MD        Or    ondansetron [facility] injection 4 mg  4 mg IntraVENous Q6H PRN Prachi Casarez MD   4 mg at 04/10/22 1868       Past Medical History:  Past Medical History:   Diagnosis Date    Arthritis     Asthma     DVT (deep vein thrombosis) in pregnancy     History of blood transfusion     Hx of blood clots     hAD dvt WAS ON Coumadin for a year. 2006    Hypertension     [facility] [address]     Renal failure     Splenic artery aneurysm ([facility] [address]     Thyroid disease        Past Surgical History:  Past Surgical History:   Procedure Laterality Date    GASTRIC BYPASS SURGERY  1975    HYSTERECTOMY      JOINT REPLACEMENT Right     hip and shoulder    LYMPHADENECTOMY      MO TOTAL KNEE ARTHROPLASTY Left 11/27/2017    KNEE TOTAL ARTHROPLASTY performed by Frank [facility] at [facility] Left 7/27/2020    LLEFT REVERSE TOTAL SHOULDER POLY EXCHANGE AND BICEPS TENOTOMY performed by Frank Eckert MD at [address] [place] [address] History  History reviewed. No pertinent family history.     Social History  Social History     Socioeconomic History    Marital status:      Spouse name: Not on file    Number of children: Not on file    Years of education: Not on file    Highest education level: Not on file Occupational History    Not on file   Tobacco Use    Smoking status: Never Smoker    Smokeless tobacco: Never Used   Substance and Sexual Activity    Alcohol use: No    Drug use: No    Sexual activity: Not on file   Other Topics Concern    Not on file   Social History Narrative    Not on file     Social Determinants of Health     Financial Resource Strain:     Difficulty of Paying Living Expenses: Not on file   Food Insecurity:     Worried About Running Out of Food in the Last Year: Not on file    Jeff of Food in the Last Year: Not on file   Transportation Needs:     Lack of Transportation (Medical): Not on file    Lack of Transportation (Non-Medical):  Not on file   Physical Activity:     Days of Exercise per Week: Not on file    Minutes of Exercise per Session: Not on file   Stress:     Feeling of Stress : Not on file   Social Connections:     Frequency of Communication with Friends and Family: Not on file    Frequency of Social Gatherings with Friends and Family: Not on file    Attends Ca[employer] Services: Not on file    Active Member of [address] [place] [address] or Organizations: Not on file    Attends Club or Organization Meetings: Not on file    Marital Status: Not on file   Intimate Partner Violence:     Fear of Current or Ex-Partner: Not on file    Emotionally Abused: Not on file    Physically Abused: Not on file    Sexually Abused: Not on file   Housing Stability:     Unable to Pay for Housing in the Last Year: Not on file    Number of [place] in the Last Year: Not on file    Unstable Housing in the Last Year: Not on file         Review of Systems:  History obtained from chart review and the patient  General ROS: No fever or chills  Respiratory ROS: No cough, shortness of breath, wheezing  Cardiovascular ROS: No chest pain or palpitations  Gastrointestinal ROS: No abdominal pain or melena  Genito-Urinary ROS: No dysuria or hematuria  Musculoskeletal ROS: No joint pain or swelling Objective:  Patient Vitals for the past 24 hrs:   BP Temp Temp src Pulse Resp SpO2 Weight   04/16/22 0642 -- -- -- -- -- -- 177 lb 9.6 oz (80.6 kg)   04/16/22 0526 104/60 96.8 °F (36 °C) Temporal 81 16 96 % --   04/16/22 0300 94/63 -- -- -- -- -- --   04/16/22 0245 91/61 -- -- -- -- -- --   04/16/22 0149 80/64 -- -- -- -- -- --   04/15/22 2349 (!) 78/56 96.8 °F (36 °C) Temporal 67 16 95 % --   04/15/22 2145 -- -- -- 77 -- -- --   04/15/22 1830 113/74 97 °F (36.1 °C) Temporal 76 16 94 % --   04/15/22 1812 122/68 97.5 °F (36.4 °C) Temporal 74 16 95 % --   04/15/22 1724 116/72 97.3 °F (36.3 °C) Temporal 80 16 96 % --   04/15/22 1715 110/85 -- -- 77 12 92 % --   04/15/22 1710 123/74 98 °F (36.7 °C) Temporal 78 8 93 % --   04/15/22 1705 126/72 -- -- 80 (!) 7 92 % --   04/15/22 1700 132/74 -- -- 85 20 94 % --   04/15/22 1655 -- -- -- 78 11 92 % --   04/15/22 1650 112/74 98 °F (36.7 °C) Temporal 78 9 92 % --   04/15/22 1645 102/71 -- -- 79 12 93 % --   04/15/22 1640 103/60 -- -- 79 (!) 7 96 % --   04/15/22 1635 101/61 97.4 °F (36.3 °C) Temporal 79 10 95 % --   04/15/22 1528 -- -- -- -- 12 95 % --   04/15/22 1500 126/72 -- -- -- -- -- --   04/15/22 1142 116/73 97.7 °F (36.5 °C) Temporal 81 18 90 % --       Intake/Output Summary (Last 24 hours) at 4/16/2022 1055  Last data filed at 4/15/2022 1634  Gross per 24 hour   Intake 300 ml   Output 25 ml   Net 275 ml     General: awake/alert   Chest:  clear to auscultation bilaterally  CVS: regular rate and rhythm  Abdominal: soft, nontender, normal bowel sounds  Extremities: no cyanosis, ble edema  Skin: warm and dry without rash    Labs:  BMP:   Recent Labs     04/15/22  0358 04/15/22  1127 04/16/22  0238    143 139   K 5.1* 5.4* 5.0    102 102   CO2 25 30* 25   BUN 40* 40* 39*   CREATININE 3.5* 3.6* 3.7*   CALCIUM 8.3* 8.6* 8.1*     CBC:   Recent Labs     04/14/22  0219 04/15/22  0358 04/16/22  0238   WBC 6.5 7.1 7.0   HGB 7.8* 8.6* 7.9*   HCT 26.3* 30.6* 28.4* .2* 106.3* 107.2*    188 182     LIVER PROFILE:   Recent Labs     04/14/22  0219 04/15/22  0358 04/16/22 0238   AST 43* 37* 27   ALT 94* 81* 59*   BILITOT 0.3 0.3 0.3   ALKPHOS 271* 281* 242*     PT/INR: No results for input(s): PROTIME, INR in the last 72 hours. APTT: No results for input(s): APTT in the last 72 hours. BNP:  No results for input(s): BNP in the last 72 hours. Ionized Calcium:No results for input(s): IONCA in the last 72 hours. Magnesium:  No results for input(s): MG in the last 72 hours. Phosphorus:  No results for input(s): PHOS in the last 72 hours. HgbA1C: No results for input(s): LABA1C in the last 72 hours. Hepatic:   Recent Labs     04/14/22  0219 04/15/22  0358 04/16/22  0238   ALKPHOS 271* 281* 242*   ALT 94* 81* 59*   AST 43* 37* 27   PROT 4.6* 5.1* 5.1*   BILITOT 0.3 0.3 0.3   LABALBU 2.8* 3.0* 2.7*     Lactic Acid: No results for input(s): LACTA in the last 72 hours. Troponin: No results for input(s): CKTOTAL, CKMB, TROPONINT in the last 72 hours. ABGs: No results found for: PHART, PO2ART, [id number]  CRP:  No results for input(s): CRP in the last 72 hours. Sed Rate:  No results for input(s): SEDRATE in the last 72 hours. Culture Results:   Blood Culture Recent: No results for input(s): BC in the last 720 hours. Urine Culture Recent :   Recent Labs     04/10/22  1651   LABURIN <10,000 CFU/ml mixed skin/urogenital eileen. No further workup       Radiology reports as per the Radiologist  Radiology: US RENAL COMPLETE    Result Date: 4/9/2022  RENAL ULTRASOUND COMPLETE 4/9/2022 11:20 AM REASON FOR EXAM: Acute renal failure in the background of chronic kidney disease  COMPARISON: None  TECHNIQUE: Multiple longitudinal and transverse realtime sonographic images of the kidneys and urinary bladder are obtained. FINDINGS: The right kidney measures 8.8 cm. The cortical thickness within the right kidney is 9 mm and 6 mm respectively in the upper and lower pole.   The injury/ATN  Chronic kidney disease stage IV  Hypertensive nephrosclerosis  Metabolic acidosis  Hyperphosphatemia  Anemia chronic kidney disease  Secondary hyperparathyroidism  Elevated AST/ALT    Plan:  Discussed with patient, nursing  Monitor labs  GI work-up reviewed  Holding bicarb  HD#1 today    Luciano Canela MD  04/16/22  10:55 AM

## 2022-04-16 NOTE — PROGRESS NOTES
Vascular Surgery -  Yunior Sellers M.D. Daily Progress Note    Pt Name: Prerna Hays Record Number: [id number]  Date of Birth 1945   Today's Date: 4/16/2022    Chief Complaint:  Chief Complaint   Patient presents with    Abnormal Lab     referred from Dr. Kwadwo Hi:     Patient was seen and examined. In HD via placed right IJ permcath     OBJECTIVE:     Patient Vitals for the past 24 hrs:   BP Temp Temp src Pulse Resp SpO2 Weight   04/16/22 1100 (!) 90/53 -- -- 63 -- -- --   04/16/22 0642 -- -- -- -- -- -- 177 lb 9.6 oz (80.6 kg)   04/16/22 0526 104/60 96.8 °F (36 °C) Temporal 81 16 96 % --   04/16/22 0300 94/63 -- -- -- -- -- --   04/16/22 0245 91/61 -- -- -- -- -- --   04/16/22 0149 80/64 -- -- -- -- -- --   04/15/22 2349 (!) 78/56 96.8 °F (36 °C) Temporal 67 16 95 % --   04/15/22 2145 -- -- -- 77 -- -- --   04/15/22 1830 113/74 97 °F (36.1 °C) Temporal 76 16 94 % --   04/15/22 1812 122/68 97.5 °F (36.4 °C) Temporal 74 16 95 % --   04/15/22 1724 116/72 97.3 °F (36.3 °C) Temporal 80 16 96 % --   04/15/22 1715 110/85 -- -- 77 12 92 % --   04/15/22 1710 123/74 98 °F (36.7 °C) Temporal 78 8 93 % --   04/15/22 1705 126/72 -- -- 80 (!) 7 92 % --   04/15/22 1700 132/74 -- -- 85 20 94 % --   04/15/22 1655 -- -- -- 78 11 92 % --   04/15/22 1650 112/74 98 °F (36.7 °C) Temporal 78 9 92 % --   04/15/22 1645 102/71 -- -- 79 12 93 % --   04/15/22 1640 103/60 -- -- 79 (!) 7 96 % --   04/15/22 1635 101/61 97.4 °F (36.3 °C) Temporal 79 10 95 % --   04/15/22 1528 -- -- -- -- 12 95 % --   04/15/22 1500 126/72 -- -- -- -- -- --   04/15/22 1142 116/73 97.7 °F (36.5 °C) Temporal 81 18 90 % --         Intake/Output Summary (Last 24 hours) at 4/16/2022 1109  Last data filed at 4/16/2022 1105  Gross per 24 hour   Intake 300 ml   Output 525 ml   Net -225 ml       In: 300 [I.V.:300]  Out: 525     I/O last 3 completed shifts:   In: 300 [I.V.:300]  Out: 25 [Blood:25]     Date 04/16/22 0000 - 04/16/22 7290 Shift 6920-9193 9551-2993 0457-3406 24 Hour Total   INTAKE   Shift Total(mL/kg)       OUTPUT   Other(mL/kg)  500(6.2)  500(6.2)   Shift Total(mL/kg)  500(6.2)  500(6.2)   Weight (kg) 80.6 80.6 80.6 80.6       Wt Readings from Last 3 Encounters:   04/16/22 177 lb 9.6 oz (80.6 kg)   07/27/20 154 lb (69.9 kg)   11/15/17 148 lb (67.1 kg)        Body mass index is 30.48 kg/m². Diet: ADULT DIET; Regular; Low Sodium (2 gm); Low Potassium (Less than 3000 mg/day); Low Phosphorus (Less than 1000 mg)        MEDS:     Scheduled Meds:   midodrine  5 mg Oral TID WC    mupirocin   Nasal BID    chlorhexidine gluconate   Topical BID    QUEtiapine  200 mg Oral Nightly    topiramate  50 mg Oral BID    polyethylene glycol  17 g Oral BID    calcitRIOL  0.25 mcg Oral Daily    vitamin D  50,000 Units Oral Once per day on Mon Wed Fri    vitamin B-12  50 mcg Oral Daily    dicyclomine  10 mg Oral Daily    escitalopram  20 mg Oral Daily    gabapentin  300 mg Oral Nightly    levothyroxine  137 mcg Oral Daily    metoprolol succinate  25 mg Oral Daily    pantoprazole  40 mg Oral Daily    magnesium oxide  400 mg Oral Daily    sodium chloride flush  5-40 mL IntraVENous 2 times per day    heparin (porcine)  5,000 Units SubCUTAneous 3 times per day     Continuous Infusions:   sodium chloride       PRN Meds:HYDROcodone 5 mg - acetaminophen, 1 tablet, Q4H PRN   Or  HYDROcodone 5 mg - acetaminophen, 2 tablet, Q4H PRN  guaiFENesin-dextromethorphan, 5 mL, Q4H PRN  diazePAM, 5 mg, Q8H PRN  tiZANidine, 4 mg, Q8H PRN  sodium chloride flush, 5-40 mL, PRN  sodium chloride, , PRN  ondansetron, 4 mg, Q8H PRN   Or  ondansetron, 4 mg, Q6H PRN          PHYSICAL EXAM:     CONSTITUTIONAL: awake and Alert and oriented times 3, no acute distress and cooperative to examination. HEENT: Normal, Head is normocephalic, atraumatic.    NECK: permcath dressing c/d/i  WOUND/INCISION:  no drainage      LABS:       CBC:   Recent Labs     04/14/22  0216 04/15/22  0358 04/16/22  0238   WBC 6.5 7.1 7.0   RBC 2.60* 2.88* 2.65*   HGB 7.8* 8.6* 7.9*   HCT 26.3* 30.6* 28.4*   .2* 106.3* 107.2*   MCH 30.0 29.9 29.8   MCHC 29.7* 28.1* 27.8*   RDW 14.6* 14.6* 14.4    188 182   MPV 11.6 11.3 11.1      Last 3 CMP:   Recent Labs     04/14/22  0219 04/14/22  0219 04/15/22  0358 04/15/22  1127 04/16/22 0238      < > 142 143 139   K 4.6   < > 5.1* 5.4* 5.0   CL 98   < > 102 102 102   CO2 32*   < > 25 30* 25   BUN 42*   < > 40* 40* 39*   CREATININE 3.5*   < > 3.5* 3.6* 3.7*   GLUCOSE 95   < > 89 94 101   CALCIUM 7.7*   < > 8.3* 8.6* 8.1*   PROT 4.6*  --  5.1*  --  5.1*   LABALBU 2.8*  --  3.0*  --  2.7*   BILITOT 0.3  --  0.3  --  0.3   ALKPHOS 271*  --  281*  --  242*   AST 43*  --  37*  --  27   ALT 94*  --  81*  --  59*    < > = values in this interval not displayed. Troponin: No results for input(s): TROPONINI in the last 72 hours. Calcium:   Lab Results   Component Value Date    CALCIUM 8.1 04/16/2022    CALCIUM 8.6 04/15/2022    CALCIUM 8.3 04/15/2022      Ionized Calcium: No results found for: IONCA   Lipids: No results for input(s): CHOL, HDL in the last 72 hours. Invalid input(s): LDLCALCU  INR: No results for input(s): INR in the last 72 hours. Lactic Acid: No results found for: LACTA         DVT prophylaxis: [] Lovenox                                 [] SCDs                                 [] SQ Heparin                                 [] Encourage ambulation, low risk for DVT, no chemical or mechanical prophylaxis necessary              [] Already on Anticoagulation      RADIOLOGY:      No results found. ASSESSMENT:     1. POD # 1  2. S/p right IJ permcath  3.  HD # Brian Loomis Problems    Diagnosis Date Noted    Abnormal finding on urinalysis [R82.90] 04/10/2022    Nausea [R11.0] 04/10/2022    Acute kidney injury superimposed on chronic kidney disease ([address] [N17.9, N18.9]     Acute kidney injury superimposed on CKD ([address] [N17.9, N18.9] 04/08/2022    Anemia due to chronic kidney disease [N18.9, D63.1] 04/08/2022   4. PLAN:     1. permcath functions well  2.   Sarah Alejo M.D.    Electronically signed 4/16/2022 at 11:09 AM

## 2022-04-16 NOTE — PROGRESS NOTES
Charge nurse contacted provider to get authorization to give Midodrine missed in surgery as patients blood pressure is very low. Authorization was given. Medication was given. Patient is asymptomatic and wakes with verbal stimuli. She is oriented X 4.  bedside. 30 min after med was given, patients blood pressure was 80/60. Will continue to monitor.

## 2022-04-16 NOTE — PROGRESS NOTES
Progress Note  Date:2022       Room:0308/308-01  Patient Name:Kendra Arreguin     YOB: 1945     Age:76 y.o. Subjective    Subjective: First encounter with pt 4/15/22    81-year-old female with past medical history of DVT, hypertension, lupus, thyroid disease, gastric bypass, and CKD who presented to [facility] ED complaining of abnormal labs. Patient reported she was following up with a nurse practitioner in nephrology clinic and was noted to have decline in renal function. Nephrology following, renal ultrasound indicated nonobstructing calculi involving the mid and lower pole of the left kidney, a tiny cortical cyst in the midpole of the right kidney. UA in ED indicated coryneabacterium, however patientasymptomatic, Repeat urine culture with no growth. Nephrology recommends to continue IV hydration. Patient noted to have some wheezing and indicated cough. Repeat chest x-ray obtained which was normal.  GI recommending tapering down on Seroquel and Topamax given transaminitis. Due to no significant improvement in renal function, patient was initiated on dialysis  after vscular surgery placement for PermCath placement 4/15. Seen in house this morning with family present, denied any acute overnight event, denied any chest pain shortness of breath, nausea vomiting abdominal pain. Tolerated dialysis well without any complaints. Was having some pain at PermCath site. Discussed with nurse to administer pain medication. Review of Systems: 12 point system review negative suggested above.       Objective         Vitals Last 24 Hours:  TEMPERATURE:  Temp  Av.4 °F (36.3 °C)  Min: 96.8 °F (36 °C)  Max: 98 °F (36.7 °C)  RESPIRATIONS RANGE: Resp  Av.6  Min: 0  Max: 20  PULSE OXIMETRY RANGE: SpO2  Av %  Min: 88 %  Max: 98 %  PULSE RANGE: Pulse  Av.9  Min: 63  Max: 85  BLOOD PRESSURE RANGE: Systolic (87XWX), HKP:874 , Min:72 , KZ   ; Diastolic (00NCB), FLJ:14, Min:50, Max:85    I/O (24Hr): Intake/Output Summary (Last 24 hours) at 4/16/2022 1215  Last data filed at 4/16/2022 1105  Gross per 24 hour   Intake 300 ml   Output 525 ml   Net -225 ml         Physical Examination:  General: Well-developed, no acute distress lying comfortably in bed. HEENT: Atraumatic normocephalic, range of motion normal, no JVD, no tracheal deviation noted. PermCath in place in the right anterior chest.  Cardiac: Normal S1-S2  Respiratory: clear To auscultation bilaterally, no rhonchi or rales, no wheezing  Abdomen: Soft, positive bowel sounds in all quadrants, no distention, nontender to palpation. Extremities: no tenderness, no edema, moves all extremities  Psych: Affect normal and good eye contact, behavioral normal.        Labs/Imaging/Diagnostics    Labs:  CBC:  Recent Labs     04/14/22  0219 04/15/22  0358 04/16/22  0238   WBC 6.5 7.1 7.0   RBC 2.60* 2.88* 2.65*   HGB 7.8* 8.6* 7.9*   HCT 26.3* 30.6* 28.4*   .2* 106.3* 107.2*   RDW 14.6* 14.6* 14.4    188 182     CHEMISTRIES:  Recent Labs     04/15/22  0358 04/15/22  1127 04/16/22 0238    143 139   K 5.1* 5.4* 5.0    102 102   CO2 25 30* 25   BUN 40* 40* 39*   CREATININE 3.5* 3.6* 3.7*   GLUCOSE 89 94 101     PT/INR:No results for input(s): PROTIME, INR in the last 72 hours. APTT:No results for input(s): APTT in the last 72 hours. LIVER PROFILE:  Recent Labs     04/14/22  0219 04/15/22  0358 04/16/22  0238   AST 43* 37* 27   ALT 94* 81* 59*   BILITOT 0.3 0.3 0.3   ALKPHOS 271* 281* 242*       Imaging Last 24 Hours:  No results found.   Assessment//Plan           Hospital Problems           Last Modified POA    * (Principal) Acute kidney injury superimposed on CKD ([facility] [address] 4/8/2022 Yes    Anemia due to chronic kidney disease 4/8/2022 Yes    Abnormal finding on urinalysis 4/10/2022 Yes    Nausea 4/10/2022 Yes    Acute kidney injury superimposed on chronic kidney disease ([facility] [address] 4/10/2022 Yes        Assessment & Plan      LUCY/ATN on CKD stage IV now requiring dialysis. Metabolic acidosis  Anemia of chronic disease  Hyperkalemia  Followed in-house by nephrology  Evaluated by vascular surgery team status post PermCath placement on 4/15  Dialysis session implemented post PermCath placement on 4/16. Avoid nephrotoxic agent. Hold bicarb given increasing CO2 levels. Transaminitis  Noticed on downtrending levels  Adjust medications as can exacerbate liver function levels. Hypertension  Currently with low blood pressure levels  Monitor closely and avoid significant hypotension  Continue midodrine. Asymptomatic bacteriuria: Monitor off antibiotics. Hypothyroidism: Continue Synthroid      Disposition: Pending outpatient dialysis set up. Electronically signed by   Romina Stokes MD   Internal Medicine Hospitalist  On 4/16/2022  At 12:15 PM    EMR Dragon/Transcription disclaimer:   Much of this encounter note is an electronic transcription/translation of spoken language to printed text.  The electronic translation of spoken language may permit erroneous, or at times, nonsensical words or phrases to be inadvertently transcribed; although attempts have made to review the note for such errors, some may still exist.

## 2022-04-17 LAB
ALBUMIN SERPL-MCNC: 3.1 G/DL (ref 3.5–5.2)
ALP BLD-CCNC: 225 U/L (ref 35–104)
ALT SERPL-CCNC: 43 U/L (ref 5–33)
ANION GAP SERPL CALCULATED.3IONS-SCNC: 12 MMOL/L (ref 7–19)
AST SERPL-CCNC: 20 U/L (ref 5–32)
BILIRUB SERPL-MCNC: 0.3 MG/DL (ref 0.2–1.2)
BUN BLDV-MCNC: 19 MG/DL (ref 8–23)
CALCIUM SERPL-MCNC: 8.4 MG/DL (ref 8.8–10.2)
CHLORIDE BLD-SCNC: 103 MMOL/L (ref 98–111)
CO2: 27 MMOL/L (ref 22–29)
CREAT SERPL-MCNC: 2.8 MG/DL (ref 0.5–0.9)
GFR AFRICAN AMERICAN: 20
GFR NON-AFRICAN AMERICAN: 16
GLUCOSE BLD-MCNC: 87 MG/DL (ref 74–109)
HCT VFR BLD CALC: 28.1 % (ref 37–47)
HEMOGLOBIN: 7.9 G/DL (ref 12–16)
MCH RBC QN AUTO: 29.6 PG (ref 27–31)
MCHC RBC AUTO-ENTMCNC: 28.1 G/DL (ref 33–37)
MCV RBC AUTO: 105.2 FL (ref 81–99)
PDW BLD-RTO: 14.3 % (ref 11.5–14.5)
PLATELET # BLD: 161 K/UL (ref 130–400)
PMV BLD AUTO: 11.4 FL (ref 9.4–12.3)
POTASSIUM REFLEX MAGNESIUM: 4.4 MMOL/L (ref 3.5–5)
RBC # BLD: 2.67 M/UL (ref 4.2–5.4)
SODIUM BLD-SCNC: 142 MMOL/L (ref 136–145)
TOTAL PROTEIN: 5.3 G/DL (ref 6.6–8.7)
WBC # BLD: 6.3 K/UL (ref 4.8–10.8)

## 2022-04-17 PROCEDURE — 36415 COLL VENOUS BLD VENIPUNCTURE: CPT

## 2022-04-17 PROCEDURE — 6370000000 HC RX 637 (ALT 250 FOR IP): Performed by: SURGERY

## 2022-04-17 PROCEDURE — 2700000000 HC OXYGEN THERAPY PER DAY

## 2022-04-17 PROCEDURE — 85027 COMPLETE CBC AUTOMATED: CPT

## 2022-04-17 PROCEDURE — 2580000003 HC RX 258: Performed by: SURGERY

## 2022-04-17 PROCEDURE — 80053 COMPREHEN METABOLIC PANEL: CPT

## 2022-04-17 PROCEDURE — 6370000000 HC RX 637 (ALT 250 FOR IP): Performed by: HOSPITALIST

## 2022-04-17 PROCEDURE — 6360000002 HC RX W HCPCS: Performed by: SURGERY

## 2022-04-17 PROCEDURE — 1210000000 HC MED SURG R&B

## 2022-04-17 RX ORDER — HYDROXYZINE HYDROCHLORIDE 10 MG/1
10 TABLET, FILM COATED ORAL 3 TIMES DAILY PRN
Status: DISCONTINUED | OUTPATIENT
Start: 2022-04-17 | End: 2022-04-19 | Stop reason: HOSPADM

## 2022-04-17 RX ADMIN — SODIUM CHLORIDE, PRESERVATIVE FREE 10 ML: 5 INJECTION INTRAVENOUS at 10:10

## 2022-04-17 RX ADMIN — PANTOPRAZOLE SODIUM 40 MG: 40 TABLET, DELAYED RELEASE ORAL at 10:08

## 2022-04-17 RX ADMIN — GUAIFENESIN SYRUP AND DEXTROMETHORPHAN 5 ML: 100; 10 SYRUP ORAL at 14:57

## 2022-04-17 RX ADMIN — TOPIRAMATE 50 MG: 50 TABLET, FILM COATED ORAL at 21:12

## 2022-04-17 RX ADMIN — HYDROCODONE BITARTRATE AND ACETAMINOPHEN 1 TABLET: 5; 325 TABLET ORAL at 19:21

## 2022-04-17 RX ADMIN — HEPARIN SODIUM 5000 UNITS: 5000 INJECTION INTRAVENOUS; SUBCUTANEOUS at 23:53

## 2022-04-17 RX ADMIN — Medication 400 MG: at 10:08

## 2022-04-17 RX ADMIN — CALCITRIOL CAPSULES 0.25 MCG 0.25 MCG: 0.25 CAPSULE ORAL at 10:08

## 2022-04-17 RX ADMIN — TOPIRAMATE 50 MG: 50 TABLET, FILM COATED ORAL at 10:08

## 2022-04-17 RX ADMIN — HYDROCODONE BITARTRATE AND ACETAMINOPHEN 1 TABLET: 5; 325 TABLET ORAL at 04:15

## 2022-04-17 RX ADMIN — POLYETHYLENE GLYCOL 3350 17 G: 17 POWDER, FOR SOLUTION ORAL at 21:12

## 2022-04-17 RX ADMIN — MIDODRINE HYDROCHLORIDE 5 MG: 5 TABLET ORAL at 18:35

## 2022-04-17 RX ADMIN — HEPARIN SODIUM 5000 UNITS: 5000 INJECTION INTRAVENOUS; SUBCUTANEOUS at 14:42

## 2022-04-17 RX ADMIN — HYDROXYZINE HYDROCHLORIDE 10 MG: 10 TABLET ORAL at 23:52

## 2022-04-17 RX ADMIN — HYDROCODONE BITARTRATE AND ACETAMINOPHEN 1 TABLET: 5; 325 TABLET ORAL at 14:42

## 2022-04-17 RX ADMIN — ESCITALOPRAM OXALATE 20 MG: 10 TABLET ORAL at 10:08

## 2022-04-17 RX ADMIN — MUPIROCIN: 20 OINTMENT TOPICAL at 10:08

## 2022-04-17 RX ADMIN — MIDODRINE HYDROCHLORIDE 5 MG: 5 TABLET ORAL at 10:08

## 2022-04-17 RX ADMIN — DICYCLOMINE HYDROCHLORIDE 10 MG: 10 CAPSULE ORAL at 10:08

## 2022-04-17 RX ADMIN — HYDROCODONE BITARTRATE AND ACETAMINOPHEN 1 TABLET: 5; 325 TABLET ORAL at 23:52

## 2022-04-17 RX ADMIN — POLYETHYLENE GLYCOL 3350 17 G: 17 POWDER, FOR SOLUTION ORAL at 10:08

## 2022-04-17 RX ADMIN — CHLORHEXIDINE GLUCONATE: 4 LIQUID TOPICAL at 10:10

## 2022-04-17 RX ADMIN — LEVOTHYROXINE SODIUM 137 MCG: 137 TABLET ORAL at 06:41

## 2022-04-17 RX ADMIN — CHLORHEXIDINE GLUCONATE: 4 LIQUID TOPICAL at 20:54

## 2022-04-17 RX ADMIN — MUPIROCIN: 20 OINTMENT TOPICAL at 21:13

## 2022-04-17 RX ADMIN — GABAPENTIN 300 MG: 300 CAPSULE ORAL at 21:12

## 2022-04-17 RX ADMIN — MIDODRINE HYDROCHLORIDE 5 MG: 5 TABLET ORAL at 12:29

## 2022-04-17 RX ADMIN — HEPARIN SODIUM 5000 UNITS: 5000 INJECTION INTRAVENOUS; SUBCUTANEOUS at 06:41

## 2022-04-17 RX ADMIN — METOPROLOL SUCCINATE 25 MG: 25 TABLET, EXTENDED RELEASE ORAL at 10:08

## 2022-04-17 RX ADMIN — Medication 50 MCG: at 10:08

## 2022-04-17 RX ADMIN — HYDROCODONE BITARTRATE AND ACETAMINOPHEN 1 TABLET: 5; 325 TABLET ORAL at 10:12

## 2022-04-17 ASSESSMENT — PAIN SCALES - GENERAL
PAINLEVEL_OUTOF10: 8
PAINLEVEL_OUTOF10: 7
PAINLEVEL_OUTOF10: 7
PAINLEVEL_OUTOF10: 6
PAINLEVEL_OUTOF10: 7

## 2022-04-17 NOTE — PLAN OF CARE
Problem: Falls - Risk of:  Goal: Will remain free from falls  Description: Will remain free from falls  Outcome: Ongoing  Goal: Absence of physical injury  Description: Absence of physical injury  Outcome: Ongoing     Problem: Pain:  Goal: Pain level will decrease  Description: Pain level will decrease  Outcome: Ongoing  Goal: Control of acute pain  Description: Control of acute pain  Outcome: Ongoing  Goal: Control of chronic pain  Description: Control of chronic pain  Outcome: Ongoing     Problem: Tissue Perfusion - Renal, Altered:  Goal: Electrolytes within specified parameters  Description: Electrolytes within specified parameters  Outcome: Ongoing  Goal: Urine creatinine clearance will be within specified parameters  Description: Urine creatinine clearance will be within specified parameters  Outcome: Ongoing  Goal: Serum creatinine will be within specified parameters  Description: Serum creatinine will be within specified parameters  Outcome: Ongoing  Goal: Ability to achieve a balanced intake and output will improve  Description: Ability to achieve a balanced intake and output will improve  Outcome: Ongoing     Problem: Activity:  Goal: Fatigue will decrease  Description: Fatigue will decrease  Outcome: Ongoing  Goal: Ability to tolerate increased activity will improve  Description: Ability to tolerate increased activity will improve  Outcome: Ongoing  Goal: Ability to maintain optimal joint mobility will improve  Description: Ability to maintain optimal joint mobility will improve  Outcome: Ongoing     Problem:  Bowel/Gastric:  Goal: Ability to achieve a regular elimination pattern will improve  Description: Ability to achieve a regular elimination pattern will improve  Outcome: Ongoing     Problem: Cardiac:  Goal: Ability to maintain an adequate cardiac output will improve  Description: Ability to maintain an adequate cardiac output will improve  Outcome: Ongoing  Goal: Ability to maintain adequate experience of comfort will improve  Description: General experience of comfort will improve  Outcome: Ongoing     Problem: Skin Integrity:  Goal: Skin integrity will improve  Description: Skin integrity will improve  Outcome: Ongoing  Goal: Signs of wound healing will improve  Description: Signs of wound healing will improve  Outcome: Ongoing     Problem: Tissue Perfusion:  Goal: Ability to maintain adequate tissue perfusion will improve  Description: Ability to maintain adequate tissue perfusion will improve  Outcome: Ongoing  Goal: Ability to maintain a stable neurologic state will improve  Description: Ability to maintain a stable neurologic state will improve  Outcome: Ongoing     Problem: Nutrition  Goal: Optimal nutrition therapy  Outcome: Ongoing     Problem: ABCDS Injury Assessment  Goal: Absence of physical injury  Outcome: Ongoing

## 2022-04-17 NOTE — PROGRESS NOTES
Progress Note  Date:2022       Room:0308/308-01  Patient Name:Kendra Arreguin     YOB: 1945     Age:76 y.o. Subjective    Subjective: First encounter with pt 4/15/22    50-year-old female with past medical history of DVT, hypertension, lupus, thyroid disease, gastric bypass, and CKD who presented to [address] [place] [address] ED complaining of abnormal labs. Patient reported she was following up with a nurse practitioner in nephrology clinic and was noted to have decline in renal function. Nephrology following, renal ultrasound indicated nonobstructing calculi involving the mid and lower pole of the left kidney, a tiny cortical cyst in the midpole of the right kidney. UA in ED indicated coryneabacterium, however patientasymptomatic, Repeat urine culture with no growth. Nephrology recommends to continue IV hydration. Patient noted to have some wheezing and indicated cough. Repeat chest x-ray obtained which was normal.  GI recommending tapering down on Seroquel and Topamax given transaminitis. Due to no significant improvement in renal function, patient was initiated on dialysis  after vscular surgery placement for PermCath placement 4/15. Seen in house this morning with family present, denied any acute overnight event, denied any chest pain shortness of breath, nausea vomiting abdominal pain. Awaiting outpatient dialysis set up. Review of Systems: 12 point system review negative suggested above. Objective         Vitals Last 24 Hours:  TEMPERATURE:  Temp  Av.2 °F (36.2 °C)  Min: 96.8 °F (36 °C)  Max: 97.7 °F (36.5 °C)  RESPIRATIONS RANGE: Resp  Av.4  Min: 18  Max: 20  PULSE OXIMETRY RANGE: SpO2  Av.6 %  Min: 93 %  Max: 100 %  PULSE RANGE: Pulse  Av.3  Min: 70  Max: 77  BLOOD PRESSURE RANGE: Systolic (66RZM), NSE:073 , Min:104 , K   ; Diastolic (44NCI), YQW:51, Min:62, Max:70    I/O (24Hr):     Intake/Output Summary (Last 24 hours) at 2022 1215  Last data filed at 4/17/2022 1007  Gross per 24 hour   Intake 360 ml   Output --   Net 360 ml         Physical Examination:  General: Well-developed, no acute distress lying comfortably in bed. HEENT: Atraumatic normocephalic, range of motion normal, no JVD, no tracheal deviation noted. PermCath in place in the right anterior chest.  Cardiac: Normal S1-S2  Respiratory: clear To auscultation bilaterally, no rhonchi or rales, no wheezing  Abdomen: Soft, positive bowel sounds in all quadrants, no distention, nontender to palpation. Extremities: no tenderness, no edema, moves all extremities  Psych: Affect normal and good eye contact, behavioral normal.        Labs/Imaging/Diagnostics    Labs:  CBC:  Recent Labs     04/15/22  0358 04/16/22  0238 04/17/22  0358   WBC 7.1 7.0 6.3   RBC 2.88* 2.65* 2.67*   HGB 8.6* 7.9* 7.9*   HCT 30.6* 28.4* 28.1*   .3* 107.2* 105.2*   RDW 14.6* 14.4 14.3    182 161     CHEMISTRIES:  Recent Labs     04/15/22  1127 04/16/22 0238 04/17/22 0358    139 142   K 5.4* 5.0 4.4    102 103   CO2 30* 25 27   BUN 40* 39* 19   CREATININE 3.6* 3.7* 2.8*   GLUCOSE 94 101 87     PT/INR:No results for input(s): PROTIME, INR in the last 72 hours. APTT:No results for input(s): APTT in the last 72 hours. LIVER PROFILE:  Recent Labs     04/15/22  0358 04/16/22  0238 04/17/22  0358   AST 37* 27 20   ALT 81* 59* 43*   BILITOT 0.3 0.3 0.3   ALKPHOS 281* 242* 225*       Imaging Last 24 Hours:  No results found. Assessment//Plan           Hospital Problems           Last Modified POA    * (Principal) Acute kidney injury superimposed on CKD ([facility] [address] 4/8/2022 Yes    Anemia due to chronic kidney disease 4/8/2022 Yes    Abnormal finding on urinalysis 4/10/2022 Yes    Nausea 4/10/2022 Yes    Acute kidney injury superimposed on chronic kidney disease ([facility] [address] 4/10/2022 Yes        Assessment & Plan      LUCY/ATN on CKD stage IV now requiring dialysis.   Metabolic acidosis  Anemia of chronic disease  Hyperkalemia  Followed in-house by nephrology  Evaluated by vascular surgery team status post PermCath placement on 4/15  Dialysis session implemented post PermCath placement on 4/16. Avoid nephrotoxic agent. Hold bicarb given increasing CO2 levels. Transaminitis  Noticed on downtrending levels  Adjust medications as can exacerbate liver function levels. Hypertension  Currently with low blood pressure levels  Monitor closely and avoid significant hypotension  Continue midodrine. Asymptomatic bacteriuria: Monitor off antibiotics. Hypothyroidism: Continue Synthroid      Disposition: Pending outpatient dialysis set up. Electronically signed by   Yaniv Timmons MD   Internal Medicine Hospitalist  On 4/17/2022  At 12:15 PM    EMR Dragon/Transcription disclaimer:   Much of this encounter note is an electronic transcription/translation of spoken language to printed text.  The electronic translation of spoken language may permit erroneous, or at times, nonsensical words or phrases to be inadvertently transcribed; although attempts have made to review the note for such errors, some may still exist.

## 2022-04-17 NOTE — PROGRESS NOTES
Nephrology ([facility]) Progress Note    Patient:  Mirtha Kearns  YOB: 1945  Date of Service: 4/17/2022  MRN: [id number]   Acct: [de-identified]   Primary Care Physician: Colonel Flower MD  Advance Directive: Full Code  Admit Date: 4/8/2022       Hospital Day: 9  Referring Provider: Eryn Fajardo MD    Patient independently seen and examined, Chart, Consults, Notes, Operative notes, Labs, Cardiology, and Radiology studies reviewed as available. Subjective:  Mirtha Kearns is a 77 y.o. female for whom we were consulted for evaluation and treatment of acute kidney injury/chronic kidney disease. She has history of stage IV chronic kidney disease and follows ODILIA Tirado in the office. She was directed to go to the hospital as she has significant decline in renal function on routine labs. Patient has been complaining of chronic diarrhea. Her p.o. intake of fluid has been fairly low. Patient also has history of hypertension, lupus, hypothyroidism and gastric bypass surgery. Hospital course remarkable for IV fluid administration and has very slow improvement of renal function.     Today, no overnight events. Family present today. Denied chest pain, nausea or vomiting. Toleratied first dialysis treatment without issue.            Allergies:  Codeine    Medicines:  Current Facility-Administered Medications   Medication Dose Route Frequency Provider Last Rate Last Admin    midodrine (PROAMATINE) tablet 5 mg  5 mg Oral TID WC Mike Wong MD   5 mg at 04/17/22 1229    HYDROcodone-acetaminophen (NORCO) 5-325 MG per tablet 1 tablet  1 tablet Oral Q4H PRN Mike Wong MD   1 tablet at 04/17/22 1012    mupirocin (BACTROBAN) 2 % ointment   Nasal BID Mike Wong MD   Given at 04/17/22 1008    chlorhexidine gluconate (ANTISEPTIC SKIN CLEANSER) 4 % solution   Topical BID Mike Wong MD   Given at 04/17/22 1010    topiramate (TOPAMAX) tablet 50 mg  50 mg Oral BID Marilu Gaytan MD   50 mg at 04/17/22 1008    guaiFENesin-dextromethorphan (ROBITUSSIN DM) 100-10 MG/5ML syrup 5 mL  5 mL Oral Q4H PRN Marilu Gaytan MD   5 mL at 04/16/22 2011    polyethylene glycol (GLYCOLAX) packet 17 g  17 g Oral BID Marilu Gaytan MD   17 g at 04/17/22 1008    calcitRIOL (ROCALTROL) capsule 0.25 mcg  0.25 mcg Oral Daily Marilu Gaytan MD   0.25 mcg at 04/17/22 1008    vitamin D (ERGOCALCIFEROL) capsule 50,000 Units  50,000 Units Oral Once per day on Mon Wed Fri Marilu Gaytan MD   50,000 Units at 04/15/22 0755    vitamin B-12 (CYANOCOBALAMIN) tablet 50 mcg  50 mcg Oral Daily Marilu Gaytan MD   50 mcg at 04/17/22 1008    tiZANidine (ZANAFLEX) tablet 4 mg  4 mg Oral Q8H PRN Marilu Gaytan MD   4 mg at 04/16/22 0744    dicyclomine (BENTYL) capsule 10 mg  10 mg Oral Daily Marilu Gaytan MD   10 mg at 04/17/22 1008    escitalopram (LEXAPRO) tablet 20 mg  20 mg Oral Daily Marilu Gaytan MD   20 mg at 04/17/22 1008    gabapentin (NEURONTIN) capsule 300 mg  300 mg Oral Nightly Marilu Gaytan MD   300 mg at 04/16/22 2011    levothyroxine (SYNTHROID) tablet 137 mcg  137 mcg Oral Daily Marilu Gaytan MD   137 mcg at 04/17/22 6499    metoprolol succinate (TOPROL XL) extended release tablet 25 mg  25 mg Oral Daily Marilu Gaytan MD   25 mg at 04/17/22 1008    pantoprazole (PROTONIX) tablet 40 mg  40 mg Oral Daily Marilu Gaytan MD   40 mg at 04/17/22 1008    magnesium oxide (MAG-OX) tablet 400 mg  400 mg Oral Daily Marilu Gaytan MD   400 mg at 04/17/22 1008    sodium chloride flush 0.9 % injection 5-40 mL  5-40 mL IntraVENous 2 times per day Marilu Gaytan MD   10 mL at 04/17/22 1010    sodium chloride flush 0.9 % injection 5-40 mL  5-40 mL IntraVENous PRN Marilu Gaytan MD   10 mL at 04/12/22 1335    0.9 % sodium chloride infusion   IntraVENous PRN Marilu Gaytan MD        heparin (porcine) injection 5,000 Units  5,000 Units SubCUTAneous 3 times per day Rain Harris MD   5,000 Units at 04/17/22 0641    ondansetron (ZOFRAN-ODT) disintegrating tablet 4 mg  4 mg Oral Q8H PRN Rain Harris MD        Or    ondansetron [facility]) injection 4 mg  4 mg IntraVENous Q6H PRN Rain Harris MD   4 mg at 04/10/22 0844       Past Medical History:  Past Medical History:   Diagnosis Date    Arthritis     Asthma     DVT (deep vein thrombosis) in pregnancy     History of blood transfusion     Hx of blood clots     hAD dvt WAS ON Coumadin for a year. 2006    Hypertension     [address]upus ([facility]     Renal failure     Splenic artery aneurysm ([facility] [facility]     Thyroid disease        Past Surgical History:  Past Surgical History:   Procedure Laterality Date    GASTRIC BYPASS SURGERY  1975    HYSTERECTOMY      JOINT REPLACEMENT Right     hip and shoulder    LYMPHADENECTOMY      NE TOTAL KNEE ARTHROPLASTY Left 11/27/2017    KNEE TOTAL ARTHROPLASTY performed by Kaylin Villa MD at [facility] Left 7/27/2020    LLEFT REVERSE TOTAL SHOULDER POLY EXCHANGE AND BICEPS TENOTOMY performed by Kaylin Villa MD at [facility] History  History reviewed. No pertinent family history.     Social History  Social History     Socioeconomic History    Marital status:      Spouse name: Not on file    Number of children: Not on file    Years of education: Not on file    Highest education level: Not on file   Occupational History    Not on file   Tobacco Use    Smoking status: Never Smoker    Smokeless tobacco: Never Used   Substance and Sexual Activity    Alcohol use: No    Drug use: No    Sexual activity: Not on file   Other Topics Concern    Not on file   Social History Narrative    Not on file     Social Determinants of Health     Financial Resource Strain:     Difficulty of Paying Living Expenses: Not on file   Food Insecurity:     Worried About Running Out of Food in the Last Year: Not on file    Jeff of Food in the Last Year: Not on file Transportation Needs:     Lack of Transportation (Medical): Not on file    Lack of Transportation (Non-Medical):  Not on file   Physical Activity:     Days of Exercise per Week: Not on file    Minutes of Exercise per Session: Not on file   Stress:     Feeling of Stress : Not on file   Social Connections:     Frequency of Communication with Friends and Family: Not on file    Frequency of Social Gatherings with Friends and Family: Not on file    Attends Yazidism Services: Not on file    Active Member of Clubs or Organizations: Not on file    Attends Club or Organization Meetings: Not on file    Marital Status: Not on file   Intimate Partner Violence:     Fear of Current or Ex-Partner: Not on file    Emotionally Abused: Not on file    Physically Abused: Not on file    Sexually Abused: Not on file   Housing Stability:     Unable to Pay for Housing in the Last Year: Not on file    Number of Places Lived in the Last Year: Not on file    Unstable Housing in the Last Year: Not on file         Review of Systems:  History obtained from chart review and the patient  General ROS: No fever or chills  Respiratory ROS: No cough, shortness of breath, wheezing  Cardiovascular ROS: No chest pain or palpitations  Gastrointestinal ROS: No abdominal pain or melena  Genito-Urinary ROS: No dysuria or hematuria  Musculoskeletal ROS: No joint pain or swelling         Objective:  Patient Vitals for the past 24 hrs:   BP Temp Temp src Pulse Resp SpO2   04/17/22 1215 122/78 98.5 °F (36.9 °C) Temporal 82 18 94 %   04/17/22 0612 108/62 97.7 °F (36.5 °C) -- 77 18 93 %   04/17/22 0152 104/70 96.8 °F (36 °C) -- 74 18 93 %   04/16/22 2000 119/69 -- -- 73 -- --   04/16/22 1719 128/68 97.5 °F (36.4 °C) -- 72 18 100 %   04/16/22 1521 110/68 96.8 °F (36 °C) Temporal 74 20 99 %       Intake/Output Summary (Last 24 hours) at 4/17/2022 1246  Last data filed at 4/17/2022 1007  Gross per 24 hour   Intake 360 ml   Output --   Net 360 ml General: awake/alert   Chest:  clear to auscultation bilaterally  CVS: regular rate and rhythm  Abdominal: soft, nontender, normal bowel sounds  Extremities: no cyanosis, ble edema  Skin: warm and dry without rash    Labs:  BMP:   Recent Labs     04/15/22  1127 04/16/22 0238 04/17/22 0358    139 142   K 5.4* 5.0 4.4    102 103   CO2 30* 25 27   BUN 40* 39* 19   CREATININE 3.6* 3.7* 2.8*   CALCIUM 8.6* 8.1* 8.4*     CBC:   Recent Labs     04/15/22  0358 04/16/22 0238 04/17/22 0358   WBC 7.1 7.0 6.3   HGB 8.6* 7.9* 7.9*   HCT 30.6* 28.4* 28.1*   .3* 107.2* 105.2*    182 161     LIVER PROFILE:   Recent Labs     04/15/22  0358 04/16/22 0238 04/17/22 0358   AST 37* 27 20   ALT 81* 59* 43*   BILITOT 0.3 0.3 0.3   ALKPHOS 281* 242* 225*     PT/INR: No results for input(s): PROTIME, INR in the last 72 hours. APTT: No results for input(s): APTT in the last 72 hours. BNP:  No results for input(s): BNP in the last 72 hours. Ionized Calcium:No results for input(s): IONCA in the last 72 hours. Magnesium:  No results for input(s): MG in the last 72 hours. Phosphorus:  No results for input(s): PHOS in the last 72 hours. HgbA1C: No results for input(s): LABA1C in the last 72 hours. Hepatic:   Recent Labs     04/15/22  0358 04/16/22 0238 04/17/22 0358   ALKPHOS 281* 242* 225*   ALT 81* 59* 43*   AST 37* 27 20   PROT 5.1* 5.1* 5.3*   BILITOT 0.3 0.3 0.3   LABALBU 3.0* 2.7* 3.1*     Lactic Acid: No results for input(s): LACTA in the last 72 hours. Troponin: No results for input(s): CKTOTAL, CKMB, TROPONINT in the last 72 hours. ABGs: No results found for: PHART, PO2ART, FBO8BNZ  CRP:  No results for input(s): CRP in the last 72 hours. Sed Rate:  No results for input(s): SEDRATE in the last 72 hours. Culture Results:   Blood Culture Recent: No results for input(s): BC in the last 720 hours.     Urine Culture Recent :   Recent Labs     04/10/22  1651   LABURIN <10,000 CFU/ml mixed skin/urogenital eileen. No further workup       Radiology reports as per the Radiologist  Radiology: US RENAL COMPLETE    Result Date: 4/9/2022  RENAL ULTRASOUND COMPLETE 4/9/2022 11:20 AM REASON FOR EXAM: Acute renal failure in the background of chronic kidney disease  COMPARISON: None  TECHNIQUE: Multiple longitudinal and transverse realtime sonographic images of the kidneys and urinary bladder are obtained. FINDINGS: The right kidney measures 8.8 cm. The cortical thickness within the right kidney is 9 mm and 6 mm respectively in the upper and lower pole. The right kidney is normal in size, shape, contour and position. There is a small cortical cyst involving the midpole measuring 8 x 4 x 6 mm in size. The cortical thickness is normal, with preservation of the corticomedullary differentiation. The central echo complex is compact with no evidence for hydronephrosis. No nephrolithiasis or abnormal perinephric fluid collections are seen. No hydroureter. The left kidney measures 9.0 cm. The cortical thickness within the left kidney is 13 mm  and 10 mm respectively in the upper and lower pole. The left kidney is normal in size, shape, contour and position. The cortical thickness is normal, with preservation of the corticomedullary differentiation. The central echo complex is compact with no evidence for hydronephrosis. There is a shadowing calculus involving the lower pole of the left kidney measuring 9 x 5 x 9 mm in size as well as a mid pole calculus measuring 1.3 x 0.5 x 0.8 cm. . No hydroureter. Scanning through the pelvis reveals the bladder to be moderately distended with anechoic urine. The wall thickness and contour are normal. There is no surrounding ascites. 1. Nonobstructing calculi involving the mid and lower pole of the left kidney. There is a tiny cortical cyst in the midpole of the right kidney. No evidence of discrete solid mass or hydronephrosis. . Signed by Dr Rosanna Lopez PORTABLE    Result Date: 4/8/2022  XR CHEST PORTABLE 4/8/2022 8:15 PM History: Shortness of breath. One view chest x-ray. Heart size is within normal limits. The mediastinum has a normal appearance. The lungs are adequately expanded with no pneumonia or pneumothorax. There is mild chronic interstitial disease with scattered calcified granulomas. There is no significant pleural fluid. No congestive failure changes. 1. No acute disease.  Signed by Dr Machado Back kidney injury/ATN  Chronic kidney disease stage IV  Hypertensive nephrosclerosis  Metabolic acidosis  Hyperphosphatemia  Anemia chronic kidney disease  Secondary hyperparathyroidism  Elevated AST/ALT    Plan:  Discussed with patient, nursing, family  Monitor labs  Holding bicarb  HD#2 tomorrow    Bill Welsh MD  04/17/22  12:46 PM

## 2022-04-18 LAB
ALBUMIN SERPL-MCNC: 3.1 G/DL (ref 3.5–5.2)
ALP BLD-CCNC: 229 U/L (ref 35–104)
ALT SERPL-CCNC: 37 U/L (ref 5–33)
ANION GAP SERPL CALCULATED.3IONS-SCNC: 12 MMOL/L (ref 7–19)
AST SERPL-CCNC: 19 U/L (ref 5–32)
BILIRUB SERPL-MCNC: 0.3 MG/DL (ref 0.2–1.2)
BUN BLDV-MCNC: 27 MG/DL (ref 8–23)
CALCIUM SERPL-MCNC: 8.5 MG/DL (ref 8.8–10.2)
CHLORIDE BLD-SCNC: 105 MMOL/L (ref 98–111)
CO2: 24 MMOL/L (ref 22–29)
CREAT SERPL-MCNC: 3.2 MG/DL (ref 0.5–0.9)
GFR AFRICAN AMERICAN: 17
GFR NON-AFRICAN AMERICAN: 14
GLUCOSE BLD-MCNC: 96 MG/DL (ref 74–109)
HCT VFR BLD CALC: 27.1 % (ref 37–47)
HEMOGLOBIN: 7.8 G/DL (ref 12–16)
MCH RBC QN AUTO: 29.7 PG (ref 27–31)
MCHC RBC AUTO-ENTMCNC: 28.8 G/DL (ref 33–37)
MCV RBC AUTO: 103 FL (ref 81–99)
PDW BLD-RTO: 14.4 % (ref 11.5–14.5)
PLATELET # BLD: 167 K/UL (ref 130–400)
PMV BLD AUTO: 11 FL (ref 9.4–12.3)
POTASSIUM REFLEX MAGNESIUM: 5 MMOL/L (ref 3.5–5)
RBC # BLD: 2.63 M/UL (ref 4.2–5.4)
SODIUM BLD-SCNC: 141 MMOL/L (ref 136–145)
TOTAL PROTEIN: 5.1 G/DL (ref 6.6–8.7)
WBC # BLD: 7 K/UL (ref 4.8–10.8)

## 2022-04-18 PROCEDURE — 6370000000 HC RX 637 (ALT 250 FOR IP): Performed by: SURGERY

## 2022-04-18 PROCEDURE — 36415 COLL VENOUS BLD VENIPUNCTURE: CPT

## 2022-04-18 PROCEDURE — 6370000000 HC RX 637 (ALT 250 FOR IP): Performed by: HOSPITALIST

## 2022-04-18 PROCEDURE — 2580000003 HC RX 258: Performed by: SURGERY

## 2022-04-18 PROCEDURE — 85027 COMPLETE CBC AUTOMATED: CPT

## 2022-04-18 PROCEDURE — 80053 COMPREHEN METABOLIC PANEL: CPT

## 2022-04-18 PROCEDURE — 6360000002 HC RX W HCPCS: Performed by: SURGERY

## 2022-04-18 PROCEDURE — 1210000000 HC MED SURG R&B

## 2022-04-18 PROCEDURE — 8010000000 HC HEMODIALYSIS ACUTE INPT

## 2022-04-18 PROCEDURE — 2700000000 HC OXYGEN THERAPY PER DAY

## 2022-04-18 RX ORDER — BUSPIRONE HYDROCHLORIDE 10 MG/1
10 TABLET ORAL 3 TIMES DAILY
Status: DISCONTINUED | OUTPATIENT
Start: 2022-04-18 | End: 2022-04-19 | Stop reason: HOSPADM

## 2022-04-18 RX ADMIN — TOPIRAMATE 50 MG: 50 TABLET, FILM COATED ORAL at 13:13

## 2022-04-18 RX ADMIN — MUPIROCIN: 20 OINTMENT TOPICAL at 15:58

## 2022-04-18 RX ADMIN — POLYETHYLENE GLYCOL 3350 17 G: 17 POWDER, FOR SOLUTION ORAL at 21:56

## 2022-04-18 RX ADMIN — MUPIROCIN: 20 OINTMENT TOPICAL at 21:56

## 2022-04-18 RX ADMIN — BUSPIRONE HYDROCHLORIDE 10 MG: 10 TABLET ORAL at 19:51

## 2022-04-18 RX ADMIN — ESCITALOPRAM OXALATE 20 MG: 10 TABLET ORAL at 13:13

## 2022-04-18 RX ADMIN — ERGOCALCIFEROL 50000 UNITS: 1.25 CAPSULE ORAL at 13:13

## 2022-04-18 RX ADMIN — DICYCLOMINE HYDROCHLORIDE 10 MG: 10 CAPSULE ORAL at 13:13

## 2022-04-18 RX ADMIN — Medication 50 MCG: at 13:12

## 2022-04-18 RX ADMIN — HEPARIN SODIUM 5000 UNITS: 5000 INJECTION INTRAVENOUS; SUBCUTANEOUS at 15:54

## 2022-04-18 RX ADMIN — GUAIFENESIN SYRUP AND DEXTROMETHORPHAN 5 ML: 100; 10 SYRUP ORAL at 22:00

## 2022-04-18 RX ADMIN — HYDROCODONE BITARTRATE AND ACETAMINOPHEN 1 TABLET: 5; 325 TABLET ORAL at 05:28

## 2022-04-18 RX ADMIN — HEPARIN SODIUM 5000 UNITS: 5000 INJECTION INTRAVENOUS; SUBCUTANEOUS at 21:56

## 2022-04-18 RX ADMIN — ONDANSETRON 4 MG: 2 INJECTION INTRAMUSCULAR; INTRAVENOUS at 18:06

## 2022-04-18 RX ADMIN — HEPARIN SODIUM 5000 UNITS: 5000 INJECTION INTRAVENOUS; SUBCUTANEOUS at 05:28

## 2022-04-18 RX ADMIN — PANTOPRAZOLE SODIUM 40 MG: 40 TABLET, DELAYED RELEASE ORAL at 13:13

## 2022-04-18 RX ADMIN — METOPROLOL SUCCINATE 25 MG: 25 TABLET, EXTENDED RELEASE ORAL at 13:13

## 2022-04-18 RX ADMIN — Medication 400 MG: at 13:13

## 2022-04-18 RX ADMIN — LEVOTHYROXINE SODIUM 137 MCG: 137 TABLET ORAL at 05:28

## 2022-04-18 RX ADMIN — GABAPENTIN 300 MG: 300 CAPSULE ORAL at 21:56

## 2022-04-18 RX ADMIN — SODIUM CHLORIDE, PRESERVATIVE FREE 5 ML: 5 INJECTION INTRAVENOUS at 16:00

## 2022-04-18 RX ADMIN — CALCITRIOL CAPSULES 0.25 MCG 0.25 MCG: 0.25 CAPSULE ORAL at 13:12

## 2022-04-18 RX ADMIN — MIDODRINE HYDROCHLORIDE 5 MG: 5 TABLET ORAL at 13:12

## 2022-04-18 RX ADMIN — TOPIRAMATE 50 MG: 50 TABLET, FILM COATED ORAL at 21:56

## 2022-04-18 RX ADMIN — GUAIFENESIN SYRUP AND DEXTROMETHORPHAN 5 ML: 100; 10 SYRUP ORAL at 02:44

## 2022-04-18 ASSESSMENT — PAIN SCALES - GENERAL
PAINLEVEL_OUTOF10: 6
PAINLEVEL_OUTOF10: 0

## 2022-04-18 NOTE — PLAN OF CARE
Problem: Falls - Risk of:  Goal: Will remain free from falls  Description: Will remain free from falls  Outcome: Ongoing  Goal: Absence of physical injury  Description: Absence of physical injury  Outcome: Ongoing     Problem: Pain:  Description: Pain management should include both nonpharmacologic and pharmacologic interventions. Goal: Pain level will decrease  Description: Pain level will decrease  Outcome: Ongoing  Goal: Control of acute pain  Description: Control of acute pain  Outcome: Ongoing  Goal: Control of chronic pain  Description: Control of chronic pain  Outcome: Ongoing     Problem: Tissue Perfusion - Renal, Altered:  Goal: Electrolytes within specified parameters  Description: Electrolytes within specified parameters  Outcome: Ongoing  Goal: Urine creatinine clearance will be within specified parameters  Description: Urine creatinine clearance will be within specified parameters  Outcome: Ongoing  Goal: Serum creatinine will be within specified parameters  Description: Serum creatinine will be within specified parameters  Outcome: Ongoing  Goal: Ability to achieve a balanced intake and output will improve  Description: Ability to achieve a balanced intake and output will improve  Outcome: Ongoing     Problem: Activity:  Goal: Fatigue will decrease  Description: Fatigue will decrease  Outcome: Ongoing  Goal: Ability to tolerate increased activity will improve  Description: Ability to tolerate increased activity will improve  Outcome: Ongoing  Goal: Ability to maintain optimal joint mobility will improve  Description: Ability to maintain optimal joint mobility will improve  Outcome: Ongoing     Problem:  Bowel/Gastric:  Goal: Ability to achieve a regular elimination pattern will improve  Description: Ability to achieve a regular elimination pattern will improve  Outcome: Ongoing     Problem: Cardiac:  Goal: Ability to maintain an adequate cardiac output will improve  Description: Ability to maintain an adequate cardiac output will improve  Outcome: Ongoing  Goal: Ability to maintain adequate ventilation will improve  Description: Ability to maintain adequate ventilation will improve  Outcome: Ongoing  Goal: Ability to achieve and maintain adequate cardiopulmonary perfusion will improve  Description: Ability to achieve and maintain adequate cardiopulmonary perfusion will improve  Outcome: Ongoing     Problem: Coping:  Goal: Ability to adjust to condition or change in health will improve  Description: Ability to adjust to condition or change in health will improve  Outcome: Ongoing  Goal: Communication of feelings regarding changes in body function or appearance will improve  Description: Communication of feelings regarding changes in body function or appearance will improve  Outcome: Ongoing     Problem: Health Behavior:  Goal: Identification of resources available to assist in meeting health care needs will improve  Description: Identification of resources available to assist in meeting health care needs will improve  Outcome: Ongoing     Problem: Nutritional:  Goal: Maintenance of adequate nutrition will improve  Description: Maintenance of adequate nutrition will improve  Outcome: Ongoing     Problem: Physical Regulation:  Goal: Signs and symptoms of infection will decrease  Description: Signs and symptoms of infection will decrease  Outcome: Ongoing  Goal: Will show no signs and symptoms of excessive bleeding  Description: Will show no signs and symptoms of excessive bleeding  Outcome: Ongoing  Goal: Complications related to the disease process, condition or treatment will be avoided or minimized  Description: Complications related to the disease process, condition or treatment will be avoided or minimized  Outcome: Ongoing     Problem: Safety:  Goal: Ability to remain free from injury will improve  Description: Ability to remain free from injury will improve  Outcome: Ongoing     Problem: Sensory:  Goal: Pain level will decrease  Description: Pain level will decrease  Outcome: Ongoing  Goal: General experience of comfort will improve  Description: General experience of comfort will improve  Outcome: Ongoing     Problem: Skin Integrity:  Goal: Skin integrity will improve  Description: Skin integrity will improve  Outcome: Ongoing  Goal: Signs of wound healing will improve  Description: Signs of wound healing will improve  Outcome: Ongoing     Problem: Tissue Perfusion:  Goal: Ability to maintain adequate tissue perfusion will improve  Description: Ability to maintain adequate tissue perfusion will improve  Outcome: Ongoing  Goal: Ability to maintain a stable neurologic state will improve  Description: Ability to maintain a stable neurologic state will improve  Outcome: Ongoing     Problem: Nutrition  Goal: Optimal nutrition therapy  Outcome: Ongoing     Problem: ABCDS Injury Assessment  Goal: Absence of physical injury  Outcome: Ongoing

## 2022-04-18 NOTE — PROGRESS NOTES
Progress Note  Date:2022       Room:0308/308-01  Patient Name:Kendra Arreguin     YOB: 1945     Age:76 y.o. Subjective    Subjective: First encounter with pt 4/15/22    46-year-old female with past medical history of DVT, hypertension, lupus, thyroid disease, gastric bypass, and CKD who presented to [facility] ED complaining of abnormal labs. Patient reported she was following up with a nurse practitioner in nephrology clinic and was noted to have decline in renal function. Nephrology following, renal ultrasound indicated nonobstructing calculi involving the mid and lower pole of the left kidney, a tiny cortical cyst in the midpole of the right kidney. UA in ED indicated coryneabacterium, however patientasymptomatic, Repeat urine culture with no growth. Nephrology recommends to continue IV hydration. Patient noted to have some wheezing and indicated cough. Repeat chest x-ray obtained which was normal.  GI recommending tapering down on Seroquel and Topamax given transaminitis. Due to no significant improvement in renal function, patient was initiated on dialysis  after vscular surgery placement for PermCath placement 4/15. Seen in house this morning, denied any acute overnight event, denied any chest pain shortness of breath, nausea vomiting abdominal pain. Awaiting outpatient dialysis set up. Review of Systems: 12 point system review negative suggested above. Objective         Vitals Last 24 Hours:  TEMPERATURE:  Temp  Av.6 °F (36.4 °C)  Min: 96.3 °F (35.7 °C)  Max: 98.5 °F (36.9 °C)  RESPIRATIONS RANGE: Resp  Av  Min: 16  Max: 18  PULSE OXIMETRY RANGE: SpO2  Av.5 %  Min: 91 %  Max: 94 %  PULSE RANGE: Pulse  Av.2  Min: 72  Max: 82  BLOOD PRESSURE RANGE: Systolic (13IXW), АНДРЕЙ:326 , Min:118 , BFR:065   ; Diastolic (21ARR), HJN:40, Min:66, Max:80    I/O (24Hr):     Intake/Output Summary (Last 24 hours) at 2022 0617  Last data filed at 2022 4506  Gross per 24 hour   Intake 480 ml   Output --   Net 480 ml         Physical Examination:  General: Well-developed, no acute distress lying comfortably in bed. HEENT: Atraumatic normocephalic, range of motion normal, no JVD, no tracheal deviation noted. PermCath in place in the right anterior chest.  Cardiac: Normal S1-S2  Respiratory: clear To auscultation bilaterally, no rhonchi or rales, no wheezing  Abdomen: Soft, positive bowel sounds in all quadrants, no distention, nontender to palpation. Extremities: no tenderness, no edema, moves all extremities  Psych: Affect normal and good eye contact, behavioral normal.        Labs/Imaging/Diagnostics    Labs:  CBC:  Recent Labs     04/16/22 0238 04/17/22 0358 04/18/22 0131   WBC 7.0 6.3 7.0   RBC 2.65* 2.67* 2.63*   HGB 7.9* 7.9* 7.8*   HCT 28.4* 28.1* 27.1*   .2* 105.2* 103.0*   RDW 14.4 14.3 14.4    161 167     CHEMISTRIES:  Recent Labs     04/16/22 0238 04/17/22 0358 04/18/22 0131    142 141   K 5.0 4.4 5.0    103 105   CO2 25 27 24   BUN 39* 19 27*   CREATININE 3.7* 2.8* 3.2*   GLUCOSE 101 87 96     PT/INR:No results for input(s): PROTIME, INR in the last 72 hours. APTT:No results for input(s): APTT in the last 72 hours. LIVER PROFILE:  Recent Labs     04/16/22 0238 04/17/22 0358 04/18/22 0131   AST 27 20 19   ALT 59* 43* 37*   BILITOT 0.3 0.3 0.3   ALKPHOS 242* 225* 229*       Imaging Last 24 Hours:  No results found. Assessment//Plan           Hospital Problems           Last Modified POA    * (Principal) Acute kidney injury superimposed on CKD ([facility] [address] 4/8/2022 Yes    Anemia due to chronic kidney disease 4/8/2022 Yes    Abnormal finding on urinalysis 4/10/2022 Yes    Nausea 4/10/2022 Yes    Acute kidney injury superimposed on chronic kidney disease ([address] 4/10/2022 Yes        Assessment & Plan      LUCY/ATN on CKD stage IV now requiring dialysis.   Metabolic acidosis  Anemia of chronic disease  Hyperkalemia  Followed in-house by nephrology  Evaluated by vascular surgery team status post PermCath placement on 4/15  Dialysis session implemented post PermCath placement on 4/16. Avoid nephrotoxic agent. Transaminitis  Noticed on downtrending levels  Adjust medications as can exacerbate liver function levels. Hypertension  Currently with low blood pressure levels  Monitor closely and avoid significant hypotension  Continue midodrine. Asymptomatic bacteriuria: Monitor off antibiotics. Hypothyroidism: Continue Synthroid      Disposition: Pending outpatient dialysis set up. Electronically signed by   Fracna Blank MD   Internal Medicine Hospitalist  On 4/18/2022  At 11:30 AM    EMR Dragon/Transcription disclaimer:   Much of this encounter note is an electronic transcription/translation of spoken language to printed text.  The electronic translation of spoken language may permit erroneous, or at times, nonsensical words or phrases to be inadvertently transcribed; although attempts have made to review the note for such errors, some may still exist.

## 2022-04-18 NOTE — PROGRESS NOTES
Contacted HD to see if patient needed am midodrine brought back-stated they would call if needed.     Electronically signed by Miles Huang RN on 4/18/2022 at 8:14 AM

## 2022-04-18 NOTE — PROGRESS NOTES
Nephrology ([facility]) Progress Note    Patient:  Lien Briggs  YOB: 1945  Date of Service: 4/18/2022  MRN: [id number]   Acct: [de-identified]   Primary Care Physician: Fanta Mart MD  Advance Directive: Full Code  Admit Date: 4/8/2022       Hospital Day: 10  Referring Provider: Norma Marte MD    Patient Seen, Chart, Consults, Notes, Labs, Radiology studies reviewed. Subjective:  Lien Briggs is a 77 y.o. female for whom we were consulted for evaluation and treatment of acute kidney injury/chronic kidney disease.  She has history of stage IV chronic kidney disease and follows at the renal office.  She was directed to go to the hospital as she has significant decline in renal function on routine labs.  Patient has been complaining of chronic diarrhea.  Her p.o. intake of fluid has been fairly low.  Patient also has history of hypertension, lupus, hypothyroidism and gastric bypass surgery.  Hospital course remarkable for IV fluids administration and has very slow improvement of renal function. Decision was then made to start dialysis. Toleratied first dialysis treatment without issue. Today, she was seen and examined on dialysis again. She offered no new complaints.        Dialysis   Pt was seen on RRT  Modality: Hemodialysis  Access: Catheter  Location: right upper  QB: 400  QD: 600  UF: 1.5 lietrs    Allergies:  Codeine    Medicines:  Current Facility-Administered Medications   Medication Dose Route Frequency Provider Last Rate Last Admin    hydrOXYzine (ATARAX) tablet 10 mg  10 mg Oral TID PRN Norma Marte MD   10 mg at 04/17/22 8397    midodrine (PROAMATINE) tablet 5 mg  5 mg Oral TID  Mady Huber MD   5 mg at 04/17/22 1835    HYDROcodone-acetaminophen (NORCO) 5-325 MG per tablet 1 tablet  1 tablet Oral Q4H PRN Mady Huber MD   1 tablet at 04/18/22 1165    mupirocin (BACTROBAN) 2 % ointment   Nasal BID Mady Huber MD   Given at 04/17/22 3701  topiramate (TOPAMAX) tablet 50 mg  50 mg Oral BID Nicki Mcclain MD   50 mg at 04/17/22 2112    guaiFENesin-dextromethorphan (ROBITUSSIN DM) 100-10 MG/5ML syrup 5 mL  5 mL Oral Q4H PRN Nicki Mcclain MD   5 mL at 04/18/22 0244    polyethylene glycol (GLYCOLAX) packet 17 g  17 g Oral BID Nicki Mcclain MD   17 g at 04/17/22 2112    calcitRIOL (ROCALTROL) capsule 0.25 mcg  0.25 mcg Oral Daily Nicki Mcclain MD   0.25 mcg at 04/17/22 1008    vitamin D (ERGOCALCIFEROL) capsule 50,000 Units  50,000 Units Oral Once per day on Mon Wed Fri Nicki Mcclain MD   50,000 Units at 04/15/22 0755    vitamin B-12 (CYANOCOBALAMIN) tablet 50 mcg  50 mcg Oral Daily Nicki Mcclain MD   50 mcg at 04/17/22 1008    tiZANidine (ZANAFLEX) tablet 4 mg  4 mg Oral Q8H PRN Nicki Mcclain MD   4 mg at 04/16/22 0744    dicyclomine (BENTYL) capsule 10 mg  10 mg Oral Daily Nicki Mcclain MD   10 mg at 04/17/22 1008    escitalopram (LEXAPRO) tablet 20 mg  20 mg Oral Daily Nicki Mcclain MD   20 mg at 04/17/22 1008    gabapentin (NEURONTIN) capsule 300 mg  300 mg Oral Nightly Nicki Mcclain MD   300 mg at 04/17/22 2112    levothyroxine (SYNTHROID) tablet 137 mcg  137 mcg Oral Daily Nicki Mcclain MD   137 mcg at 04/18/22 3338    metoprolol succinate (TOPROL XL) extended release tablet 25 mg  25 mg Oral Daily Nicki Mcclain MD   25 mg at 04/17/22 1008    pantoprazole (PROTONIX) tablet 40 mg  40 mg Oral Daily Nicki Mcclain MD   40 mg at 04/17/22 1008    magnesium oxide (MAG-OX) tablet 400 mg  400 mg Oral Daily Nicki Mcclain MD   400 mg at 04/17/22 1008    sodium chloride flush 0.9 % injection 5-40 mL  5-40 mL IntraVENous 2 times per day Nicki Mcclain MD   10 mL at 04/17/22 1010    sodium chloride flush 0.9 % injection 5-40 mL  5-40 mL IntraVENous PRN Nicki Mcclain MD   10 mL at 04/12/22 1335    0.9 % sodium chloride infusion   IntraVENous PRN Nicki Mcclain MD        heparin (porcine) injection 5,000 Units  5,000 Units SubCUTAneous 3 times per day Cuong Briscoe MD   5,000 Units at 04/18/22 0528    ondansetron (ZOFRAN-ODT) disintegrating tablet 4 mg  4 mg Oral Q8H PRN Cuong Briscoe MD        Or    ondansetron [facility] injection 4 mg  4 mg IntraVENous Q6H PRN Cuong Briscoe MD   4 mg at 04/10/22 0844       Past Medical History:  Past Medical History:   Diagnosis Date    Arthritis     Asthma     DVT (deep vein thrombosis) in pregnancy     History of blood transfusion     Hx of blood clots     hAD dvt WAS ON Coumadin for a year. 2006    Hypertension     [facility]up[facility] ([facility]     Renal failure     Splenic artery aneurysm ([facility] [facility]     Thyroid disease        Past Surgical History:  Past Surgical History:   Procedure Laterality Date    GASTRIC BYPASS SURGERY  1975    HYSTERECTOMY      JOINT REPLACEMENT Right     hip and shoulder    LYMPHADENECTOMY      NV TOTAL KNEE ARTHROPLASTY Left 11/27/2017    KNEE TOTAL ARTHROPLASTY performed by Jing Hammonds MD at [facility] Left 7/27/2020    LLEFT REVERSE TOTAL SHOULDER POLY EXCHANGE AND BICEPS TENOTOMY performed by Jing Hammonds MD at [facility] N/A 4/15/2022    PLACEMENT OF TUNNELED PERM-CATHETER performed by Cuogn Briscoe MD at [facility] History  History reviewed. No pertinent family history.     Social History  Social History     Socioeconomic History    Marital status:      Spouse name: Not on file    Number of children: Not on file    Years of education: Not on file    Highest education level: Not on file   Occupational History    Not on file   Tobacco Use    Smoking status: Never Smoker    Smokeless tobacco: Never Used   Substance and Sexual Activity    Alcohol use: No    Drug use: No    Sexual activity: Not on file   Other Topics Concern    Not on file   Social History Narrative    Not on file     Social Determinants of Health     Financial Resource Strain:     Difficulty of Paying Living Expenses: Not on file   Food Insecurity:     Worried About Running Out of Food in the Last Year: Not on file    Jeff of Food in the Last Year: Not on file   Transportation Needs:     Lack of Transportation (Medical): Not on file    Lack of Transportation (Non-Medical): Not on file   Physical Activity:     Days of Exercise per Week: Not on file    Minutes of Exercise per Session: Not on file   Stress:     Feeling of Stress : Not on file   Social Connections:     Frequency of Communication with Friends and Family: Not on file    Frequency of Social Gatherings with Friends and Family: Not on file    Attends Sabianist Services: Not on file    Active Member of [address] [place] [address] [employer] or Organizations: Not on file    Attends Club or Organization Meetings: Not on file    Marital Status: Not on file   Intimate Partner Violence:     Fear of Current or Ex-Partner: Not on file    Emotionally Abused: Not on file    Physically Abused: Not on file    Sexually Abused: Not on file   Housing Stability:     Unable to Pay for Housing in the Last Year: Not on file    Number of [place] in the Last Year: Not on file    Unstable Housing in the Last Year: Not on file         Review of Systems:  Reviewed with the patient at the bedside, 6 points reviewed and negative except as noted above. Objective:  Blood pressure 124/76, pulse 72, temperature 97.7 °F (36.5 °C), temperature source Temporal, resp. rate 18, height 5' 4\" (1.626 m), weight 167 lb 6.4 oz (75.9 kg), SpO2 93 %.     Intake/Output Summary (Last 24 hours) at 4/18/2022 1031  Last data filed at 4/18/2022 0958  Gross per 24 hour   Intake 480 ml   Output --   Net 480 ml     General: awake/alert   Chest:  clear to auscultation bilaterally without respiratory distress  CVS: regular rate and rhythm  Abdominal: soft, nontender, normal bowel sounds  Extremities: no cyanosis or edema  Skin: warm and dry without rash    Labs:  BMP:   Recent Labs     04/16/22  0238 04/17/22  0354 04/18/22 0131    142 141   K 5.0 4.4 5.0    103 105   CO2 25 27 24   BUN 39* 19 27*   CREATININE 3.7* 2.8* 3.2*   CALCIUM 8.1* 8.4* 8.5*     CBC:   Recent Labs     04/16/22 0238 04/17/22 0358 04/18/22 0131   WBC 7.0 6.3 7.0   HGB 7.9* 7.9* 7.8*   HCT 28.4* 28.1* 27.1*   .2* 105.2* 103.0*    161 167     LIVER PROFILE:   Recent Labs     04/16/22 0238 04/17/22 0358 04/18/22 0131   AST 27 20 19   ALT 59* 43* 37*   BILITOT 0.3 0.3 0.3   ALKPHOS 242* 225* 229*     PT/INR: No results for input(s): PROTIME, INR in the last 72 hours. APTT: No results for input(s): APTT in the last 72 hours. BNP:  No results for input(s): BNP in the last 72 hours. Ionized Calcium:No results for input(s): IONCA in the last 72 hours. Magnesium:No results for input(s): MG in the last 72 hours. Phosphorus:No results for input(s): PHOS in the last 72 hours. HgbA1C: No results for input(s): LABA1C in the last 72 hours. Hepatic:   Recent Labs     04/16/22 0238 04/17/22 0358 04/18/22 0131   ALKPHOS 242* 225* 229*   ALT 59* 43* 37*   AST 27 20 19   PROT 5.1* 5.3* 5.1*   BILITOT 0.3 0.3 0.3   LABALBU 2.7* 3.1* 3.1*     Lactic Acid: No results for input(s): LACTA in the last 72 hours. Troponin: No results for input(s): CKTOTAL, CKMB, TROPONINT in the last 72 hours. ABGs: No results for input(s): PH, PCO2, PO2, HCO3, O2SAT in the last 72 hours. CRP:  No results for input(s): CRP in the last 72 hours. Sed Rate:  No results for input(s): SEDRATE in the last 72 hours. Cultures:   No results for input(s): CULTURE in the last 72 hours. No results for input(s): BC, Kwadwo Crumble in the last 72 hours. No results for input(s): CXSURG in the last 72 hours.     Radiology reports as per the Radiologist  Radiology: US RENAL COMPLETE    Result Date: 4/9/2022  RENAL ULTRASOUND COMPLETE 4/9/2022 11:20 AM REASON FOR EXAM: Acute renal failure in the background of chronic kidney disease  COMPARISON: None  TECHNIQUE: Multiple longitudinal and transverse realtime sonographic images of the kidneys and urinary bladder are obtained. FINDINGS: The right kidney measures 8.8 cm. The cortical thickness within the right kidney is 9 mm and 6 mm respectively in the upper and lower pole. The right kidney is normal in size, shape, contour and position. There is a small cortical cyst involving the midpole measuring 8 x 4 x 6 mm in size. The cortical thickness is normal, with preservation of the corticomedullary differentiation. The central echo complex is compact with no evidence for hydronephrosis. No nephrolithiasis or abnormal perinephric fluid collections are seen. No hydroureter. The left kidney measures 9.0 cm. The cortical thickness within the left kidney is 13 mm  and 10 mm respectively in the upper and lower pole. The left kidney is normal in size, shape, contour and position. The cortical thickness is normal, with preservation of the corticomedullary differentiation. The central echo complex is compact with no evidence for hydronephrosis. There is a shadowing calculus involving the lower pole of the left kidney measuring 9 x 5 x 9 mm in size as well as a mid pole calculus measuring 1.3 x 0.5 x 0.8 cm. . No hydroureter. Scanning through the pelvis reveals the bladder to be moderately distended with anechoic urine. The wall thickness and contour are normal. There is no surrounding ascites. 1. Nonobstructing calculi involving the mid and lower pole of the left kidney. There is a tiny cortical cyst in the midpole of the right kidney. No evidence of discrete solid mass or hydronephrosis. . Signed by Dr Bolanos Age    Result Date: 4/8/2022  XR CHEST PORTABLE 4/8/2022 8:15 PM History: Shortness of breath. One view chest x-ray. Heart size is within normal limits. The mediastinum has a normal appearance. The lungs are adequately expanded with no pneumonia or pneumothorax.  There is mild chronic interstitial disease with scattered calcified granulomas. There is no significant pleural fluid. No congestive failure changes. 1. No acute disease. Signed by Dr Karyn Brady   -Acute kidney injury/ATN  -Chronic kidney disease stage IV  -Hypertensive nephrosclerosis  -Metabolic acidosis  -Hyperphosphatemia  -Anemia chronic kidney disease  -Secondary hyperparathyroidism  -Elevated AST/ALT    Plan:  Dialysis as above. Follow-up labs. Arrange outpatient dialysis placement in [place].     Katherine Painting MD, MD  04/18/22  10:31 AM

## 2022-04-19 VITALS
RESPIRATION RATE: 17 BRPM | HEIGHT: 64 IN | WEIGHT: 159.6 LBS | HEART RATE: 72 BPM | BODY MASS INDEX: 27.25 KG/M2 | TEMPERATURE: 98.7 F | OXYGEN SATURATION: 95 % | DIASTOLIC BLOOD PRESSURE: 77 MMHG | SYSTOLIC BLOOD PRESSURE: 112 MMHG

## 2022-04-19 LAB
ALBUMIN SERPL-MCNC: 3.4 G/DL (ref 3.5–5.2)
ALP BLD-CCNC: 217 U/L (ref 35–104)
ALT SERPL-CCNC: 31 U/L (ref 5–33)
ANION GAP SERPL CALCULATED.3IONS-SCNC: 12 MMOL/L (ref 7–19)
AST SERPL-CCNC: 17 U/L (ref 5–32)
BILIRUB SERPL-MCNC: 0.3 MG/DL (ref 0.2–1.2)
BUN BLDV-MCNC: 15 MG/DL (ref 8–23)
CALCIUM SERPL-MCNC: 8.3 MG/DL (ref 8.8–10.2)
CHLORIDE BLD-SCNC: 101 MMOL/L (ref 98–111)
CO2: 26 MMOL/L (ref 22–29)
CREAT SERPL-MCNC: 2.4 MG/DL (ref 0.5–0.9)
GFR AFRICAN AMERICAN: 24
GFR NON-AFRICAN AMERICAN: 20
GLUCOSE BLD-MCNC: 89 MG/DL (ref 74–109)
HCT VFR BLD CALC: 28 % (ref 37–47)
HEMOGLOBIN: 8.4 G/DL (ref 12–16)
MCH RBC QN AUTO: 30.2 PG (ref 27–31)
MCHC RBC AUTO-ENTMCNC: 30 G/DL (ref 33–37)
MCV RBC AUTO: 100.7 FL (ref 81–99)
PDW BLD-RTO: 14.3 % (ref 11.5–14.5)
PLATELET # BLD: 190 K/UL (ref 130–400)
PMV BLD AUTO: 11.5 FL (ref 9.4–12.3)
POTASSIUM REFLEX MAGNESIUM: 4.1 MMOL/L (ref 3.5–5)
RBC # BLD: 2.78 M/UL (ref 4.2–5.4)
SODIUM BLD-SCNC: 139 MMOL/L (ref 136–145)
TOTAL PROTEIN: 5.2 G/DL (ref 6.6–8.7)
WBC # BLD: 6.6 K/UL (ref 4.8–10.8)

## 2022-04-19 PROCEDURE — 2580000003 HC RX 258: Performed by: SURGERY

## 2022-04-19 PROCEDURE — 85027 COMPLETE CBC AUTOMATED: CPT

## 2022-04-19 PROCEDURE — 80053 COMPREHEN METABOLIC PANEL: CPT

## 2022-04-19 PROCEDURE — 94761 N-INVAS EAR/PLS OXIMETRY MLT: CPT

## 2022-04-19 PROCEDURE — 6370000000 HC RX 637 (ALT 250 FOR IP): Performed by: SURGERY

## 2022-04-19 PROCEDURE — 36415 COLL VENOUS BLD VENIPUNCTURE: CPT

## 2022-04-19 PROCEDURE — 6370000000 HC RX 637 (ALT 250 FOR IP): Performed by: HOSPITALIST

## 2022-04-19 PROCEDURE — 6360000002 HC RX W HCPCS: Performed by: SURGERY

## 2022-04-19 RX ORDER — TOPIRAMATE 50 MG/1
50 TABLET, FILM COATED ORAL 2 TIMES DAILY
Qty: 60 TABLET | Refills: 3 | Status: SHIPPED | OUTPATIENT
Start: 2022-04-19

## 2022-04-19 RX ORDER — QUETIAPINE FUMARATE 100 MG/1
100 TABLET, FILM COATED ORAL NIGHTLY
Qty: 30 TABLET | Refills: 0 | Status: SHIPPED | OUTPATIENT
Start: 2022-04-19

## 2022-04-19 RX ADMIN — BUSPIRONE HYDROCHLORIDE 10 MG: 10 TABLET ORAL at 13:49

## 2022-04-19 RX ADMIN — DICYCLOMINE HYDROCHLORIDE 10 MG: 10 CAPSULE ORAL at 09:02

## 2022-04-19 RX ADMIN — ESCITALOPRAM OXALATE 20 MG: 10 TABLET ORAL at 09:03

## 2022-04-19 RX ADMIN — PANTOPRAZOLE SODIUM 40 MG: 40 TABLET, DELAYED RELEASE ORAL at 09:02

## 2022-04-19 RX ADMIN — BUSPIRONE HYDROCHLORIDE 10 MG: 10 TABLET ORAL at 09:03

## 2022-04-19 RX ADMIN — LEVOTHYROXINE SODIUM 137 MCG: 137 TABLET ORAL at 05:42

## 2022-04-19 RX ADMIN — Medication 50 MCG: at 09:02

## 2022-04-19 RX ADMIN — CALCITRIOL CAPSULES 0.25 MCG 0.25 MCG: 0.25 CAPSULE ORAL at 09:02

## 2022-04-19 RX ADMIN — TOPIRAMATE 50 MG: 50 TABLET, FILM COATED ORAL at 09:02

## 2022-04-19 RX ADMIN — Medication 400 MG: at 09:02

## 2022-04-19 RX ADMIN — HEPARIN SODIUM 5000 UNITS: 5000 INJECTION INTRAVENOUS; SUBCUTANEOUS at 05:42

## 2022-04-19 RX ADMIN — SODIUM CHLORIDE, PRESERVATIVE FREE 10 ML: 5 INJECTION INTRAVENOUS at 09:15

## 2022-04-19 RX ADMIN — GUAIFENESIN SYRUP AND DEXTROMETHORPHAN 5 ML: 100; 10 SYRUP ORAL at 09:15

## 2022-04-19 RX ADMIN — MUPIROCIN: 20 OINTMENT TOPICAL at 09:05

## 2022-04-19 RX ADMIN — METOPROLOL SUCCINATE 25 MG: 25 TABLET, EXTENDED RELEASE ORAL at 09:02

## 2022-04-19 ASSESSMENT — PAIN SCALES - GENERAL: PAINLEVEL_OUTOF10: 0

## 2022-04-19 NOTE — CARE COORDINATION
[facility]  Electronically signed by RENALDO Leigh on 4/19/2022 at 11:15 AM    **schedule letter received and given to Pt and spouse  Electronically signed by RENALDO Leigh on 4/19/2022 at 1:34 PM

## 2022-04-19 NOTE — PROGRESS NOTES
Comprehensive Nutrition Assessment    Type and Reason for Visit:  Reassess    Nutrition Recommendations/Plan: continue current POC    Nutrition Assessment:  Pt eating lunch at time of visit. PO intake still variable but is improving. Intake ranging 25-50% nad %. Dialysis has started. Weight loss has occurred with diuresis    Malnutrition Assessment:  Malnutrition Status: At risk for malnutrition (Comment)    Context:  Acute Illness     Findings of the 6 clinical characteristics of malnutrition:  Energy Intake:  Mild decrease in energy intake (Comment)  Weight Loss:  7 - Greater than 2% over 1 week (due to dialysis)     Body Fat Loss:  No significant body fat loss Orbital   Muscle Mass Loss:  No significant muscle mass loss    Fluid Accumulation:  1 - Mild Extremities   Strength:  Not Performed    Nutrition Related Findings:  +1 edema,  hx-Gastric bypass      Wounds:  None       Current Nutrition Therapies:    ADULT DIET; Regular; Low Sodium (2 gm); Low Potassium (Less than 3000 mg/day); Low Phosphorus (Less than 1000 mg)    Anthropometric Measures:  · Height: 5' 4\" (162.6 cm)  · Current Body Weight: 159 lb 9.6 oz (72.4 kg)   · Admission Body Weight: 150 lb (68 kg) (stated)    · Usual Body Weight: 150 lb 9.6 oz (68.3 kg) (1/2022)     · Ideal Body Weight: 120 lbs; % Ideal Body Weight 133 %   · BMI: 27.4  · Adjusted Body Weight:  ; No Adjustment    · BMI Categories: Overweight (BMI 25.0-29. 9)       Nutrition Diagnosis:   · Inadequate oral intake,Altered nutrition-related lab values related to early satiety,renal dysfunction as evidenced by intake 26-50%,localized or generalized fluid accumulation,weight loss      Nutrition Interventions:   Food and/or Nutrient Delivery:  Continue Current Diet  Nutrition Education/Counseling:  No recommendation at this time   Coordination of Nutrition Care:  Continue to monitor while inpatient    Goals:  PO intake 50% or greater for all meals.   Increased weight d/t fluid retention lost       Nutrition Monitoring and Evaluation:   Behavioral-Environmental Outcomes:  None Identified   Food/Nutrient Intake Outcomes:  Food and Nutrient Intake  Physical Signs/Symptoms Outcomes:  Biochemical Data,Weight,Skin,Nutrition Focused Physical Findings,Fluid Status or Edema     Discharge Planning:    Continue current diet     Electronically signed by Cassy Garcia MS, RD, LD on 4/19/22 at 2:15 PM CDT    Contact: [phone]

## 2022-04-19 NOTE — DISCHARGE SUMMARY
Discharge Summary      Date:4/19/2022        Patient Name:Kendra Hamilton     YOB: 1945     Age:76 y.o. Admit Date:4/8/2022   Admission Condition:fair   Discharged Condition:stable  Discharge Date: 04/19/22       Discharge Diagnoses   Principal Problem:    Acute kidney injury superimposed on CKD [facility])  Active Problems:    Anemia due to chronic kidney disease    Abnormal finding on urinalysis    Nausea    Acute kidney injury superimposed on chronic kidney disease ([address]  Resolved Problems:    * No resolved hospital problems. [facility] Stay   Narrative of Hospital Course:     First encounter with pt 4/15/22     67-year-old female with past medical history of DVT, hypertension, lupus, thyroid disease, gastric bypass, and CKD who presented to [facility] ED complaining of abnormal labs.  Patient reported she was following up with a nurse practitioner in nephrology clinic and was noted to have decline in renal function. Renal ultrasound indicated nonobstructing calculi involving the mid and lower pole of the left kidney, a tiny cortical cyst in the midpole of the right kidney. UA in ED indicated coryneabacterium, however patient asymptomatic, Repeat urine culture with no growth. Nephrology evaluated patient in house. Patient noted to have some wheezing and indicated cough. Repeat chest x-ray obtained which was normal.  GI recommending tapering down on Seroquel and Topamax given transaminitis. Due to no significant improvement in renal function, patient was initiated on dialysis 4/16 after vscular surgery placement for PermCath placement 4/15. Outpatient dialysis was set up and patient was discharged home to follow-up outpatient with PCP as well as nephrologist for continued management of chronic medical problems. Physical Examination:  General: Well-developed, no acute distress lying comfortably in bed.   HEENT: Atraumatic normocephalic, range of motion normal. PermCath in place in the right anterior chest.  Cardiac: Normal S1-S2  Respiratory: clear To auscultation bilaterally, no rhonchi or rales, no wheezing  Abdomen: Soft, positive bowel sounds in all quadrants, no distention, nontender to palpation. Extremities: no tenderness, no edema, moves all extremities  Psych: Affect normal and good eye contact, behavioral normal.      Consultants:   IP CONSULT TO NEPHROLOGY  IP CONSULT TO VASCULAR SURGERY    Time Spent on Discharge:  35 minutes were spent in patient examination, evaluation, counseling as well as medication reconciliation, prescriptions for required medications, discharge plan and follow up. Surgeries/Procedures Performed:  Procedure(s):  PLACEMENT OF TUNNELED PERM-CATHETER       Significant Diagnostic Studies:   Recent Labs:  CBC:   Lab Results   Component Value Date    WBC 6.6 04/19/2022    RBC 2.78 04/19/2022    HGB 8.4 04/19/2022    HCT 28.0 04/19/2022    .7 04/19/2022    MCH 30.2 04/19/2022    MCHC 30.0 04/19/2022    RDW 14.3 04/19/2022     04/19/2022     BMP:    Lab Results   Component Value Date    GLUCOSE 89 04/19/2022     04/19/2022    K 4.1 04/19/2022     04/19/2022    CO2 26 04/19/2022    ANIONGAP 12 04/19/2022    BUN 15 04/19/2022    CREATININE 2.4 04/19/2022    CALCIUM 8.3 04/19/2022    LABGLOM 20 04/19/2022    GFRAA 24 04/19/2022       Radiology Last 7 Days:  No results found.     Discharge Plan   Disposition: Home    Provider Follow-Up:   [facility]  [facility]  [facility], [address] [place] [address],[address] Floor, [facility]  [phone]  [phone]  Go on 4/21/2022  to begin outpatient dialysis; [facility] TTS 5AM; first tx 4/21    Mirlande Milian MD  [address]  [phone]      Office will call pt with follow up appointment time and date         Patient Instructions   Diet: renal diet    Activity: activity as tolerated      Discharge Medications         Medication List CHANGE how you take these medications    QUEtiapine 100 MG tablet  Commonly known as: SEROQUEL  Take 1 tablet by mouth at bedtime  What changed:   · medication strength  · how much to take     topiramate 50 MG tablet  Commonly known as: TOPAMAX  Take 1 tablet by mouth 2 times daily  What changed:   · medication strength  · how much to take        CONTINUE taking these medications    allopurinol 100 MG tablet  Commonly known as: ZYLOPRIM     calcitRIOL 0.25 MCG capsule  Commonly known as: ROCALTROL     diazePAM 5 MG tablet  Commonly known as: VALIUM     dicyclomine 10 MG capsule  Commonly known as: BENTYL     Eliquis 5 MG Tabs tablet  Generic drug: apixaban     epoetin niurka 61181 UNIT/ML injection  Commonly known as: EPOGEN;PROCRIT     escitalopram 20 MG tablet  Commonly known as: LEXAPRO     gabapentin 300 MG capsule  Commonly known as: NEURONTIN     levothyroxine 150 MCG tablet  Commonly known as: SYNTHROID     Magnesium Oxide 400 MG Caps     metoprolol succinate 25 MG extended release tablet  Commonly known as: TOPROL XL     pantoprazole 40 MG tablet  Commonly known as: PROTONIX     PRENATAL VITAMIN PO     PROBIOTIC DAILY PO     promethazine 25 MG tablet  Commonly known as: PHENERGAN     SODIUM BICARBONATE PO     tiZANidine 4 MG tablet  Commonly known as: ZANAFLEX     vitamin B-12 100 MCG tablet  Commonly known as: CYANOCOBALAMIN     vitamin D 1.25 MG (74307 UT) Caps capsule  Commonly known as: ERGOCALCIFEROL        STOP taking these medications    amLODIPine 5 MG tablet  Commonly known as: NORVASC     aspirin 81 MG tablet     ferrous sulfate 325 (65 Fe) MG tablet  Commonly known as: IRON 325     FLUoxetine 10 MG capsule  Commonly known as: PROZAC     methocarbamol 500 MG tablet  Commonly known as: ROBAXIN     Micardis 40 MG tablet  Generic drug: telmisartan     OCUVITE EYE + MULTI PO     potassium chloride 20 MEQ packet  Commonly known as: KLOR-CON     rivaroxaban 10 MG Tabs tablet  Commonly known as: Tasia Estrada Where to Get Your Medications      These medications were sent to [facility], [address] [place] [address] [phone] Svetlana Landa, [address] [place] [address] Way    Phone: [phone]   · QUEtiapine 100 MG tablet  · topiramate 50 MG tablet         Electronically signed by Bety Mackey MD on 4/19/22 at 1:19 PM CDT

## 2022-04-19 NOTE — PLAN OF CARE
Problem: Falls - Risk of:  Goal: Will remain free from falls  Description: Will remain free from falls  Outcome: Ongoing  Goal: Absence of physical injury  Description: Absence of physical injury  Outcome: Ongoing     Problem: Pain:  Description: Pain management should include both nonpharmacologic and pharmacologic interventions. Goal: Pain level will decrease  Description: Pain level will decrease  Outcome: Ongoing  Goal: Control of acute pain  Description: Control of acute pain  Outcome: Ongoing  Goal: Control of chronic pain  Description: Control of chronic pain  Outcome: Ongoing     Problem: Tissue Perfusion - Renal, Altered:  Goal: Electrolytes within specified parameters  Description: Electrolytes within specified parameters  Outcome: Ongoing  Goal: Urine creatinine clearance will be within specified parameters  Description: Urine creatinine clearance will be within specified parameters  Outcome: Ongoing  Goal: Serum creatinine will be within specified parameters  Description: Serum creatinine will be within specified parameters  Outcome: Ongoing  Goal: Ability to achieve a balanced intake and output will improve  Description: Ability to achieve a balanced intake and output will improve  Outcome: Ongoing     Problem: Activity:  Goal: Fatigue will decrease  Description: Fatigue will decrease  Outcome: Ongoing  Goal: Ability to tolerate increased activity will improve  Description: Ability to tolerate increased activity will improve  Outcome: Ongoing  Goal: Ability to maintain optimal joint mobility will improve  Description: Ability to maintain optimal joint mobility will improve  Outcome: Ongoing     Problem:  Bowel/Gastric:  Goal: Ability to achieve a regular elimination pattern will improve  Description: Ability to achieve a regular elimination pattern will improve  Outcome: Ongoing     Problem: Cardiac:  Goal: Ability to maintain an adequate cardiac output will improve  Description: Ability to maintain Pain level will decrease  Description: Pain level will decrease  Outcome: Ongoing  Goal: General experience of comfort will improve  Description: General experience of comfort will improve  Outcome: Ongoing     Problem: Skin Integrity:  Goal: Skin integrity will improve  Description: Skin integrity will improve  Outcome: Ongoing  Goal: Signs of wound healing will improve  Description: Signs of wound healing will improve  Outcome: Ongoing     Problem: Tissue Perfusion:  Goal: Ability to maintain adequate tissue perfusion will improve  Description: Ability to maintain adequate tissue perfusion will improve  Outcome: Ongoing  Goal: Ability to maintain a stable neurologic state will improve  Description: Ability to maintain a stable neurologic state will improve  Outcome: Ongoing     Problem: Nutrition  Goal: Optimal nutrition therapy  Outcome: Ongoing     Problem: ABCDS Injury Assessment  Goal: Absence of physical injury  Outcome: Ongoing

## 2022-04-19 NOTE — PROGRESS NOTES
Nephrology ([facility]) Progress Note    Patient:  Shannan Ramsay  YOB: 1945  Date of Service: 4/19/2022  MRN: [id number]   Acct: [de-identified]   Primary Care Physician: Miguel Kay MD  Advance Directive: Full Code  Admit Date: 4/8/2022       Hospital Day: 11  Referring Provider: Evy Newby MD    Patient Seen, Chart, Consults notes, Labs, Radiology studies reviewed. Subjective:    Miguel Richards a 77 y. o. female for whom we were consulted for evaluation and treatment of acute kidney injury/chronic kidney disease.  She has history of stage IV chronic kidney disease and follows at the renal office.  She was directed to go to the hospital as she has significant decline in renal function on routine labs.  Patient has been complaining of chronic diarrhea.  Her p.o. intake of fluid has been fairly low.  Patient also has history of hypertension, lupus, hypothyroidism and gastric bypass surgery.  Hospital course remarkable for IV fluids administration and has very slow improvement of renal function. Decision was then made to start dialysis.  Toleratied first dialysis treatment without issue. She is s/p dialysis on 4/18. Today, she was minimally dyspneic but offered no other complaints and was anxious for discharge.      Allergies:  Codeine    Medicines:  Current Facility-Administered Medications   Medication Dose Route Frequency Provider Last Rate Last Admin    busPIRone (BUSPAR) tablet 10 mg  10 mg Oral TID Evy Newby MD   10 mg at 04/19/22 8812    hydrOXYzine (ATARAX) tablet 10 mg  10 mg Oral TID PRN Evy Newby MD   10 mg at 04/17/22 3092    HYDROcodone-acetaminophen (NORCO) 5-325 MG per tablet 1 tablet  1 tablet Oral Q4H PRN Deidre Moser MD   1 tablet at 04/18/22 0528    mupirocin (BACTROBAN) 2 % ointment   Nasal BID Deidre Moser MD   Given at 04/19/22 0905    topiramate (TOPAMAX) tablet 50 mg  50 mg Oral BID Deidre Moser MD   50 mg at 04/19/22 0902    guaiFENesin-dextromethorphan (ROBITUSSIN DM) 100-10 MG/5ML syrup 5 mL  5 mL Oral Q4H PRN Azeem Bowden MD   5 mL at 04/19/22 0915    polyethylene glycol (GLYCOLAX) packet 17 g  17 g Oral BID Azeem Bowden MD   17 g at 04/18/22 2156    calcitRIOL (ROCALTROL) capsule 0.25 mcg  0.25 mcg Oral Daily Azeem Bowden MD   0.25 mcg at 04/19/22 0902    vitamin D (ERGOCALCIFEROL) capsule 50,000 Units  50,000 Units Oral Once per day on Mon Wed Fri Azeem Bowden MD   50,000 Units at 04/18/22 1313    vitamin B-12 (CYANOCOBALAMIN) tablet 50 mcg  50 mcg Oral Daily Azeem Bowden MD   50 mcg at 04/19/22 0902    tiZANidine (ZANAFLEX) tablet 4 mg  4 mg Oral Q8H PRN Azeem Bowden MD   4 mg at 04/16/22 0744    dicyclomine (BENTYL) capsule 10 mg  10 mg Oral Daily Azeem Bowden MD   10 mg at 04/19/22 0902    escitalopram (LEXAPRO) tablet 20 mg  20 mg Oral Daily Azeem Bowden MD   20 mg at 04/19/22 2282    gabapentin (NEURONTIN) capsule 300 mg  300 mg Oral Nightly Azeem Bowden MD   300 mg at 04/18/22 2156    levothyroxine (SYNTHROID) tablet 137 mcg  137 mcg Oral Daily Azeem Bowden MD   137 mcg at 04/19/22 0542    metoprolol succinate (TOPROL XL) extended release tablet 25 mg  25 mg Oral Daily Azeem Bowden MD   25 mg at 04/19/22 0902    pantoprazole (PROTONIX) tablet 40 mg  40 mg Oral Daily Azeem Bowden MD   40 mg at 04/19/22 0902    magnesium oxide (MAG-OX) tablet 400 mg  400 mg Oral Daily Azeem Bowden MD   400 mg at 04/19/22 0902    sodium chloride flush 0.9 % injection 5-40 mL  5-40 mL IntraVENous 2 times per day Azeem Bowden MD   10 mL at 04/19/22 0915    sodium chloride flush 0.9 % injection 5-40 mL  5-40 mL IntraVENous PRN Azeem Bowden MD   10 mL at 04/12/22 1335    0.9 % sodium chloride infusion   IntraVENous PRN Azeem Bowden MD        heparin (porcine) injection 5,000 Units  5,000 Units SubCUTAneous 3 times per day Azeem Bowden MD   5,000 Units at 04/19/22 0542    ondansetron (ZOFRAN-ODT) disintegrating tablet 4 mg  4 mg Oral Q8H PRN Vaughn Olson MD        Or    ondansetron [facility] injection 4 mg  4 mg IntraVENous Q6H PRN Vaughn Olson MD   4 mg at 04/18/22 1806       Past Medical History:  Past Medical History:   Diagnosis Date    Arthritis     Asthma     DVT (deep vein thrombosis) in pregnancy     History of blood transfusion     Hx of blood clots     hAD dvt WAS ON Coumadin for a year. 2006    Hypertension     [facility] ([facility] [address]     Renal failure     Splenic artery aneurysm ([facility] [address]     Thyroid disease        Past Surgical History:  Past Surgical History:   Procedure Laterality Date    GASTRIC BYPASS SURGERY  1975    HYSTERECTOMY      JOINT REPLACEMENT Right     hip and shoulder    LYMPHADENECTOMY      LA TOTAL KNEE ARTHROPLASTY Left 11/27/2017    KNEE TOTAL ARTHROPLASTY performed by Janae Hopkins MD at [facility] Left 7/27/2020    LLEFT REVERSE TOTAL SHOULDER POLY EXCHANGE AND BICEPS TENOTOMY performed by Janae Hopkins MD at [facility] N/A 4/15/2022    PLACEMENT OF TUNNELED PERM-CATHETER performed by Vaughn Olson MD at [facility] History  History reviewed. No pertinent family history.     Social History  Social History     Socioeconomic History    Marital status:      Spouse name: Not on file    Number of children: Not on file    Years of education: Not on file    Highest education level: Not on file   Occupational History    Not on file   Tobacco Use    Smoking status: Never Smoker    Smokeless tobacco: Never Used   Substance and Sexual Activity    Alcohol use: No    Drug use: No    Sexual activity: Not on file   Other Topics Concern    Not on file   Social History Narrative    Not on file     Social Determinants of Health     Financial Resource Strain:     Difficulty of Paying Living Expenses: Not on file   Food Insecurity:     Worried About Running Out of Food in the Last Year: Not on file    Ran Out of Food in the Last Year: Not on file   Transportation Needs:     Lack of Transportation (Medical): Not on file    Lack of Transportation (Non-Medical): Not on file   Physical Activity:     Days of Exercise per Week: Not on file    Minutes of Exercise per Session: Not on file   Stress:     Feeling of Stress : Not on file   Social Connections:     Frequency of Communication with Friends and Family: Not on file    Frequency of Social Gatherings with Friends and Family: Not on file    Attends Synagogue Services: Not on file    Active Member of [address] [place] [address] [employer] or Organizations: Not on file    Attends Club or Organization Meetings: Not on file    Marital Status: Not on file   Intimate Partner Violence:     Fear of Current or Ex-Partner: Not on file    Emotionally Abused: Not on file    Physically Abused: Not on file    Sexually Abused: Not on file   Housing Stability:     Unable to Pay for Housing in the Last Year: Not on file    Number of [place] in the Last Year: Not on file    Unstable Housing in the Last Year: Not on file         Review of Systems:  History obtained from chart review and the patient  General ROS: No fever or chills  Respiratory ROS: No cough,+ shortness of breath, -wheezing  Cardiovascular ROS: no chest pain but dyspnea on exertion  Gastrointestinal ROS: No abdominal pain or melena  Genito-Urinary ROS: No dysuria or hematuria  Musculoskeletal ROS: No joint pain or swelling         Objective:  Blood pressure 112/77, pulse 72, temperature 98.7 °F (37.1 °C), resp. rate 18, height 5' 4\" (1.626 m), weight 159 lb 9.6 oz (72.4 kg), SpO2 96 %.     Intake/Output Summary (Last 24 hours) at 4/19/2022 1212  Last data filed at 4/19/2022 1002  Gross per 24 hour   Intake 360 ml   Output 1500 ml   Net -1140 ml     General: alert and oriented x3   Chest:  clear to auscultation bilaterally without respiratory distress  CVS: regular rate and rhythm  Abdominal: soft, nontender, normal bowel sounds  Extremities: no cyanosis or edema  Skin: warm and dry without rash    Labs:  BMP:   Recent Labs     04/17/22 0358 04/18/22 0131 04/19/22  0334    141 139   K 4.4 5.0 4.1    105 101   CO2 27 24 26   BUN 19 27* 15   CREATININE 2.8* 3.2* 2.4*   CALCIUM 8.4* 8.5* 8.3*     CBC:   Recent Labs     04/17/22 0358 04/18/22 0131 04/19/22  0334   WBC 6.3 7.0 6.6   HGB 7.9* 7.8* 8.4*   HCT 28.1* 27.1* 28.0*   .2* 103.0* 100.7*    167 190     LIVER PROFILE:   Recent Labs     04/17/22 0358 04/18/22 0131 04/19/22  0334   AST 20 19 17   ALT 43* 37* 31   BILITOT 0.3 0.3 0.3   ALKPHOS 225* 229* 217*     PT/INR: No results for input(s): PROTIME, INR in the last 72 hours. APTT: No results for input(s): APTT in the last 72 hours. BNP:  No results for input(s): BNP in the last 72 hours. Ionized Calcium:No results for input(s): IONCA in the last 72 hours. Magnesium:No results for input(s): MG in the last 72 hours. Phosphorus:No results for input(s): PHOS in the last 72 hours. HgbA1C: No results for input(s): LABA1C in the last 72 hours. Hepatic:   Recent Labs     04/17/22 0358 04/18/22 0131 04/19/22  0334   ALKPHOS 225* 229* 217*   ALT 43* 37* 31   AST 20 19 17   PROT 5.3* 5.1* 5.2*   BILITOT 0.3 0.3 0.3   LABALBU 3.1* 3.1* 3.4*     Lactic Acid: No results for input(s): LACTA in the last 72 hours. Troponin: No results for input(s): CKTOTAL, CKMB, TROPONINT in the last 72 hours. ABGs: No results for input(s): PH, PCO2, PO2, HCO3, O2SAT in the last 72 hours. CRP:  No results for input(s): CRP in the last 72 hours. Sed Rate:  No results for input(s): SEDRATE in the last 72 hours. Cultures:   No results for input(s): CULTURE in the last 72 hours.     Radiology reports as per the Radiologist  Radiology: US RENAL COMPLETE    Result Date: 4/9/2022  RENAL ULTRASOUND COMPLETE 4/9/2022 11:20 AM REASON FOR EXAM: Acute renal failure in the background of chronic kidney disease COMPARISON: None  TECHNIQUE: Multiple longitudinal and transverse realtime sonographic images of the kidneys and urinary bladder are obtained. FINDINGS: The right kidney measures 8.8 cm. The cortical thickness within the right kidney is 9 mm and 6 mm respectively in the upper and lower pole. The right kidney is normal in size, shape, contour and position. There is a small cortical cyst involving the midpole measuring 8 x 4 x 6 mm in size. The cortical thickness is normal, with preservation of the corticomedullary differentiation. The central echo complex is compact with no evidence for hydronephrosis. No nephrolithiasis or abnormal perinephric fluid collections are seen. No hydroureter. The left kidney measures 9.0 cm. The cortical thickness within the left kidney is 13 mm  and 10 mm respectively in the upper and lower pole. The left kidney is normal in size, shape, contour and position. The cortical thickness is normal, with preservation of the corticomedullary differentiation. The central echo complex is compact with no evidence for hydronephrosis. There is a shadowing calculus involving the lower pole of the left kidney measuring 9 x 5 x 9 mm in size as well as a mid pole calculus measuring 1.3 x 0.5 x 0.8 cm. . No hydroureter. Scanning through the pelvis reveals the bladder to be moderately distended with anechoic urine. The wall thickness and contour are normal. There is no surrounding ascites. 1. Nonobstructing calculi involving the mid and lower pole of the left kidney. There is a tiny cortical cyst in the midpole of the right kidney. No evidence of discrete solid mass or hydronephrosis. . Signed by Dr Seun Britt    Result Date: 4/8/2022  XR CHEST PORTABLE 4/8/2022 8:15 PM History: Shortness of breath. One view chest x-ray. Heart size is within normal limits. The mediastinum has a normal appearance. The lungs are adequately expanded with no pneumonia or pneumothorax.  There is mild chronic interstitial disease with scattered calcified granulomas. There is no significant pleural fluid. No congestive failure changes. 1. No acute disease. Signed by Dr Lisandro Quinteros   -Acute kidney injury/ATN  -Chronic kidney disease stage IV  -Hypertensive nephrosclerosis  -Metabolic acidosis  -Hyperphosphatemia  -Anemia chronic kidney disease  -Secondary hyperparathyroidism  -Elevated AST/ALT      Plan:  Outpatient dialysis is set up on Tuesday Thursdays and Saturday temporary dialysis cath. Patient is stable from renal standpoint to be discharged and continue dialysis on April 21.

## 2022-04-19 NOTE — PLAN OF CARE
Nutrition Problem #1: Inadequate oral intake,Altered nutrition-related lab values  Intervention: Food and/or Nutrient Delivery: Continue Current Diet  Nutritional Goals: PO intake 50% or greater for all meals.   Increased weight d/t fluid retention lost

## 2022-04-19 NOTE — PROGRESS NOTES
15:29p - Home oxygen evaluation performed and results are as follows: SaO2 = 93% on R/A at rest, SaO2 = 94% on 2 lpm O2(NC) at rest, SaO2 = 91% on R/A while ambulating, SaO2 = 93% on 2 lpm O2(NC) while ambulating. (Recovery SaO2).  TS RRT

## 2022-05-02 ENCOUNTER — HOSPITAL ENCOUNTER (OUTPATIENT)
Dept: CARDIOLOGY | Facility: HOSPITAL | Age: 77
Discharge: HOME | End: 2022-05-02
Admitting: PHYSICIAN ASSISTANT

## 2022-05-02 VITALS
BODY MASS INDEX: 26.58 KG/M2 | WEIGHT: 150 LBS | HEIGHT: 63 IN | SYSTOLIC BLOOD PRESSURE: 121 MMHG | DIASTOLIC BLOOD PRESSURE: 69 MMHG

## 2022-05-02 DIAGNOSIS — I50.22 CHRONIC SYSTOLIC HEART FAILURE: ICD-10-CM

## 2022-05-02 PROCEDURE — 93306 TTE W/DOPPLER COMPLETE: CPT | Performed by: INTERNAL MEDICINE

## 2022-05-02 PROCEDURE — 93306 TTE W/DOPPLER COMPLETE: CPT

## 2022-05-06 LAB
BH CV ECHO MEAS - AO MAX PG: 12.5 MMHG
BH CV ECHO MEAS - AO MEAN PG: 7 MMHG
BH CV ECHO MEAS - AO ROOT DIAM: 3.1 CM
BH CV ECHO MEAS - AO V2 MAX: 177 CM/SEC
BH CV ECHO MEAS - AO V2 VTI: 41.4 CM
BH CV ECHO MEAS - AVA(I,D): 1.88 CM2
BH CV ECHO MEAS - EDV(CUBED): 40 ML
BH CV ECHO MEAS - EDV(MOD-SP4): 50.3 ML
BH CV ECHO MEAS - EF(MOD-SP4): 55.1 %
BH CV ECHO MEAS - ESV(CUBED): 11.5 ML
BH CV ECHO MEAS - ESV(MOD-SP4): 22.6 ML
BH CV ECHO MEAS - FS: 33.9 %
BH CV ECHO MEAS - IVS/LVPW: 1.14 CM
BH CV ECHO MEAS - IVSD: 1.03 CM
BH CV ECHO MEAS - LA DIMENSION: 3 CM
BH CV ECHO MEAS - LAT PEAK E' VEL: 10.4 CM/SEC
BH CV ECHO MEAS - LV DIASTOLIC VOL/BSA (35-75): 29.4 CM2
BH CV ECHO MEAS - LV MASS(C)D: 94.7 GRAMS
BH CV ECHO MEAS - LV MAX PG: 4 MMHG
BH CV ECHO MEAS - LV MEAN PG: 2 MMHG
BH CV ECHO MEAS - LV SYSTOLIC VOL/BSA (12-30): 13.2 CM2
BH CV ECHO MEAS - LV V1 MAX: 100 CM/SEC
BH CV ECHO MEAS - LV V1 VTI: 24.8 CM
BH CV ECHO MEAS - LVIDD: 3.4 CM
BH CV ECHO MEAS - LVIDS: 2.26 CM
BH CV ECHO MEAS - LVOT AREA: 3.1 CM2
BH CV ECHO MEAS - LVOT DIAM: 2 CM
BH CV ECHO MEAS - LVPWD: 0.9 CM
BH CV ECHO MEAS - MED PEAK E' VEL: 6.9 CM/SEC
BH CV ECHO MEAS - MV A MAX VEL: 88.3 CM/SEC
BH CV ECHO MEAS - MV DEC TIME: 0.2 MSEC
BH CV ECHO MEAS - MV E MAX VEL: 91.2 CM/SEC
BH CV ECHO MEAS - MV E/A: 1.03
BH CV ECHO MEAS - RAP SYSTOLE: 5 MMHG
BH CV ECHO MEAS - RVSP: 22.5 MMHG
BH CV ECHO MEAS - SI(MOD-SP4): 16.2 ML/M2
BH CV ECHO MEAS - SV(LVOT): 77.9 ML
BH CV ECHO MEAS - SV(MOD-SP4): 27.7 ML
BH CV ECHO MEAS - TR MAX PG: 17.5 MMHG
BH CV ECHO MEAS - TR MAX VEL: 209 CM/SEC
BH CV ECHO MEASUREMENTS AVERAGE E/E' RATIO: 10.54
LEFT ATRIUM VOLUME INDEX: 32.7 ML/M2
LEFT ATRIUM VOLUME: 56 CM3
MAXIMAL PREDICTED HEART RATE: 145 BPM
STRESS TARGET HR: 123 BPM

## 2022-05-19 ENCOUNTER — OFFICE VISIT (OUTPATIENT)
Dept: CARDIOLOGY | Facility: CLINIC | Age: 77
End: 2022-05-19

## 2022-05-19 VITALS
HEART RATE: 50 BPM | WEIGHT: 156 LBS | OXYGEN SATURATION: 100 % | BODY MASS INDEX: 27.64 KG/M2 | HEIGHT: 63 IN | SYSTOLIC BLOOD PRESSURE: 116 MMHG | DIASTOLIC BLOOD PRESSURE: 70 MMHG

## 2022-05-19 DIAGNOSIS — I48.0 PAROXYSMAL ATRIAL FIBRILLATION: ICD-10-CM

## 2022-05-19 DIAGNOSIS — R06.02 SOB (SHORTNESS OF BREATH): Primary | ICD-10-CM

## 2022-05-19 DIAGNOSIS — R42 DIZZINESS: ICD-10-CM

## 2022-05-19 PROBLEM — Z01.818 PRE-OP EVALUATION: Status: RESOLVED | Noted: 2017-11-20 | Resolved: 2022-05-19

## 2022-05-19 PROBLEM — R10.9 INTRACTABLE ABDOMINAL PAIN: Status: RESOLVED | Noted: 2021-08-03 | Resolved: 2022-05-19

## 2022-05-19 PROBLEM — R10.9 ABDOMINAL PAIN: Status: RESOLVED | Noted: 2020-10-27 | Resolved: 2022-05-19

## 2022-05-19 PROCEDURE — 93000 ELECTROCARDIOGRAM COMPLETE: CPT | Performed by: INTERNAL MEDICINE

## 2022-05-19 PROCEDURE — 99204 OFFICE O/P NEW MOD 45 MIN: CPT | Performed by: INTERNAL MEDICINE

## 2022-05-19 RX ORDER — METOPROLOL SUCCINATE 25 MG/1
25 TABLET, EXTENDED RELEASE ORAL DAILY
COMMUNITY

## 2022-05-19 RX ORDER — TIZANIDINE 4 MG/1
4 TABLET ORAL NIGHTLY PRN
COMMUNITY

## 2022-05-19 NOTE — PROGRESS NOTES
Subjective:     Encounter Date:05/19/2022      Patient ID: Thao Mohr is a 76 y.o. female with paroxysmal atrial fibrillation, hypertension, chronic systolic heart failure, stage IV chronic kidney disease, previous DVT, systemic lupus erythematosus, presenting today for further evaluation and to establish care.    Referring Provider: Gali Rey PA-C  Reason for Referral: Chronic systolic heart failure    Chief Complaint: Here today to establish care    This is a 76-year-old female with advanced chronic kidney disease, currently on dialysis through a tunneled catheter, also with previous history of chronic systolic heart failure although most recent echocardiogram shows low normal systolic function, recently also diagnosed with paroxysmal atrial fibrillation, referred here to establish care.  The patient was recently hospitalized at [facility].  Unfortunately, I cannot access these records at this time.  She says that during that stay, she was diagnosed with atrial fibrillation.  This was also confirmed at her primary care provider's office.  She was placed on Eliquis.  She has tolerated this well with no significant bleeding issues.  Currently, she is being dialyzed which she says that this is hopefully just a temporary measure.  In any event, she has shortness of breath and dyspnea on exertion which are chronic and unchanged.  Over a period of years, this has gotten progressively worse.  She denies orthopnea, PND or significant edema.  She does have intermittent lightheadedness and dizziness that seem unrelated to episodes of atrial fibrillation.  She does describe what she thinks her episodes of atrial fibrillation with racing and pounding, usually lasting short periods of time and spontaneously resolving.  She denies having any significant chest pain.  Her blood pressure is generally well controlled.  Currently, she is tolerating all of her medicines well.      The following portions of the  "patient's history were reviewed and updated as appropriate: allergies, current medications, past family history, past medical history, past social history, past surgical history and problem list.     Past Medical History:   Diagnosis Date   • Acid reflux    • Aneurysm (HCC)     Pt states \"Somewhere near my spleen.\"   • Anxiety and depression    • Arthritis    • Diarrhea    • Generalized headaches    • Heart murmur    • History of transfusion    • Hypertension    • Hypothyroid    • Kidney stones    • Lupus (HCC)    • Migraines    • MRSA (methicillin resistant Staphylococcus aureus)     in past , on face   • Nausea    • PONV (postoperative nausea and vomiting)    • Renal failure     21%   • Thrombosis 2007    RIGHT KNEE     Past Surgical History:   Procedure Laterality Date   • APPENDECTOMY     • CHOLECYSTECTOMY     • COLONOSCOPY     • ENDOSCOPY N/A 10/30/2020    Procedure: ESOPHAGOGASTRODUODENOSCOPY WITH ANESTHESIA;  Surgeon: Naresh Heard DO;  Location: [facility] ENDOSCOPY;  Service: Gastroenterology;  Laterality: N/A;  preop; abdominal pain  postop; normal   PCP Greg Rey    • EXTRACORPOREAL SHOCK WAVE LITHOTRIPSY (ESWL) Right 8/16/2021    Procedure: EXTRACORPOREAL SHOCKWAVE LITHOTRIPSY RIGHT;  Surgeon: Ronnie Hayes MD;  Location: [facility] OR;  Service: Urology;  Laterality: Right;   • EXTRACORPOREAL SHOCK WAVE LITHOTRIPSY (ESWL) Left 1/17/2022    Procedure: EXTRACORPOREAL SHOCKWAVE LITHOTRIPSY LEFT;  Surgeon: Ronnie Hayes MD;  Location: [facility] OR;  Service: Urology;  Laterality: Left;   • HYSTERECTOMY     • JOINT REPLACEMENT     • REPLACEMENT TOTAL KNEE Left    • SHOULDER ROTATOR CUFF REPAIR Right    • TOTAL HIP ARTHROPLASTY Right    • TOTAL SHOULDER ARTHROPLASTY W/ DISTAL CLAVICLE EXCISION Left 12/27/2019    Procedure: LEFT REVERSE TOTAL SHOULDER ARTHROPLASTY;  Surgeon: Dhaval Yepez MD;  Location: [facility] OR;  Service: Orthopedics   • WRIST FRACTURE SURGERY Left 2 weeks ago       Current " Outpatient Medications:   •  allopurinol (ZYLOPRIM) 100 MG tablet, Take 100 mg by mouth Daily., Disp: , Rfl:   •  apixaban (ELIQUIS) 5 MG tablet tablet, Take 5 mg by mouth 2 (Two) Times a Day., Disp: , Rfl:   •  calcitriol (ROCALTROL) 0.25 MCG capsule, Take 0.25 mcg by mouth Daily., Disp: , Rfl:   •  diazePAM (VALIUM) 5 MG tablet, Take 5 mg by mouth Daily., Disp: , Rfl:   •  epoetin al (Procrit) 73882 UNIT/ML injection, Inject  under the skin into the appropriate area as directed As Needed., Disp: , Rfl:   •  escitalopram (LEXAPRO) 20 MG tablet, Take 20 mg by mouth Daily., Disp: , Rfl:   •  gabapentin (NEURONTIN) 300 MG capsule, Take 300 mg by mouth Daily As Needed (for pain)., Disp: , Rfl:   •  levothyroxine (SYNTHROID, LEVOTHROID) 175 MCG tablet, Take 150 mcg by mouth Daily., Disp: , Rfl:   •  magnesium oxide (MAGOX) 400 (241.3 MG) MG tablet tablet, Take 400 mg by mouth Daily., Disp: , Rfl:   •  metoprolol succinate XL (TOPROL-XL) 25 MG 24 hr tablet, Take 25 mg by mouth Daily., Disp: , Rfl:   •  pantoprazole (PROTONIX) 40 MG EC tablet, Take 40 mg by mouth Daily., Disp: , Rfl:   •  potassium chloride (K-DUR,KLOR-CON) 10 MEQ ER tablet, Take 20 mEq by mouth 2 (Two) Times a Day., Disp: , Rfl:   •  Prenatal Vit-Fe Fumarate-FA (PRENATAL VITAMIN PO), Take 1 tablet by mouth Daily., Disp: , Rfl:   •  Probiotic Product (PROBIOTIC DAILY PO), Take 1 tablet/day by mouth., Disp: , Rfl:   •  promethazine (PHENERGAN) 25 MG tablet, Take 25 mg by mouth Every 8 (Eight) Hours As Needed for Nausea or Vomiting., Disp: , Rfl:   •  QUEtiapine (SEROquel) 200 MG tablet, Take 200 mg by mouth Every Night., Disp: , Rfl:   •  SODIUM BICARBONATE PO, Take 650 mg by mouth 2 (Two) Times a Day As Needed (HEARTBURN)., Disp: , Rfl:   •  tiZANidine (ZANAFLEX) 4 MG tablet, Take 4 mg by mouth At Night As Needed for Muscle Spasms., Disp: , Rfl:   •  topiramate (TOPAMAX) 100 MG tablet, Take 100 mg by mouth Daily., Disp: , Rfl:   •  vitamin B-12  (CYANOCOBALAMIN) 1000 MCG tablet, Take 1,000 mcg by mouth Daily., Disp: , Rfl:   •  vitamin D (ERGOCALCIFEROL) 18729 UNITS capsule capsule, 50,000 Units 2 (Two) Times a Week. Sunday and Wednesday, Disp: , Rfl: 0    Allergies   Allergen Reactions   • Codeine Hives     IS ABLE TO TAKE PERCOCET   • Morphine Nausea And Vomiting     Social History     Tobacco Use   • Smoking status: Never Smoker   • Smokeless tobacco: Never Used   Substance Use Topics   • Alcohol use: No     Family History   Problem Relation Age of Onset   • Cancer Father    • Coronary artery disease Brother    • Colon cancer Paternal Aunt    • Colon polyps Neg Hx    • Esophageal cancer Neg Hx      Review of Systems   Constitutional: Negative for chills, fever and weight loss.   HENT: Negative for congestion and hearing loss.    Eyes: Negative for blurred vision and pain.   Cardiovascular: Positive for dyspnea on exertion. Negative for chest pain, leg swelling, orthopnea, palpitations, paroxysmal nocturnal dyspnea and syncope.   Respiratory: Positive for shortness of breath. Negative for cough and wheezing.    Endocrine: Negative for cold intolerance and heat intolerance.   Hematologic/Lymphatic: Negative for adenopathy and bleeding problem.   Skin: Negative for color change, poor wound healing and rash.   Musculoskeletal: Negative for myalgias and neck pain.   Gastrointestinal: Negative for abdominal pain, change in bowel habit, nausea and vomiting.   Genitourinary: Negative for dysuria and frequency.   Neurological: Positive for dizziness. Negative for focal weakness, headaches, light-headedness, loss of balance and numbness.   Psychiatric/Behavioral: Negative for altered mental status and memory loss.   Allergic/Immunologic: Negative for hives and persistent infections.       ECG 12 Lead    Date/Time: 5/19/2022 1:26 PM  Performed by: Ronnie Matute MD  Authorized by: Ronnie Matute MD   Comparison: compared with previous ECG from  8/10/2021  Similar to previous ECG  Rhythm: sinus rhythm  Ectopy: atrial premature contractions  Rate: normal  BPM: 95  Conduction: conduction normal  QRS axis: normal  Other findings: non-specific ST-T wave changes    Clinical impression: non-specific ECG               Objective:     Vitals reviewed.   Constitutional:       General: Not in acute distress.     Appearance: Normal appearance. Well-developed and not in distress. Chronically ill-appearing. Not toxic-appearing or diaphoretic.   Eyes:      General: Lids are normal.      Extraocular Movements: Extraocular movements intact.      Pupils: Pupils are equal, round, and reactive to light.   HENT:      Head: Normocephalic and atraumatic.      Right Ear: External ear normal.      Left Ear: External ear normal.      Nose: Nose normal.    Mouth/Throat:      Mouth: Mucous membranes are not pale, not dry and not cyanotic.   Neck:      Thyroid: No thyroid mass or thyromegaly.      Vascular: No carotid bruit, hepatojugular reflux or JVD.      Trachea: No tracheal deviation.      Lymphadenopathy: No cervical adenopathy.   Pulmonary:      Effort: Pulmonary effort is normal. No accessory muscle usage or respiratory distress.      Breath sounds: Normal breath sounds. No wheezing. No rhonchi. No rales.   Chest:      Chest wall: Not tender to palpatation.   Cardiovascular:      Normal rate. Regular rhythm.      Murmurs: There is a systolic murmur.      No gallop.   Pulses:     Intact distal pulses.   Edema:     Peripheral edema absent.   Abdominal:      General: Bowel sounds are normal. There is no distension or abdominal bruit.      Palpations: Abdomen is soft.      Tenderness: There is no abdominal tenderness.   Musculoskeletal:         General: No tenderness or deformity.      Extremities: No clubbing present.     Cervical back: Normal range of motion and neck supple. No edema. Skin:     General: Skin is warm and dry. There is no cyanosis.      Findings: No erythema or  "rash.   Neurological:      General: No focal deficit present.      Mental Status: Oriented to person, place, and time and oriented to person, place and time.      Cranial Nerves: No cranial nerve deficit.   Psychiatric:         Attention and Perception: Attention normal.         Mood and Affect: Mood normal.         Speech: Speech normal.         Behavior: Behavior normal. Behavior is cooperative.         Thought Content: Thought content normal.       /70   Pulse 50   Ht 160 cm (63\")   Wt 70.8 kg (156 lb)   SpO2 100%   BMI 27.63 kg/m²     Data/Lab Review:     Results for orders placed during the hospital encounter of 05/02/22    Adult Transthoracic Echo Complete W/ Cont if Necessary Per Protocol    Interpretation Summary  · Left ventricular systolic function is low normal. Left ventricular ejection fraction appears to be 51 - 55%.  · Normal right ventricular cavity size and systolic function noted.  · No significant valvular abnormalities identified on this study.    Lab Results   Component Value Date    WBC 6.6 04/19/2022    HGB 8.4 (L) 04/19/2022    HCT 28.0 (L) 04/19/2022    .7 (H) 04/19/2022     04/19/2022     Lab Results   Component Value Date    GLUCOSE 94 04/15/2022    CALCIUM 8.6 (L) 04/15/2022     04/15/2022    K 5.4 (H) 04/15/2022    CO2 30 (H) 04/15/2022     04/15/2022    BUN 40 (H) 04/15/2022    CREATININE 3.6 (H) 04/15/2022    EGFRIFAFRI 15 (L) 04/15/2022    EGFRIFNONA 12 (A) 04/15/2022    BCR 19.4 01/14/2022    ANIONGAP 11 04/15/2022     ECG from 4/8/2022 at  shows sinus rhythm, ventricular rate 77, poor R wave progression.  This was unable to be visualized, however the report was reviewed.      Assessment:          Diagnosis Plan   1. SOB (shortness of breath)  Adult Stress Echo W/ Cont or Stress Agent if Necessary Per Protocol   2. Paroxysmal atrial fibrillation (HCC)  Holter Monitor - 72 Hour Up To 15 Days    ECG 12 Lead   3. Dizziness        "   Plan:       1.  Shortness of breath: The patient describes chronic shortness of breath and dyspnea on exertion.  Symptoms are likely multifactorial, however the patient has not had an ischemic evaluation in the past.  Therefore, I think that an ischemic evaluation would be reasonable at this time.  We also discussed that certainly with renal failure, chronic anemia, etc., that the patient may have a multitude of reasons for shortness of breath.  She does not appear to be grossly volume overloaded at this time, however.  She is not in atrial fibrillation at this time.  Anemia is chronic and unchanged according to the patient.  Therefore, I think an ischemic evaluation would be reasonable.  We will order a stress test to further evaluate.    2.  Paroxysmal atrial fibrillation: The patient describes intermittent dizziness as well as palpitations.  While she does have a history of paroxysmal atrial fibrillation, she is not in atrial fibrillation at this time.  I think that a cardiac monitor would be reasonable to assess her overall burden.  She continues anticoagulant therapy as well.    3.  Dizziness: Potentially related to atrial fibrillation but unclear at this time.  We will place the patient on monitor to see if we can correlate any symptoms with episodes of atrial fibrillation.    Follow-up will be pending the results of the patient's studies.

## 2022-06-06 ENCOUNTER — HOSPITAL ENCOUNTER (OUTPATIENT)
Dept: CARDIOLOGY | Facility: HOSPITAL | Age: 77
Discharge: HOME | End: 2022-06-06
Admitting: INTERNAL MEDICINE

## 2022-06-06 VITALS
BODY MASS INDEX: 25.61 KG/M2 | DIASTOLIC BLOOD PRESSURE: 86 MMHG | SYSTOLIC BLOOD PRESSURE: 141 MMHG | HEIGHT: 64 IN | HEART RATE: 99 BPM | WEIGHT: 150 LBS

## 2022-06-06 DIAGNOSIS — R06.02 SOB (SHORTNESS OF BREATH): ICD-10-CM

## 2022-06-06 PROCEDURE — 0 DOBUTAMINE PER 250 MG: Performed by: INTERNAL MEDICINE

## 2022-06-06 PROCEDURE — 93018 CV STRESS TEST I&R ONLY: CPT | Performed by: INTERNAL MEDICINE

## 2022-06-06 PROCEDURE — 93017 CV STRESS TEST TRACING ONLY: CPT

## 2022-06-06 PROCEDURE — 93352 ADMIN ECG CONTRAST AGENT: CPT | Performed by: INTERNAL MEDICINE

## 2022-06-06 PROCEDURE — 93350 STRESS TTE ONLY: CPT

## 2022-06-06 PROCEDURE — 25010000002 PERFLUTREN 6.52 MG/ML SUSPENSION: Performed by: INTERNAL MEDICINE

## 2022-06-06 PROCEDURE — 93350 STRESS TTE ONLY: CPT | Performed by: INTERNAL MEDICINE

## 2022-06-06 RX ORDER — DOBUTAMINE HYDROCHLORIDE 100 MG/100ML
5-50 INJECTION INTRAVENOUS CONTINUOUS
Status: DISCONTINUED | OUTPATIENT
Start: 2022-06-06 | End: 2022-06-07 | Stop reason: HOSPADM

## 2022-06-06 RX ADMIN — Medication 10 MCG/KG/MIN: at 13:52

## 2022-06-06 RX ADMIN — PERFLUTREN 8.48 MG: 6.52 INJECTION, SUSPENSION INTRAVENOUS at 13:52

## 2022-06-07 LAB
BH CV STRESS BP STAGE 1: NORMAL
BH CV STRESS BP STAGE 2: NORMAL
BH CV STRESS BP STAGE 3: NORMAL
BH CV STRESS DOSE DOBUTAMINE STAGE 1: 10
BH CV STRESS DOSE DOBUTAMINE STAGE 2: 20
BH CV STRESS DOSE DOBUTAMINE STAGE 3: 30
BH CV STRESS DURATION MIN STAGE 1: 3
BH CV STRESS DURATION MIN STAGE 2: 3
BH CV STRESS DURATION MIN STAGE 3: 2
BH CV STRESS DURATION SEC STAGE 1: 0
BH CV STRESS DURATION SEC STAGE 2: 0
BH CV STRESS DURATION SEC STAGE 3: 10
BH CV STRESS HR STAGE 1: 97
BH CV STRESS HR STAGE 2: 117
BH CV STRESS HR STAGE 3: 129
BH CV STRESS PROTOCOL 1: NORMAL
BH CV STRESS RECOVERY BP: NORMAL MMHG
BH CV STRESS RECOVERY HR: 100 BPM
BH CV STRESS STAGE 1: 1
BH CV STRESS STAGE 2: 2
BH CV STRESS STAGE 3: 3
MAXIMAL PREDICTED HEART RATE: 145 BPM
PERCENT MAX PREDICTED HR: 88.97 %
STRESS BASELINE BP: NORMAL MMHG
STRESS BASELINE HR: 99 BPM
STRESS PERCENT HR: 105 %
STRESS POST EXERCISE DUR MIN: 8 MIN
STRESS POST EXERCISE DUR SEC: 10 SEC
STRESS POST PEAK BP: NORMAL MMHG
STRESS POST PEAK HR: 129 BPM
STRESS TARGET HR: 123 BPM

## 2022-07-08 ENCOUNTER — TELEPHONE (OUTPATIENT)
Dept: INFUSION THERAPY | Age: 77
End: 2022-07-08

## 2022-07-08 DIAGNOSIS — U07.1 COVID: Primary | ICD-10-CM

## 2022-07-08 RX ORDER — SODIUM CHLORIDE 0.9 % (FLUSH) 0.9 %
5-40 SYRINGE (ML) INJECTION PRN
Status: CANCELLED | OUTPATIENT
Start: 2022-07-08

## 2022-07-08 RX ORDER — HEPARIN SODIUM (PORCINE) LOCK FLUSH IV SOLN 100 UNIT/ML 100 UNIT/ML
500 SOLUTION INTRAVENOUS PRN
Status: CANCELLED | OUTPATIENT
Start: 2022-07-08

## 2022-07-08 RX ORDER — ALBUTEROL SULFATE 90 UG/1
4 AEROSOL, METERED RESPIRATORY (INHALATION) PRN
Status: CANCELLED | OUTPATIENT
Start: 2022-07-08

## 2022-07-08 RX ORDER — SODIUM CHLORIDE 9 MG/ML
INJECTION, SOLUTION INTRAVENOUS CONTINUOUS
Status: CANCELLED | OUTPATIENT
Start: 2022-07-08

## 2022-07-08 RX ORDER — SODIUM CHLORIDE 9 MG/ML
5-250 INJECTION, SOLUTION INTRAVENOUS PRN
Status: CANCELLED | OUTPATIENT
Start: 2022-07-08

## 2022-07-08 RX ORDER — EPINEPHRINE 1 MG/ML
0.3 INJECTION, SOLUTION, CONCENTRATE INTRAVENOUS PRN
Status: CANCELLED | OUTPATIENT
Start: 2022-07-08

## 2022-07-08 RX ORDER — ONDANSETRON 2 MG/ML
8 INJECTION INTRAMUSCULAR; INTRAVENOUS
Status: CANCELLED | OUTPATIENT
Start: 2022-07-08

## 2022-07-08 RX ORDER — DIPHENHYDRAMINE HYDROCHLORIDE 50 MG/ML
50 INJECTION INTRAMUSCULAR; INTRAVENOUS
Status: CANCELLED | OUTPATIENT
Start: 2022-07-08

## 2022-07-08 RX ORDER — ACETAMINOPHEN 325 MG/1
650 TABLET ORAL
Status: CANCELLED | OUTPATIENT
Start: 2022-07-08

## 2022-07-08 RX ORDER — BEBTELOVIMAB 87.5 MG/ML
175 INJECTION, SOLUTION INTRAVENOUS ONCE
Status: CANCELLED | OUTPATIENT
Start: 2022-07-08 | End: 2022-07-08

## 2022-07-08 RX ORDER — ACETAMINOPHEN 325 MG/1
650 TABLET ORAL ONCE
Status: CANCELLED
Start: 2022-07-08 | End: 2022-07-08

## 2022-09-19 ENCOUNTER — HOSPITAL ENCOUNTER (OUTPATIENT)
Dept: VASCULAR LAB | Age: 77
Discharge: HOME | End: 2022-09-19
Payer: MEDICARE

## 2022-09-19 DIAGNOSIS — N18.4 CHRONIC KIDNEY DISEASE, STAGE IV (SEVERE) (HCC): Primary | ICD-10-CM

## 2022-09-19 DIAGNOSIS — Z01.818 PREOP EXAMINATION: ICD-10-CM

## 2022-09-19 PROCEDURE — 93985 DUP-SCAN HEMO COMPL BI STD: CPT

## 2022-11-07 ENCOUNTER — CLINICAL SUPPORT (OUTPATIENT)
Dept: FAMILY MEDICINE CLINIC | Facility: CLINIC | Age: 77
End: 2022-11-07

## 2022-11-07 VITALS — RESPIRATION RATE: 20 BRPM | HEIGHT: 64 IN | WEIGHT: 150 LBS | BODY MASS INDEX: 25.61 KG/M2

## 2022-11-07 DIAGNOSIS — H61.21 EAR BUILD-UP, RIGHT: Primary | ICD-10-CM

## 2022-11-07 PROCEDURE — 69209 REMOVE IMPACTED EAR WAX UNI: CPT | Performed by: NURSE PRACTITIONER

## 2022-11-29 NOTE — PROGRESS NOTES
I had the pleasure of treating The patient only had removal of impacted ear wax of right ear with irrigation by Prasad MORFIN (MA) with no audiologic testing, Pt  tolerated procedure well.

## 2022-12-02 ENCOUNTER — NURSE TRIAGE (OUTPATIENT)
Dept: CALL CENTER | Facility: HOSPITAL | Age: 77
End: 2022-12-02

## 2022-12-02 NOTE — TELEPHONE ENCOUNTER
Dr. Rey notified and will send in patient Diazepam      Reason for Disposition  • Prescription refill request for a CONTROLLED substance (e.g., narcotics, ADHD medicines)    Additional Information  • Negative: [1] Intentional drug overdose AND [2] suicidal thoughts or ideas  • Negative: Drug overdose and triager unable to answer question  • Negative: Caller requesting information unrelated to medicine  • Negative: Caller requesting information about COVID-19 Vaccine  • Negative: Caller requesting information about Emergency Contraception  • Negative: Caller requesting information about Combined Birth Control Pills  • Negative: Caller requesting information about Progestin Birth Control Pills  • Negative: Caller requesting information about Post-Op pain or medicines  • Negative: Caller requesting a prescription antibiotic (such as Penicillin) for Strep throat and has a positive culture result  • Negative: Caller requesting a prescription anti-viral med (such as Tamiflu) and has influenza (flu)  symptoms  • Negative: Immunization reaction suspected  • Negative: Rash while taking a medicine or within 3 days of stopping it  • Negative: [1] Asthma and [2] having symptoms of asthma (cough, wheezing, etc.)  • Negative: [1] Symptom of illness (e.g., headache, abdominal pain, earache, vomiting) AND [2] more than mild  • Negative: Breastfeeding questions about mother's medicines and diet  • Negative: MORE THAN A DOUBLE DOSE of a prescription or over-the-counter (OTC) drug  • Negative: [1] DOUBLE DOSE (an extra dose or lesser amount) of prescription drug AND [2] any symptoms (e.g., dizziness, nausea, pain, sleepiness)  • Negative: [1] DOUBLE DOSE (an extra dose or lesser amount) of over-the-counter (OTC) drug AND [2] any symptoms (e.g., dizziness, nausea, pain, sleepiness)  • Negative: Took another person's prescription drug  • Negative: [1] DOUBLE DOSE (an extra dose or lesser amount) of prescription drug AND [2] NO  "symptoms (Exception: a double dose of antibiotics)  • Negative: Diabetes drug error or overdose (e.g., took wrong type of insulin or took extra dose)  • Negative: [1] Prescription refill request for ESSENTIAL medicine (i.e., likelihood of harm to patient if not taken) AND [2] triager unable to refill per department policy  • Negative: [1] Prescription not at pharmacy AND [2] was prescribed by PCP recently (Exception: triager has access to EMR and prescription is recorded there. Go to Home Care and confirm for pharmacy.)  • Negative: [1] Pharmacy calling with prescription question AND [2] triager unable to answer question  • Negative: [1] Caller has URGENT medicine question about med that PCP or specialist prescribed AND [2] triager unable to answer question  • Negative: Medicine patch causing local rash or itching  • Negative: [1] Caller has medicine question about med NOT prescribed by PCP AND [2] triager unable to answer question (e.g., compatibility with other med, storage)  • Negative: Prescription request for new medicine (not a refill)  • Negative: [1] Prescription refill request for NON-ESSENTIAL medicine (i.e., no harm to patient if med not taken) AND [2] triager unable to refill per department policy    Answer Assessment - Initial Assessment Questions  1. NAME of MEDICATION: \"What medicine are you calling about?\"      Diazepam   2. QUESTION: \"What is your question?\" (e.g., medication refill, side effect)      Refill not called in yesterday  3. PRESCRIBING HCP: \"Who prescribed it?\" Reason: if prescribed by specialist, call should be referred to that group.      Dr. Greg Rey  4. SYMPTOMS: \"Do you have any symptoms?\"      No  5. SEVERITY: If symptoms are present, ask \"Are they mild, moderate or severe?\"     No  6. PREGNANCY:  \"Is there any chance that you are pregnant?\" \"When was your last menstrual period?\"      NO    Protocols used: MEDICATION QUESTION CALL-ADULT-AH      "

## 2023-01-09 ENCOUNTER — OFFICE VISIT (OUTPATIENT)
Dept: FAMILY MEDICINE CLINIC | Facility: CLINIC | Age: 78
End: 2023-01-09
Payer: MEDICARE

## 2023-01-09 VITALS
OXYGEN SATURATION: 98 % | SYSTOLIC BLOOD PRESSURE: 143 MMHG | TEMPERATURE: 99.3 F | WEIGHT: 173 LBS | HEART RATE: 89 BPM | BODY MASS INDEX: 28.82 KG/M2 | RESPIRATION RATE: 16 BRPM | DIASTOLIC BLOOD PRESSURE: 99 MMHG | HEIGHT: 65 IN

## 2023-01-09 DIAGNOSIS — H10.9 BACTERIAL CONJUNCTIVITIS OF BOTH EYES: Primary | ICD-10-CM

## 2023-01-09 DIAGNOSIS — J01.00 ACUTE NON-RECURRENT MAXILLARY SINUSITIS: ICD-10-CM

## 2023-01-09 DIAGNOSIS — B96.89 BACTERIAL CONJUNCTIVITIS OF BOTH EYES: Primary | ICD-10-CM

## 2023-01-09 PROCEDURE — 99213 OFFICE O/P EST LOW 20 MIN: CPT | Performed by: NURSE PRACTITIONER

## 2023-01-09 RX ORDER — DOXYCYCLINE HYCLATE 100 MG/1
100 CAPSULE ORAL 2 TIMES DAILY
Qty: 14 CAPSULE | Refills: 0 | Status: SHIPPED | OUTPATIENT
Start: 2023-01-09 | End: 2023-01-16

## 2023-01-09 RX ORDER — TOBRAMYCIN AND DEXAMETHASONE 3; 1 MG/ML; MG/ML
1 SUSPENSION/ DROPS OPHTHALMIC
Qty: 2 ML | Refills: 0 | Status: SHIPPED | OUTPATIENT
Start: 2023-01-09 | End: 2023-01-16

## 2023-01-09 NOTE — PROGRESS NOTES
"        Subjective     Chief Complaint   Patient presents with   • Eye Problem     Left eye. Purulent drainage, swelling in eye and left side of face. X2 days.        History of Present Illness  Symptoms started 2 days ago.  Left eye is painful and has swelling around it, denies itching.  Yellowish drainage that has now spread from left eye to right eye.  Sinus presure behind left eye.       Patient's PMR from outside medical facility reviewed and noted.    Review of Systems   Constitutional: Negative for fever.   HENT: Positive for sinus pressure and sinus pain.    Eyes: Positive for pain and discharge. Negative for photophobia, redness and itching.        Otherwise complete ROS reviewed and negative except as mentioned in the HPI.    Past Medical History:   Past Medical History:   Diagnosis Date   • Acid reflux    • Aneurysm (HCC)     Pt states \"Somewhere near my spleen.\"   • Anxiety and depression    • Arthritis    • Diarrhea    • Generalized headaches    • Heart murmur    • History of transfusion    • Hypertension    • Hypothyroid    • Kidney stones    • Lupus (HCC)    • Migraines    • MRSA (methicillin resistant Staphylococcus aureus)     in past , on face   • Nausea    • PONV (postoperative nausea and vomiting)    • Renal failure     21%   • Thrombosis 2007    RIGHT KNEE     Past Surgical History:  Past Surgical History:   Procedure Laterality Date   • APPENDECTOMY     • CHOLECYSTECTOMY     • COLONOSCOPY     • ENDOSCOPY N/A 10/30/2020    Procedure: ESOPHAGOGASTRODUODENOSCOPY WITH ANESTHESIA;  Surgeon: Naresh Heard DO;  Location: [facility] ENDOSCOPY;  Service: Gastroenterology;  Laterality: N/A;  preop; abdominal pain  postop; normal   PCP Greg Rey    • EXTRACORPOREAL SHOCK WAVE LITHOTRIPSY (ESWL) Right 8/16/2021    Procedure: EXTRACORPOREAL SHOCKWAVE LITHOTRIPSY RIGHT;  Surgeon: Ronnie Hayes MD;  Location: [facility] OR;  Service: Urology;  Laterality: Right;   • EXTRACORPOREAL SHOCK WAVE " LITHOTRIPSY (ESWL) Left 1/17/2022    Procedure: EXTRACORPOREAL SHOCKWAVE LITHOTRIPSY LEFT;  Surgeon: Ronnie Hayes MD;  Location: [facility] OR;  Service: Urology;  Laterality: Left;   • HYSTERECTOMY     • JOINT REPLACEMENT     • REPLACEMENT TOTAL KNEE Left    • SHOULDER ROTATOR CUFF REPAIR Right    • TOTAL HIP ARTHROPLASTY Right    • TOTAL SHOULDER ARTHROPLASTY W/ DISTAL CLAVICLE EXCISION Left 12/27/2019    Procedure: LEFT REVERSE TOTAL SHOULDER ARTHROPLASTY;  Surgeon: Dhaval Yepez MD;  Location: [facility] OR;  Service: Orthopedics   • WRIST FRACTURE SURGERY Left 2 weeks ago     Social History:  reports that she has never smoked. She has never used smokeless tobacco. She reports that she does not drink alcohol and does not use drugs.    Family History: family history includes Cancer in her father; Colon cancer in her paternal aunt; Coronary artery disease in her brother.      Allergies:  Allergies   Allergen Reactions   • Codeine Hives     IS ABLE TO TAKE PERCOCET   • Morphine Nausea And Vomiting     Medications:  Prior to Admission medications    Medication Sig Start Date End Date Taking? Authorizing Provider   allopurinol (ZYLOPRIM) 100 MG tablet Take 100 mg by mouth Daily.   Yes Mariano Moulton MD   apixaban (ELIQUIS) 5 MG tablet tablet Take 5 mg by mouth 2 (Two) Times a Day.   Yes Mariano Moulton MD   calcitriol (ROCALTROL) 0.25 MCG capsule Take 0.25 mcg by mouth Daily.   Yes Mariano Moulton MD   diazePAM (VALIUM) 5 MG tablet Take 5 mg by mouth Daily.   Yes Mariano Moulton MD   epoetin al (Procrit) 86136 UNIT/ML injection Inject  under the skin into the appropriate area as directed As Needed.   Yes Mariano Moulton MD   escitalopram (LEXAPRO) 20 MG tablet Take 20 mg by mouth Daily. 10/12/21  Yes Mariano Moulton MD   gabapentin (NEURONTIN) 300 MG capsule Take 300 mg by mouth Daily As Needed (for pain).   Yes Mariano Moulton MD   levothyroxine (SYNTHROID, LEVOTHROID)  "175 MCG tablet Take 150 mcg by mouth Daily.   Yes Mariano Moulton MD   magnesium oxide (MAGOX) 400 (241.3 MG) MG tablet tablet Take 400 mg by mouth Daily.   Yes Mariano Moulton MD   metoprolol succinate XL (TOPROL-XL) 25 MG 24 hr tablet Take 25 mg by mouth Daily.   Yes Mariano Moulton MD   pantoprazole (PROTONIX) 40 MG EC tablet Take 40 mg by mouth Daily.   Yes Mariano Moulton MD   potassium chloride (K-DUR,KLOR-CON) 10 MEQ ER tablet Take 20 mEq by mouth 2 (Two) Times a Day.   Yes Mariano Moulton MD   Prenatal Vit-Fe Fumarate-FA (PRENATAL VITAMIN PO) Take 1 tablet by mouth Daily.   Yes Mariano Moulton MD   Probiotic Product (PROBIOTIC DAILY PO) Take 1 tablet/day by mouth.   Yes Mariano Moulton MD   promethazine (PHENERGAN) 25 MG tablet Take 25 mg by mouth Every 8 (Eight) Hours As Needed for Nausea or Vomiting.   Yes Mariano Moulton MD   QUEtiapine (SEROquel) 200 MG tablet Take 200 mg by mouth Every Night.   Yes Mariano Moulton MD   SODIUM BICARBONATE PO Take 650 mg by mouth 2 (Two) Times a Day As Needed (HEARTBURN).   Yes Mariano Moulton MD   tiZANidine (ZANAFLEX) 4 MG tablet Take 4 mg by mouth At Night As Needed for Muscle Spasms.   Yes Mariano Moulton MD   topiramate (TOPAMAX) 100 MG tablet Take 100 mg by mouth Daily.   Yes Mariano Moulton MD   vitamin B-12 (CYANOCOBALAMIN) 1000 MCG tablet Take 1,000 mcg by mouth Daily.   Yes Mariano Moulton MD   vitamin D (ERGOCALCIFEROL) 88557 UNITS capsule capsule 50,000 Units 2 (Two) Times a Week. Sunday and Wednesday 8/21/16  Yes Mariano Moulton MD         Objective     Vital Signs: /99 (BP Location: Left arm, Patient Position: Sitting, Cuff Size: Adult)   Pulse 89   Temp 99.3 °F (37.4 °C) (Infrared)   Resp 16   Ht 165.1 cm (65\")   Wt 78.5 kg (173 lb)   SpO2 98%   BMI 28.79 kg/m²   Physical Exam  Vitals and nursing note reviewed.   Constitutional:       Appearance: Normal " appearance.   HENT:      Head: Normocephalic.      Right Ear: Tympanic membrane normal.      Left Ear: Tympanic membrane is bulging.      Nose:      Right Sinus: No maxillary sinus tenderness or frontal sinus tenderness.      Left Sinus: Maxillary sinus tenderness and frontal sinus tenderness present.      Mouth/Throat:      Mouth: Mucous membranes are moist.   Eyes:      General:         Left eye: Discharge present.No foreign body.      Extraocular Movements: Extraocular movements intact.      Conjunctiva/sclera:      Left eye: Left conjunctiva is injected.      Pupils: Pupils are equal, round, and reactive to light.   Cardiovascular:      Rate and Rhythm: Normal rate and regular rhythm.      Pulses: Normal pulses.      Heart sounds: Normal heart sounds.   Pulmonary:      Effort: Pulmonary effort is normal.      Breath sounds: Normal breath sounds.   Abdominal:      General: Bowel sounds are normal.      Palpations: Abdomen is soft.   Musculoskeletal:         General: Normal range of motion.      Cervical back: Normal range of motion.   Skin:     General: Skin is warm and dry.   Neurological:      General: No focal deficit present.      Mental Status: She is alert and oriented to person, place, and time.   Psychiatric:         Mood and Affect: Mood normal.         Behavior: Behavior normal.         Thought Content: Thought content normal.         Judgment: Judgment normal.         BMI is >= 25 and <30. (Overweight) The following options were offered after discussion;: not discussed.      Advance Care Planning   ACP discussion was held with the patient during this visit.         Results Reviewed:  Glucose   Date Value Ref Range Status   04/15/2022 94 74 - 109 mg/dL Final     BUN   Date Value Ref Range Status   04/15/2022 40 (H) 8 - 23 mg/dL Final     Creatinine   Date Value Ref Range Status   04/15/2022 3.6 (H) 0.5 - 0.9 mg/dL Final     Sodium   Date Value Ref Range Status   04/15/2022 143 136 - 145 mmol/L Final      Potassium   Date Value Ref Range Status   04/15/2022 5.4 (H) 3.5 - 5.0 mmol/L Final     Chloride   Date Value Ref Range Status   04/15/2022 102 98 - 111 mmol/L Final     CO2   Date Value Ref Range Status   04/15/2022 30 (H) 22 - 29 mmol/L Final     Calcium   Date Value Ref Range Status   04/15/2022 8.6 (L) 8.8 - 10.2 mg/dL Final     ALT (SGPT)   Date Value Ref Range Status   04/11/2022 250 (H) 5 - 33 U/L Final     AST (SGOT)   Date Value Ref Range Status   04/11/2022 224 (H) 5 - 32 U/L Final     WBC   Date Value Ref Range Status   04/19/2022 6.6 4.8 - 10.8 K/uL Final     Hematocrit   Date Value Ref Range Status   04/19/2022 28.0 (L) 37.0 - 47.0 % Final     Platelets   Date Value Ref Range Status   04/19/2022 190 130 - 400 K/uL Final         Assessment / Plan     Assessment/Plan     Diagnoses and all orders for this visit:    1. Bacterial conjunctivitis of both eyes (Primary)  -     tobramycin-dexamethasone (TobraDex) 0.3-0.1 % ophthalmic suspension; Administer 1 drop to both eyes Every 4 (Four) Hours While Awake for 7 days.  Dispense: 2 mL; Refill: 0    2. Acute non-recurrent maxillary sinusitis  -     doxycycline (VIBRAMYCIN) 100 MG capsule; Take 1 capsule by mouth 2 (Two) Times a Day for 7 days.  Dispense: 14 capsule; Refill: 0         An After Visit Summary was printed and given to the patient at discharge.  Return if symptoms worsen or fail to improve.    I have discussed the patient results/orders and plan/recommendation with them at today's visit.      Varsha Sotelo, APRN   01/09/2023

## 2023-02-07 DIAGNOSIS — N20.0 KIDNEY STONES: Primary | ICD-10-CM

## 2023-02-28 ENCOUNTER — HOSPITAL ENCOUNTER (OUTPATIENT)
Dept: NUCLEAR MEDICINE | Age: 78
Discharge: HOME | End: 2023-03-02
Payer: MEDICARE

## 2023-02-28 DIAGNOSIS — M25.551 RIGHT HIP PAIN: ICD-10-CM

## 2023-02-28 DIAGNOSIS — T84.51XA INFECTION AND INFLAMMATORY REACTION DUE TO INTERNAL RIGHT HIP PROSTHESIS, INITIAL ENCOUNTER (HCC): ICD-10-CM

## 2023-02-28 LAB
BASOPHILS ABSOLUTE: 0.1 K/UL (ref 0–0.2)
BASOPHILS RELATIVE PERCENT: 1.4 % (ref 0–1)
C-REACTIVE PROTEIN: <0.3 MG/DL (ref 0–0.5)
EOSINOPHILS ABSOLUTE: 0.5 K/UL (ref 0–0.6)
EOSINOPHILS RELATIVE PERCENT: 8.1 % (ref 0–5)
HCT VFR BLD CALC: 35 % (ref 37–47)
HEMOGLOBIN: 10.6 G/DL (ref 12–16)
IMMATURE GRANULOCYTES #: 0 K/UL
LYMPHOCYTES ABSOLUTE: 1.9 K/UL (ref 1.1–4.5)
LYMPHOCYTES RELATIVE PERCENT: 32 % (ref 20–40)
MCH RBC QN AUTO: 31.5 PG (ref 27–31)
MCHC RBC AUTO-ENTMCNC: 30.3 G/DL (ref 33–37)
MCV RBC AUTO: 104.2 FL (ref 81–99)
MONOCYTES ABSOLUTE: 0.5 K/UL (ref 0–0.9)
MONOCYTES RELATIVE PERCENT: 8.6 % (ref 0–10)
NEUTROPHILS ABSOLUTE: 2.9 K/UL (ref 1.5–7.5)
NEUTROPHILS RELATIVE PERCENT: 49.6 % (ref 50–65)
PDW BLD-RTO: 15.4 % (ref 11.5–14.5)
PLATELET # BLD: 236 K/UL (ref 130–400)
PMV BLD AUTO: 11.2 FL (ref 9.4–12.3)
RBC # BLD: 3.36 M/UL (ref 4.2–5.4)
SEDIMENTATION RATE, ERYTHROCYTE: 25 MM/HR (ref 0–25)
WBC # BLD: 5.8 K/UL (ref 4.8–10.8)

## 2023-02-28 PROCEDURE — 3430000000 HC RX DIAGNOSTIC RADIOPHARMACEUTICAL: Performed by: ORTHOPAEDIC SURGERY

## 2023-02-28 PROCEDURE — 78315 BONE IMAGING 3 PHASE: CPT

## 2023-02-28 PROCEDURE — A9503 TC99M MEDRONATE: HCPCS | Performed by: ORTHOPAEDIC SURGERY

## 2023-02-28 RX ORDER — TC 99M MEDRONATE 20 MG/10ML
20 INJECTION, POWDER, LYOPHILIZED, FOR SOLUTION INTRAVENOUS
Status: COMPLETED | OUTPATIENT
Start: 2023-02-28 | End: 2023-02-28

## 2023-02-28 RX ADMIN — TC 99M MEDRONATE 20 MILLICURIE: 20 INJECTION, POWDER, LYOPHILIZED, FOR SOLUTION INTRAVENOUS at 11:05

## 2023-03-03 LAB
CHROMIUM, SERUM: 1.1 UG/L
COBALT, PLASMA: <1 UG/L

## 2023-07-26 ENCOUNTER — OFFICE VISIT (OUTPATIENT)
Dept: FAMILY MEDICINE CLINIC | Facility: CLINIC | Age: 78
End: 2023-07-26
Payer: MEDICARE

## 2023-07-26 VITALS
TEMPERATURE: 97.8 F | SYSTOLIC BLOOD PRESSURE: 117 MMHG | OXYGEN SATURATION: 97 % | DIASTOLIC BLOOD PRESSURE: 76 MMHG | HEIGHT: 64 IN | BODY MASS INDEX: 27.49 KG/M2 | WEIGHT: 161 LBS | HEART RATE: 102 BPM | RESPIRATION RATE: 18 BRPM

## 2023-07-26 DIAGNOSIS — R06.02 SHORTNESS OF BREATH: Primary | ICD-10-CM

## 2023-07-26 DIAGNOSIS — R23.1 PALE COMPLEXION: ICD-10-CM

## 2023-07-26 DIAGNOSIS — R42 DIZZINESS: ICD-10-CM

## 2023-07-26 DIAGNOSIS — R53.83 FATIGUE, UNSPECIFIED TYPE: ICD-10-CM

## 2023-07-26 DIAGNOSIS — R55 NEAR SYNCOPE: ICD-10-CM

## 2023-07-26 DIAGNOSIS — R11.2 NAUSEA AND VOMITING, UNSPECIFIED VOMITING TYPE: ICD-10-CM

## 2023-07-26 DIAGNOSIS — Z79.01 LONG TERM CURRENT USE OF ANTICOAGULANT: ICD-10-CM

## 2023-07-26 RX ORDER — PHENAZOPYRIDINE HYDROCHLORIDE 200 MG/1
TABLET, FILM COATED ORAL
COMMUNITY
Start: 2023-07-07

## 2023-07-26 RX ORDER — CEPHALEXIN 500 MG/1
CAPSULE ORAL
COMMUNITY

## 2023-07-26 RX ORDER — AMLODIPINE BESYLATE 5 MG/1
5 TABLET ORAL
COMMUNITY

## 2023-07-26 NOTE — PROGRESS NOTES
"        Subjective     Chief Complaint   Patient presents with    Vomiting    Dizziness    unable to eat    Insomnia       History of Present Illness    [place] [facility] and \"lung infection\" on 7/6/23 was treated with pyridium and a cephalosporin.   She is dizzy constantly and it is worse with movement.  She does not feel dizzy when she is laying down.  She states she has almost \"passed out\" several times over the last 4-5 days and that her  caught her.  Denies loss of consciousness.  She feels short of breath.  She vomited thick white emesis 4-5 days ago and has been dry heaving since.  She is nauseous constantly and it is worse with movement and food for 5-6 days.  She is able to eat and drink small amounts but is eating and drinking significantly less than normal.  She has stomach cramping but denies blood in stools, blood in emesis or coffee ground emesis.  She had a gastric bypass 30 years ago and has chronic diarrhea since.  She does not feel like diarrhea is worse currently.      Patient's PMR from outside medical facility reviewed and noted.    Review of Systems   Constitutional:  Positive for fatigue. Negative for fever.   Respiratory:  Positive for shortness of breath and wheezing.    Gastrointestinal:  Positive for abdominal pain, diarrhea, nausea and vomiting. Negative for blood in stool.   Neurological:  Positive for dizziness, syncope (near syncope) and light-headedness.      Otherwise complete ROS reviewed and negative except as mentioned in the HPI.    Past Medical History:   Past Medical History:   Diagnosis Date    Acid reflux     Aneurysm     Pt states \"Somewhere near my spleen.\"    Anxiety and depression     Arthritis     Diarrhea     Generalized headaches     Heart murmur     History of transfusion     Hypertension     Hypothyroid     Kidney stones     Lupus     Migraines     MRSA (methicillin resistant Staphylococcus aureus)     in past , on face    Nausea     " PONV (postoperative nausea and vomiting)     Renal failure     21%    Thrombosis 2007    RIGHT KNEE     Past Surgical History:  Past Surgical History:   Procedure Laterality Date    APPENDECTOMY      CHOLECYSTECTOMY      COLONOSCOPY      ENDOSCOPY N/A 10/30/2020    Procedure: ESOPHAGOGASTRODUODENOSCOPY WITH ANESTHESIA;  Surgeon: Naresh Heard DO;  Location: [facility] ENDOSCOPY;  Service: Gastroenterology;  Laterality: N/A;  preop; abdominal pain  postop; normal   PCP Greg Rey     EXTRACORPOREAL SHOCK WAVE LITHOTRIPSY (ESWL) Right 8/16/2021    Procedure: EXTRACORPOREAL SHOCKWAVE LITHOTRIPSY RIGHT;  Surgeon: Ronnie Hayes MD;  Location: [facility] OR;  Service: Urology;  Laterality: Right;    EXTRACORPOREAL SHOCK WAVE LITHOTRIPSY (ESWL) Left 1/17/2022    Procedure: EXTRACORPOREAL SHOCKWAVE LITHOTRIPSY LEFT;  Surgeon: Ronnie Hayes MD;  Location: [facility] OR;  Service: Urology;  Laterality: Left;    HYSTERECTOMY      JOINT REPLACEMENT      REPLACEMENT TOTAL KNEE Left     SHOULDER ROTATOR CUFF REPAIR Right     TOTAL HIP ARTHROPLASTY Right     TOTAL SHOULDER ARTHROPLASTY W/ DISTAL CLAVICLE EXCISION Left 12/27/2019    Procedure: LEFT REVERSE TOTAL SHOULDER ARTHROPLASTY;  Surgeon: Dhaval Yepez MD;  Location: [facility] OR;  Service: Orthopedics    WRIST FRACTURE SURGERY Left 2 weeks ago     Social History:  reports that she has never smoked. She has never used smokeless tobacco. She reports that she does not drink alcohol and does not use drugs.    Family History: family history includes Cancer in her father; Colon cancer in her paternal aunt; Coronary artery disease in her brother.      Allergies:  Allergies   Allergen Reactions    Codeine Hives     IS ABLE TO TAKE PERCOCET    Morphine Nausea And Vomiting     Medications:  Prior to Admission medications    Medication Sig Start Date End Date Taking? Authorizing Provider   allopurinol (ZYLOPRIM) 100 MG tablet Take 1 tablet by mouth Daily.   Yes Provider,  MD Mariano   amLODIPine (NORVASC) 5 MG tablet 1 tablet.   Yes Mariano Moulton MD   apixaban (ELIQUIS) 5 MG tablet tablet Take 1 tablet by mouth 2 (Two) Times a Day.   Yes Mariano Moulton MD   calcitriol (ROCALTROL) 0.25 MCG capsule Take 1 capsule by mouth Daily.   Yes Mariano Moulton MD   diazePAM (VALIUM) 5 MG tablet Take 1 tablet by mouth Daily.   Yes Mariano Moulton MD   epoetin al (Procrit) 28181 UNIT/ML injection Inject  under the skin into the appropriate area as directed As Needed.   Yes Mariano Moulton MD   escitalopram (LEXAPRO) 20 MG tablet Take 1 tablet by mouth Daily. 10/12/21  Yes Mariano Moulton MD   gabapentin (NEURONTIN) 300 MG capsule Take 1 capsule by mouth Daily As Needed (for pain).   Yes Mariano Moulton MD   levothyroxine (SYNTHROID, LEVOTHROID) 175 MCG tablet Take 150 mcg by mouth Daily.   Yes Mariano Moulton MD   magnesium oxide (MAGOX) 400 (241.3 MG) MG tablet tablet Take 1 tablet by mouth Daily.   Yes Mariano Moulton MD   metoprolol succinate XL (TOPROL-XL) 25 MG 24 hr tablet Take 1 tablet by mouth Daily.   Yes Mariano Moulton MD   pantoprazole (PROTONIX) 40 MG EC tablet Take 1 tablet by mouth Daily.   Yes Mariano Moulton MD   phenazopyridine (PYRIDIUM) 200 MG tablet  7/7/23  Yes Mariano Moulton MD   potassium chloride (K-DUR,KLOR-CON) 10 MEQ ER tablet Take 2 tablets by mouth 2 (Two) Times a Day.   Yes Mariano Moulton MD   Prenatal Vit-Fe Fumarate-FA (PRENATAL VITAMIN PO) Take 1 tablet by mouth Daily.   Yes Mariano Moulton MD   Probiotic Product (PROBIOTIC DAILY PO) Take 1 tablet/day by mouth.   Yes Mariano Moulton MD   promethazine (PHENERGAN) 25 MG tablet Take 1 tablet by mouth Every 8 (Eight) Hours As Needed for Nausea or Vomiting.   Yes Mariano Moulton MD   QUEtiapine (SEROquel) 200 MG tablet Take 1 tablet by mouth Every Night.   Yes Mariano Moulton MD   SODIUM BICARBONATE PO Take  "650 mg by mouth 2 (Two) Times a Day As Needed (HEARTBURN).   Yes Mariano Moulton MD   tiZANidine (ZANAFLEX) 4 MG tablet Take 1 tablet by mouth At Night As Needed for Muscle Spasms.   Yes Mariano Moulton MD   topiramate (TOPAMAX) 100 MG tablet Take 1 tablet by mouth Daily.   Yes Mariano Moulton MD   vitamin B-12 (CYANOCOBALAMIN) 1000 MCG tablet Take 1 tablet by mouth Daily.   Yes Mariano Moulton MD   vitamin D (ERGOCALCIFEROL) 11760 UNITS capsule capsule 1 capsule 2 (Two) Times a Week. Sunday and Wednesday 8/21/16  Yes Mariano Moulton MD   cephalexin (KEFLEX) 500 MG capsule cephalexin 500 mg capsule    Mariano Moulton MD       PHQ-9 Depression Screening  Little interest or pleasure in doing things? 0-->not at all   Feeling down, depressed, or hopeless? 0-->not at all   Trouble falling or staying asleep, or sleeping too much?     Feeling tired or having little energy?     Poor appetite or overeating?     Feeling bad about yourself - or that you are a failure or have let yourself or your family down?     Trouble concentrating on things, such as reading the newspaper or watching television?     Moving or speaking so slowly that other people could have noticed? Or the opposite - being so fidgety or restless that you have been moving around a lot more than usual?     Thoughts that you would be better off dead, or of hurting yourself in some way?     PHQ-9 Total Score 0   If you checked off any problems, how difficult have these problems made it for you to do your work, take care of things at home, or get along with other people?         PHQ-9 Total Score: 0   0 (Negative screening for depression)      Objective     Vital Signs: /76 (BP Location: Left arm, Patient Position: Sitting, Cuff Size: Adult)   Pulse 102   Temp 97.8 °F (36.6 °C) (Infrared)   Resp 18   Ht 162.6 cm (64\")   Wt 73 kg (161 lb)   SpO2 97%   BMI 27.64 kg/m²   Physical Exam  Vitals and nursing note reviewed. "   Constitutional:       Appearance: She is overweight. She is ill-appearing.      Comments: Skin is very white, lacking all color.  Appears frail.    HENT:      Head: Normocephalic.      Nose: Nose normal.      Mouth/Throat:      Mouth: Mucous membranes are moist.   Cardiovascular:      Rate and Rhythm: Normal rate. Rhythm irregular.      Pulses: Normal pulses.      Heart sounds: Normal heart sounds.   Pulmonary:      Effort: Pulmonary effort is normal.      Breath sounds: Normal breath sounds.   Abdominal:      General: Bowel sounds are normal.      Palpations: Abdomen is soft.   Musculoskeletal:         General: Normal range of motion.      Cervical back: Normal range of motion.   Skin:     General: Skin is cool and dry.      Coloration: Skin is pale.      Comments: Nail beds, oral mucosa, and ocular mucosa are very pale.   Neurological:      General: No focal deficit present.      Mental Status: She is alert and oriented to person, place, and time.   Psychiatric:         Mood and Affect: Mood normal.         Behavior: Behavior normal.              Advance Care Planning   ACP discussion was held with the patient during this visit. Patient does not have an advance directive, declines further assistance.         Results Reviewed:  Glucose   Date Value Ref Range Status   04/15/2022 94 74 - 109 mg/dL Final     BUN   Date Value Ref Range Status   04/15/2022 40 (H) 8 - 23 mg/dL Final     Creatinine   Date Value Ref Range Status   04/15/2022 3.6 (H) 0.5 - 0.9 mg/dL Final     Sodium   Date Value Ref Range Status   04/15/2022 143 136 - 145 mmol/L Final     Potassium   Date Value Ref Range Status   04/15/2022 5.4 (H) 3.5 - 5.0 mmol/L Final     Chloride   Date Value Ref Range Status   04/15/2022 102 98 - 111 mmol/L Final     CO2   Date Value Ref Range Status   04/15/2022 30 (H) 22 - 29 mmol/L Final     Calcium   Date Value Ref Range Status   04/15/2022 8.6 (L) 8.8 - 10.2 mg/dL Final     ALT (SGPT)   Date Value Ref Range  Status   04/11/2022 250 (H) 5 - 33 U/L Final     AST (SGOT)   Date Value Ref Range Status   04/11/2022 224 (H) 5 - 32 U/L Final     WBC   Date Value Ref Range Status   02/28/2023 5.8 4.8 - 10.8 K/uL Final     Hematocrit   Date Value Ref Range Status   02/28/2023 35.0 (L) 37.0 - 47.0 % Final     Platelets   Date Value Ref Range Status   02/28/2023 236 130 - 400 K/uL Final         Assessment / Plan     Assessment/Plan     Diagnoses and all orders for this visit:    1. Shortness of breath (Primary)    2. Pale complexion    3. Fatigue, unspecified type    4. Dizziness    5. Near syncope    6. Long term current use of anticoagulant    7. Nausea and vomiting, unspecified vomiting type      Assessment & Plan:  Patient advised to go to ER given symptoms.  Differential diagnoses include possible GI bleed, gastroenteritis, dehydration.  Patient is agreeable to go to [facility] ER but declines transportation by EMS.  Patient's  is present and has agreed to drive patient to [facility] ER.  Will call report to ER prior to their arrival.        An After Visit Summary was printed and given to the patient at discharge.  Return if symptoms worsen or fail to improve.    I have discussed the patient results/orders and plan/recommendation with them at today's visit.      Varsha Sotelo, APRN   07/26/2023

## 2023-08-17 ENCOUNTER — TRANSCRIBE ORDERS (OUTPATIENT)
Dept: ADMINISTRATIVE | Facility: HOSPITAL | Age: 78
End: 2023-08-17
Payer: MEDICARE

## 2023-08-17 DIAGNOSIS — R13.10 DYSPHAGIA, UNSPECIFIED TYPE: Primary | ICD-10-CM

## 2023-09-01 ENCOUNTER — HOSPITAL ENCOUNTER (OUTPATIENT)
Dept: GENERAL RADIOLOGY | Facility: HOSPITAL | Age: 78
Discharge: HOME | End: 2023-09-01
Payer: MEDICARE

## 2023-10-03 ENCOUNTER — APPOINTMENT (OUTPATIENT)
Dept: CT IMAGING | Facility: HOSPITAL | Age: 78
DRG: 391 | End: 2023-10-03
Payer: MEDICARE

## 2023-10-03 ENCOUNTER — HOSPITAL ENCOUNTER (INPATIENT)
Facility: HOSPITAL | Age: 78
LOS: 5 days | Discharge: HOME | DRG: 391 | End: 2023-10-10
Attending: INTERNAL MEDICINE | Admitting: INTERNAL MEDICINE
Payer: MEDICARE

## 2023-10-03 DIAGNOSIS — Z74.09 IMPAIRED FUNCTIONAL MOBILITY AND ACTIVITY TOLERANCE: Primary | ICD-10-CM

## 2023-10-03 PROBLEM — K52.9 ENTEROCOLITIS: Status: ACTIVE | Noted: 2023-10-03

## 2023-10-03 PROBLEM — R11.2 NAUSEA AND VOMITING: Status: ACTIVE | Noted: 2023-10-03

## 2023-10-03 PROBLEM — R10.9 ACUTE ABDOMINAL PAIN: Status: ACTIVE | Noted: 2023-10-03

## 2023-10-03 LAB
ALBUMIN SERPL-MCNC: 3.3 G/DL (ref 3.5–5.2)
ALBUMIN/GLOB SERPL: 1.2 G/DL
ALP SERPL-CCNC: 112 U/L (ref 39–117)
ALT SERPL W P-5'-P-CCNC: 21 U/L (ref 1–33)
AMYLASE SERPL-CCNC: 28 U/L (ref 28–100)
ANION GAP SERPL CALCULATED.3IONS-SCNC: 12 MMOL/L (ref 5–15)
AST SERPL-CCNC: 17 U/L (ref 1–32)
BASOPHILS # BLD AUTO: 0.02 10*3/MM3 (ref 0–0.2)
BASOPHILS NFR BLD AUTO: 0.2 % (ref 0–1.5)
BILIRUB SERPL-MCNC: 0.6 MG/DL (ref 0–1.2)
BUN SERPL-MCNC: 33 MG/DL (ref 8–23)
BUN/CREAT SERPL: 14.4 (ref 7–25)
CALCIUM SPEC-SCNC: 7.7 MG/DL (ref 8.6–10.5)
CHLORIDE SERPL-SCNC: 104 MMOL/L (ref 98–107)
CO2 SERPL-SCNC: 17 MMOL/L (ref 22–29)
CREAT SERPL-MCNC: 2.29 MG/DL (ref 0.57–1)
CRP SERPL-MCNC: 9.05 MG/DL (ref 0–0.5)
DEPRECATED RDW RBC AUTO: 49 FL (ref 37–54)
EGFRCR SERPLBLD CKD-EPI 2021: 21.5 ML/MIN/1.73
EOSINOPHIL # BLD AUTO: 0.01 10*3/MM3 (ref 0–0.4)
EOSINOPHIL NFR BLD AUTO: 0.1 % (ref 0.3–6.2)
ERYTHROCYTE [DISTWIDTH] IN BLOOD BY AUTOMATED COUNT: 14.6 % (ref 12.3–15.4)
GLOBULIN UR ELPH-MCNC: 2.7 GM/DL
GLUCOSE SERPL-MCNC: 129 MG/DL (ref 65–99)
HCT VFR BLD AUTO: 36 % (ref 34–46.6)
HGB BLD-MCNC: 11.3 G/DL (ref 12–15.9)
IMM GRANULOCYTES # BLD AUTO: 0.06 10*3/MM3 (ref 0–0.05)
IMM GRANULOCYTES NFR BLD AUTO: 0.5 % (ref 0–0.5)
LIPASE SERPL-CCNC: 15 U/L (ref 13–60)
LYMPHOCYTES # BLD AUTO: 1.14 10*3/MM3 (ref 0.7–3.1)
LYMPHOCYTES NFR BLD AUTO: 8.9 % (ref 19.6–45.3)
MCH RBC QN AUTO: 29 PG (ref 26.6–33)
MCHC RBC AUTO-ENTMCNC: 31.4 G/DL (ref 31.5–35.7)
MCV RBC AUTO: 92.3 FL (ref 79–97)
MONOCYTES # BLD AUTO: 1.39 10*3/MM3 (ref 0.1–0.9)
MONOCYTES NFR BLD AUTO: 10.9 % (ref 5–12)
NEUTROPHILS NFR BLD AUTO: 10.16 10*3/MM3 (ref 1.7–7)
NEUTROPHILS NFR BLD AUTO: 79.4 % (ref 42.7–76)
NRBC BLD AUTO-RTO: 0 /100 WBC (ref 0–0.2)
PLATELET # BLD AUTO: 291 10*3/MM3 (ref 140–450)
PMV BLD AUTO: 10.4 FL (ref 6–12)
POTASSIUM SERPL-SCNC: 4.1 MMOL/L (ref 3.5–5.2)
PROT SERPL-MCNC: 6 G/DL (ref 6–8.5)
RBC # BLD AUTO: 3.9 10*6/MM3 (ref 3.77–5.28)
SODIUM SERPL-SCNC: 133 MMOL/L (ref 136–145)
WBC NRBC COR # BLD: 12.78 10*3/MM3 (ref 3.4–10.8)

## 2023-10-03 PROCEDURE — G0378 HOSPITAL OBSERVATION PER HR: HCPCS

## 2023-10-03 PROCEDURE — 85025 COMPLETE CBC W/AUTO DIFF WBC: CPT

## 2023-10-03 PROCEDURE — 83690 ASSAY OF LIPASE: CPT

## 2023-10-03 PROCEDURE — 86140 C-REACTIVE PROTEIN: CPT

## 2023-10-03 PROCEDURE — 25010000002 ONDANSETRON PER 1 MG

## 2023-10-03 PROCEDURE — 25810000003 LACTATED RINGERS PER 1000 ML

## 2023-10-03 PROCEDURE — 25010000002 HYDROMORPHONE PER 4 MG: Performed by: INTERNAL MEDICINE

## 2023-10-03 PROCEDURE — 74176 CT ABD & PELVIS W/O CONTRAST: CPT

## 2023-10-03 PROCEDURE — 82150 ASSAY OF AMYLASE: CPT

## 2023-10-03 PROCEDURE — 80053 COMPREHEN METABOLIC PANEL: CPT

## 2023-10-03 RX ORDER — LEVOTHYROXINE SODIUM 0.15 MG/1
150 TABLET ORAL DAILY
Status: DISCONTINUED | OUTPATIENT
Start: 2023-10-04 | End: 2023-10-10 | Stop reason: HOSPADM

## 2023-10-03 RX ORDER — ONDANSETRON 2 MG/ML
4 INJECTION INTRAMUSCULAR; INTRAVENOUS EVERY 6 HOURS PRN
Status: DISCONTINUED | OUTPATIENT
Start: 2023-10-03 | End: 2023-10-10 | Stop reason: HOSPADM

## 2023-10-03 RX ORDER — AMOXICILLIN 250 MG
2 CAPSULE ORAL 2 TIMES DAILY
Status: DISCONTINUED | OUTPATIENT
Start: 2023-10-03 | End: 2023-10-03

## 2023-10-03 RX ORDER — SODIUM CHLORIDE, SODIUM LACTATE, POTASSIUM CHLORIDE, CALCIUM CHLORIDE 600; 310; 30; 20 MG/100ML; MG/100ML; MG/100ML; MG/100ML
100 INJECTION, SOLUTION INTRAVENOUS CONTINUOUS
Status: DISCONTINUED | OUTPATIENT
Start: 2023-10-03 | End: 2023-10-10

## 2023-10-03 RX ORDER — BISACODYL 5 MG/1
5 TABLET, DELAYED RELEASE ORAL DAILY PRN
Status: DISCONTINUED | OUTPATIENT
Start: 2023-10-03 | End: 2023-10-03

## 2023-10-03 RX ORDER — METOPROLOL SUCCINATE 25 MG/1
25 TABLET, EXTENDED RELEASE ORAL DAILY
Status: DISCONTINUED | OUTPATIENT
Start: 2023-10-04 | End: 2023-10-10 | Stop reason: HOSPADM

## 2023-10-03 RX ORDER — HYDROMORPHONE HYDROCHLORIDE 1 MG/ML
0.5 INJECTION, SOLUTION INTRAMUSCULAR; INTRAVENOUS; SUBCUTANEOUS EVERY 6 HOURS PRN
Status: DISCONTINUED | OUTPATIENT
Start: 2023-10-03 | End: 2023-10-04

## 2023-10-03 RX ORDER — ALLOPURINOL 100 MG/1
100 TABLET ORAL 2 TIMES DAILY
Status: DISCONTINUED | OUTPATIENT
Start: 2023-10-03 | End: 2023-10-10 | Stop reason: HOSPADM

## 2023-10-03 RX ORDER — PANTOPRAZOLE SODIUM 40 MG/1
40 TABLET, DELAYED RELEASE ORAL 2 TIMES DAILY
Status: DISCONTINUED | OUTPATIENT
Start: 2023-10-03 | End: 2023-10-10 | Stop reason: HOSPADM

## 2023-10-03 RX ORDER — HYDROMORPHONE HYDROCHLORIDE 1 MG/ML
0.5 INJECTION, SOLUTION INTRAMUSCULAR; INTRAVENOUS; SUBCUTANEOUS
Status: DISCONTINUED | OUTPATIENT
Start: 2023-10-03 | End: 2023-10-03

## 2023-10-03 RX ORDER — POLYETHYLENE GLYCOL 3350 17 G/17G
17 POWDER, FOR SOLUTION ORAL DAILY PRN
Status: DISCONTINUED | OUTPATIENT
Start: 2023-10-03 | End: 2023-10-03

## 2023-10-03 RX ORDER — AMLODIPINE BESYLATE 5 MG/1
5 TABLET ORAL
Status: DISCONTINUED | OUTPATIENT
Start: 2023-10-04 | End: 2023-10-10 | Stop reason: HOSPADM

## 2023-10-03 RX ORDER — ESCITALOPRAM OXALATE 10 MG/1
20 TABLET ORAL DAILY
Status: DISCONTINUED | OUTPATIENT
Start: 2023-10-04 | End: 2023-10-10 | Stop reason: HOSPADM

## 2023-10-03 RX ORDER — HYDROMORPHONE HYDROCHLORIDE 1 MG/ML
0.5 INJECTION, SOLUTION INTRAMUSCULAR; INTRAVENOUS; SUBCUTANEOUS EVERY 6 HOURS PRN
Status: DISCONTINUED | OUTPATIENT
Start: 2023-10-03 | End: 2023-10-03 | Stop reason: SDUPTHER

## 2023-10-03 RX ORDER — QUETIAPINE 300 MG/1
300 TABLET, FILM COATED, EXTENDED RELEASE ORAL NIGHTLY
COMMUNITY

## 2023-10-03 RX ORDER — QUETIAPINE 150 MG/1
300 TABLET, FILM COATED, EXTENDED RELEASE ORAL NIGHTLY
Status: DISCONTINUED | OUTPATIENT
Start: 2023-10-03 | End: 2023-10-10 | Stop reason: HOSPADM

## 2023-10-03 RX ORDER — TOPIRAMATE 100 MG/1
100 TABLET, FILM COATED ORAL 2 TIMES DAILY
Status: DISCONTINUED | OUTPATIENT
Start: 2023-10-03 | End: 2023-10-10 | Stop reason: HOSPADM

## 2023-10-03 RX ORDER — SODIUM CHLORIDE 0.9 % (FLUSH) 0.9 %
10 SYRINGE (ML) INJECTION EVERY 12 HOURS SCHEDULED
Status: DISCONTINUED | OUTPATIENT
Start: 2023-10-03 | End: 2023-10-10 | Stop reason: HOSPADM

## 2023-10-03 RX ORDER — BISACODYL 10 MG
10 SUPPOSITORY, RECTAL RECTAL DAILY PRN
Status: DISCONTINUED | OUTPATIENT
Start: 2023-10-03 | End: 2023-10-03

## 2023-10-03 RX ORDER — SODIUM CHLORIDE 0.9 % (FLUSH) 0.9 %
10 SYRINGE (ML) INJECTION AS NEEDED
Status: DISCONTINUED | OUTPATIENT
Start: 2023-10-03 | End: 2023-10-10 | Stop reason: HOSPADM

## 2023-10-03 RX ORDER — GABAPENTIN 300 MG/1
300 CAPSULE ORAL DAILY PRN
Status: DISCONTINUED | OUTPATIENT
Start: 2023-10-03 | End: 2023-10-10 | Stop reason: HOSPADM

## 2023-10-03 RX ORDER — SODIUM CHLORIDE 9 MG/ML
40 INJECTION, SOLUTION INTRAVENOUS AS NEEDED
Status: DISCONTINUED | OUTPATIENT
Start: 2023-10-03 | End: 2023-10-10 | Stop reason: HOSPADM

## 2023-10-03 RX ORDER — BACLOFEN 20 MG
1 TABLET ORAL 2 TIMES DAILY
COMMUNITY

## 2023-10-03 RX ADMIN — QUETIAPINE FUMARATE 300 MG: 150 TABLET, EXTENDED RELEASE ORAL at 20:45

## 2023-10-03 RX ADMIN — HYDROMORPHONE HYDROCHLORIDE 0.5 MG: 1 INJECTION, SOLUTION INTRAMUSCULAR; INTRAVENOUS; SUBCUTANEOUS at 15:54

## 2023-10-03 RX ADMIN — APIXABAN 5 MG: 5 TABLET, FILM COATED ORAL at 22:00

## 2023-10-03 RX ADMIN — TOPIRAMATE 100 MG: 100 TABLET, FILM COATED ORAL at 20:45

## 2023-10-03 RX ADMIN — Medication 10 ML: at 20:46

## 2023-10-03 RX ADMIN — PANTOPRAZOLE SODIUM 40 MG: 40 TABLET, DELAYED RELEASE ORAL at 20:45

## 2023-10-03 RX ADMIN — SODIUM CHLORIDE, POTASSIUM CHLORIDE, SODIUM LACTATE AND CALCIUM CHLORIDE 100 ML/HR: 600; 310; 30; 20 INJECTION, SOLUTION INTRAVENOUS at 15:54

## 2023-10-03 RX ADMIN — ONDANSETRON 4 MG: 2 INJECTION INTRAMUSCULAR; INTRAVENOUS at 15:54

## 2023-10-03 RX ADMIN — HYDROMORPHONE HYDROCHLORIDE 0.5 MG: 1 INJECTION, SOLUTION INTRAMUSCULAR; INTRAVENOUS; SUBCUTANEOUS at 21:55

## 2023-10-03 RX ADMIN — ALLOPURINOL 100 MG: 100 TABLET ORAL at 20:45

## 2023-10-03 NOTE — PLAN OF CARE
Goal Outcome Evaluation:  Plan of Care Reviewed With: patient        Progress: no change  Outcome Evaluation: awaiting iv access per vats team. abd pain and tenderness in all quads. abd is soft.  plan cl liq diet, iv abx when access obtained. medicating  per order for pain and nausea. no acute distress noted. cont to monitor.

## 2023-10-03 NOTE — PLAN OF CARE
Goal Outcome Evaluation:  Plan of Care Reviewed With: patient        Progress: no change  Outcome Evaluation: Pt admitted from Dr. Rey's offfice with enterocolitis. Pt is alert and oriented. WBC slightly elevated. CT abd/pelvis results pending. IVF. Safety maintained.

## 2023-10-04 LAB
ALBUMIN SERPL-MCNC: 3.2 G/DL (ref 3.5–5.2)
ALBUMIN/GLOB SERPL: 1.2 G/DL
ALP SERPL-CCNC: 108 U/L (ref 39–117)
ALT SERPL W P-5'-P-CCNC: 16 U/L (ref 1–33)
ANION GAP SERPL CALCULATED.3IONS-SCNC: 12 MMOL/L (ref 5–15)
AST SERPL-CCNC: 18 U/L (ref 1–32)
BASOPHILS # BLD AUTO: 0.02 10*3/MM3 (ref 0–0.2)
BASOPHILS NFR BLD AUTO: 0.2 % (ref 0–1.5)
BILIRUB SERPL-MCNC: 0.5 MG/DL (ref 0–1.2)
BUN SERPL-MCNC: 31 MG/DL (ref 8–23)
BUN/CREAT SERPL: 15.3 (ref 7–25)
CALCIUM SPEC-SCNC: 7.6 MG/DL (ref 8.6–10.5)
CHLORIDE SERPL-SCNC: 104 MMOL/L (ref 98–107)
CO2 SERPL-SCNC: 19 MMOL/L (ref 22–29)
CREAT SERPL-MCNC: 2.02 MG/DL (ref 0.57–1)
D-LACTATE SERPL-SCNC: 0.8 MMOL/L (ref 0.5–2)
DEPRECATED RDW RBC AUTO: 49.9 FL (ref 37–54)
EGFRCR SERPLBLD CKD-EPI 2021: 25 ML/MIN/1.73
EOSINOPHIL # BLD AUTO: 0.02 10*3/MM3 (ref 0–0.4)
EOSINOPHIL NFR BLD AUTO: 0.2 % (ref 0.3–6.2)
ERYTHROCYTE [DISTWIDTH] IN BLOOD BY AUTOMATED COUNT: 14.6 % (ref 12.3–15.4)
GLOBULIN UR ELPH-MCNC: 2.7 GM/DL
GLUCOSE SERPL-MCNC: 134 MG/DL (ref 65–99)
HCT VFR BLD AUTO: 35.6 % (ref 34–46.6)
HGB BLD-MCNC: 10.9 G/DL (ref 12–15.9)
IMM GRANULOCYTES # BLD AUTO: 0.08 10*3/MM3 (ref 0–0.05)
IMM GRANULOCYTES NFR BLD AUTO: 0.6 % (ref 0–0.5)
LYMPHOCYTES # BLD AUTO: 1.17 10*3/MM3 (ref 0.7–3.1)
LYMPHOCYTES NFR BLD AUTO: 9.2 % (ref 19.6–45.3)
MCH RBC QN AUTO: 28.8 PG (ref 26.6–33)
MCHC RBC AUTO-ENTMCNC: 30.6 G/DL (ref 31.5–35.7)
MCV RBC AUTO: 93.9 FL (ref 79–97)
MONOCYTES # BLD AUTO: 1.37 10*3/MM3 (ref 0.1–0.9)
MONOCYTES NFR BLD AUTO: 10.8 % (ref 5–12)
NEUTROPHILS NFR BLD AUTO: 10.01 10*3/MM3 (ref 1.7–7)
NEUTROPHILS NFR BLD AUTO: 79 % (ref 42.7–76)
NRBC BLD AUTO-RTO: 0 /100 WBC (ref 0–0.2)
PLATELET # BLD AUTO: 277 10*3/MM3 (ref 140–450)
PMV BLD AUTO: 11.4 FL (ref 6–12)
POTASSIUM SERPL-SCNC: 4.2 MMOL/L (ref 3.5–5.2)
PROT SERPL-MCNC: 5.9 G/DL (ref 6–8.5)
RBC # BLD AUTO: 3.79 10*6/MM3 (ref 3.77–5.28)
SODIUM SERPL-SCNC: 135 MMOL/L (ref 136–145)
WBC NRBC COR # BLD: 12.67 10*3/MM3 (ref 3.4–10.8)

## 2023-10-04 PROCEDURE — 99222 1ST HOSP IP/OBS MODERATE 55: CPT | Performed by: SPECIALIST

## 2023-10-04 PROCEDURE — 25010000002 HYDROMORPHONE PER 4 MG: Performed by: INTERNAL MEDICINE

## 2023-10-04 PROCEDURE — 83605 ASSAY OF LACTIC ACID: CPT

## 2023-10-04 PROCEDURE — 25010000002 ONDANSETRON PER 1 MG

## 2023-10-04 PROCEDURE — 25010000002 PIPERACILLIN SOD-TAZOBACTAM PER 1 G: Performed by: SPECIALIST

## 2023-10-04 PROCEDURE — 85025 COMPLETE CBC W/AUTO DIFF WBC: CPT | Performed by: INTERNAL MEDICINE

## 2023-10-04 PROCEDURE — 25810000003 LACTATED RINGERS PER 1000 ML

## 2023-10-04 PROCEDURE — 25010000002 METRONIDAZOLE 500 MG/100ML SOLUTION: Performed by: SPECIALIST

## 2023-10-04 PROCEDURE — 25810000003 LACTATED RINGERS SOLUTION: Performed by: SPECIALIST

## 2023-10-04 PROCEDURE — G0378 HOSPITAL OBSERVATION PER HR: HCPCS

## 2023-10-04 PROCEDURE — 80053 COMPREHEN METABOLIC PANEL: CPT | Performed by: INTERNAL MEDICINE

## 2023-10-04 PROCEDURE — 87040 BLOOD CULTURE FOR BACTERIA: CPT | Performed by: SPECIALIST

## 2023-10-04 RX ORDER — HYDROMORPHONE HYDROCHLORIDE 1 MG/ML
0.5 INJECTION, SOLUTION INTRAMUSCULAR; INTRAVENOUS; SUBCUTANEOUS
Status: DISPENSED | OUTPATIENT
Start: 2023-10-04 | End: 2023-10-08

## 2023-10-04 RX ORDER — FAMOTIDINE 20 MG/1
20 TABLET, FILM COATED ORAL DAILY
Status: DISCONTINUED | OUTPATIENT
Start: 2023-10-04 | End: 2023-10-10 | Stop reason: HOSPADM

## 2023-10-04 RX ORDER — SIMETHICONE 80 MG
80 TABLET,CHEWABLE ORAL 4 TIMES DAILY PRN
Status: DISCONTINUED | OUTPATIENT
Start: 2023-10-04 | End: 2023-10-10 | Stop reason: HOSPADM

## 2023-10-04 RX ORDER — FAMOTIDINE 10 MG/ML
20 INJECTION, SOLUTION INTRAVENOUS DAILY
Status: DISCONTINUED | OUTPATIENT
Start: 2023-10-04 | End: 2023-10-10 | Stop reason: HOSPADM

## 2023-10-04 RX ORDER — METRONIDAZOLE 500 MG/100ML
500 INJECTION, SOLUTION INTRAVENOUS EVERY 6 HOURS
Status: DISCONTINUED | OUTPATIENT
Start: 2023-10-04 | End: 2023-10-10

## 2023-10-04 RX ADMIN — GABAPENTIN 300 MG: 300 CAPSULE ORAL at 20:20

## 2023-10-04 RX ADMIN — HYDROMORPHONE HYDROCHLORIDE 0.5 MG: 1 INJECTION, SOLUTION INTRAMUSCULAR; INTRAVENOUS; SUBCUTANEOUS at 03:25

## 2023-10-04 RX ADMIN — ESCITSLOPRAM 20 MG: 10 TABLET ORAL at 08:59

## 2023-10-04 RX ADMIN — ONDANSETRON 4 MG: 2 INJECTION INTRAMUSCULAR; INTRAVENOUS at 18:56

## 2023-10-04 RX ADMIN — TOPIRAMATE 100 MG: 100 TABLET, FILM COATED ORAL at 21:01

## 2023-10-04 RX ADMIN — SODIUM CHLORIDE, POTASSIUM CHLORIDE, SODIUM LACTATE AND CALCIUM CHLORIDE 100 ML/HR: 600; 310; 30; 20 INJECTION, SOLUTION INTRAVENOUS at 02:32

## 2023-10-04 RX ADMIN — APIXABAN 5 MG: 5 TABLET, FILM COATED ORAL at 08:59

## 2023-10-04 RX ADMIN — SODIUM CHLORIDE, POTASSIUM CHLORIDE, SODIUM LACTATE AND CALCIUM CHLORIDE 100 ML/HR: 600; 310; 30; 20 INJECTION, SOLUTION INTRAVENOUS at 22:16

## 2023-10-04 RX ADMIN — Medication 10 ML: at 21:01

## 2023-10-04 RX ADMIN — APIXABAN 5 MG: 5 TABLET, FILM COATED ORAL at 21:00

## 2023-10-04 RX ADMIN — METRONIDAZOLE 500 MG: 5 INJECTION, SOLUTION INTRAVENOUS at 21:09

## 2023-10-04 RX ADMIN — ONDANSETRON 4 MG: 2 INJECTION INTRAMUSCULAR; INTRAVENOUS at 02:58

## 2023-10-04 RX ADMIN — ALLOPURINOL 100 MG: 100 TABLET ORAL at 21:00

## 2023-10-04 RX ADMIN — Medication 10 ML: at 09:00

## 2023-10-04 RX ADMIN — HYDROMORPHONE HYDROCHLORIDE 0.5 MG: 1 INJECTION, SOLUTION INTRAMUSCULAR; INTRAVENOUS; SUBCUTANEOUS at 18:56

## 2023-10-04 RX ADMIN — PIPERACILLIN SODIUM AND TAZOBACTAM SODIUM 3.38 G: 3; .375 INJECTION, SOLUTION INTRAVENOUS at 21:40

## 2023-10-04 RX ADMIN — PANTOPRAZOLE SODIUM 40 MG: 40 TABLET, DELAYED RELEASE ORAL at 21:00

## 2023-10-04 RX ADMIN — HYDROMORPHONE HYDROCHLORIDE 0.5 MG: 1 INJECTION, SOLUTION INTRAMUSCULAR; INTRAVENOUS; SUBCUTANEOUS at 14:34

## 2023-10-04 RX ADMIN — QUETIAPINE FUMARATE 300 MG: 150 TABLET, EXTENDED RELEASE ORAL at 21:01

## 2023-10-04 RX ADMIN — FAMOTIDINE 20 MG: 10 INJECTION INTRAVENOUS at 15:31

## 2023-10-04 RX ADMIN — METRONIDAZOLE 500 MG: 5 INJECTION, SOLUTION INTRAVENOUS at 15:36

## 2023-10-04 RX ADMIN — PANTOPRAZOLE SODIUM 40 MG: 40 TABLET, DELAYED RELEASE ORAL at 08:59

## 2023-10-04 RX ADMIN — SODIUM CHLORIDE, POTASSIUM CHLORIDE, SODIUM LACTATE AND CALCIUM CHLORIDE 1000 ML: 600; 310; 30; 20 INJECTION, SOLUTION INTRAVENOUS at 15:32

## 2023-10-04 RX ADMIN — PIPERACILLIN SODIUM AND TAZOBACTAM SODIUM 3.38 G: 3; .375 INJECTION, SOLUTION INTRAVENOUS at 15:36

## 2023-10-04 RX ADMIN — SODIUM CHLORIDE, POTASSIUM CHLORIDE, SODIUM LACTATE AND CALCIUM CHLORIDE 100 ML/HR: 600; 310; 30; 20 INJECTION, SOLUTION INTRAVENOUS at 14:15

## 2023-10-04 RX ADMIN — TOPIRAMATE 100 MG: 100 TABLET, FILM COATED ORAL at 08:59

## 2023-10-04 RX ADMIN — METOPROLOL SUCCINATE 25 MG: 25 TABLET, EXTENDED RELEASE ORAL at 08:59

## 2023-10-04 RX ADMIN — HYDROMORPHONE HYDROCHLORIDE 0.5 MG: 1 INJECTION, SOLUTION INTRAMUSCULAR; INTRAVENOUS; SUBCUTANEOUS at 09:03

## 2023-10-04 RX ADMIN — ALLOPURINOL 100 MG: 100 TABLET ORAL at 08:59

## 2023-10-04 RX ADMIN — LEVOTHYROXINE SODIUM 150 MCG: 150 TABLET ORAL at 08:59

## 2023-10-04 RX ADMIN — AMLODIPINE BESYLATE 5 MG: 5 TABLET ORAL at 09:00

## 2023-10-04 NOTE — H&P
Date of Admission: 10/3/2023  Primary Care Physician: Greg Rey MD    Subjective     Chief Complaint: Abdominal pain    History of Present Illness  Thao Ulrich is a 78-year-old female with a past medical history of end-stage chronic kidney disease on hemodialysis, lupus, hypertension, aneurysm, heart murmur, chronic pain.  She presented to our office yesterday for acute abdominal pain that is with been ongoing for the past 2 to 3 days.  She was seen at [facility] emergency department on Sunday and had a CT of her abdomen pelvis with IV contrast performed.  It showed evidence of enterocolitis and she was discharged in stable condition with Zofran and Bentyl to take at home.  Her symptoms continued and she was very tender on exam yesterday in the office.  She states that she did not eat or have a bowel movement for the past 2 days.  She was evaluated by myself and Dr. Rey and the patient was admitted for further evaluation.    Work-up so far reveals mild leukocytosis with left shift, elevated inflammatory markers, mild dehydration and mild normocytic anemia.  Anemia is chronic for the patient, and she is also quite pale which is chronic for her as well.  Was some concern that with the use of IV contrast history this may have induced acute kidney injury.  However, her renal function appears to be stable for her.  Mild hyponatremia is present.  This is correcting with IV fluids.  CT of the abdomen pelvis was performed yesterday without IV contrast showing still evidence of probable enterocolitis.  Ischemic etiology cannot be excluded without the use of IV contrast.    She is extremely tender on exam today as well.  Her abdomen is protuberant and could be slightly distended.  She states that her pain and nausea has improved with medication, however she her abdomen is still overall the same.  At this point we would appreciate gastroenterology's evaluation of the patient.    It should be of note  "that the patient does have a history of chronic pain and narcotic use.  She has seen pain management in the past and was discharged.  She is also been going through an extremely stressful time with the loss of her 2 sons.        Review of Systems   Constitutional:  Positive for fatigue. Negative for fever.   Respiratory:  Negative for cough and shortness of breath.    Cardiovascular:  Negative for chest pain, palpitations and leg swelling.   Gastrointestinal:  Positive for abdominal distention, abdominal pain, nausea and vomiting. Negative for blood in stool, constipation and diarrhea.   Genitourinary:  Negative for difficulty urinating, dysuria, frequency and urgency.   Skin:  Positive for pallor.      Otherwise complete ROS reviewed and negative except as mentioned in the HPI.      Past Medical History:   Past Medical History:   Diagnosis Date    Acid reflux     Aneurysm     Pt states \"Somewhere near my spleen.\"    Anxiety and depression     Arthritis     Diarrhea     Generalized headaches     Heart murmur     History of transfusion     Hypertension     Hypothyroid     Kidney stones     Lupus     Migraines     MRSA (methicillin resistant Staphylococcus aureus)     in past , on face    Nausea     PONV (postoperative nausea and vomiting)     Renal failure     21%    Thrombosis 2007    RIGHT KNEE       Past Surgical History:  Past Surgical History:   Procedure Laterality Date    APPENDECTOMY      CHOLECYSTECTOMY      COLONOSCOPY      ENDOSCOPY N/A 10/30/2020    Procedure: ESOPHAGOGASTRODUODENOSCOPY WITH ANESTHESIA;  Surgeon: Naresh Heard DO;  Location: [facility] ENDOSCOPY;  Service: Gastroenterology;  Laterality: N/A;  preop; abdominal pain  postop; normal   PCP Greg Rey     EXTRACORPOREAL SHOCK WAVE LITHOTRIPSY (ESWL) Right 8/16/2021    Procedure: EXTRACORPOREAL SHOCKWAVE LITHOTRIPSY RIGHT;  Surgeon: Ronnie Hayes MD;  Location: [facility] OR;  Service: Urology;  Laterality: Right;    EXTRACORPOREAL " SHOCK WAVE LITHOTRIPSY (ESWL) Left 1/17/2022    Procedure: EXTRACORPOREAL SHOCKWAVE LITHOTRIPSY LEFT;  Surgeon: Ronnie Hayes MD;  Location: [facility] OR;  Service: Urology;  Laterality: Left;    HYSTERECTOMY      JOINT REPLACEMENT      REPLACEMENT TOTAL KNEE Left     SHOULDER ROTATOR CUFF REPAIR Right     TOTAL HIP ARTHROPLASTY Right     TOTAL SHOULDER ARTHROPLASTY W/ DISTAL CLAVICLE EXCISION Left 12/27/2019    Procedure: LEFT REVERSE TOTAL SHOULDER ARTHROPLASTY;  Surgeon: Dhaval Yepez MD;  Location:  [facility] OR;  Service: Orthopedics    WRIST FRACTURE SURGERY Left 2 weeks ago       Social History:  reports that she has never smoked. She has never used smokeless tobacco. She reports that she does not drink alcohol and does not use drugs.    Family History: family history includes Cancer in her father; Colon cancer in her paternal aunt; Coronary artery disease in her brother.       Allergies:  Allergies   Allergen Reactions    Codeine Hives     IS ABLE TO TAKE PERCOCET    Morphine Nausea And Vomiting       Medications:  Prior to Admission medications    Medication Sig Start Date End Date Taking? Authorizing Provider   allopurinol (ZYLOPRIM) 100 MG tablet Take 1 tablet by mouth 2 (Two) Times a Day.   Yes Mariano Moulton MD   apixaban (ELIQUIS) 5 MG tablet tablet Take 1 tablet by mouth 2 (Two) Times a Day.   Yes Mariano Moulton MD   calcitriol (ROCALTROL) 0.25 MCG capsule Take 1 capsule by mouth 3 (Three) Times a Week.   Yes Mariano Moulton MD   diazePAM (VALIUM) 5 MG tablet Take 1 tablet by mouth Daily.   Yes Mariano Moulton MD   escitalopram (LEXAPRO) 20 MG tablet Take 1 tablet by mouth Daily. 10/12/21  Yes Mariano Moulton MD   levothyroxine (SYNTHROID, LEVOTHROID) 150 MCG tablet Take 1 tablet by mouth Daily.   Yes Mariano Moulton MD   pantoprazole (PROTONIX) 40 MG EC tablet Take 1 tablet by mouth 2 (Two) Times a Day.   Yes Mariano Moulton MD   Prenatal Vit-Fe  "Fumarate-FA (PRENATAL VITAMIN PO) Take 1 tablet by mouth Daily. OTC   Yes Mariano Moulton MD   Probiotic Product (PROBIOTIC DAILY PO) Take 1 capsule by mouth Daily. OTC   Yes Mariano Moulton MD   promethazine (PHENERGAN) 25 MG tablet Take 1 tablet by mouth Every 8 (Eight) Hours As Needed for Nausea or Vomiting.   Yes Mariano Moulton MD   QUEtiapine XR (SEROquel XR) 300 MG 24 hr tablet Take 1 tablet by mouth Every Night.   Yes Mariano Moluton MD   SODIUM BICARBONATE PO Take 650 mg by mouth Every 4 (Four) Hours As Needed (HEARTBURN).   Yes Mariano Moulton MD   tiZANidine (ZANAFLEX) 4 MG tablet Take 1 tablet by mouth At Night As Needed for Muscle Spasms.   Yes Mariano Moulton MD   topiramate (TOPAMAX) 100 MG tablet Take 1 tablet by mouth 2 (Two) Times a Day.   Yes Mariano Moulton MD   vitamin B-12 (CYANOCOBALAMIN) 1000 MCG tablet Take 1 tablet by mouth Daily. OTC   Yes Mariano Moulton MD   vitamin D (ERGOCALCIFEROL) 73448 UNITS capsule capsule Take 1 capsule by mouth 2 (Two) Times a Week. Sunday and Wednesday 8/21/16  Yes aMriano Moulton MD   amLODIPine (NORVASC) 5 MG tablet 1 tablet.  Patient not taking: Reported on 10/3/2023    Mariano Moulton MD   epoetin al (Procrit) 55254 UNIT/ML injection Inject  under the skin into the appropriate area as directed As Needed.  Patient not taking: Reported on 10/3/2023    Mariano Moulton MD   gabapentin (NEURONTIN) 300 MG capsule Take 1 capsule by mouth Daily As Needed (for pain).    Mariano Moulton MD   Magnesium Oxide -Mg Supplement 500 MG tablet Take 1 tablet by mouth 2 (Two) Times a Day.    Mariano Moulton MD   metoprolol succinate XL (TOPROL-XL) 25 MG 24 hr tablet Take 1 tablet by mouth Daily.    Mariano Moulton MD       Objective     Vital Signs: /73 (BP Location: Right arm, Patient Position: Lying)   Pulse 89   Temp 98.5 øF (36.9 øC) (Oral)   Resp 16   Ht 162.6 cm (64\")   Wt " 71.1 kg (156 lb 11.2 oz)   SpO2 92%   BMI 26.90 kg/mý   Physical Exam  Constitutional:       General: She is not in acute distress.     Appearance: Normal appearance. She is obese. She is not toxic-appearing.   HENT:      Head: Normocephalic and atraumatic.      Nose: Nose normal.      Mouth/Throat:      Mouth: Mucous membranes are moist.      Pharynx: Oropharynx is clear.   Eyes:      Extraocular Movements: Extraocular movements intact.      Pupils: Pupils are equal, round, and reactive to light.   Cardiovascular:      Rate and Rhythm: Normal rate and regular rhythm.      Pulses: Normal pulses.      Heart sounds: Normal heart sounds.   Pulmonary:      Effort: Pulmonary effort is normal.      Breath sounds: Normal breath sounds.   Abdominal:      General: There is distension.      Tenderness: There is abdominal tenderness. There is guarding.   Musculoskeletal:         General: Normal range of motion.      Cervical back: Normal range of motion and neck supple.   Skin:     General: Skin is warm and dry.      Capillary Refill: Capillary refill takes less than 2 seconds.      Coloration: Skin is pale.   Neurological:      General: No focal deficit present.      Mental Status: She is alert and oriented to person, place, and time.   Psychiatric:         Mood and Affect: Mood normal.         Behavior: Behavior normal.           Results Reviewed:  Lab Results (last 24 hours)       Procedure Component Value Units Date/Time    Comprehensive Metabolic Panel [[id number]]  (Abnormal) Collected: 10/04/23 0409    Specimen: Blood Updated: 10/04/23 0519     Glucose 134 mg/dL      BUN 31 mg/dL      Creatinine 2.02 mg/dL      Sodium 135 mmol/L      Potassium 4.2 mmol/L      Comment: Slight hemolysis detected by analyzer. Results may be affected.        Chloride 104 mmol/L      CO2 19.0 mmol/L      Calcium 7.6 mg/dL      Total Protein 5.9 g/dL      Albumin 3.2 g/dL      ALT (SGPT) 16 U/L      AST (SGOT) 18 U/L      Comment: Slight  hemolysis detected by analyzer. Results may be affected.        Alkaline Phosphatase 108 U/L      Total Bilirubin 0.5 mg/dL      Globulin 2.7 gm/dL      A/G Ratio 1.2 g/dL      BUN/Creatinine Ratio 15.3     Anion Gap 12.0 mmol/L      eGFR 25.0 mL/min/1.73     Narrative:      GFR Normal >60  Chronic Kidney Disease <60  Kidney Failure <15    The GFR formula is only valid for adults with stable renal function between ages 18 and 70.    CBC & Differential [[id number]]  (Abnormal) Collected: 10/04/23 0409    Specimen: Blood Updated: 10/04/23 0457    Narrative:      The following orders were created for panel order CBC & Differential.  Procedure                               Abnormality         Status                     ---------                               -----------         ------                     CBC Auto Differential[[id number]]        Abnormal            Final result                 Please view results for these tests on the individual orders.    CBC Auto Differential [[id number]]  (Abnormal) Collected: 10/04/23 0409    Specimen: Blood Updated: 10/04/23 0457     WBC 12.67 10*3/mm3      RBC 3.79 10*6/mm3      Hemoglobin 10.9 g/dL      Hematocrit 35.6 %      MCV 93.9 fL      MCH 28.8 pg      MCHC 30.6 g/dL      RDW 14.6 %      RDW-SD 49.9 fl      MPV 11.4 fL      Platelets 277 10*3/mm3      Neutrophil % 79.0 %      Lymphocyte % 9.2 %      Monocyte % 10.8 %      Eosinophil % 0.2 %      Basophil % 0.2 %      Immature Grans % 0.6 %      Neutrophils, Absolute 10.01 10*3/mm3      Lymphocytes, Absolute 1.17 10*3/mm3      Monocytes, Absolute 1.37 10*3/mm3      Eosinophils, Absolute 0.02 10*3/mm3      Basophils, Absolute 0.02 10*3/mm3      Immature Grans, Absolute 0.08 10*3/mm3      nRBC 0.0 /100 WBC     Comprehensive Metabolic Panel [[id number]]  (Abnormal) Collected: 10/03/23 1250    Specimen: Blood Updated: 10/03/23 1323     Glucose 129 mg/dL      BUN 33 mg/dL      Creatinine 2.29 mg/dL      Sodium 133 mmol/L       Potassium 4.1 mmol/L      Chloride 104 mmol/L      CO2 17.0 mmol/L      Calcium 7.7 mg/dL      Total Protein 6.0 g/dL      Albumin 3.3 g/dL      ALT (SGPT) 21 U/L      AST (SGOT) 17 U/L      Alkaline Phosphatase 112 U/L      Total Bilirubin 0.6 mg/dL      Globulin 2.7 gm/dL      A/G Ratio 1.2 g/dL      BUN/Creatinine Ratio 14.4     Anion Gap 12.0 mmol/L      eGFR 21.5 mL/min/1.73     Narrative:      GFR Normal >60  Chronic Kidney Disease <60  Kidney Failure <15    The GFR formula is only valid for adults with stable renal function between ages 18 and 70.    C-reactive Protein [[id number]]  (Abnormal) Collected: 10/03/23 1250    Specimen: Blood Updated: 10/03/23 1323     C-Reactive Protein 9.05 mg/dL     Amylase [[id number]]  (Normal) Collected: 10/03/23 1250    Specimen: Blood Updated: 10/03/23 1320     Amylase 28 U/L     Lipase [[id number]]  (Normal) Collected: 10/03/23 1250    Specimen: Blood Updated: 10/03/23 1318     Lipase 15 U/L     CBC Auto Differential [[id number]]  (Abnormal) Collected: 10/03/23 1250    Specimen: Blood Updated: 10/03/23 1259     WBC 12.78 10*3/mm3      RBC 3.90 10*6/mm3      Hemoglobin 11.3 g/dL      Hematocrit 36.0 %      MCV 92.3 fL      MCH 29.0 pg      MCHC 31.4 g/dL      RDW 14.6 %      RDW-SD 49.0 fl      MPV 10.4 fL      Platelets 291 10*3/mm3      Neutrophil % 79.4 %      Lymphocyte % 8.9 %      Monocyte % 10.9 %      Eosinophil % 0.1 %      Basophil % 0.2 %      Immature Grans % 0.5 %      Neutrophils, Absolute 10.16 10*3/mm3      Lymphocytes, Absolute 1.14 10*3/mm3      Monocytes, Absolute 1.39 10*3/mm3      Eosinophils, Absolute 0.01 10*3/mm3      Basophils, Absolute 0.02 10*3/mm3      Immature Grans, Absolute 0.06 10*3/mm3      nRBC 0.0 /100 WBC           Imaging Results (Last 24 Hours)       Procedure Component Value Units Date/Time    CT Abdomen Pelvis Without Contrast [[id number]] Collected: 10/03/23 1322     Updated: 10/03/23 1347    Narrative:      EXAM: CT ABDOMEN PELVIS WO  CONTRAST- - 10/3/2023 1:02 PM CDT     HISTORY: Abdominal pain, acute, nonlocalized       COMPARISON: 12/23/2021.     DOSE LENGTH PRODUCT: 397 mGy cm. Automatic exposure control was utilized  to make radiation dose as low as reasonably achievable.     TECHNIQUE: Unenhanced axial images of the abdomen and pelvis obtained  with coronal and sagittal reformats.     FINDINGS: Evaluation limited secondary to lack of intravenous contrast  agent.     VISUALIZED CHEST: No pleural or pericardial effusion. Lung bases clear.     LIVER: Normal hepatic contour.     BILIARY: Gallbladder not demonstrated. No bile duct dilation.     PANCREAS: Normal pancreas contour.     SPLEEN: Normal size spleen. There is a 1.9 cm rim calcified splenic  artery aneurysm.     ADRENAL: Normal appearance of the bilateral adrenal glands.     GENITOURINARY:  Bilateral renal calcifications, may represent nonobstructing  nephrolithiasis or renovascular calcifications. No hydronephrosis. No  convincing ureteral calculus although the distal ureters are limited in  evaluation due to metallic streak artifact from RIGHT hip prosthesis.  Contrast opacifies the urinary bladder.  Uterus appears absent. Adnexa not discretely identified.     PERITONEUM: Small volume free fluid in the abdomen. No convincing free  air.     GI TRACT: Small hiatal hernia. Normal C-sweep of the duodenum.  Fluid-filled nondilated loops of small bowel. Fluid in the colon. Lack  of intravenous contrast limits evaluation of the bowel wall. No  pneumatosis. Appendix not discretely identified.     VESSELS: Aorta normal in course and caliber with calcified  atherosclerosis.     RETROPERITONEUM: No retroperitoneal or pelvic lymphadenopathy.     SOFT TISSUES: Changes of prior midline ventral surgical incision. Small  fat-containing ventral hernia on axial series 2, image 46.     BONES: RIGHT hip replacement. No acute or suspicious bony finding.          Impression:      1. Fluid in small bowel  and colon, could be seen with  infectious/inflammatory enterocolitis. Ischemic etiology is not  excluded. Lack of contrast limits evaluation of the bowel wall and  vasculature. No pneumatosis.  2. Low-density fluid in the abdomen, insufficient to drain.  3. Rim calcified splenic artery aneurysm measures 1.9 cm, similar to  12/23/2021.  4. Calcified aortic atherosclerosis.  5. Bilateral renal calcifications, may represent nonobstructing  nephrolithiasis or renovascular calcifications. No hydronephrosis.  6. Contrast opacifies the urinary bladder. Correlate with recent outside  imaging or contrasted procedure history.      This report was signed and finalized on 10/3/2023 1:44 PM CDT by Dr Zena Muñoz MD.               I have personally reviewed and interpreted the radiology studies and ECG obtained at time of admission.     Assessment / Plan     Assessment & Plan  Active Hospital Problems    Diagnosis     **Enterocolitis     Nausea and vomiting     Acute abdominal pain     Dehydration     CKD (chronic kidney disease)      1.  GI consult.  2.  Continue to trend labs, renal function, electrolytes.  We will add lactic acid.  3.  Continue IV fluids.  4.  Continue IV pain medication and IV nausea medication.  5.  SCDs for DVT prophylaxis.  She is on Eliquis.  6.  Remain n.p.o. for now until GI consultation.    This case has been discussed with Dr. Rey.      Thai Rausch, CALVIN   10/04/23   07:18 CDT

## 2023-10-04 NOTE — PLAN OF CARE
Goal Outcome Evaluation:  Plan of Care Reviewed With: patient, spouse        Progress: no change  Outcome Evaluation: A&OX4, VSS. C/o severe ABD pain, PRN IV pain med given with relief. NPO, IVF and IV ABX given. GI stool awaiting collection. GI to see when MD available. General Surgery consulted for advice-Bolus and ABX ordered. SCD's, on room air and . Eliquis for VTE prevention. Voiding w/o difficulty. Call light in reach, safety maintained and continue to monitor.

## 2023-10-04 NOTE — PLAN OF CARE
Goal Outcome Evaluation:  Plan of Care Reviewed With: patient      Patient c/o abdominal pain, PRN pain med given. Patient c/o nausea, PRN Zofran given. A&O x4. Patient on room air. Up with assist x1 to bathroom, voiding. SCDs on. Patient on Eliquis. VSS.

## 2023-10-04 NOTE — CONSULTS
Patient Care Team:  Greg Rey MD as PCP - General (Internal Medicine)  Greg Rey MD as Referring Physician (Internal Medicine)  Ronnie Matute MD as Cardiologist (Cardiology)  Naresh Heard DO as Consulting Physician (Gastroenterology)    Chief complaint Central abdominal pain.    Subjective     Subjective .     History of present illness: Patient is a 78-year-old female with a history of chronic end-stage disease on hemodialysis, lupus, hypertension, history of aneurysm, heart murmur and chronic pain.  She has a history of having gastric bypass surgery 50 years prior in [place] when her  was in the service, she is also had other abdominal surgeries of hysterectomy and cholecystectomy.  She states for the past 2 weeks she has had central abdominal pain that has continued and by report she was seen in [facility] emergency room on Sunday had a CT scan of her abdomen with IV contrast and it showed enterocolitis and she was discharged on Zofran and Bentyl.  She has continued with this discomfort presented to Dr. Rey's office and was admitted and is here for evaluation.  GI medicine has been consulted CAT scan has been completed showing no surgical findings but enterocolitis and previous CAT scan also reportedly had enterocolitis.  She is not currently on antibiotics as I can see and gastroenterology has not seemed to this point.  Initially she was nauseated she also had some loose stool but she has loose stool on a regular basis.  She does have a history of chronic pain and also narcotic use has been seen by pain management in the past.      Past surgical history significant for appendectomy, cholecystectomy, several colonoscopies upper endoscopies lithotripsies, she also stated that she had gastric bypass in the 50 years prior.  And other surgeries of lithotripsy, right and left shoulder replacement, left total knee.    Medical problems are those noted.  To  "include arthritis reflux anxiety generalized headaches history of transfusions hypertension hypothyroidism kidney stones Lupus migraines renal dysfunction with a 21% function,    Non-smoker nondrinker      Review of Systems  Pertinent items are noted in HPI, all other systems reviewed and negative    History  Past Medical History:   Diagnosis Date    Acid reflux     Aneurysm     Pt states \"Somewhere near my spleen.\"    Anxiety and depression     Arthritis     Diarrhea     Generalized headaches     Heart murmur     History of transfusion     Hypertension     Hypothyroid     Kidney stones     Lupus     Migraines     MRSA (methicillin resistant Staphylococcus aureus)     in past , on face    Nausea     PONV (postoperative nausea and vomiting)     Renal failure     21%    Thrombosis 2007    RIGHT KNEE   ,   Past Surgical History:   Procedure Laterality Date    APPENDECTOMY      CHOLECYSTECTOMY      COLONOSCOPY      ENDOSCOPY N/A 10/30/2020    Procedure: ESOPHAGOGASTRODUODENOSCOPY WITH ANESTHESIA;  Surgeon: Naresh Heard DO;  Location: [facility] ENDOSCOPY;  Service: Gastroenterology;  Laterality: N/A;  preop; abdominal pain  postop; normal   PCP Greg Rey     EXTRACORPOREAL SHOCK WAVE LITHOTRIPSY (ESWL) Right 8/16/2021    Procedure: EXTRACORPOREAL SHOCKWAVE LITHOTRIPSY RIGHT;  Surgeon: Ronnie Hayes MD;  Location: [facility] OR;  Service: Urology;  Laterality: Right;    EXTRACORPOREAL SHOCK WAVE LITHOTRIPSY (ESWL) Left 1/17/2022    Procedure: EXTRACORPOREAL SHOCKWAVE LITHOTRIPSY LEFT;  Surgeon: Ronnie Hayes MD;  Location: [facility] OR;  Service: Urology;  Laterality: Left;    HYSTERECTOMY      JOINT REPLACEMENT      REPLACEMENT TOTAL KNEE Left     SHOULDER ROTATOR CUFF REPAIR Right     TOTAL HIP ARTHROPLASTY Right     TOTAL SHOULDER ARTHROPLASTY W/ DISTAL CLAVICLE EXCISION Left 12/27/2019    Procedure: LEFT REVERSE TOTAL SHOULDER ARTHROPLASTY;  Surgeon: Dhaval Yepez MD;  Location: [facility] OR;  Service: " Orthopedics    WRIST FRACTURE SURGERY Left 2 weeks ago   ,   Family History   Problem Relation Age of Onset    Cancer Father     Coronary artery disease Brother     Colon cancer Paternal Aunt     Colon polyps Neg Hx     Esophageal cancer Neg Hx    ,   Social History     Tobacco Use    Smoking status: Never    Smokeless tobacco: Never   Vaping Use    Vaping Use: Never used   Substance Use Topics    Alcohol use: No    Drug use: No   ,   Medications Prior to Admission   Medication Sig Dispense Refill Last Dose    allopurinol (ZYLOPRIM) 100 MG tablet Take 1 tablet by mouth 2 (Two) Times a Day.   Past Week    apixaban (ELIQUIS) 5 MG tablet tablet Take 1 tablet by mouth 2 (Two) Times a Day.   Past Month    calcitriol (ROCALTROL) 0.25 MCG capsule Take 1 capsule by mouth 3 (Three) Times a Week.   Patient Taking Differently    diazePAM (VALIUM) 5 MG tablet Take 1 tablet by mouth Daily.   Past Week    escitalopram (LEXAPRO) 20 MG tablet Take 1 tablet by mouth Daily.   Past Week    levothyroxine (SYNTHROID, LEVOTHROID) 150 MCG tablet Take 1 tablet by mouth Daily.   Past Week    pantoprazole (PROTONIX) 40 MG EC tablet Take 1 tablet by mouth 2 (Two) Times a Day.   Patient Taking Differently    Prenatal Vit-Fe Fumarate-FA (PRENATAL VITAMIN PO) Take 1 tablet by mouth Daily. OTC   Past Week    Probiotic Product (PROBIOTIC DAILY PO) Take 1 capsule by mouth Daily. OTC   Past Week    promethazine (PHENERGAN) 25 MG tablet Take 1 tablet by mouth Every 8 (Eight) Hours As Needed for Nausea or Vomiting.   Past Week    QUEtiapine XR (SEROquel XR) 300 MG 24 hr tablet Take 1 tablet by mouth Every Night.   Past Week    SODIUM BICARBONATE PO Take 650 mg by mouth Every 4 (Four) Hours As Needed (HEARTBURN).   Patient Taking Differently    tiZANidine (ZANAFLEX) 4 MG tablet Take 1 tablet by mouth At Night As Needed for Muscle Spasms.   Past Week    topiramate (TOPAMAX) 100 MG tablet Take 1 tablet by mouth 2 (Two) Times a Day.   Patient Taking  Differently    vitamin B-12 (CYANOCOBALAMIN) 1000 MCG tablet Take 1 tablet by mouth Daily. OTC   Past Week    vitamin D (ERGOCALCIFEROL) 51547 UNITS capsule capsule Take 1 capsule by mouth 2 (Two) Times a Week. Sunday and Wednesday  0 Past Week    amLODIPine (NORVASC) 5 MG tablet 1 tablet. (Patient not taking: Reported on 10/3/2023)   Not Taking    epoetin al (Procrit) 08421 UNIT/ML injection Inject  under the skin into the appropriate area as directed As Needed. (Patient not taking: Reported on 10/3/2023)   Not Taking    gabapentin (NEURONTIN) 300 MG capsule Take 1 capsule by mouth Daily As Needed (for pain).   Unknown    Magnesium Oxide -Mg Supplement 500 MG tablet Take 1 tablet by mouth 2 (Two) Times a Day.   Unknown    metoprolol succinate XL (TOPROL-XL) 25 MG 24 hr tablet Take 1 tablet by mouth Daily.   Unknown   , Scheduled Meds:  allopurinol, 100 mg, Oral, BID  amLODIPine, 5 mg, Oral, Q24H  apixaban, 5 mg, Oral, BID  escitalopram, 20 mg, Oral, Daily  levothyroxine, 150 mcg, Oral, Daily  metoprolol succinate XL, 25 mg, Oral, Daily  pantoprazole, 40 mg, Oral, BID  QUEtiapine XR, 300 mg, Oral, Nightly  sodium chloride, 10 mL, Intravenous, Q12H  topiramate, 100 mg, Oral, BID    , Continuous Infusions:  lactated ringers, 100 mL/hr, Last Rate: 100 mL/hr (10/04/23 1415)    , PRN Meds:    gabapentin    HYDROmorphone    ondansetron    sodium chloride    sodium chloride and Allergies:  Codeine and Morphine    Objective      Objective     Vital Signs   Temp:  [98.4 øF (36.9 øC)-99.2 øF (37.3 øC)] 98.4 øF (36.9 øC)  Heart Rate:  [79-89] 88  Resp:  [16] 16  BP: (113-150)/(61-78) 113/61    Intake & Output (last 3 days)         10/01 0701  10/02 0700 10/02 0701  10/03 0700 10/03 0701  10/04 0700 10/04 0701  10/05 0700    P.O.   0 240    I.V. (mL/kg)   1001.5 (14.1) 1000 (14.1)    Total Intake(mL/kg)   1001.5 (14.1) 1240 (17.4)    Net   +1001.5 +1240            Urine Unmeasured Occurrence   2 x 1 x             Physical  Exam:     General Appearance:    Alert, cooperative, in no acute distress complaining of some central abdominal discomfort.   Head:    Normocephalic, without obvious abnormality, atraumatic   Eyes:            Lids and lashes normal, conjunctivae and sclerae normal, no   icterus, no pallor, corneas clear, PERRLA   Ears:    Ears appear intact with no abnormalities noted   Throat:   No oral lesions, no thrush, oral mucosa moist   Neck:   No adenopathy, supple, trachea midline, no thyromegaly, no   carotid bruit, no JVD   Back:     No kyphosis present, no scoliosis present, no skin lesions,      erythema or scars, no tenderness to percussion or                   palpation,   range of motion normal   Lungs:     Clear to auscultation,respirations regular, even and                  unlabored    Heart:    Regular rhythm and normal rate, normal S1 and S2, no            murmur, no gallop, no rub, no click   Chest Wall:    No abnormalities observed   Abdomen:   Abdomen is slightly distended, well-healed midline incision, bowel sounds are present, relatively normal active, no borborygmi, tenderness in all quadrants, by report CT scan with p.o. and IV contrast shows enterocolitis, by CAT scan 2 days prior and enterocolitis also noted on CAT scan today.   Rectal:     DeferredStudy Result    Narrative & Impression   EXAM: CT ABDOMEN PELVIS WO CONTRAST- - 10/3/2023 1:02 PM CDT     HISTORY: Abdominal pain, acute, nonlocalized       COMPARISON: 12/23/2021.     DOSE LENGTH PRODUCT: 397 mGy cm. Automatic exposure control was utilized  to make radiation dose as low as reasonably achievable.     TECHNIQUE: Unenhanced axial images of the abdomen and pelvis obtained  with coronal and sagittal reformats.     FINDINGS: Evaluation limited secondary to lack of intravenous contrast  agent.     VISUALIZED CHEST: No pleural or pericardial effusion. Lung bases clear.     LIVER: Normal hepatic contour.     BILIARY: Gallbladder not demonstrated. No  bile duct dilation.     PANCREAS: Normal pancreas contour.     SPLEEN: Normal size spleen. There is a 1.9 cm rim calcified splenic  artery aneurysm.     ADRENAL: Normal appearance of the bilateral adrenal glands.     GENITOURINARY:  Bilateral renal calcifications, may represent nonobstructing  nephrolithiasis or renovascular calcifications. No hydronephrosis. No  convincing ureteral calculus although the distal ureters are limited in  evaluation due to metallic streak artifact from RIGHT hip prosthesis.  Contrast opacifies the urinary bladder.  Uterus appears absent. Adnexa not discretely identified.     PERITONEUM: Small volume free fluid in the abdomen. No convincing free  air.     GI TRACT: Small hiatal hernia. Normal C-sweep of the duodenum.  Fluid-filled nondilated loops of small bowel. Fluid in the colon. Lack  of intravenous contrast limits evaluation of the bowel wall. No  pneumatosis. Appendix not discretely identified.     VESSELS: Aorta normal in course and caliber with calcified  atherosclerosis.     RETROPERITONEUM: No retroperitoneal or pelvic lymphadenopathy.     SOFT TISSUES: Changes of prior midline ventral surgical incision. Small  fat-containing ventral hernia on axial series 2, image 46.     BONES: RIGHT hip replacement. No acute or suspicious bony finding.        IMPRESSION:  1. Fluid in small bowel and colon, could be seen with  infectious/inflammatory enterocolitis. Ischemic etiology is not  excluded. Lack of contrast limits evaluation of the bowel wall and  vasculature. No pneumatosis.  2. Low-density fluid in the abdomen, insufficient to drain.  3. Rim calcified splenic artery aneurysm measures 1.9 cm, similar to  12/23/2021.  4. Calcified aortic atherosclerosis.  5. Bilateral renal calcifications, may represent nonobstructing  nephrolithiasis or renovascular calcifications. No hydronephrosis.  6. Contrast opacifies the urinary bladder. Correlate with recent outside  imaging or contrasted  procedure history.      This report was signed and finalized on 10/3/2023 1:44 PM CDT by Dr Zena Muñoz MD.         Extremities:   Moves all extremities well, no edema, no cyanosis, no             redness   Pulses:   Pulses palpable and equal bilaterally   Skin:   No bleeding, bruising or rash   Lymph nodes:   No palpable adenopathy   Neurologic:   Cranial nerves 2 - 12 grossly intact, sensation intact, DTR       present and equal bilaterally        Results from last 7 days   Lab Units 10/04/23  0409 10/03/23  1250   WBC 10*3/mm3 12.67* 12.78*   HEMOGLOBIN g/dL 10.9* 11.3*   HEMATOCRIT % 35.6 36.0   PLATELETS 10*3/mm3 277 291        Results from last 7 days   Lab Units 10/04/23  0409 10/03/23  1250   SODIUM mmol/L 135* 133*   POTASSIUM mmol/L 4.2 4.1   CHLORIDE mmol/L 104 104   CO2 mmol/L 19.0* 17.0*   BUN mg/dL 31* 33*   CREATININE mg/dL 2.02* 2.29*   CALCIUM mg/dL 7.6* 7.7*   BILIRUBIN mg/dL 0.5 0.6   ALK PHOS U/L 108 112   ALT (SGPT) U/L 16 21   AST (SGOT) U/L 18 17   GLUCOSE mg/dL 134* 129*         Results Review:   I reviewed the patient's new clinical results.         Assessment/Plan     Assessment & Plan       Enterocolitis    CKD (chronic kidney disease)    Dehydration    Nausea and vomiting    Acute abdominal pain      Patient with multiple medical problems to include chronic renal disease, dehydration, nausea and 2 weeks of abdominal pain.  She also has a history of gastric bypass surgery, cholecystectomy hysterectomy.  She was recently in an outside hospital by report and showed enterocolitis and was discharged on Zofran, her white count is mildly elevated at 12, her CT scan shows some small bowel distention the contrast from the CT scan on Sunday by report that is when it was completed is in the sigmoid colon.  Showing that there is no obstruction, there is normal transit, there is some edema of the sigmoid colon as well as edema throughout the abdomen.  But no ascites, and no obstruction.  White  count is unchanged over the past 12 hours to 12,000.  And C-reactive protein is mildly elevated at 9.0.  I suggest to give another bolus of lactated Ringer's, start some type of antibiotics we will start Zosyn and Flagyl for treatment of enterocolitis, will defer to gastroenterology for treatment of the enterocolitis but they have not seen to this point.  We will check labs in the morning.  If another CAT scan is completed I would suggest using p.o. contrast.  Continue support.    [] X-Ray  [] Reviewed Records  [] Called Physician/Consulted      I discussed the patient's findings and my recommendations with patient, family, nursing staff, and primary care team    Alan Mathews MD  10/04/23  14:56 CDT    Time: Time spent with patient 45 minutes     Part of this note may be an electronic transcription/translation of spoken language to printed text using the Dragon Dictation System.

## 2023-10-04 NOTE — CASE MANAGEMENT/SOCIAL WORK
Discharge Planning Assessment  [facility]     Patient Name: Thao Mohr  MRN: [id number]  Today's Date: 10/4/2023    Admit Date: 10/3/2023        Discharge Needs Assessment       Row Name 10/04/23 0940       Living Environment    People in Home spouse (P)     Primary Care Provided by self (P)     Provides Primary Care For no one (P)     Family Caregiver if Needed spouse (P)     Quality of Family Relationships unable to assess (P)        Transition Planning    Patient/Family Anticipates Transition to home (P)     Patient/Family Anticipated Services at Transition none (P)        Discharge Needs Assessment    Equipment Currently Used at Home none (P)     Concerns to be Addressed no discharge needs identified (P)     Anticipated Changes Related to Illness none (P)     Equipment Needed After Discharge none (P)     Discharge Coordination/Progress Spoke with pt she says she lives with her  [place]. PCP Dr. Rey. She does not use or need equipment. She has used HH when she has a knee replacement. She has nn at this  time. (P)              Emeterio Gonzalez, Social Work Student

## 2023-10-05 ENCOUNTER — APPOINTMENT (OUTPATIENT)
Dept: MRI IMAGING | Facility: HOSPITAL | Age: 78
DRG: 391 | End: 2023-10-05
Payer: MEDICARE

## 2023-10-05 ENCOUNTER — APPOINTMENT (OUTPATIENT)
Dept: CT IMAGING | Facility: HOSPITAL | Age: 78
DRG: 391 | End: 2023-10-05
Payer: MEDICARE

## 2023-10-05 LAB
ALBUMIN SERPL-MCNC: 2.9 G/DL (ref 3.5–5.2)
ALBUMIN/GLOB SERPL: 1.1 G/DL
ALP SERPL-CCNC: 134 U/L (ref 39–117)
ALT SERPL W P-5'-P-CCNC: 15 U/L (ref 1–33)
ANION GAP SERPL CALCULATED.3IONS-SCNC: 14 MMOL/L (ref 5–15)
AST SERPL-CCNC: 15 U/L (ref 1–32)
BASOPHILS # BLD AUTO: 0.02 10*3/MM3 (ref 0–0.2)
BASOPHILS NFR BLD AUTO: 0.2 % (ref 0–1.5)
BILIRUB SERPL-MCNC: 0.7 MG/DL (ref 0–1.2)
BUN SERPL-MCNC: 28 MG/DL (ref 8–23)
BUN/CREAT SERPL: 12.6 (ref 7–25)
CALCIUM SPEC-SCNC: 7.3 MG/DL (ref 8.6–10.5)
CHLORIDE SERPL-SCNC: 103 MMOL/L (ref 98–107)
CO2 SERPL-SCNC: 17 MMOL/L (ref 22–29)
CREAT SERPL-MCNC: 2.23 MG/DL (ref 0.57–1)
CRP SERPL-MCNC: 24.23 MG/DL (ref 0–0.5)
DEPRECATED RDW RBC AUTO: 50 FL (ref 37–54)
EGFRCR SERPLBLD CKD-EPI 2021: 22.2 ML/MIN/1.73
EOSINOPHIL # BLD AUTO: 0.13 10*3/MM3 (ref 0–0.4)
EOSINOPHIL NFR BLD AUTO: 1 % (ref 0.3–6.2)
ERYTHROCYTE [DISTWIDTH] IN BLOOD BY AUTOMATED COUNT: 14.5 % (ref 12.3–15.4)
ERYTHROCYTE [SEDIMENTATION RATE] IN BLOOD: 40 MM/HR (ref 0–30)
GLOBULIN UR ELPH-MCNC: 2.7 GM/DL
GLUCOSE SERPL-MCNC: 119 MG/DL (ref 65–99)
HCT VFR BLD AUTO: 34.5 % (ref 34–46.6)
HGB BLD-MCNC: 10.6 G/DL (ref 12–15.9)
IMM GRANULOCYTES # BLD AUTO: 0.06 10*3/MM3 (ref 0–0.05)
IMM GRANULOCYTES NFR BLD AUTO: 0.5 % (ref 0–0.5)
LYMPHOCYTES # BLD AUTO: 1.69 10*3/MM3 (ref 0.7–3.1)
LYMPHOCYTES NFR BLD AUTO: 12.7 % (ref 19.6–45.3)
MCH RBC QN AUTO: 29 PG (ref 26.6–33)
MCHC RBC AUTO-ENTMCNC: 30.7 G/DL (ref 31.5–35.7)
MCV RBC AUTO: 94.3 FL (ref 79–97)
MONOCYTES # BLD AUTO: 1.46 10*3/MM3 (ref 0.1–0.9)
MONOCYTES NFR BLD AUTO: 11 % (ref 5–12)
NEUTROPHILS NFR BLD AUTO: 74.6 % (ref 42.7–76)
NEUTROPHILS NFR BLD AUTO: 9.91 10*3/MM3 (ref 1.7–7)
NRBC BLD AUTO-RTO: 0 /100 WBC (ref 0–0.2)
PLATELET # BLD AUTO: 286 10*3/MM3 (ref 140–450)
PMV BLD AUTO: 11.3 FL (ref 6–12)
POTASSIUM SERPL-SCNC: 3.9 MMOL/L (ref 3.5–5.2)
PROT SERPL-MCNC: 5.6 G/DL (ref 6–8.5)
RBC # BLD AUTO: 3.66 10*6/MM3 (ref 3.77–5.28)
SODIUM SERPL-SCNC: 134 MMOL/L (ref 136–145)
WBC NRBC COR # BLD: 13.27 10*3/MM3 (ref 3.4–10.8)

## 2023-10-05 PROCEDURE — 25010000002 PIPERACILLIN SOD-TAZOBACTAM PER 1 G: Performed by: SPECIALIST

## 2023-10-05 PROCEDURE — 25010000002 METRONIDAZOLE 500 MG/100ML SOLUTION: Performed by: SPECIALIST

## 2023-10-05 PROCEDURE — 25010000002 ONDANSETRON PER 1 MG

## 2023-10-05 PROCEDURE — 25010000002 HYDROMORPHONE PER 4 MG: Performed by: INTERNAL MEDICINE

## 2023-10-05 PROCEDURE — 25810000003 LACTATED RINGERS PER 1000 ML

## 2023-10-05 PROCEDURE — 74181 MRI ABDOMEN W/O CONTRAST: CPT

## 2023-10-05 PROCEDURE — 85652 RBC SED RATE AUTOMATED: CPT | Performed by: SPECIALIST

## 2023-10-05 PROCEDURE — 85025 COMPLETE CBC W/AUTO DIFF WBC: CPT

## 2023-10-05 PROCEDURE — 99232 SBSQ HOSP IP/OBS MODERATE 35: CPT | Performed by: SPECIALIST

## 2023-10-05 PROCEDURE — 25810000003 LACTATED RINGERS SOLUTION: Performed by: SPECIALIST

## 2023-10-05 PROCEDURE — 80053 COMPREHEN METABOLIC PANEL: CPT

## 2023-10-05 PROCEDURE — 86140 C-REACTIVE PROTEIN: CPT | Performed by: INTERNAL MEDICINE

## 2023-10-05 PROCEDURE — 74176 CT ABD & PELVIS W/O CONTRAST: CPT

## 2023-10-05 RX ORDER — PEG-3350, SODIUM SULFATE, SODIUM CHLORIDE, POTASSIUM CHLORIDE, SODIUM ASCORBATE AND ASCORBIC ACID 7.5-2.691G
1000 KIT ORAL ONCE
Status: COMPLETED | OUTPATIENT
Start: 2023-10-06 | End: 2023-10-05

## 2023-10-05 RX ORDER — PEG-3350, SODIUM SULFATE, SODIUM CHLORIDE, POTASSIUM CHLORIDE, SODIUM ASCORBATE AND ASCORBIC ACID 7.5-2.691G
1000 KIT ORAL ONCE
Status: DISCONTINUED | OUTPATIENT
Start: 2023-10-06 | End: 2023-10-10 | Stop reason: HOSPADM

## 2023-10-05 RX ADMIN — METOPROLOL SUCCINATE 25 MG: 25 TABLET, EXTENDED RELEASE ORAL at 08:34

## 2023-10-05 RX ADMIN — METRONIDAZOLE 500 MG: 5 INJECTION, SOLUTION INTRAVENOUS at 22:04

## 2023-10-05 RX ADMIN — ALLOPURINOL 100 MG: 100 TABLET ORAL at 08:34

## 2023-10-05 RX ADMIN — Medication 10 ML: at 22:04

## 2023-10-05 RX ADMIN — PANTOPRAZOLE SODIUM 40 MG: 40 TABLET, DELAYED RELEASE ORAL at 22:04

## 2023-10-05 RX ADMIN — TOPIRAMATE 100 MG: 100 TABLET, FILM COATED ORAL at 08:35

## 2023-10-05 RX ADMIN — APIXABAN 5 MG: 5 TABLET, FILM COATED ORAL at 08:34

## 2023-10-05 RX ADMIN — HYDROMORPHONE HYDROCHLORIDE 0.5 MG: 1 INJECTION, SOLUTION INTRAMUSCULAR; INTRAVENOUS; SUBCUTANEOUS at 13:40

## 2023-10-05 RX ADMIN — PIPERACILLIN SODIUM AND TAZOBACTAM SODIUM 3.38 G: 3; .375 INJECTION, SOLUTION INTRAVENOUS at 13:39

## 2023-10-05 RX ADMIN — LEVOTHYROXINE SODIUM 150 MCG: 150 TABLET ORAL at 08:35

## 2023-10-05 RX ADMIN — METRONIDAZOLE 500 MG: 5 INJECTION, SOLUTION INTRAVENOUS at 04:01

## 2023-10-05 RX ADMIN — ALLOPURINOL 100 MG: 100 TABLET ORAL at 22:04

## 2023-10-05 RX ADMIN — QUETIAPINE FUMARATE 300 MG: 150 TABLET, EXTENDED RELEASE ORAL at 22:04

## 2023-10-05 RX ADMIN — ONDANSETRON 4 MG: 2 INJECTION INTRAMUSCULAR; INTRAVENOUS at 04:50

## 2023-10-05 RX ADMIN — HYDROMORPHONE HYDROCHLORIDE 0.5 MG: 1 INJECTION, SOLUTION INTRAMUSCULAR; INTRAVENOUS; SUBCUTANEOUS at 18:01

## 2023-10-05 RX ADMIN — ESCITSLOPRAM 20 MG: 10 TABLET ORAL at 08:34

## 2023-10-05 RX ADMIN — AMLODIPINE BESYLATE 5 MG: 5 TABLET ORAL at 08:34

## 2023-10-05 RX ADMIN — METRONIDAZOLE 500 MG: 5 INJECTION, SOLUTION INTRAVENOUS at 09:02

## 2023-10-05 RX ADMIN — HYDROMORPHONE HYDROCHLORIDE 0.5 MG: 1 INJECTION, SOLUTION INTRAMUSCULAR; INTRAVENOUS; SUBCUTANEOUS at 08:40

## 2023-10-05 RX ADMIN — HYDROMORPHONE HYDROCHLORIDE 0.5 MG: 1 INJECTION, SOLUTION INTRAMUSCULAR; INTRAVENOUS; SUBCUTANEOUS at 04:49

## 2023-10-05 RX ADMIN — TOPIRAMATE 100 MG: 100 TABLET, FILM COATED ORAL at 22:04

## 2023-10-05 RX ADMIN — POLYETHYLENE GLYCOL 3350, SODIUM SULFATE, SODIUM CHLORIDE, POTASSIUM CHLORIDE, ASCORBIC ACID, SODIUM ASCORBATE 1000 ML: KIT at 23:30

## 2023-10-05 RX ADMIN — PIPERACILLIN SODIUM AND TAZOBACTAM SODIUM 3.38 G: 3; .375 INJECTION, SOLUTION INTRAVENOUS at 05:25

## 2023-10-05 RX ADMIN — Medication 10 ML: at 08:34

## 2023-10-05 RX ADMIN — METRONIDAZOLE 500 MG: 5 INJECTION, SOLUTION INTRAVENOUS at 15:14

## 2023-10-05 RX ADMIN — FAMOTIDINE 20 MG: 20 TABLET, FILM COATED ORAL at 08:34

## 2023-10-05 RX ADMIN — PANTOPRAZOLE SODIUM 40 MG: 40 TABLET, DELAYED RELEASE ORAL at 08:34

## 2023-10-05 RX ADMIN — SODIUM CHLORIDE, POTASSIUM CHLORIDE, SODIUM LACTATE AND CALCIUM CHLORIDE 100 ML/HR: 600; 310; 30; 20 INJECTION, SOLUTION INTRAVENOUS at 22:13

## 2023-10-05 RX ADMIN — PIPERACILLIN SODIUM AND TAZOBACTAM SODIUM 3.38 G: 3; .375 INJECTION, SOLUTION INTRAVENOUS at 22:16

## 2023-10-05 RX ADMIN — SODIUM CHLORIDE, POTASSIUM CHLORIDE, SODIUM LACTATE AND CALCIUM CHLORIDE 500 ML: 600; 310; 30; 20 INJECTION, SOLUTION INTRAVENOUS at 09:02

## 2023-10-05 NOTE — PROGRESS NOTES
"     LOS: 1 day   Patient Care Team:  Greg Rey MD as PCP - General (Internal Medicine)  Greg Rey MD as Referring Physician (Internal Medicine)  Ronnie Matute MD as Cardiologist (Cardiology)  Naresh Heard DO as Consulting Physician (Gastroenterology)    Chief Complaint: Central abdominal pain    Subjective     Subjective     Hospital day #2 for a very difficult diagnosis and treatment for this patient.  78-year-old with multi quadrant abdominal pain.  CT scan completed at [facility] on Sunday shows signs consistent with enterocolitis, an IV and p.o. contrast was completed.  A noncontrasted CAT scan was completed 2 days ago on admission and it was consistent with enterocolitis there was transit of the contrast into the sigmoid and rectum there was some ascites and \"dirty\" mesentery.  She continued with the abdominal pain I placed her on antibiotics yesterday of Zosyn and Flagyl, and she continues with this pain but just to make sure was not missing something completed a p.o. contrasted study and there was rapid transit of the contrast into the right colon, there is no abnormalities of the right colon, there is no pneumatosis, there is some \"dirty\" mesentery, there is rapid transit of the contrast through the small bowel, there is no obstruction, and with the rapid transit this is more consistent with enterocolitis.  There is some ascites that is unchanged, there is no abscess.  And I have 3 CAT scans all confirming enterocolitis.  I reviewed the 3 CAT scans with Dr. Sevilla , and Dr. Muñoz and both have the same thought that this is all consistent with enterocolitis.  GI medicine to see evaluate and treat and also case reviewed with Dr. Rey  IMPRESSION: Wanda  1. Contrast material in the stomach duodenum and in the colon suggesting  less likelihood of an obstruction or ileus.  2. Moderately dilated entire small bowel without evidence of  obstruction. " This may represent nonspecific acute enteritis. There is no  significant contrast in the small bowel which suggest a less likelihood  of slow transit/ileus.  3. Moderate increase in amount of small to moderate size abdominal and  pelvic free fluid/ascites. This may be secondary to small bowel  inflammatory process.  4. Bilateral nonobstructing renal calculi and/or renal vascular  calcifications.  5. A small sliding-type hiatal hernia.     Objective      Objective     Vital Signs  Temp:  [98.3 øF (36.8 øC)-99 øF (37.2 øC)] 98.4 øF (36.9 øC)  Heart Rate:  [] 81  Resp:  [16-18] 18  BP: (101-120)/(59-94) 112/64    Intake & Output (last 3 days)         10/02 0701  10/03 0700 10/03 0701  10/04 0700 10/04 0701  10/05 0700 10/05 0701  10/06 0700    P.O.  0 240     I.V. (mL/kg)  1001.5 (14.1) 1000 (14.1)     Total Intake(mL/kg)  1001.5 (14.1) 1240 (17.4)     Net  +1001.5 +1240             Urine Unmeasured Occurrence  2 x 3 x             Physical Exam:     General Appearance:    Alert, cooperative, in no acute distress   Lungs:     Clear to auscultation, respirations regular, even and                  unlabored    Heart:    Regular rhythm and normal rate, normal S1 and S2, no            murmur, no gallop, no rub, no click   Chest Wall:    No abnormalities observed   Abdomen:   Slightly distended, positive bowel sounds, no erythema, due to the continued pain we repeated the CAT scan with the findings noted.  IMPRESSION:  1. Contrast material in the stomach duodenum and in the colon suggesting  less likelihood of an obstruction or ileus.  2. Moderately dilated entire small bowel without evidence of  obstruction. This may represent nonspecific acute enteritis. There is no  significant contrast in the small bowel which suggest a less likelihood  of slow transit/ileus.  3. Moderate increase in amount of small to moderate size abdominal and  pelvic free fluid/ascites. This may be secondary to small bowel  inflammatory  process.  4. Bilateral nonobstructing renal calculi and/or renal vascular  calcifications.  5. A small sliding-type hiatal hernia.  There is rapid transit through the small bowel consistent with enterocolitis, there is no pneumatosis, there is no obstruction there is no colonic abnormality, there is transit of the contrast into the rectum, and 3 CAT scans completed in the past 5 days are all of the same notation.        Results Review:     I reviewed the patient's new clinical results.  I reviewed the patient's new imaging results and agree with the interpretation.    Results from last 7 days   Lab Units 10/05/23  0454 10/04/23  0409 10/03/23  1250   WBC 10*3/mm3 13.27* 12.67* 12.78*   HEMOGLOBIN g/dL 10.6* 10.9* 11.3*   HEMATOCRIT % 34.5 35.6 36.0   PLATELETS 10*3/mm3 286 277 291        Results from last 7 days   Lab Units 10/05/23  0454 10/04/23  0409 10/03/23  1250   SODIUM mmol/L 134* 135* 133*   POTASSIUM mmol/L 3.9 4.2 4.1   CHLORIDE mmol/L 103 104 104   CO2 mmol/L 17.0* 19.0* 17.0*   BUN mg/dL 28* 31* 33*   CREATININE mg/dL 2.23* 2.02* 2.29*   CALCIUM mg/dL 7.3* 7.6* 7.7*   BILIRUBIN mg/dL 0.7 0.5 0.6   ALK PHOS U/L 134* 108 112   ALT (SGPT) U/L 15 16 21   AST (SGOT) U/L 15 18 17   GLUCOSE mg/dL 119* 134* 129*       Assessment/Plan     Assessment & Plan       Enterocolitis    CKD (chronic kidney disease)    Dehydration    Nausea and vomiting    Acute abdominal pain      Patient with probable enterocolitis patient with significant abdominal pain continue antibiotic, continue support, continue to monitor and hydration gastroenterology to see.    [] X-Ray  [] Reviewed Records  [] Called Physician/Consulted    Alan Mathews MD  10/05/23  14:15 CDT      Time: time spent with patient 15 minutes     Part of this note may be an electronic transcription/translation of spoken language to printed text using the Dragon Dictation System.

## 2023-10-05 NOTE — PLAN OF CARE
Goal Outcome Evaluation:            ABD CT/MRI today. C/o intermittent abd pain.  at bedside. Possible scope tomorrow if MRI normal. Denies any needs at this time. VSS.

## 2023-10-05 NOTE — PROGRESS NOTES
[facility]   Progress Note    Patient Name: Thao Mohr  : 1945  MRN: [id number]  Primary Care Physician:  Greg Rey MD  Date of admission: 10/3/2023    Subjective   Subjective     Chief Complaint: reports multiquadrant abdominal pain    Objective   Objective     Vitals:   Temp:  [98.3 øF (36.8 øC)-99 øF (37.2 øC)] 98.4 øF (36.9 øC)  Heart Rate:  [] 81  Resp:  [16-18] 18  BP: (101-120)/(59-94) 112/64  Physical Exam    Constitutional: Awake, alert   Eyes: PERRLA, sclerae anicteric, no conjunctival injection   HENT: NCAT, mucous membranes moist   Neck: Supple, no thyromegaly, no lymphadenopathy, trachea midline   Respiratory: Clear to auscultation bilaterally, nonlabored respirations    Cardiovascular: RRR, no murmurs, rubs, or gallops, palpable pedal pulses bilaterally   Gastrointestinal: soft, +distention, TTP in multiple quadrants   Musculoskeletal: No bilateral ankle edema, no clubbing or cyanosis to extremities   Psychiatric: Appropriate affect, cooperative   Neurologic: Oriented x 3, strength symmetric in all extremities, Cranial Nerves grossly intact to confrontation, speech clear   Skin: No rashes     Result Review      Assessment & Plan   Assessment / Plan     Brief Patient Summary:  Thao Mohr is a 78 y.o. female who presents with abdominal pain    Active Hospital Problems:  Active Hospital Problems    Diagnosis     **Enterocolitis     Nausea and vomiting     Acute abdominal pain     Dehydration     CKD (chronic kidney disease)      Plan:     MRI abdomen ordered due to multiquadrant pain, distention. Prior Cts were inconclusive and could no be performed with contrast due to ESRD.   Will order bowel prep for tonight, if MRI is negative.    DVT prophylaxis:  Medical and mechanical DVT prophylaxis orders are present.    CODE STATUS:   Code Status (Patient has no pulse and is not breathing): CPR (Attempt to Resuscitate)  Medical Interventions (Patient has pulse or is  breathing): Full Support      Monse Vivas MD

## 2023-10-05 NOTE — PROGRESS NOTES
Chief Complaint: Abdominal pain      Interval History:     Overall patient is the same this morning.  She was resting when I entered the room, and is arousable to voice.  Still very tender with any light palpation of her abdomen.  Overall abdomen still is still Frazine distended.  Apparently we had no GI coverage yesterday and she was evaluated by general surgery.  Recommended antibiotics, and Zosyn and Flagyl started yesterday for enterocolitis.  Labs this morning show leukocytosis, and inflammatory markers continue to rise.    Review of Systems:   General ROS: negative for - chills or fever  Respiratory ROS: no cough, shortness of breath, or wheezing  Cardiovascular ROS: no chest pain or dyspnea on exertion  Gastrointestinal ROS: negative for - abdominal pain, diarrhea or nausea/vomiting       Vital Signs  Temp:  [98.4 øF (36.9 øC)-99 øF (37.2 øC)] 99 øF (37.2 øC)  Heart Rate:  [] 104  Resp:  [16-18] 18  BP: (101-124)/(59-66) 101/66    Intake/Output Summary (Last 24 hours) at 10/5/2023 0725  Last data filed at 10/4/2023 1414  Gross per 24 hour   Intake 1240 ml   Output --   Net 1240 ml       Physical Exam:     General Appearance:    Alert, cooperative, in no acute distress   Head:    N/A   Throat:   N/A   Neck:   N/A   Lungs:     Clear to auscultation,respirations regular, even and                  unlabored    Heart:    Regular rhythm and normal rate, normal S1 and S2, no            murmur, no gallop, no rub   Abdomen:   Hypoactive bowel sounds, severe tenderness with light palpation, distended, rebound                tenderness   Extremities:   No edema, no cyanosis, no clubbing   Pulses:   N/A   Skin:   N/A   Lymph nodes:   N/A   Neurologic:   N/A          Lab Review:       Lab Results (last 24 hours)       Procedure Component Value Units Date/Time    Comprehensive Metabolic Panel [[id number]]  (Abnormal) Collected: 10/05/23 0454    Specimen: Blood Updated: 10/05/23 0550     Glucose 119 mg/dL      BUN  28 mg/dL      Creatinine 2.23 mg/dL      Sodium 134 mmol/L      Potassium 3.9 mmol/L      Chloride 103 mmol/L      CO2 17.0 mmol/L      Calcium 7.3 mg/dL      Total Protein 5.6 g/dL      Albumin 2.9 g/dL      ALT (SGPT) 15 U/L      AST (SGOT) 15 U/L      Alkaline Phosphatase 134 U/L      Total Bilirubin 0.7 mg/dL      Globulin 2.7 gm/dL      A/G Ratio 1.1 g/dL      BUN/Creatinine Ratio 12.6     Anion Gap 14.0 mmol/L      eGFR 22.2 mL/min/1.73     Narrative:      GFR Normal >60  Chronic Kidney Disease <60  Kidney Failure <15    The GFR formula is only valid for adults with stable renal function between ages 18 and 70.    C-reactive Protein [[id number]]  (Abnormal) Collected: 10/05/23 0454    Specimen: Blood Updated: 10/05/23 0550     C-Reactive Protein 24.23 mg/dL     Sedimentation Rate [[id number]]  (Abnormal) Collected: 10/05/23 0454    Specimen: Blood Updated: 10/05/23 0537     Sed Rate 40 mm/hr     CBC & Differential [[id number]]  (Abnormal) Collected: 10/05/23 0454    Specimen: Blood Updated: 10/05/23 0528    Narrative:      The following orders were created for panel order CBC & Differential.  Procedure                               Abnormality         Status                     ---------                               -----------         ------                     CBC Auto Differential[[id number]]        Abnormal            Final result                 Please view results for these tests on the individual orders.    CBC Auto Differential [[id number]]  (Abnormal) Collected: 10/05/23 0454    Specimen: Blood Updated: 10/05/23 0528     WBC 13.27 10*3/mm3      RBC 3.66 10*6/mm3      Hemoglobin 10.6 g/dL      Hematocrit 34.5 %      MCV 94.3 fL      MCH 29.0 pg      MCHC 30.7 g/dL      RDW 14.5 %      RDW-SD 50.0 fl      MPV 11.3 fL      Platelets 286 10*3/mm3      Neutrophil % 74.6 %      Lymphocyte % 12.7 %      Monocyte % 11.0 %      Eosinophil % 1.0 %      Basophil % 0.2 %      Immature Grans % 0.5 %      Neutrophils,  Absolute 9.91 10*3/mm3      Lymphocytes, Absolute 1.69 10*3/mm3      Monocytes, Absolute 1.46 10*3/mm3      Eosinophils, Absolute 0.13 10*3/mm3      Basophils, Absolute 0.02 10*3/mm3      Immature Grans, Absolute 0.06 10*3/mm3      nRBC 0.0 /100 WBC     Blood Culture - Blood, Hand, Right [[id number]] Collected: 10/04/23 1533    Specimen: Blood from Hand, Right Updated: 10/04/23 1616    Blood Culture - Blood, Arm, Left [[id number]] Collected: 10/04/23 1533    Specimen: Blood from Arm, Left Updated: 10/04/23 1615    Lactic Acid, Plasma [[id number]]  (Normal) Collected: 10/04/23 0817    Specimen: Blood Updated: 10/04/23 0923     Lactate 0.8 mmol/L           Cultures:    No results found for: BLOODCX, URINECX, WOUNDCX, MRSACX, RESPCX, STOOLCX    Radiology Review:  Imaging Results (Last 24 Hours)       ** No results found for the last 24 hours. **                     ASSESSMENT:      Enterocolitis    CKD (chronic kidney disease)    Dehydration    Nausea and vomiting    Acute abdominal pain      PLAN:    1.  Continue IV antibiotics, IV fluids, supportive care with pain and nausea medication.  2.  GI consult tomorrow.  3.  N.p.o. status for now.  4.  Monitor labs.  5.  Could consider CT with p.o. contrast     CALVIN Chong  10/05/23  07:25 CDT        Part of this note may be an electronic transcription/translation of spoken language to printed text using the Dragon Dictation System.

## 2023-10-06 LAB
ALBUMIN SERPL-MCNC: 2.4 G/DL (ref 3.5–5.2)
ALBUMIN/GLOB SERPL: 1 G/DL
ALP SERPL-CCNC: 137 U/L (ref 39–117)
ALT SERPL W P-5'-P-CCNC: 9 U/L (ref 1–33)
ANION GAP SERPL CALCULATED.3IONS-SCNC: 13 MMOL/L (ref 5–15)
AST SERPL-CCNC: 17 U/L (ref 1–32)
BASOPHILS # BLD AUTO: 0.04 10*3/MM3 (ref 0–0.2)
BASOPHILS NFR BLD AUTO: 0.3 % (ref 0–1.5)
BILIRUB SERPL-MCNC: 0.5 MG/DL (ref 0–1.2)
BUN SERPL-MCNC: 27 MG/DL (ref 8–23)
BUN/CREAT SERPL: 10.7 (ref 7–25)
CALCIUM SPEC-SCNC: 7 MG/DL (ref 8.6–10.5)
CHLORIDE SERPL-SCNC: 102 MMOL/L (ref 98–107)
CO2 SERPL-SCNC: 17 MMOL/L (ref 22–29)
CREAT SERPL-MCNC: 2.52 MG/DL (ref 0.57–1)
CRP SERPL-MCNC: 24.58 MG/DL (ref 0–0.5)
DEPRECATED RDW RBC AUTO: 49.7 FL (ref 37–54)
EGFRCR SERPLBLD CKD-EPI 2021: 19.2 ML/MIN/1.73
EOSINOPHIL # BLD AUTO: 0.23 10*3/MM3 (ref 0–0.4)
EOSINOPHIL NFR BLD AUTO: 1.8 % (ref 0.3–6.2)
ERYTHROCYTE [DISTWIDTH] IN BLOOD BY AUTOMATED COUNT: 14.3 % (ref 12.3–15.4)
GLOBULIN UR ELPH-MCNC: 2.5 GM/DL
GLUCOSE SERPL-MCNC: 106 MG/DL (ref 65–99)
HCT VFR BLD AUTO: 28.1 % (ref 34–46.6)
HGB BLD-MCNC: 8.6 G/DL (ref 12–15.9)
IMM GRANULOCYTES # BLD AUTO: 0.07 10*3/MM3 (ref 0–0.05)
IMM GRANULOCYTES NFR BLD AUTO: 0.5 % (ref 0–0.5)
LYMPHOCYTES # BLD AUTO: 0.99 10*3/MM3 (ref 0.7–3.1)
LYMPHOCYTES NFR BLD AUTO: 7.6 % (ref 19.6–45.3)
MCH RBC QN AUTO: 29 PG (ref 26.6–33)
MCHC RBC AUTO-ENTMCNC: 30.6 G/DL (ref 31.5–35.7)
MCV RBC AUTO: 94.6 FL (ref 79–97)
MONOCYTES # BLD AUTO: 1.12 10*3/MM3 (ref 0.1–0.9)
MONOCYTES NFR BLD AUTO: 8.6 % (ref 5–12)
NEUTROPHILS NFR BLD AUTO: 10.62 10*3/MM3 (ref 1.7–7)
NEUTROPHILS NFR BLD AUTO: 81.2 % (ref 42.7–76)
NRBC BLD AUTO-RTO: 0 /100 WBC (ref 0–0.2)
PLATELET # BLD AUTO: 242 10*3/MM3 (ref 140–450)
PMV BLD AUTO: 11.4 FL (ref 6–12)
POTASSIUM SERPL-SCNC: 3.1 MMOL/L (ref 3.5–5.2)
PROT SERPL-MCNC: 4.9 G/DL (ref 6–8.5)
RBC # BLD AUTO: 2.97 10*6/MM3 (ref 3.77–5.28)
SODIUM SERPL-SCNC: 132 MMOL/L (ref 136–145)
WBC NRBC COR # BLD: 13.07 10*3/MM3 (ref 3.4–10.8)

## 2023-10-06 PROCEDURE — 85025 COMPLETE CBC W/AUTO DIFF WBC: CPT

## 2023-10-06 PROCEDURE — 25010000002 METRONIDAZOLE 500 MG/100ML SOLUTION: Performed by: SPECIALIST

## 2023-10-06 PROCEDURE — 25810000003 LACTATED RINGERS PER 1000 ML

## 2023-10-06 PROCEDURE — 25010000002 ONDANSETRON PER 1 MG

## 2023-10-06 PROCEDURE — 99232 SBSQ HOSP IP/OBS MODERATE 35: CPT | Performed by: SPECIALIST

## 2023-10-06 PROCEDURE — 86140 C-REACTIVE PROTEIN: CPT | Performed by: INTERNAL MEDICINE

## 2023-10-06 PROCEDURE — 80053 COMPREHEN METABOLIC PANEL: CPT

## 2023-10-06 PROCEDURE — 25010000002 PIPERACILLIN SOD-TAZOBACTAM PER 1 G: Performed by: SPECIALIST

## 2023-10-06 PROCEDURE — 25010000002 HYDROMORPHONE PER 4 MG: Performed by: INTERNAL MEDICINE

## 2023-10-06 RX ADMIN — Medication 10 ML: at 20:30

## 2023-10-06 RX ADMIN — SODIUM CHLORIDE, POTASSIUM CHLORIDE, SODIUM LACTATE AND CALCIUM CHLORIDE 100 ML/HR: 600; 310; 30; 20 INJECTION, SOLUTION INTRAVENOUS at 20:37

## 2023-10-06 RX ADMIN — METRONIDAZOLE 500 MG: 5 INJECTION, SOLUTION INTRAVENOUS at 22:48

## 2023-10-06 RX ADMIN — APIXABAN 5 MG: 5 TABLET, FILM COATED ORAL at 09:13

## 2023-10-06 RX ADMIN — METOPROLOL SUCCINATE 25 MG: 25 TABLET, EXTENDED RELEASE ORAL at 09:14

## 2023-10-06 RX ADMIN — PIPERACILLIN SODIUM AND TAZOBACTAM SODIUM 3.38 G: 3; .375 INJECTION, SOLUTION INTRAVENOUS at 05:20

## 2023-10-06 RX ADMIN — HYDROMORPHONE HYDROCHLORIDE 0.5 MG: 1 INJECTION, SOLUTION INTRAMUSCULAR; INTRAVENOUS; SUBCUTANEOUS at 20:37

## 2023-10-06 RX ADMIN — ONDANSETRON 4 MG: 2 INJECTION INTRAMUSCULAR; INTRAVENOUS at 01:52

## 2023-10-06 RX ADMIN — QUETIAPINE FUMARATE 300 MG: 150 TABLET, EXTENDED RELEASE ORAL at 20:28

## 2023-10-06 RX ADMIN — APIXABAN 5 MG: 5 TABLET, FILM COATED ORAL at 20:28

## 2023-10-06 RX ADMIN — HYDROMORPHONE HYDROCHLORIDE 0.5 MG: 1 INJECTION, SOLUTION INTRAMUSCULAR; INTRAVENOUS; SUBCUTANEOUS at 09:39

## 2023-10-06 RX ADMIN — PIPERACILLIN SODIUM AND TAZOBACTAM SODIUM 3.38 G: 3; .375 INJECTION, SOLUTION INTRAVENOUS at 13:18

## 2023-10-06 RX ADMIN — Medication 10 ML: at 09:36

## 2023-10-06 RX ADMIN — HYDROMORPHONE HYDROCHLORIDE 0.5 MG: 1 INJECTION, SOLUTION INTRAMUSCULAR; INTRAVENOUS; SUBCUTANEOUS at 16:30

## 2023-10-06 RX ADMIN — PANTOPRAZOLE SODIUM 40 MG: 40 TABLET, DELAYED RELEASE ORAL at 09:36

## 2023-10-06 RX ADMIN — METRONIDAZOLE 500 MG: 5 INJECTION, SOLUTION INTRAVENOUS at 09:36

## 2023-10-06 RX ADMIN — METRONIDAZOLE 500 MG: 5 INJECTION, SOLUTION INTRAVENOUS at 04:40

## 2023-10-06 RX ADMIN — METRONIDAZOLE 500 MG: 5 INJECTION, SOLUTION INTRAVENOUS at 16:35

## 2023-10-06 RX ADMIN — HYDROMORPHONE HYDROCHLORIDE 0.5 MG: 1 INJECTION, SOLUTION INTRAMUSCULAR; INTRAVENOUS; SUBCUTANEOUS at 06:16

## 2023-10-06 RX ADMIN — TOPIRAMATE 100 MG: 100 TABLET, FILM COATED ORAL at 20:29

## 2023-10-06 RX ADMIN — AMLODIPINE BESYLATE 5 MG: 5 TABLET ORAL at 09:13

## 2023-10-06 RX ADMIN — LEVOTHYROXINE SODIUM 150 MCG: 150 TABLET ORAL at 09:13

## 2023-10-06 RX ADMIN — PANTOPRAZOLE SODIUM 40 MG: 40 TABLET, DELAYED RELEASE ORAL at 20:28

## 2023-10-06 RX ADMIN — ALLOPURINOL 100 MG: 100 TABLET ORAL at 09:13

## 2023-10-06 RX ADMIN — FAMOTIDINE 20 MG: 20 TABLET, FILM COATED ORAL at 09:13

## 2023-10-06 RX ADMIN — TOPIRAMATE 100 MG: 100 TABLET, FILM COATED ORAL at 09:13

## 2023-10-06 RX ADMIN — HYDROMORPHONE HYDROCHLORIDE 0.5 MG: 1 INJECTION, SOLUTION INTRAMUSCULAR; INTRAVENOUS; SUBCUTANEOUS at 13:22

## 2023-10-06 RX ADMIN — ESCITSLOPRAM 20 MG: 10 TABLET ORAL at 09:13

## 2023-10-06 RX ADMIN — ALLOPURINOL 100 MG: 100 TABLET ORAL at 20:28

## 2023-10-06 NOTE — PROGRESS NOTES
[facility]   Progress Note    Patient Name: Thao Mohr  : 1945  MRN: [id number]  Primary Care Physician:  Greg Rey MD  Date of admission: 10/3/2023    Subjective   Subjective    Tolerated clears, multiple BM overnight. Still reports abdominal pain        Objective     Vitals:   Temp:  [97.4 øF (36.3 øC)-98.6 øF (37 øC)] 98.2 øF (36.8 øC)  Heart Rate:  [73-85] 80  Resp:  [18-20] 20  BP: ()/(59-94) 106/64  Physical Exam    Constitutional: Awake, alert   Eyes: PERRLA, sclerae anicteric, no conjunctival injection   HENT: NCAT, mucous membranes moist   Neck: Supple, no thyromegaly, no lymphadenopathy, trachea midline   Respiratory: Clear to auscultation bilaterally, nonlabored respirations    Cardiovascular: RRR, no murmurs, rubs, or gallops, palpable pedal pulses bilaterally   Gastrointestinal: soft, approp TTP, ND, no rebound/guarding   Musculoskeletal: No bilateral ankle edema, no clubbing or cyanosis to extremities   Psychiatric: Appropriate affect, cooperative   Neurologic: Oriented x 3, strength symmetric in all extremities, Cranial Nerves grossly intact to confrontation, speech clear   Skin: No rashes     Result Review    Result Review:  I have personally reviewed the results from the time of this admission to 10/6/2023 07:49 CDT and agree with these findings:  []  Laboratory list / accordion  []  Microbiology  [x]  Radiology  []  EKG/Telemetry   []  Cardiology/Vascular   []  Pathology  []  Old records  []  Other:  Most notable findings include: MRI shows dilated loops inconsistent with obstruction      Assessment & Plan   Assessment / Plan     Brief Patient Summary:  Thao Mohr is a 78 y.o. female who presents with abdominal pain    Active Hospital Problems:  Active Hospital Problems    Diagnosis     **Enterocolitis     Nausea and vomiting     Acute abdominal pain     Dehydration     CKD (chronic kidney disease)      Plan:    Recommend GI evaluation today for  EGD/colonoscopy  May need Bariatric evaluation given prior hx of RYGB   No acute general surgery intervention    DVT prophylaxis:  Medical and mechanical DVT prophylaxis orders are present.    CODE STATUS:   Code Status (Patient has no pulse and is not breathing): CPR (Attempt to Resuscitate)  Medical Interventions (Patient has pulse or is breathing): Full Support        Monse Vivas MD

## 2023-10-06 NOTE — PROGRESS NOTES
Chief Complaint: Abdominal pain      Interval History:     Persistent abdominal tenderness with palpation.  She is asleep and comfortable otherwise.  Still no bowel movement.  She was only able to complete part of her bowel prep last night.  She is evaluated by general surgery again yesterday and there are plans to do colonoscopy today.  MRI of the abdomen was performed yesterday and was mostly unremarkable.    Labs show mild hypokalemia, stable renal function, persistent leukocytosis and elevated C-reactive protein.    Review of Systems:   General ROS: negative for - chills or fever  Respiratory ROS: no cough, shortness of breath, or wheezing  Cardiovascular ROS: no chest pain or dyspnea on exertion  Gastrointestinal ROS: negative for - abdominal pain, diarrhea or nausea/vomiting       Vital Signs  Temp:  [97.4 øF (36.3 øC)-98.6 øF (37 øC)] 98.2 øF (36.8 øC)  Heart Rate:  [73-85] 80  Resp:  [18-20] 20  BP: ()/(59-94) 106/64    Intake/Output Summary (Last 24 hours) at 10/6/2023 0750  Last data filed at 10/6/2023 0433  Gross per 24 hour   Intake 2795 ml   Output --   Net 2795 ml       Physical Exam:     General Appearance:    Alert, cooperative, in no acute distress   Head:    N/A   Throat:   N/A   Neck:   N/A   Lungs:     Clear to auscultation,respirations regular, even and                  unlabored    Heart:    Regular rhythm and normal rate, normal S1 and S2, no            murmur, no gallop, no rub   Abdomen:   Generalized abdominal tenderness and distention   Extremities:   No edema, no cyanosis, no clubbing   Pulses:   N/A   Skin:   N/A   Lymph nodes:   N/A   Neurologic:   N/A          Lab Review:       Lab Results (last 24 hours)       Procedure Component Value Units Date/Time    Comprehensive Metabolic Panel [[id number]]  (Abnormal) Collected: 10/06/23 0540    Specimen: Blood Updated: 10/06/23 0632     Glucose 106 mg/dL      BUN 27 mg/dL      Creatinine 2.52 mg/dL      Sodium 132 mmol/L       Potassium 3.1 mmol/L      Chloride 102 mmol/L      CO2 17.0 mmol/L      Calcium 7.0 mg/dL      Total Protein 4.9 g/dL      Albumin 2.4 g/dL      ALT (SGPT) 9 U/L      AST (SGOT) 17 U/L      Alkaline Phosphatase 137 U/L      Total Bilirubin 0.5 mg/dL      Globulin 2.5 gm/dL      A/G Ratio 1.0 g/dL      BUN/Creatinine Ratio 10.7     Anion Gap 13.0 mmol/L      eGFR 19.2 mL/min/1.73     Narrative:      GFR Normal >60  Chronic Kidney Disease <60  Kidney Failure <15    The GFR formula is only valid for adults with stable renal function between ages 18 and 70.    C-reactive Protein [[id number]]  (Abnormal) Collected: 10/06/23 0540    Specimen: Blood Updated: 10/06/23 0632     C-Reactive Protein 24.58 mg/dL     CBC & Differential [[id number]]  (Abnormal) Collected: 10/06/23 0540    Specimen: Blood Updated: 10/06/23 0623    Narrative:      The following orders were created for panel order CBC & Differential.  Procedure                               Abnormality         Status                     ---------                               -----------         ------                     CBC Auto Differential[[id number]]        Abnormal            Final result                 Please view results for these tests on the individual orders.    CBC Auto Differential [[id number]]  (Abnormal) Collected: 10/06/23 0540    Specimen: Blood Updated: 10/06/23 0623     WBC 13.07 10*3/mm3      RBC 2.97 10*6/mm3      Hemoglobin 8.6 g/dL      Hematocrit 28.1 %      MCV 94.6 fL      MCH 29.0 pg      MCHC 30.6 g/dL      RDW 14.3 %      RDW-SD 49.7 fl      MPV 11.4 fL      Platelets 242 10*3/mm3      Neutrophil % 81.2 %      Lymphocyte % 7.6 %      Monocyte % 8.6 %      Eosinophil % 1.8 %      Basophil % 0.3 %      Immature Grans % 0.5 %      Neutrophils, Absolute 10.62 10*3/mm3      Lymphocytes, Absolute 0.99 10*3/mm3      Monocytes, Absolute 1.12 10*3/mm3      Eosinophils, Absolute 0.23 10*3/mm3      Basophils, Absolute 0.04 10*3/mm3      Immature Grans,  Absolute 0.07 10*3/mm3      nRBC 0.0 /100 WBC     Narrative:      Discussed HH delta failure with Selina,deepali notify     Blood Culture - Blood, Hand, Right [[id number]]  (Normal) Collected: 10/04/23 1533    Specimen: Blood from Hand, Right Updated: 10/05/23 1630     Blood Culture No growth at 24 hours    Blood Culture - Blood, Arm, Left [[id number]]  (Normal) Collected: 10/04/23 1533    Specimen: Blood from Arm, Left Updated: 10/05/23 1630     Blood Culture No growth at 24 hours          Cultures:    Blood Culture   Date Value Ref Range Status   10/04/2023 No growth at 24 hours  Preliminary   10/04/2023 No growth at 24 hours  Preliminary       Radiology Review:  Imaging Results (Last 24 Hours)       Procedure Component Value Units Date/Time    MRI Abdomen Without Contrast [[id number]] Collected: 10/05/23 1804     Updated: 10/05/23 1815    Narrative:      EXAMINATION: MRI ABDOMEN WO CONTRAST-  10/5/2023 6:05 PM CDT     HISTORY: Abdominal pain. Nonlocalized.     FINDINGS: Multiplanar fast spin echo imaging sequences were obtained of  the upper abdomen on a high-field magnet without gadolinium enhancement.     Today's study is somewhat degraded by motion artifact.     There is a trace left-sided pleural effusion with bibasilar subsegmental  atelectasis. The heart is mildly enlarged. There is no pericardial  effusion.     The liver is homogeneous in signal without evidence of discrete mass.  There is no evidence of splenomegaly. There is ascitic fluid within the  subdiaphragmatic space on the left as well as a small amount of ascites  within the paracolic gutters and right peritoneal cavity.     The gallbladder is surgically absent. Mild prominence of the  extrahepatic bile duct likely represents reservoir effect without  definite intraluminal filling defect. No intrahepatic biliary dilatation  is present.     The visualized descending thoracic aorta and proximal abdominal aorta  are normal in caliber.     Both  adrenals are unremarkable. There is a subcentimeter cortical cyst  of the upper pole of the right kidney. No perinephric fluid collections  or hydronephrotic changes are present of either kidney. Both proximal  ureters are normal in caliber.     The pancreas is normal in appearance with no radiographic evidence of  acute pancreatitis or discrete mass. There is no pancreatic ductal  dilatation.     There are nonspecific fluid-filled loops of small bowel and colon with  mild to moderate distention. A discrete transition point is not  identified and this could represent an ileus. There is a small ventral  wall hernia within the midline in the mid abdomen with an abdominal wall  defect measuring 1.6 cm in size and a small amount of herniated  peritoneal fat. This likely represents an incisional hernia with  evidence of a previous midline incision present.       Impression:      1. Nonspecific bowel gas pattern with multiple fluid filled small bowel  and colonic loops. I do not see a discrete transition point with  obstruction considered less likely. This may represent a mild ileus.  There is ascites within the abdomen including fluid in the left  subdiaphragmatic space as well as a small amount of fluid in the  gutters.  2. The patient has undergone prior cholecystectomy. Mild extrahepatic  prominence of the bile duct is likely related to reservoir effect  without a discrete filling defect demonstrated. No intrahepatic biliary  dilatation.  3. The adrenals are normal. There is a subcentimeter cortical cyst upper  pole right kidney. No evidence of hydronephrosis or perinephric fluid  collection.  4. The liver and pancreas are homogeneous in signal without evidence of  mass.  5. Small ventral wall hernia-likely an incisional hernia containing a  small amount of peritoneal fat. There is evidence of a previous midline  surgical incision over the mid and upper abdomen.  6. Trace left pleural effusion. There is bibasilar  subsegmental  atelectasis.     This report was signed and finalized on 10/5/2023 6:12 PM CDT by Dr. Kristian Bravo MD.       CT Abdomen Pelvis Without Contrast [[id number]] Collected: 10/05/23 1309     Updated: 10/05/23 1343    Narrative:      EXAMINATION: CT ABDOMEN PELVIS WO CONTRAST-      10/5/2023 12:46 PM CDT     HISTORY: Abdominal pain, acute, nonlocalized     In order to have a CT radiation dose as low as reasonably achievable  Automated Exposure Control was utilized for adjustment of the mA and/or  KV according to patient size.     DLP in mGycm= 378     The CT scan of the abdomen and pelvis is performed without intravenous  contrast enhancement.     Images are acquired in axial plane with subsequent reconstruction in  coronal and sagittal planes.     The comparison is made with the previous study dated 10/3/2023. The  limited visualized lungs show atelectatic change in left lower lung.  Small area of consolidation is seen in the left lower lobe with adjacent  trace pleural effusion.     Limited visualized cardiomediastinal structures show atheromatous  changes of the thoracic aorta.     There is a small residual contrast in the distal esophagus and in the  hiatal hernia.     The unenhanced liver appears unremarkable. Relatively small unenhanced  spleen is seen with moderate amount of loculated fluid between the left  diaphragm and the spleen. The fluid extend into the left paracolic  gutter. The amount of fluid is more than the previous study.     The gallbladder is absent.     Unenhanced pancreas is atrophic with fatty infiltration.     The adrenal glands are normal.     Moderate lobulation of renal contour bilaterally is seen. Several  calcifications in the kidneys bilaterally are similar to the previous  study and may represent a combination of calculi and vascular  calcification. No hydronephrosis. Ureters are nondilated. The urinary  bladder is moderately distended with a small amount of residual  contrast  in the urinary bladder.     The stomach is decompressed with residual contrast. Moderate thickening  of the wall of the stomach is probably due to under distention. There is  residual contrast in the duodenum and proximal jejunum. Moderately  dilated entire small bowel is seen similar to the previous study. It  measures up to 2 cm in diameter. There are no significant fluid levels  in the small bowel. There is contrast material in the colon which  appears to be filled with fluid and gas. No significant amount of stool.  No finding to suggest obstruction. There is small to moderate amount of  free fluid in the pelvis which has somewhat increased since the previous  study.     Atheromatous changes of the abdominal aorta and iliac arteries. No  aneurysmal dilatation. 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     There is no evidence of abdominal or pelvic lymphadenopathy.     The images reviewed in bone window show no acute bony abnormality. There  is evidence of right hip arthroplasty.       Impression:      1. Contrast material in the stomach duodenum and in the colon suggesting  less likelihood of an obstruction or ileus.  2. Moderately dilated entire small bowel without evidence of  obstruction. This may represent nonspecific acute enteritis. There is no  significant contrast in the small bowel which suggest a less likelihood  of slow transit/ileus.  3. Moderate increase in amount of small to moderate size abdominal and  pelvic free fluid/ascites. This may be secondary to small bowel  inflammatory process.  4. Bilateral nonobstructing renal calculi and/or renal vascular  calcifications.  5. A small sliding-type hiatal hernia.                                This report was signed and finalized on 10/5/2023 1:40 PM CDT by Dr. William Bolton MD.                        ASSESSMENT:      Enterocolitis    CKD (chronic kidney disease)    Dehydration    Nausea and  vomiting    Acute abdominal pain      PLAN:    1.  General surgery and GI consult.  Plans for colonoscopy today if bowel prep was sufficient.  2.  Continue with IV antibiotics, IV fluids and supportive care.  3.  N.p.o. status.  4.  Continue to monitor labs, electrolytes, and renal function.    Discussed this case with Dr. Dunbar.  Further orders per Dr. Dunbar.      Thai Rausch, APRN  10/06/23  07:50 CDT        Part of this note may be an electronic transcription/translation of spoken language to printed text using the Dragon Dictation System.

## 2023-10-06 NOTE — PLAN OF CARE
"Goal Outcome Evaluation:           Progress: no change     VSS.  RA.  IVF as ordered.  Medicated for pain X2.  Denies nausea.  BMs today - liquid.  Refuses to complete bowel prep - states \"unable to continue\".  NPO.  Awaiting GI consult tomorrow.  Safety maintained.  Voiding. SCDs.           "

## 2023-10-06 NOTE — PROGRESS NOTES
LOS: 2 days   Patient Care Team:  Greg Rey MD as PCP - General (Internal Medicine)  Greg Rey MD as Referring Physician (Internal Medicine)  Ronnie Matute MD as Cardiologist (Cardiology)  Naresh Heard DO as Consulting Physician (Gastroenterology)    Chief Complaint: Central abdominal pain Hospital day #3    Subjective     Subjective     Patient very minimally improved from yesterday, 4 bowel movements reported, mostly loose, no blood, not foul-smelling, cultures were obtained, no nausea no vomiting, CT scan as noted yesterday shows passage into the colon rapidly, some intra-abdominal ascites, no change, her white count is unchanged at 13 and her C-reactive protein is elevated but without change at 24.  The 3 CTs were reviewed once again no obvious surgical problems noted looking at it from the potential bypass I cannot see where any gastric bypass was appreciated are completed but also appreciate the patient's history stating that it occurred 50 years prior but I do not see any obvious surgical changes to the gastric body itself but she says there was surgery without 100 pound weight loss at that time.  GI to see to this point we are treating her as a gastroenteritis her pulse and blood pressure is reduced some in the past 24 hours with a downward trend  Objective      Objective     Vital Signs  Temp:  [97.4 øF (36.3 øC)-98.6 øF (37 øC)] 98.5 øF (36.9 øC)  Heart Rate:  [73-81] 76  Resp:  [18-20] 20  BP: ()/(55-64) 95/55    Intake & Output (last 3 days)         10/03 0701  10/04 0700 10/04 0701  10/05 0700 10/05 0701  10/06 0700 10/06 0701  10/07 0700    P.O. 0 240 240     I.V. (mL/kg) 1001.5 (14.1) 1000 (14.1) 2555 (35.9)     Total Intake(mL/kg) 1001.5 (14.1) 1240 (17.4) 2795 (39.3)     Net +1001.5 +1240 +2795             Urine Unmeasured Occurrence 2 x 3 x 1 x     Stool Unmeasured Occurrence   4 x             Physical Exam:     General Appearance:    Alert,  cooperative, in no acute distress   Lungs:     Clear to auscultation, respirations regular, even and                  unlabored    Heart:    Regular rhythm and normal rate, normal S1 and S2, no            murmur, no gallop, no rub, no click   Chest Wall:    No abnormalities observed   Abdomen:   Occasional bowel sounds, she is tender still in all quadrants, not 1 side more than another, white count is unchanged at 13, C-reactive protein is unchanged at 24, case discussed with radiology, also with Dr. Rey,  No other suggestions other than to support her electrolytes        Results Review:     I reviewed the patient's new clinical results.  I reviewed the patient's new imaging results and agree with the interpretation.    Results from last 7 days   Lab Units 10/06/23  0540 10/05/23  0454 10/04/23  0409   WBC 10*3/mm3 13.07* 13.27* 12.67*   HEMOGLOBIN g/dL 8.6* 10.6* 10.9*   HEMATOCRIT % 28.1* 34.5 35.6   PLATELETS 10*3/mm3 242 286 277        Results from last 7 days   Lab Units 10/06/23  0540 10/05/23  0454 10/04/23  0409   SODIUM mmol/L 132* 134* 135*   POTASSIUM mmol/L 3.1* 3.9 4.2   CHLORIDE mmol/L 102 103 104   CO2 mmol/L 17.0* 17.0* 19.0*   BUN mg/dL 27* 28* 31*   CREATININE mg/dL 2.52* 2.23* 2.02*   CALCIUM mg/dL 7.0* 7.3* 7.6*   BILIRUBIN mg/dL 0.5 0.7 0.5   ALK PHOS U/L 137* 134* 108   ALT (SGPT) U/L 9 15 16   AST (SGOT) U/L 17 15 18   GLUCOSE mg/dL 106* 119* 134*       Assessment/Plan     Assessment & Plan       Enterocolitis    CKD (chronic kidney disease)    Dehydration    Nausea and vomiting    Acute abdominal pain      Continue hydration, continue antibiotics, gastroenterology to see, no other suggestions.    [] X-Ray  [] Reviewed Records  [] Called Physician/Consulted    Alan Mathews MD  10/06/23  08:21 CDT      Time: time spent with patient 15 minutes     Part of this note may be an electronic transcription/translation of spoken language to printed text using the Dragon Dictation System.

## 2023-10-06 NOTE — PROGRESS NOTES
Nutrition Services    Patient Name:  Thao Mohr  YOB: 1945  MRN: [id number]  Admit Date:  10/3/2023    NPO x 3 days. Pt may benefit from early [enteral or parenteral] feeding if unable to initiate a po diet and if appropriate with POC goals. If desired, RD available for recommendations. NTN following per protocol.     Electronically signed by:  Terri Odonnell RDN, [facility]  10/06/23 10:56 CDT

## 2023-10-06 NOTE — PROGRESS NOTES
"Pharmacy Renal Dose Conversion    Thao Mohr is a 78 y.o. year old female  162.6 cm (64\") 71.1 kg (156 lb 11.2 oz)    Estimated Creatinine Clearance: 18.1 mL/min (A) (by C-G formula based on SCr of 2.52 mg/dL (H)).   Creatinine   Date Value Ref Range Status   10/06/2023 2.52 (H) 0.57 - 1.00 mg/dL Final   10/05/2023 2.23 (H) 0.57 - 1.00 mg/dL Final   10/04/2023 2.02 (H) 0.57 - 1.00 mg/dL Final   04/15/2022 3.6 (H) 0.5 - 0.9 mg/dL Final   04/11/2022 3.4 (H) 0.5 - 0.9 mg/dL Final   04/10/2022 3.2 (H) 0.5 - 0.9 mg/dL Final       PLAN  Based on prescribing information provided by the drug , Zosyn 3.375 gm iv Q8H,  has been changed to Zosyn 3.375 gm iv Q12H (extended infusion).    Adjusted per the directives and guidelines established by [facility] Committee and [facility] Committee.  Pharmacy will continue to monitor daily and make further adjustment(s) accordingly.    Sherif Cruz, PharmD  10/06/2313:55 CDT    "

## 2023-10-06 NOTE — CASE MANAGEMENT/SOCIAL WORK
Continued Stay Note   May     Patient Name: Thao Mohr  MRN: [id number]  Today's Date: 10/6/2023    Admit Date: 10/3/2023    Plan: Home   Discharge Plan       Row Name 10/06/23 0909       Plan    Plan Home    Patient/Family in Agreement with Plan yes    Plan Comments Spoke with pt to reassess d/c needs. She feels she will be able to return home and denies other needs.  She says she does have a cane at home to help her if needed to get around. Will follow.    Final Discharge Disposition Code 01 - home or self-care                   Discharge Codes    No documentation.                 Expected Discharge Date and Time       Expected Discharge Date Expected Discharge Time    Oct 7, 2023               SHADE Colon

## 2023-10-06 NOTE — PLAN OF CARE
Goal Outcome Evaluation:  Plan of Care Reviewed With: patient           Outcome Evaluation: No c/o pain this shift, c/o nausea PRN meds given per orders, cont. IVF and IV ABX, colo prep given per orders, NPO

## 2023-10-07 LAB
ALBUMIN SERPL-MCNC: 2.5 G/DL (ref 3.5–5.2)
ALBUMIN/GLOB SERPL: 1 G/DL
ALP SERPL-CCNC: 121 U/L (ref 39–117)
ALT SERPL W P-5'-P-CCNC: 9 U/L (ref 1–33)
ANION GAP SERPL CALCULATED.3IONS-SCNC: 14 MMOL/L (ref 5–15)
AST SERPL-CCNC: 13 U/L (ref 1–32)
BASOPHILS # BLD AUTO: 0.03 10*3/MM3 (ref 0–0.2)
BASOPHILS NFR BLD AUTO: 0.3 % (ref 0–1.5)
BILIRUB SERPL-MCNC: 0.3 MG/DL (ref 0–1.2)
BUN SERPL-MCNC: 22 MG/DL (ref 8–23)
BUN/CREAT SERPL: 9.3 (ref 7–25)
CALCIUM SPEC-SCNC: 7.1 MG/DL (ref 8.6–10.5)
CHLORIDE SERPL-SCNC: 104 MMOL/L (ref 98–107)
CO2 SERPL-SCNC: 18 MMOL/L (ref 22–29)
CREAT SERPL-MCNC: 2.36 MG/DL (ref 0.57–1)
DEPRECATED RDW RBC AUTO: 49.3 FL (ref 37–54)
EGFRCR SERPLBLD CKD-EPI 2021: 20.7 ML/MIN/1.73
EOSINOPHIL # BLD AUTO: 0.62 10*3/MM3 (ref 0–0.4)
EOSINOPHIL NFR BLD AUTO: 5.6 % (ref 0.3–6.2)
ERYTHROCYTE [DISTWIDTH] IN BLOOD BY AUTOMATED COUNT: 14.5 % (ref 12.3–15.4)
GLOBULIN UR ELPH-MCNC: 2.4 GM/DL
GLUCOSE SERPL-MCNC: 96 MG/DL (ref 65–99)
HCT VFR BLD AUTO: 25.4 % (ref 34–46.6)
HGB BLD-MCNC: 8 G/DL (ref 12–15.9)
IMM GRANULOCYTES # BLD AUTO: 0.04 10*3/MM3 (ref 0–0.05)
IMM GRANULOCYTES NFR BLD AUTO: 0.4 % (ref 0–0.5)
LYMPHOCYTES # BLD AUTO: 1.12 10*3/MM3 (ref 0.7–3.1)
LYMPHOCYTES NFR BLD AUTO: 10.1 % (ref 19.6–45.3)
MAGNESIUM SERPL-MCNC: 1.4 MG/DL (ref 1.6–2.4)
MCH RBC QN AUTO: 29.5 PG (ref 26.6–33)
MCHC RBC AUTO-ENTMCNC: 31.5 G/DL (ref 31.5–35.7)
MCV RBC AUTO: 93.7 FL (ref 79–97)
MONOCYTES # BLD AUTO: 0.86 10*3/MM3 (ref 0.1–0.9)
MONOCYTES NFR BLD AUTO: 7.7 % (ref 5–12)
NEUTROPHILS NFR BLD AUTO: 75.9 % (ref 42.7–76)
NEUTROPHILS NFR BLD AUTO: 8.43 10*3/MM3 (ref 1.7–7)
NRBC BLD AUTO-RTO: 0 /100 WBC (ref 0–0.2)
PLATELET # BLD AUTO: 247 10*3/MM3 (ref 140–450)
PMV BLD AUTO: 10.9 FL (ref 6–12)
POTASSIUM SERPL-SCNC: 2.8 MMOL/L (ref 3.5–5.2)
PROT SERPL-MCNC: 4.9 G/DL (ref 6–8.5)
RBC # BLD AUTO: 2.71 10*6/MM3 (ref 3.77–5.28)
SODIUM SERPL-SCNC: 136 MMOL/L (ref 136–145)
WBC NRBC COR # BLD: 11.1 10*3/MM3 (ref 3.4–10.8)

## 2023-10-07 PROCEDURE — 85025 COMPLETE CBC W/AUTO DIFF WBC: CPT

## 2023-10-07 PROCEDURE — 83735 ASSAY OF MAGNESIUM: CPT | Performed by: FAMILY MEDICINE

## 2023-10-07 PROCEDURE — 25010000002 METRONIDAZOLE 500 MG/100ML SOLUTION: Performed by: SPECIALIST

## 2023-10-07 PROCEDURE — 80053 COMPREHEN METABOLIC PANEL: CPT

## 2023-10-07 PROCEDURE — 25010000002 PIPERACILLIN SOD-TAZOBACTAM PER 1 G: Performed by: INTERNAL MEDICINE

## 2023-10-07 PROCEDURE — 99221 1ST HOSP IP/OBS SF/LOW 40: CPT | Performed by: NURSE PRACTITIONER

## 2023-10-07 PROCEDURE — 25810000003 LACTATED RINGERS PER 1000 ML

## 2023-10-07 PROCEDURE — 25010000002 ONDANSETRON PER 1 MG

## 2023-10-07 PROCEDURE — 25010000002 MAGNESIUM SULFATE IN D5W 1G/100ML (PREMIX) 1-5 GM/100ML-% SOLUTION: Performed by: INTERNAL MEDICINE

## 2023-10-07 PROCEDURE — 25010000002 HYDROMORPHONE PER 4 MG: Performed by: INTERNAL MEDICINE

## 2023-10-07 RX ORDER — POTASSIUM CHLORIDE 750 MG/1
40 CAPSULE, EXTENDED RELEASE ORAL ONCE
Status: COMPLETED | OUTPATIENT
Start: 2023-10-07 | End: 2023-10-07

## 2023-10-07 RX ORDER — MAGNESIUM SULFATE 1 G/100ML
1 INJECTION INTRAVENOUS
Status: COMPLETED | OUTPATIENT
Start: 2023-10-07 | End: 2023-10-07

## 2023-10-07 RX ADMIN — METRONIDAZOLE 500 MG: 5 INJECTION, SOLUTION INTRAVENOUS at 22:18

## 2023-10-07 RX ADMIN — HYDROMORPHONE HYDROCHLORIDE 0.5 MG: 1 INJECTION, SOLUTION INTRAMUSCULAR; INTRAVENOUS; SUBCUTANEOUS at 04:24

## 2023-10-07 RX ADMIN — ALLOPURINOL 100 MG: 100 TABLET ORAL at 20:37

## 2023-10-07 RX ADMIN — Medication 10 ML: at 20:37

## 2023-10-07 RX ADMIN — LEVOTHYROXINE SODIUM 150 MCG: 150 TABLET ORAL at 08:33

## 2023-10-07 RX ADMIN — TOPIRAMATE 100 MG: 100 TABLET, FILM COATED ORAL at 20:37

## 2023-10-07 RX ADMIN — SODIUM CHLORIDE, POTASSIUM CHLORIDE, SODIUM LACTATE AND CALCIUM CHLORIDE 100 ML/HR: 600; 310; 30; 20 INJECTION, SOLUTION INTRAVENOUS at 08:34

## 2023-10-07 RX ADMIN — PANTOPRAZOLE SODIUM 40 MG: 40 TABLET, DELAYED RELEASE ORAL at 20:37

## 2023-10-07 RX ADMIN — ALLOPURINOL 100 MG: 100 TABLET ORAL at 08:32

## 2023-10-07 RX ADMIN — HYDROMORPHONE HYDROCHLORIDE 0.5 MG: 1 INJECTION, SOLUTION INTRAMUSCULAR; INTRAVENOUS; SUBCUTANEOUS at 15:12

## 2023-10-07 RX ADMIN — METRONIDAZOLE 500 MG: 5 INJECTION, SOLUTION INTRAVENOUS at 09:06

## 2023-10-07 RX ADMIN — METOPROLOL SUCCINATE 25 MG: 25 TABLET, EXTENDED RELEASE ORAL at 08:33

## 2023-10-07 RX ADMIN — APIXABAN 5 MG: 5 TABLET, FILM COATED ORAL at 20:37

## 2023-10-07 RX ADMIN — ESCITSLOPRAM 20 MG: 10 TABLET ORAL at 08:33

## 2023-10-07 RX ADMIN — ONDANSETRON 4 MG: 2 INJECTION INTRAMUSCULAR; INTRAVENOUS at 20:37

## 2023-10-07 RX ADMIN — MAGNESIUM SULFATE IN DEXTROSE 1 G: 10 INJECTION, SOLUTION INTRAVENOUS at 11:24

## 2023-10-07 RX ADMIN — Medication 10 ML: at 08:34

## 2023-10-07 RX ADMIN — TOPIRAMATE 100 MG: 100 TABLET, FILM COATED ORAL at 08:33

## 2023-10-07 RX ADMIN — MAGNESIUM SULFATE IN DEXTROSE 1 G: 10 INJECTION, SOLUTION INTRAVENOUS at 12:29

## 2023-10-07 RX ADMIN — METRONIDAZOLE 500 MG: 5 INJECTION, SOLUTION INTRAVENOUS at 03:41

## 2023-10-07 RX ADMIN — MAGNESIUM SULFATE IN DEXTROSE 1 G: 10 INJECTION, SOLUTION INTRAVENOUS at 10:26

## 2023-10-07 RX ADMIN — POTASSIUM CHLORIDE 40 MEQ: 10 CAPSULE, COATED, EXTENDED RELEASE ORAL at 09:06

## 2023-10-07 RX ADMIN — HYDROMORPHONE HYDROCHLORIDE 0.5 MG: 1 INJECTION, SOLUTION INTRAMUSCULAR; INTRAVENOUS; SUBCUTANEOUS at 11:46

## 2023-10-07 RX ADMIN — AMLODIPINE BESYLATE 5 MG: 5 TABLET ORAL at 08:33

## 2023-10-07 RX ADMIN — FAMOTIDINE 20 MG: 20 TABLET, FILM COATED ORAL at 08:32

## 2023-10-07 RX ADMIN — METRONIDAZOLE 500 MG: 5 INJECTION, SOLUTION INTRAVENOUS at 15:12

## 2023-10-07 RX ADMIN — PANTOPRAZOLE SODIUM 40 MG: 40 TABLET, DELAYED RELEASE ORAL at 08:32

## 2023-10-07 RX ADMIN — HYDROMORPHONE HYDROCHLORIDE 0.5 MG: 1 INJECTION, SOLUTION INTRAMUSCULAR; INTRAVENOUS; SUBCUTANEOUS at 08:39

## 2023-10-07 RX ADMIN — HYDROMORPHONE HYDROCHLORIDE 0.5 MG: 1 INJECTION, SOLUTION INTRAMUSCULAR; INTRAVENOUS; SUBCUTANEOUS at 19:51

## 2023-10-07 RX ADMIN — PIPERACILLIN SODIUM AND TAZOBACTAM SODIUM 3.38 G: 3; .375 INJECTION, SOLUTION INTRAVENOUS at 12:30

## 2023-10-07 RX ADMIN — QUETIAPINE FUMARATE 300 MG: 150 TABLET, EXTENDED RELEASE ORAL at 20:37

## 2023-10-07 RX ADMIN — HYDROMORPHONE HYDROCHLORIDE 0.5 MG: 1 INJECTION, SOLUTION INTRAMUSCULAR; INTRAVENOUS; SUBCUTANEOUS at 23:53

## 2023-10-07 RX ADMIN — PIPERACILLIN SODIUM AND TAZOBACTAM SODIUM 3.38 G: 3; .375 INJECTION, SOLUTION INTRAVENOUS at 01:49

## 2023-10-07 RX ADMIN — SODIUM CHLORIDE, POTASSIUM CHLORIDE, SODIUM LACTATE AND CALCIUM CHLORIDE 100 ML/HR: 600; 310; 30; 20 INJECTION, SOLUTION INTRAVENOUS at 23:51

## 2023-10-07 RX ADMIN — APIXABAN 5 MG: 5 TABLET, FILM COATED ORAL at 08:32

## 2023-10-07 NOTE — PROGRESS NOTES
Daily Progress Note  Thao Mohr  MRN: [id number] LOS: 3    Admit Date: 10/3/2023   10/7/2023 10:23 CDT    Subjective:      Chief Complaint:  No chief complaint on file.      Interval History:    Reviewed overnight events and nursing notes.   Some improvement, has had a bowel movement.  No acute events overnight.    Review of Systems   Constitutional:  Positive for activity change, appetite change and fatigue.   Respiratory:  Negative for cough and shortness of breath.    Gastrointestinal:  Positive for abdominal pain and nausea.   Skin:  Positive for pallor.       DIET:  NPO Diet NPO Type: Strict NPO    Medications:   lactated ringers, 100 mL/hr, Last Rate: 100 mL/hr (10/07/23 0834)      allopurinol, 100 mg, Oral, BID  amLODIPine, 5 mg, Oral, Q24H  apixaban, 5 mg, Oral, BID  escitalopram, 20 mg, Oral, Daily  famotidine, 20 mg, Intravenous, Daily   Or  famotidine, 20 mg, Oral, Daily  levothyroxine, 150 mcg, Oral, Daily  magnesium sulfate, 1 g, Intravenous, Q1H  metoprolol succinate XL, 25 mg, Oral, Daily  metroNIDAZOLE, 500 mg, Intravenous, Q6H  pantoprazole, 40 mg, Oral, BID  PEG-KCl-NaCl-NaSulf-Na Asc-C, 1,000 mL, Oral, Once  piperacillin-tazobactam, 3.375 g, Intravenous, Q12H  QUEtiapine XR, 300 mg, Oral, Nightly  sodium chloride, 10 mL, Intravenous, Q12H  topiramate, 100 mg, Oral, BID        Data:     Code Status:   Code Status and Medical Interventions:   Ordered at: 10/03/23 1229     Code Status (Patient has no pulse and is not breathing):    CPR (Attempt to Resuscitate)     Medical Interventions (Patient has pulse or is breathing):    Full Support       Family History   Problem Relation Age of Onset    Cancer Father     Coronary artery disease Brother     Colon cancer Paternal Aunt     Colon polyps Neg Hx     Esophageal cancer Neg Hx      Social History     Socioeconomic History    Marital status:    Tobacco Use    Smoking status: Never    Smokeless tobacco: Never   Vaping Use    Vaping Use: Never  used   Substance and Sexual Activity    Alcohol use: No    Drug use: No    Sexual activity: Defer       Labs:    Lab Results (last 72 hours)       Procedure Component Value Units Date/Time    Magnesium [[id number]]  (Abnormal) Collected: 10/07/23 0900    Specimen: Blood Updated: 10/07/23 1002     Magnesium 1.4 mg/dL     Comprehensive Metabolic Panel [[id number]]  (Abnormal) Collected: 10/07/23 0508    Specimen: Blood Updated: 10/07/23 0541     Glucose 96 mg/dL      BUN 22 mg/dL      Creatinine 2.36 mg/dL      Sodium 136 mmol/L      Potassium 2.8 mmol/L      Chloride 104 mmol/L      CO2 18.0 mmol/L      Calcium 7.1 mg/dL      Total Protein 4.9 g/dL      Albumin 2.5 g/dL      ALT (SGPT) 9 U/L      AST (SGOT) 13 U/L      Alkaline Phosphatase 121 U/L      Total Bilirubin 0.3 mg/dL      Globulin 2.4 gm/dL      A/G Ratio 1.0 g/dL      BUN/Creatinine Ratio 9.3     Anion Gap 14.0 mmol/L      eGFR 20.7 mL/min/1.73     Narrative:      GFR Normal >60  Chronic Kidney Disease <60  Kidney Failure <15    The GFR formula is only valid for adults with stable renal function between ages 18 and 70.    CBC & Differential [[id number]]  (Abnormal) Collected: 10/07/23 0508    Specimen: Blood Updated: 10/07/23 0521    Narrative:      The following orders were created for panel order CBC & Differential.  Procedure                               Abnormality         Status                     ---------                               -----------         ------                     CBC Auto Differential[[id number]]        Abnormal            Final result                 Please view results for these tests on the individual orders.    CBC Auto Differential [[id number]]  (Abnormal) Collected: 10/07/23 0508    Specimen: Blood Updated: 10/07/23 0521     WBC 11.10 10*3/mm3      RBC 2.71 10*6/mm3      Hemoglobin 8.0 g/dL      Hematocrit 25.4 %      MCV 93.7 fL      MCH 29.5 pg      MCHC 31.5 g/dL      RDW 14.5 %      RDW-SD 49.3 fl      MPV 10.9 fL       Platelets 247 10*3/mm3      Neutrophil % 75.9 %      Lymphocyte % 10.1 %      Monocyte % 7.7 %      Eosinophil % 5.6 %      Basophil % 0.3 %      Immature Grans % 0.4 %      Neutrophils, Absolute 8.43 10*3/mm3      Lymphocytes, Absolute 1.12 10*3/mm3      Monocytes, Absolute 0.86 10*3/mm3      Eosinophils, Absolute 0.62 10*3/mm3      Basophils, Absolute 0.03 10*3/mm3      Immature Grans, Absolute 0.04 10*3/mm3      nRBC 0.0 /100 WBC     Blood Culture - Blood, Hand, Right [[id number]]  (Normal) Collected: 10/04/23 1533    Specimen: Blood from Hand, Right Updated: 10/06/23 1630     Blood Culture No growth at 2 days    Blood Culture - Blood, Arm, Left [[id number]]  (Normal) Collected: 10/04/23 1533    Specimen: Blood from Arm, Left Updated: 10/06/23 1630     Blood Culture No growth at 2 days    Comprehensive Metabolic Panel [[id number]]  (Abnormal) Collected: 10/06/23 0540    Specimen: Blood Updated: 10/06/23 0632     Glucose 106 mg/dL      BUN 27 mg/dL      Creatinine 2.52 mg/dL      Sodium 132 mmol/L      Potassium 3.1 mmol/L      Chloride 102 mmol/L      CO2 17.0 mmol/L      Calcium 7.0 mg/dL      Total Protein 4.9 g/dL      Albumin 2.4 g/dL      ALT (SGPT) 9 U/L      AST (SGOT) 17 U/L      Alkaline Phosphatase 137 U/L      Total Bilirubin 0.5 mg/dL      Globulin 2.5 gm/dL      A/G Ratio 1.0 g/dL      BUN/Creatinine Ratio 10.7     Anion Gap 13.0 mmol/L      eGFR 19.2 mL/min/1.73     Narrative:      GFR Normal >60  Chronic Kidney Disease <60  Kidney Failure <15    The GFR formula is only valid for adults with stable renal function between ages 18 and 70.    C-reactive Protein [[id number]]  (Abnormal) Collected: 10/06/23 0540    Specimen: Blood Updated: 10/06/23 0632     C-Reactive Protein 24.58 mg/dL     CBC & Differential [[id number]]  (Abnormal) Collected: 10/06/23 0540    Specimen: Blood Updated: 10/06/23 0623    Narrative:      The following orders were created for panel order CBC & Differential.  Procedure                                Abnormality         Status                     ---------                               -----------         ------                     CBC Auto Differential[[id number]]        Abnormal            Final result                 Please view results for these tests on the individual orders.    CBC Auto Differential [[id number]]  (Abnormal) Collected: 10/06/23 0540    Specimen: Blood Updated: 10/06/23 0623     WBC 13.07 10*3/mm3      RBC 2.97 10*6/mm3      Hemoglobin 8.6 g/dL      Hematocrit 28.1 %      MCV 94.6 fL      MCH 29.0 pg      MCHC 30.6 g/dL      RDW 14.3 %      RDW-SD 49.7 fl      MPV 11.4 fL      Platelets 242 10*3/mm3      Neutrophil % 81.2 %      Lymphocyte % 7.6 %      Monocyte % 8.6 %      Eosinophil % 1.8 %      Basophil % 0.3 %      Immature Grans % 0.5 %      Neutrophils, Absolute 10.62 10*3/mm3      Lymphocytes, Absolute 0.99 10*3/mm3      Monocytes, Absolute 1.12 10*3/mm3      Eosinophils, Absolute 0.23 10*3/mm3      Basophils, Absolute 0.04 10*3/mm3      Immature Grans, Absolute 0.07 10*3/mm3      nRBC 0.0 /100 WBC     Narrative:      Discussed HH delta failure with Selina,will notify     Comprehensive Metabolic Panel [[id number]]  (Abnormal) Collected: 10/05/23 0454    Specimen: Blood Updated: 10/05/23 0550     Glucose 119 mg/dL      BUN 28 mg/dL      Creatinine 2.23 mg/dL      Sodium 134 mmol/L      Potassium 3.9 mmol/L      Chloride 103 mmol/L      CO2 17.0 mmol/L      Calcium 7.3 mg/dL      Total Protein 5.6 g/dL      Albumin 2.9 g/dL      ALT (SGPT) 15 U/L      AST (SGOT) 15 U/L      Alkaline Phosphatase 134 U/L      Total Bilirubin 0.7 mg/dL      Globulin 2.7 gm/dL      A/G Ratio 1.1 g/dL      BUN/Creatinine Ratio 12.6     Anion Gap 14.0 mmol/L      eGFR 22.2 mL/min/1.73     Narrative:      GFR Normal >60  Chronic Kidney Disease <60  Kidney Failure <15    The GFR formula is only valid for adults with stable renal function between ages 18 and 70.    C-reactive Protein  "[[id number]]  (Abnormal) Collected: 10/05/23 0454    Specimen: Blood Updated: 10/05/23 0550     C-Reactive Protein 24.23 mg/dL     Sedimentation Rate [[id number]]  (Abnormal) Collected: 10/05/23 0454    Specimen: Blood Updated: 10/05/23 0537     Sed Rate 40 mm/hr     CBC & Differential [[id number]]  (Abnormal) Collected: 10/05/23 0454    Specimen: Blood Updated: 10/05/23 0528    Narrative:      The following orders were created for panel order CBC & Differential.  Procedure                               Abnormality         Status                     ---------                               -----------         ------                     CBC Auto Differential[[id number]]        Abnormal            Final result                 Please view results for these tests on the individual orders.    CBC Auto Differential [[id number]]  (Abnormal) Collected: 10/05/23 0454    Specimen: Blood Updated: 10/05/23 0528     WBC 13.27 10*3/mm3      RBC 3.66 10*6/mm3      Hemoglobin 10.6 g/dL      Hematocrit 34.5 %      MCV 94.3 fL      MCH 29.0 pg      MCHC 30.7 g/dL      RDW 14.5 %      RDW-SD 50.0 fl      MPV 11.3 fL      Platelets 286 10*3/mm3      Neutrophil % 74.6 %      Lymphocyte % 12.7 %      Monocyte % 11.0 %      Eosinophil % 1.0 %      Basophil % 0.2 %      Immature Grans % 0.5 %      Neutrophils, Absolute 9.91 10*3/mm3      Lymphocytes, Absolute 1.69 10*3/mm3      Monocytes, Absolute 1.46 10*3/mm3      Eosinophils, Absolute 0.13 10*3/mm3      Basophils, Absolute 0.02 10*3/mm3      Immature Grans, Absolute 0.06 10*3/mm3      nRBC 0.0 /100 WBC               Objective:     Vitals: /59 (BP Location: Left arm, Patient Position: Lying)   Pulse 73   Temp 98 øF (36.7 øC) (Oral)   Resp 18   Ht 162.6 cm (64\")   Wt 71.1 kg (156 lb 11.2 oz)   SpO2 95%   BMI 26.90 kg/mý    Intake/Output Summary (Last 24 hours) at 10/7/2023 1023  Last data filed at 10/7/2023 0400  Gross per 24 hour   Intake 1198 ml   Output --   Net 1198 ml    Temp " "(24hrs), Av øF (36.7 øC), Min:97.8 øF (36.6 øC), Max:98.4 øF (36.9 øC)      Physical Exam  Vitals reviewed.   Constitutional:       Appearance: Normal appearance. She is not ill-appearing.   HENT:      Head: Normocephalic and atraumatic.   Eyes:      General:         Right eye: No discharge.         Left eye: No discharge.      Extraocular Movements: Extraocular movements intact.      Conjunctiva/sclera: Conjunctivae normal.   Cardiovascular:      Rate and Rhythm: Normal rate and regular rhythm.      Pulses: Normal pulses.      Heart sounds: No murmur heard.  Pulmonary:      Effort: Pulmonary effort is normal. No respiratory distress.   Abdominal:      General: Abdomen is flat. Bowel sounds are normal. There is no distension.      Tenderness: There is abdominal tenderness (Throughout).   Musculoskeletal:      Right lower leg: No edema.      Left lower leg: No edema.   Skin:     Capillary Refill: Capillary refill takes less than 2 seconds.   Neurological:      General: No focal deficit present.      Mental Status: She is alert and oriented to person, place, and time.   Psychiatric:         Mood and Affect: Mood normal.         Behavior: Behavior normal.             Assessment and Plan:     Primary Problem:  Enterocolitis    Hospital Problem list:    Enterocolitis    CKD (chronic kidney disease)    Dehydration    Nausea and vomiting    Acute abdominal pain      PMH:  Past Medical History:   Diagnosis Date    Acid reflux     Aneurysm     Pt states \"Somewhere near my spleen.\"    Anxiety and depression     Arthritis     Diarrhea     Generalized headaches     Heart murmur     History of transfusion     Hypertension     Hypothyroid     Kidney stones     Lupus     Migraines     MRSA (methicillin resistant Staphylococcus aureus)     in past , on face    Nausea     PONV (postoperative nausea and vomiting)     Renal failure     21%    Thrombosis 2007    RIGHT KNEE       Treatment Plan:  Enterocolitis: General surgery and " GI consulted and following.  Still having significant abdominal pain, but has had a bowel movement now.  Nausea is improved.  Advance diet as tolerated.  Monitor electrolytes and renal function.    RANDALL: Monitor with IVF.    Disposition: Inpatient    Reviewed treatment plans with the patient and/or family.   30 minutes spent in face to face interaction and coordination of care.     Electronically signed by Zia Kramer MD on 10/7/2023 at 10:23 CDT

## 2023-10-07 NOTE — CONSULTS
"[facility]  Initial Inpatient Consult Note    Referring Provider: Greg Rey MD    Date of Admission: 10/3/2023  Date of Service:  10/07/23    Reason for Consultation: Abdominal pain    Subjective     History of present illness:      This is a 78-year-old female patient presented to [facility] ER after 1 week of generalized abdominal pain.  She did present to [facility] last Sunday and was discharged with 2 new medications (patient is unsure of the name).  These new medications did not provide relief.  She complains of increased nausea.  She does have loose bowel movement but denies signs of GI blood loss.  Last bowel movement was yesterday.  Patient certainly has had 2 children passed away.      Past Medical History:  Past Medical History:   Diagnosis Date    Acid reflux     Aneurysm     Pt states \"Somewhere near my spleen.\"    Anxiety and depression     Arthritis     Diarrhea     Generalized headaches     Heart murmur     History of transfusion     Hypertension     Hypothyroid     Kidney stones     Lupus     Migraines     MRSA (methicillin resistant Staphylococcus aureus)     in past , on face    Nausea     PONV (postoperative nausea and vomiting)     Renal failure     21%    Thrombosis 2007    RIGHT KNEE       Past Surgical History:  Past Surgical History:   Procedure Laterality Date    APPENDECTOMY      CHOLECYSTECTOMY      COLONOSCOPY      ENDOSCOPY N/A 10/30/2020    Procedure: ESOPHAGOGASTRODUODENOSCOPY WITH ANESTHESIA;  Surgeon: Naresh Heard DO;  Location: [facility] ENDOSCOPY;  Service: Gastroenterology;  Laterality: N/A;  preop; abdominal pain  postop; normal   PCP Greg Rey     EXTRACORPOREAL SHOCK WAVE LITHOTRIPSY (ESWL) Right 8/16/2021    Procedure: EXTRACORPOREAL SHOCKWAVE LITHOTRIPSY RIGHT;  Surgeon: Ronnie Hayes MD;  Location: [facility] OR;  Service: Urology;  Laterality: Right;    EXTRACORPOREAL SHOCK WAVE LITHOTRIPSY (ESWL) Left 1/17/2022    " Procedure: EXTRACORPOREAL SHOCKWAVE LITHOTRIPSY LEFT;  Surgeon: Ronnie Hayes MD;  Location:  [facility] OR;  Service: Urology;  Laterality: Left;    HYSTERECTOMY      JOINT REPLACEMENT      REPLACEMENT TOTAL KNEE Left     SHOULDER ROTATOR CUFF REPAIR Right     TOTAL HIP ARTHROPLASTY Right     TOTAL SHOULDER ARTHROPLASTY W/ DISTAL CLAVICLE EXCISION Left 12/27/2019    Procedure: LEFT REVERSE TOTAL SHOULDER ARTHROPLASTY;  Surgeon: Dhaval Yepez MD;  Location:  [facility] OR;  Service: Orthopedics    WRIST FRACTURE SURGERY Left 2 weeks ago        Social History:   Social History     Tobacco Use    Smoking status: Never    Smokeless tobacco: Never   Substance Use Topics    Alcohol use: No        Family History:  Family History   Problem Relation Age of Onset    Cancer Father     Coronary artery disease Brother     Colon cancer Paternal Aunt     Colon polyps Neg Hx     Esophageal cancer Neg Hx        Home Meds:  Medications Prior to Admission   Medication Sig Dispense Refill Last Dose    allopurinol (ZYLOPRIM) 100 MG tablet Take 1 tablet by mouth 2 (Two) Times a Day.   Past Week    apixaban (ELIQUIS) 5 MG tablet tablet Take 1 tablet by mouth 2 (Two) Times a Day.   Past Month    calcitriol (ROCALTROL) 0.25 MCG capsule Take 1 capsule by mouth 3 (Three) Times a Week.   Patient Taking Differently    diazePAM (VALIUM) 5 MG tablet Take 1 tablet by mouth Daily.   Past Week    escitalopram (LEXAPRO) 20 MG tablet Take 1 tablet by mouth Daily.   Past Week    levothyroxine (SYNTHROID, LEVOTHROID) 150 MCG tablet Take 1 tablet by mouth Daily.   Past Week    pantoprazole (PROTONIX) 40 MG EC tablet Take 1 tablet by mouth 2 (Two) Times a Day.   Patient Taking Differently    Prenatal Vit-Fe Fumarate-FA (PRENATAL VITAMIN PO) Take 1 tablet by mouth Daily. OTC   Past Week    Probiotic Product (PROBIOTIC DAILY PO) Take 1 capsule by mouth Daily. OTC   Past Week    promethazine (PHENERGAN) 25 MG tablet Take 1 tablet by mouth Every 8 (Eight)  Hours As Needed for Nausea or Vomiting.   Past Week    QUEtiapine XR (SEROquel XR) 300 MG 24 hr tablet Take 1 tablet by mouth Every Night.   Past Week    SODIUM BICARBONATE PO Take 650 mg by mouth Every 4 (Four) Hours As Needed (HEARTBURN).   Patient Taking Differently    tiZANidine (ZANAFLEX) 4 MG tablet Take 1 tablet by mouth At Night As Needed for Muscle Spasms.   Past Week    topiramate (TOPAMAX) 100 MG tablet Take 1 tablet by mouth 2 (Two) Times a Day.   Patient Taking Differently    vitamin B-12 (CYANOCOBALAMIN) 1000 MCG tablet Take 1 tablet by mouth Daily. OTC   Past Week    vitamin D (ERGOCALCIFEROL) 38013 UNITS capsule capsule Take 1 capsule by mouth 2 (Two) Times a Week. Sunday and Wednesday  0 Past Week    amLODIPine (NORVASC) 5 MG tablet 1 tablet. (Patient not taking: Reported on 10/3/2023)   Not Taking    epoetin al (Procrit) 30122 UNIT/ML injection Inject  under the skin into the appropriate area as directed As Needed. (Patient not taking: Reported on 10/3/2023)   Not Taking    gabapentin (NEURONTIN) 300 MG capsule Take 1 capsule by mouth Daily As Needed (for pain).   Unknown    Magnesium Oxide -Mg Supplement 500 MG tablet Take 1 tablet by mouth 2 (Two) Times a Day.   Unknown    metoprolol succinate XL (TOPROL-XL) 25 MG 24 hr tablet Take 1 tablet by mouth Daily.   Unknown       Current Meds:     Current Facility-Administered Medications:     allopurinol (ZYLOPRIM) tablet 100 mg, 100 mg, Oral, BID, Greg Rey MD, 100 mg at 10/07/23 0832    amLODIPine (NORVASC) tablet 5 mg, 5 mg, Oral, Q24H, Greg Rey MD, 5 mg at 10/07/23 0833    apixaban (ELIQUIS) tablet 5 mg, 5 mg, Oral, BID, Greg Rey MD, 5 mg at 10/07/23 0832    Calcium Replacement - Follow Nurse / BPA Driven Protocol, , Does not apply, PRN, Zia Kramer MD    escitalopram (LEXAPRO) tablet 20 mg, 20 mg, Oral, Daily, Greg Rye MD, 20 mg at 10/07/23 0833    famotidine (PEPCID) injection 20 mg, 20  mg, Intravenous, Daily, 20 mg at 10/04/23 1531 **OR** famotidine (PEPCID) tablet 20 mg, 20 mg, Oral, Daily, Alan Mathews MD, 20 mg at 10/07/23 0832    gabapentin (NEURONTIN) capsule 300 mg, 300 mg, Oral, Daily PRN, Greg Rey MD, 300 mg at 10/04/23 2020    HYDROmorphone (DILAUDID) injection 0.5 mg, 0.5 mg, Intravenous, Q3H PRN, Greg Rey MD, 0.5 mg at 10/07/23 0839    lactated ringers infusion, 100 mL/hr, Intravenous, Continuous, Thai Rausch APRN, Last Rate: 100 mL/hr at 10/07/23 0834, 100 mL/hr at 10/07/23 0834    levothyroxine (SYNTHROID, LEVOTHROID) tablet 150 mcg, 150 mcg, Oral, Daily, Greg Rey MD, 150 mcg at 10/07/23 0833    Magnesium Standard Dose Replacement - Follow Nurse / BPA Driven Protocol, , Does not apply, PRN, Zia Kramer MD    magnesium sulfate in D5W 1g/100mL (PREMIX), 1 g, Intravenous, Q1H, Greg Rey MD, 1 g at 10/07/23 1026    metoprolol succinate XL (TOPROL-XL) 24 hr tablet 25 mg, 25 mg, Oral, Daily, Greg Rey MD, 25 mg at 10/07/23 0833    metroNIDAZOLE (FLAGYL) IVPB 500 mg, 500 mg, Intravenous, Q6H, Alan Mathews MD, Last Rate: 100 mL/hr at 10/07/23 0906, 500 mg at 10/07/23 0906    ondansetron (ZOFRAN) injection 4 mg, 4 mg, Intravenous, Q6H PRN, Thai Rausch APRN, 4 mg at 10/06/23 0152    pantoprazole (PROTONIX) EC tablet 40 mg, 40 mg, Oral, BID, Greg Rey MD, 40 mg at 10/07/23 0832    [COMPLETED] PEG-KCl-NaCl-NaSulf-Na Asc-C (MOVIPREP) powder 1,000 mL, 1,000 mL, Oral, Once, 1,000 mL at 10/05/23 5360 **FOLLOWED BY** PEG-KCl-NaCl-NaSulf-Na Asc-C (MOVIPREP) powder 1,000 mL, 1,000 mL, Oral, Once, Monse Vivas MD    Phosphorus Replacement - Follow Nurse / BPA Driven Protocol, , Does not apply, PRN, Zia Kramer MD    piperacillin-tazobactam (ZOSYN) 3.375 g in iso-osmotic dextrose 50 ml (premix), 3.375 g, Intravenous, Q12H, Greg Rey MD, 3.375 g at 10/07/23 0149    Potassium  Replacement - Follow Nurse / BPA Driven Protocol, , Does not apply, PRN, Zia Kramer MD    QUEtiapine XR (SEROquel XR) 24 hr tablet 300 mg, 300 mg, Oral, Nightly, Greg Rey MD, 300 mg at 10/06/23 2028    simethicone (MYLICON) chewable tablet 80 mg, 80 mg, Oral, 4x Daily PRN, Alan Mathews MD    sodium chloride 0.9 % flush 10 mL, 10 mL, Intravenous, Q12H, Thai Rausch, APRN, 10 mL at 10/07/23 0834    sodium chloride 0.9 % flush 10 mL, 10 mL, Intravenous, PRN, Thai Rausch, APRN    sodium chloride 0.9 % infusion 40 mL, 40 mL, Intravenous, PRN, Thai Rausch, APRN    topiramate (TOPAMAX) tablet 100 mg, 100 mg, Oral, BID, Greg Rey MD, 100 mg at 10/07/23 0833    Allergies:  Allergies   Allergen Reactions    Codeine Hives     IS ABLE TO TAKE PERCOCET    Morphine Nausea And Vomiting       Vital Signs  Temp:  [97.8 øF (36.6 øC)-98.4 øF (36.9 øC)] 98 øF (36.7 øC)  Heart Rate:  [61-74] 73  Resp:  [18-20] 18  BP: ()/(55-68) 102/59  Body mass index is 26.9 kg/mý.    Intake/Output Summary (Last 24 hours) at 10/7/2023 1040  Last data filed at 10/7/2023 0400  Gross per 24 hour   Intake 1098 ml   Output --   Net 1098 ml     No intake/output data recorded.    Review of Systems     Constitution:  negative for chills, positive fatigue and negative fevers  Eyes:  negative for blurriness and change of vision  ENT:   negative for sore throat and voice change  Respiratory: negative for  cough and shortness of air  Cardiovascular:  Negative for chest pain or palpitations  Gastrointestinal:  negative for  See HPI  Genitourinary:  negative for  blood in urine and painful urination  Integument: negative for  rash and redness  Hematologic / Lymphatic: negative for  excessive bleeding and easy bruising  Musculoskeletal: negative for  joint pain and joint stiffness out of the ordinary  Neurological:  negative for  seizures and speech abnormality  Behavioral/Psych:  negative for   anxiety and depression out of the ordinary  Endocrine: negative for  diabetes and weight loss, unintended  Allergies / Immunologic:  negative for  anaphylaxis and rash      Objective     Physical Exam:  General :    Alert, cooperative, in no acute distress   Head:    Normocephalic, without obvious abnormality, atraumatic   Eyes:            Lids and lashes normal, conjunctivae and sclerae normal, no   Icterus, conjunctival pallor   Throat:   No oral lesions, no thrush, oral mucosa moist, posterior oropharynx clear   Neck:   No adenopathy, supple, trachea midline, no thyromegaly, no   carotid bruit, no JVD   Lungs:     Clear to auscultation, respirations regular, even and                   unlabored    Heart:    Regular rhythm and normal rate, normal S1 and S2, no            murmur   Chest Wall:    No abnormalities observed   Abdomen:     Normal bowel sounds, no masses, no organomegaly, soft        tender-generalized, nondistended, no guarding, no rebound                 tenderness   Rectal:     Deferred   Extremities:   No edema, no cyanosis   Skin:   No open lesions, bruising or rash   Lymph nodes:   No palpable cervical adenopathy   Psychiatric:  Judgement and insight: normal   Orientation to person place and time: normal   Mood and affect: normal        Results Review:    I have reviewed all of the patients current test results  Results from last 7 days   Lab Units 10/07/23  0508 10/06/23  0540 10/05/23  0454   WBC 10*3/mm3 11.10* 13.07* 13.27*   HEMOGLOBIN g/dL 8.0* 8.6* 10.6*   HEMATOCRIT % 25.4* 28.1* 34.5   PLATELETS 10*3/mm3 247 242 286       Results from last 7 days   Lab Units 10/07/23  0508 10/06/23  0540 10/05/23  0454   SODIUM mmol/L 136 132* 134*   POTASSIUM mmol/L 2.8* 3.1* 3.9   CHLORIDE mmol/L 104 102 103   CO2 mmol/L 18.0* 17.0* 17.0*   BUN mg/dL 22 27* 28*   CREATININE mg/dL 2.36* 2.52* 2.23*   CALCIUM mg/dL 7.1* 7.0* 7.3*   BILIRUBIN mg/dL 0.3 0.5 0.7   ALK PHOS U/L 121* 137* 134*   ALT (SGPT) U/L  9 9 15   AST (SGOT) U/L 13 17 15   GLUCOSE mg/dL 96 106* 119*             Lab Results   Lab Value Date/Time    LIPASE 15 10/03/2023 1250    LIPASE 43 08/03/2021 1208    LIPASE 41 10/23/2020 1301    LIPASE 78 03/13/2017 1212    LIPASE 197 10/11/2016 0553    LIPASE 340 (H) 10/10/2016 1654       Radiology:    Imaging Results (Last 24 Hours)       ** No results found for the last 24 hours. **              Assessment & Plan       Enterocolitis    CKD (chronic kidney disease)    Dehydration    Nausea and vomiting    Acute abdominal pain      Impression/Plan    Nausea vomiting  Abdominal pain  Abnormal CT scan    CT scan indicative of enterocolitis.  Patient is currently on antibiotics.  Her diet has been advanced.  Discussion with patient regarding decreasing diet to a clear liquid if she is unable to tolerate prescribed diet.  Discussion regarding viral versus bacterial versus stress components to abdominal pain.  Continue current treatment, GI will continue to follow.      Electronically signed by CALVIN Starkey, 10/07/23, 10:40 AM CDT.         CALVIN Starkey  10/07/23  10:40 CDT

## 2023-10-07 NOTE — PLAN OF CARE
Goal Outcome Evaluation:         GI saw today. K/mag repleted per ckd protocol. Requiring pain meds consistently. VSS. Denies any needs at this time.

## 2023-10-07 NOTE — PROGRESS NOTES
[facility]   Progress Note    Patient Name: Thao Mohr  : 1945  MRN: [id number]  Primary Care Physician:  Greg Rey MD  Date of admission: 10/3/2023    Subjective   Subjective    Awake, alert. Tolerating clears. +BM yesterday        Objective     Vitals:   Temp:  [97.8 øF (36.6 øC)-98.4 øF (36.9 øC)] 98 øF (36.7 øC)  Heart Rate:  [61-74] 73  Resp:  [18-20] 18  BP: ()/(55-68) 102/59  Physical Exam    Constitutional: Awake, alert   Eyes: PERRLA, sclerae anicteric, no conjunctival injection   HENT: NCAT, mucous membranes moist   Neck: Supple, no thyromegaly, no lymphadenopathy, trachea midline   Respiratory: Clear to auscultation bilaterally, nonlabored respirations    Cardiovascular: RRR, no murmurs, rubs, or gallops, palpable pedal pulses bilaterally   Gastrointestinal: Positive bowel sounds, soft, tender to deep palpation, nondistended   Musculoskeletal: No bilateral ankle edema, no clubbing or cyanosis to extremities   Psychiatric: Appropriate affect, cooperative   Neurologic: Oriented x 3, strength symmetric in all extremities, Cranial Nerves grossly intact to confrontation, speech clear   Skin: No rashes     Result Review    Result Review:  I have personally reviewed the results from the time of this admission to 10/7/2023 09:37 CDT and agree with these findings:  []  Laboratory list / accordion  []  Microbiology  []  Radiology  []  EKG/Telemetry   []  Cardiology/Vascular   []  Pathology  []  Old records  []  Other:        Assessment & Plan   Assessment / Plan     Brief Patient Summary:  Thao Mohr is a 78 y.o. female who presents with abdominal pain, possible enterocolitis    Active Hospital Problems:  Active Hospital Problems    Diagnosis     **Enterocolitis     Nausea and vomiting     Acute abdominal pain     Dehydration     CKD (chronic kidney disease)      Plan:    ADAT  OOBA  PT/OT  Pain management    DVT prophylaxis:  Medical and mechanical DVT prophylaxis orders are  present.    CODE STATUS:   Code Status (Patient has no pulse and is not breathing): CPR (Attempt to Resuscitate)  Medical Interventions (Patient has pulse or is breathing): Full Support        MD Monse Rock MD  General Surgery  10/07/23  09:37 CDT

## 2023-10-08 LAB
ALBUMIN SERPL-MCNC: 2.4 G/DL (ref 3.5–5.2)
ALBUMIN/GLOB SERPL: 1 G/DL
ALP SERPL-CCNC: 120 U/L (ref 39–117)
ALT SERPL W P-5'-P-CCNC: 7 U/L (ref 1–33)
ANION GAP SERPL CALCULATED.3IONS-SCNC: 10 MMOL/L (ref 5–15)
AST SERPL-CCNC: 11 U/L (ref 1–32)
BASOPHILS # BLD AUTO: 0.07 10*3/MM3 (ref 0–0.2)
BASOPHILS NFR BLD AUTO: 0.8 % (ref 0–1.5)
BILIRUB SERPL-MCNC: 0.2 MG/DL (ref 0–1.2)
BUN SERPL-MCNC: 17 MG/DL (ref 8–23)
BUN/CREAT SERPL: 8.3 (ref 7–25)
CALCIUM SPEC-SCNC: 7.4 MG/DL (ref 8.6–10.5)
CHLORIDE SERPL-SCNC: 106 MMOL/L (ref 98–107)
CO2 SERPL-SCNC: 20 MMOL/L (ref 22–29)
CREAT SERPL-MCNC: 2.06 MG/DL (ref 0.57–1)
DEPRECATED RDW RBC AUTO: 50.1 FL (ref 37–54)
EGFRCR SERPLBLD CKD-EPI 2021: 24.4 ML/MIN/1.73
EOSINOPHIL # BLD AUTO: 0.74 10*3/MM3 (ref 0–0.4)
EOSINOPHIL NFR BLD AUTO: 8.2 % (ref 0.3–6.2)
ERYTHROCYTE [DISTWIDTH] IN BLOOD BY AUTOMATED COUNT: 14.6 % (ref 12.3–15.4)
GLOBULIN UR ELPH-MCNC: 2.4 GM/DL
GLUCOSE SERPL-MCNC: 99 MG/DL (ref 65–99)
HCT VFR BLD AUTO: 24.2 % (ref 34–46.6)
HGB BLD-MCNC: 7.4 G/DL (ref 12–15.9)
IMM GRANULOCYTES # BLD AUTO: 0.05 10*3/MM3 (ref 0–0.05)
IMM GRANULOCYTES NFR BLD AUTO: 0.6 % (ref 0–0.5)
LYMPHOCYTES # BLD AUTO: 1.15 10*3/MM3 (ref 0.7–3.1)
LYMPHOCYTES NFR BLD AUTO: 12.7 % (ref 19.6–45.3)
MAGNESIUM SERPL-MCNC: 2 MG/DL (ref 1.6–2.4)
MCH RBC QN AUTO: 28.8 PG (ref 26.6–33)
MCHC RBC AUTO-ENTMCNC: 30.6 G/DL (ref 31.5–35.7)
MCV RBC AUTO: 94.2 FL (ref 79–97)
MONOCYTES # BLD AUTO: 0.8 10*3/MM3 (ref 0.1–0.9)
MONOCYTES NFR BLD AUTO: 8.9 % (ref 5–12)
NEUTROPHILS NFR BLD AUTO: 6.22 10*3/MM3 (ref 1.7–7)
NEUTROPHILS NFR BLD AUTO: 68.8 % (ref 42.7–76)
NRBC BLD AUTO-RTO: 0 /100 WBC (ref 0–0.2)
PLATELET # BLD AUTO: 274 10*3/MM3 (ref 140–450)
PMV BLD AUTO: 10.8 FL (ref 6–12)
POTASSIUM SERPL-SCNC: 2.9 MMOL/L (ref 3.5–5.2)
PROT SERPL-MCNC: 4.8 G/DL (ref 6–8.5)
RBC # BLD AUTO: 2.57 10*6/MM3 (ref 3.77–5.28)
SODIUM SERPL-SCNC: 136 MMOL/L (ref 136–145)
WBC NRBC COR # BLD: 9.03 10*3/MM3 (ref 3.4–10.8)

## 2023-10-08 PROCEDURE — 25010000002 PIPERACILLIN SOD-TAZOBACTAM PER 1 G: Performed by: INTERNAL MEDICINE

## 2023-10-08 PROCEDURE — 85025 COMPLETE CBC W/AUTO DIFF WBC: CPT

## 2023-10-08 PROCEDURE — 83735 ASSAY OF MAGNESIUM: CPT | Performed by: INTERNAL MEDICINE

## 2023-10-08 PROCEDURE — 25010000002 ONDANSETRON PER 1 MG

## 2023-10-08 PROCEDURE — 25010000002 METRONIDAZOLE 500 MG/100ML SOLUTION: Performed by: SPECIALIST

## 2023-10-08 PROCEDURE — 25010000002 HYDROMORPHONE PER 4 MG: Performed by: INTERNAL MEDICINE

## 2023-10-08 PROCEDURE — 99232 SBSQ HOSP IP/OBS MODERATE 35: CPT | Performed by: NURSE PRACTITIONER

## 2023-10-08 PROCEDURE — 25810000003 LACTATED RINGERS PER 1000 ML

## 2023-10-08 PROCEDURE — 80053 COMPREHEN METABOLIC PANEL: CPT

## 2023-10-08 RX ORDER — HYDROMORPHONE HYDROCHLORIDE 1 MG/ML
0.5 INJECTION, SOLUTION INTRAMUSCULAR; INTRAVENOUS; SUBCUTANEOUS
Status: DISCONTINUED | OUTPATIENT
Start: 2023-10-08 | End: 2023-10-09

## 2023-10-08 RX ADMIN — HYDROMORPHONE HYDROCHLORIDE 0.5 MG: 1 INJECTION, SOLUTION INTRAMUSCULAR; INTRAVENOUS; SUBCUTANEOUS at 09:27

## 2023-10-08 RX ADMIN — PIPERACILLIN SODIUM AND TAZOBACTAM SODIUM 3.38 G: 3; .375 INJECTION, SOLUTION INTRAVENOUS at 18:30

## 2023-10-08 RX ADMIN — PANTOPRAZOLE SODIUM 40 MG: 40 TABLET, DELAYED RELEASE ORAL at 21:13

## 2023-10-08 RX ADMIN — HYDROMORPHONE HYDROCHLORIDE 0.5 MG: 1 INJECTION, SOLUTION INTRAMUSCULAR; INTRAVENOUS; SUBCUTANEOUS at 14:10

## 2023-10-08 RX ADMIN — TOPIRAMATE 100 MG: 100 TABLET, FILM COATED ORAL at 21:13

## 2023-10-08 RX ADMIN — METRONIDAZOLE 500 MG: 5 INJECTION, SOLUTION INTRAVENOUS at 15:33

## 2023-10-08 RX ADMIN — SODIUM CHLORIDE, POTASSIUM CHLORIDE, SODIUM LACTATE AND CALCIUM CHLORIDE 100 ML/HR: 600; 310; 30; 20 INJECTION, SOLUTION INTRAVENOUS at 13:02

## 2023-10-08 RX ADMIN — ALLOPURINOL 100 MG: 100 TABLET ORAL at 21:13

## 2023-10-08 RX ADMIN — HYDROMORPHONE HYDROCHLORIDE 0.5 MG: 1 INJECTION, SOLUTION INTRAMUSCULAR; INTRAVENOUS; SUBCUTANEOUS at 05:05

## 2023-10-08 RX ADMIN — APIXABAN 5 MG: 5 TABLET, FILM COATED ORAL at 21:13

## 2023-10-08 RX ADMIN — ONDANSETRON 4 MG: 2 INJECTION INTRAMUSCULAR; INTRAVENOUS at 05:05

## 2023-10-08 RX ADMIN — METRONIDAZOLE 500 MG: 5 INJECTION, SOLUTION INTRAVENOUS at 04:18

## 2023-10-08 RX ADMIN — TOPIRAMATE 100 MG: 100 TABLET, FILM COATED ORAL at 09:30

## 2023-10-08 RX ADMIN — PIPERACILLIN SODIUM AND TAZOBACTAM SODIUM 3.38 G: 3; .375 INJECTION, SOLUTION INTRAVENOUS at 10:51

## 2023-10-08 RX ADMIN — ALLOPURINOL 100 MG: 100 TABLET ORAL at 09:29

## 2023-10-08 RX ADMIN — Medication 10 ML: at 09:30

## 2023-10-08 RX ADMIN — HYDROMORPHONE HYDROCHLORIDE 0.5 MG: 1 INJECTION, SOLUTION INTRAMUSCULAR; INTRAVENOUS; SUBCUTANEOUS at 17:49

## 2023-10-08 RX ADMIN — FAMOTIDINE 20 MG: 10 INJECTION INTRAVENOUS at 09:26

## 2023-10-08 RX ADMIN — QUETIAPINE FUMARATE 300 MG: 150 TABLET, EXTENDED RELEASE ORAL at 21:12

## 2023-10-08 RX ADMIN — LEVOTHYROXINE SODIUM 150 MCG: 150 TABLET ORAL at 09:29

## 2023-10-08 RX ADMIN — HYDROMORPHONE HYDROCHLORIDE 0.5 MG: 1 INJECTION, SOLUTION INTRAMUSCULAR; INTRAVENOUS; SUBCUTANEOUS at 21:13

## 2023-10-08 RX ADMIN — Medication 10 ML: at 21:15

## 2023-10-08 RX ADMIN — PANTOPRAZOLE SODIUM 40 MG: 40 TABLET, DELAYED RELEASE ORAL at 09:27

## 2023-10-08 RX ADMIN — ESCITSLOPRAM 20 MG: 10 TABLET ORAL at 09:28

## 2023-10-08 RX ADMIN — PIPERACILLIN SODIUM AND TAZOBACTAM SODIUM 3.38 G: 3; .375 INJECTION, SOLUTION INTRAVENOUS at 01:16

## 2023-10-08 RX ADMIN — ONDANSETRON 4 MG: 2 INJECTION INTRAMUSCULAR; INTRAVENOUS at 21:13

## 2023-10-08 RX ADMIN — METOPROLOL SUCCINATE 25 MG: 25 TABLET, EXTENDED RELEASE ORAL at 09:28

## 2023-10-08 RX ADMIN — METRONIDAZOLE 500 MG: 5 INJECTION, SOLUTION INTRAVENOUS at 09:39

## 2023-10-08 RX ADMIN — AMLODIPINE BESYLATE 5 MG: 5 TABLET ORAL at 09:28

## 2023-10-08 RX ADMIN — METRONIDAZOLE 500 MG: 5 INJECTION, SOLUTION INTRAVENOUS at 21:23

## 2023-10-08 RX ADMIN — APIXABAN 5 MG: 5 TABLET, FILM COATED ORAL at 09:28

## 2023-10-08 NOTE — PLAN OF CARE
Goal Outcome Evaluation:  Plan of Care Reviewed With: patient      IVF. IV antibiotics continue.Pain at level of tolerance with pain med. VSS. Safety maintained.

## 2023-10-08 NOTE — PROGRESS NOTES
[facility]   Progress Note    Patient Name: Thao Mohr  : 1945  MRN: [id number]  Primary Care Physician:  Greg Rey MD  Date of admission: 10/3/2023    Subjective   Subjective    +BM. Reports moderate abdominal pain        Objective     Vitals:   Temp:  [97.5 øF (36.4 øC)-98.3 øF (36.8 øC)] 97.5 øF (36.4 øC)  Heart Rate:  [62-75] 71  Resp:  [18] 18  BP: (103-105)/(55-62) 104/57  Physical Exam    Constitutional: Awake, alert   Eyes: PERRLA, sclerae anicteric, no conjunctival injection   HENT: NCAT, mucous membranes moist   Neck: Supple, no thyromegaly, no lymphadenopathy, trachea midline   Respiratory: Clear to auscultation bilaterally, nonlabored respirations    Cardiovascular: RRR, no murmurs, rubs, or gallops, palpable pedal pulses bilaterally   Gastrointestinal: Positive bowel sounds, soft, tender to deep palpation, nondistended   Musculoskeletal: No bilateral ankle edema, no clubbing or cyanosis to extremities   Psychiatric: Appropriate affect, cooperative   Neurologic: Oriented x 3, strength symmetric in all extremities, Cranial Nerves grossly intact to confrontation, speech clear   Skin: No rashes     Result Review    Result Review:  I have personally reviewed the results from the time of this admission to 10/8/2023 08:32 CDT and agree with these findings:  []  Laboratory list / accordion  []  Microbiology  []  Radiology  []  EKG/Telemetry   []  Cardiology/Vascular   []  Pathology  []  Old records  []  Other:        Assessment & Plan   Assessment / Plan     Brief Patient Summary:  Thao Mohr is a 78 y.o. female who presents with abdominal pain, possible enterocolitis    Active Hospital Problems:  Active Hospital Problems    Diagnosis     **Enterocolitis     Nausea and vomiting     Acute abdominal pain     Dehydration     CKD (chronic kidney disease)      Plan:    MARINAT  JUSTIN  PT/OT  Pain management    DVT prophylaxis:  Medical and mechanical DVT prophylaxis orders are  present.    CODE STATUS:   Code Status (Patient has no pulse and is not breathing): CPR (Attempt to Resuscitate)  Medical Interventions (Patient has pulse or is breathing): Full Support        MD Monse Rock MD  General Surgery  10/08/23  09:37 CDT

## 2023-10-08 NOTE — PROGRESS NOTES
Daily Progress Note  Thao Mohr  MRN: [id number] LOS: 4    Admit Date: 10/3/2023   10/8/2023 08:07 CDT    Subjective:      Chief Complaint:  No chief complaint on file.      Interval History:    Reviewed overnight events and nursing notes.   Some improvement, has had a bowel movement.  No acute events overnight.  Still complaining of significant abdominal pain, did not tolerate increasing diet yesterday    Review of Systems   Constitutional:  Positive for activity change, appetite change and fatigue.   Respiratory:  Negative for cough and shortness of breath.    Gastrointestinal:  Positive for abdominal pain and nausea.   Skin:  Positive for pallor.       DIET:  NPO Diet NPO Type: Strict NPO    Medications:   lactated ringers, 100 mL/hr, Last Rate: 100 mL/hr (10/07/23 8618)      allopurinol, 100 mg, Oral, BID  amLODIPine, 5 mg, Oral, Q24H  apixaban, 5 mg, Oral, BID  escitalopram, 20 mg, Oral, Daily  famotidine, 20 mg, Intravenous, Daily   Or  famotidine, 20 mg, Oral, Daily  levothyroxine, 150 mcg, Oral, Daily  metoprolol succinate XL, 25 mg, Oral, Daily  metroNIDAZOLE, 500 mg, Intravenous, Q6H  pantoprazole, 40 mg, Oral, BID  PEG-KCl-NaCl-NaSulf-Na Asc-C, 1,000 mL, Oral, Once  piperacillin-tazobactam, 3.375 g, Intravenous, Q12H  QUEtiapine XR, 300 mg, Oral, Nightly  sodium chloride, 10 mL, Intravenous, Q12H  topiramate, 100 mg, Oral, BID        Data:     Code Status:   Code Status and Medical Interventions:   Ordered at: 10/03/23 1229     Code Status (Patient has no pulse and is not breathing):    CPR (Attempt to Resuscitate)     Medical Interventions (Patient has pulse or is breathing):    Full Support       Family History   Problem Relation Age of Onset    Cancer Father     Coronary artery disease Brother     Colon cancer Paternal Aunt     Colon polyps Neg Hx     Esophageal cancer Neg Hx      Social History     Socioeconomic History    Marital status:    Tobacco Use    Smoking status: Never     Smokeless tobacco: Never   Vaping Use    Vaping Use: Never used   Substance and Sexual Activity    Alcohol use: No    Drug use: No    Sexual activity: Defer       Labs:    Lab Results (last 72 hours)       Procedure Component Value Units Date/Time    Comprehensive Metabolic Panel [[id number]]  (Abnormal) Collected: 10/08/23 0454    Specimen: Blood Updated: 10/08/23 0557     Glucose 99 mg/dL      BUN 17 mg/dL      Creatinine 2.06 mg/dL      Sodium 136 mmol/L      Potassium 2.9 mmol/L      Chloride 106 mmol/L      CO2 20.0 mmol/L      Calcium 7.4 mg/dL      Total Protein 4.8 g/dL      Albumin 2.4 g/dL      ALT (SGPT) 7 U/L      AST (SGOT) 11 U/L      Alkaline Phosphatase 120 U/L      Total Bilirubin 0.2 mg/dL      Globulin 2.4 gm/dL      A/G Ratio 1.0 g/dL      BUN/Creatinine Ratio 8.3     Anion Gap 10.0 mmol/L      eGFR 24.4 mL/min/1.73     Narrative:      GFR Normal >60  Chronic Kidney Disease <60  Kidney Failure <15    The GFR formula is only valid for adults with stable renal function between ages 18 and 70.    CBC & Differential [[id number]]  (Abnormal) Collected: 10/08/23 0454    Specimen: Blood Updated: 10/08/23 0536    Narrative:      The following orders were created for panel order CBC & Differential.  Procedure                               Abnormality         Status                     ---------                               -----------         ------                     CBC Auto Differential[[id number]]        Abnormal            Final result                 Please view results for these tests on the individual orders.    CBC Auto Differential [[id number]]  (Abnormal) Collected: 10/08/23 0454    Specimen: Blood Updated: 10/08/23 0536     WBC 9.03 10*3/mm3      RBC 2.57 10*6/mm3      Hemoglobin 7.4 g/dL      Hematocrit 24.2 %      MCV 94.2 fL      MCH 28.8 pg      MCHC 30.6 g/dL      RDW 14.6 %      RDW-SD 50.1 fl      MPV 10.8 fL      Platelets 274 10*3/mm3      Neutrophil % 68.8 %      Lymphocyte % 12.7 %       Monocyte % 8.9 %      Eosinophil % 8.2 %      Basophil % 0.8 %      Immature Grans % 0.6 %      Neutrophils, Absolute 6.22 10*3/mm3      Lymphocytes, Absolute 1.15 10*3/mm3      Monocytes, Absolute 0.80 10*3/mm3      Eosinophils, Absolute 0.74 10*3/mm3      Basophils, Absolute 0.07 10*3/mm3      Immature Grans, Absolute 0.05 10*3/mm3      nRBC 0.0 /100 WBC     Magnesium [[id number]]  (Normal) Collected: 10/08/23 0054    Specimen: Blood Updated: 10/08/23 0121     Magnesium 2.0 mg/dL     Blood Culture - Blood, Hand, Right [[id number]]  (Normal) Collected: 10/04/23 1533    Specimen: Blood from Hand, Right Updated: 10/07/23 1616     Blood Culture No growth at 3 days    Blood Culture - Blood, Arm, Left [[id number]]  (Normal) Collected: 10/04/23 1533    Specimen: Blood from Arm, Left Updated: 10/07/23 1616     Blood Culture No growth at 3 days    Magnesium [[id number]]  (Abnormal) Collected: 10/07/23 0900    Specimen: Blood Updated: 10/07/23 1002     Magnesium 1.4 mg/dL     Comprehensive Metabolic Panel [[id number]]  (Abnormal) Collected: 10/07/23 0508    Specimen: Blood Updated: 10/07/23 0541     Glucose 96 mg/dL      BUN 22 mg/dL      Creatinine 2.36 mg/dL      Sodium 136 mmol/L      Potassium 2.8 mmol/L      Chloride 104 mmol/L      CO2 18.0 mmol/L      Calcium 7.1 mg/dL      Total Protein 4.9 g/dL      Albumin 2.5 g/dL      ALT (SGPT) 9 U/L      AST (SGOT) 13 U/L      Alkaline Phosphatase 121 U/L      Total Bilirubin 0.3 mg/dL      Globulin 2.4 gm/dL      A/G Ratio 1.0 g/dL      BUN/Creatinine Ratio 9.3     Anion Gap 14.0 mmol/L      eGFR 20.7 mL/min/1.73     Narrative:      GFR Normal >60  Chronic Kidney Disease <60  Kidney Failure <15    The GFR formula is only valid for adults with stable renal function between ages 18 and 70.    CBC & Differential [[id number]]  (Abnormal) Collected: 10/07/23 0508    Specimen: Blood Updated: 10/07/23 0521    Narrative:      The following orders were created for panel order CBC &  Differential.  Procedure                               Abnormality         Status                     ---------                               -----------         ------                     CBC Auto Differential[[id number]]        Abnormal            Final result                 Please view results for these tests on the individual orders.    CBC Auto Differential [[id number]]  (Abnormal) Collected: 10/07/23 0508    Specimen: Blood Updated: 10/07/23 0521     WBC 11.10 10*3/mm3      RBC 2.71 10*6/mm3      Hemoglobin 8.0 g/dL      Hematocrit 25.4 %      MCV 93.7 fL      MCH 29.5 pg      MCHC 31.5 g/dL      RDW 14.5 %      RDW-SD 49.3 fl      MPV 10.9 fL      Platelets 247 10*3/mm3      Neutrophil % 75.9 %      Lymphocyte % 10.1 %      Monocyte % 7.7 %      Eosinophil % 5.6 %      Basophil % 0.3 %      Immature Grans % 0.4 %      Neutrophils, Absolute 8.43 10*3/mm3      Lymphocytes, Absolute 1.12 10*3/mm3      Monocytes, Absolute 0.86 10*3/mm3      Eosinophils, Absolute 0.62 10*3/mm3      Basophils, Absolute 0.03 10*3/mm3      Immature Grans, Absolute 0.04 10*3/mm3      nRBC 0.0 /100 WBC     Comprehensive Metabolic Panel [[id number]]  (Abnormal) Collected: 10/06/23 0540    Specimen: Blood Updated: 10/06/23 0632     Glucose 106 mg/dL      BUN 27 mg/dL      Creatinine 2.52 mg/dL      Sodium 132 mmol/L      Potassium 3.1 mmol/L      Chloride 102 mmol/L      CO2 17.0 mmol/L      Calcium 7.0 mg/dL      Total Protein 4.9 g/dL      Albumin 2.4 g/dL      ALT (SGPT) 9 U/L      AST (SGOT) 17 U/L      Alkaline Phosphatase 137 U/L      Total Bilirubin 0.5 mg/dL      Globulin 2.5 gm/dL      A/G Ratio 1.0 g/dL      BUN/Creatinine Ratio 10.7     Anion Gap 13.0 mmol/L      eGFR 19.2 mL/min/1.73     Narrative:      GFR Normal >60  Chronic Kidney Disease <60  Kidney Failure <15    The GFR formula is only valid for adults with stable renal function between ages 18 and 70.    C-reactive Protein [[id number]]  (Abnormal) Collected: 10/06/23 0540  "   Specimen: Blood Updated: 10/06/23 0632     C-Reactive Protein 24.58 mg/dL     CBC & Differential [[id number]]  (Abnormal) Collected: 10/06/23 0540    Specimen: Blood Updated: 10/06/23 0623    Narrative:      The following orders were created for panel order CBC & Differential.  Procedure                               Abnormality         Status                     ---------                               -----------         ------                     CBC Auto Differential[[id number]]        Abnormal            Final result                 Please view results for these tests on the individual orders.    CBC Auto Differential [[id number]]  (Abnormal) Collected: 10/06/23 0540    Specimen: Blood Updated: 10/06/23 0623     WBC 13.07 10*3/mm3      RBC 2.97 10*6/mm3      Hemoglobin 8.6 g/dL      Hematocrit 28.1 %      MCV 94.6 fL      MCH 29.0 pg      MCHC 30.6 g/dL      RDW 14.3 %      RDW-SD 49.7 fl      MPV 11.4 fL      Platelets 242 10*3/mm3      Neutrophil % 81.2 %      Lymphocyte % 7.6 %      Monocyte % 8.6 %      Eosinophil % 1.8 %      Basophil % 0.3 %      Immature Grans % 0.5 %      Neutrophils, Absolute 10.62 10*3/mm3      Lymphocytes, Absolute 0.99 10*3/mm3      Monocytes, Absolute 1.12 10*3/mm3      Eosinophils, Absolute 0.23 10*3/mm3      Basophils, Absolute 0.04 10*3/mm3      Immature Grans, Absolute 0.07 10*3/mm3      nRBC 0.0 /100 WBC     Narrative:      Discussed HH delta failure with Selina,will notify               Objective:     Vitals: /57 (BP Location: Left arm, Patient Position: Lying)   Pulse 71   Temp 97.5 øF (36.4 øC) (Oral)   Resp 18   Ht 162.6 cm (64\")   Wt 71.1 kg (156 lb 11.2 oz)   SpO2 92%   BMI 26.90 kg/mý    Intake/Output Summary (Last 24 hours) at 10/8/2023 0807  Last data filed at 10/8/2023 0427  Gross per 24 hour   Intake 240 ml   Output --   Net 240 ml    Temp (24hrs), Av.9 øF (36.6 øC), Min:97.5 øF (36.4 øC), Max:98.3 øF (36.8 øC)      Physical Exam  Vitals " "reviewed.   Constitutional:       Appearance: Normal appearance. She is not ill-appearing.   HENT:      Head: Normocephalic and atraumatic.   Eyes:      General:         Right eye: No discharge.         Left eye: No discharge.      Extraocular Movements: Extraocular movements intact.      Conjunctiva/sclera: Conjunctivae normal.   Cardiovascular:      Rate and Rhythm: Normal rate and regular rhythm.      Pulses: Normal pulses.      Heart sounds: No murmur heard.  Pulmonary:      Effort: Pulmonary effort is normal. No respiratory distress.   Abdominal:      General: Abdomen is flat. Bowel sounds are normal. There is no distension.      Tenderness: There is abdominal tenderness (Throughout).   Musculoskeletal:      Right lower leg: No edema.      Left lower leg: No edema.   Skin:     Capillary Refill: Capillary refill takes less than 2 seconds.   Neurological:      General: No focal deficit present.      Mental Status: She is alert and oriented to person, place, and time.   Psychiatric:         Mood and Affect: Mood normal.         Behavior: Behavior normal.             Assessment and Plan:     Primary Problem:  Enterocolitis    Hospital Problem list:    Enterocolitis    CKD (chronic kidney disease)    Dehydration    Nausea and vomiting    Acute abdominal pain      PMH:  Past Medical History:   Diagnosis Date    Acid reflux     Aneurysm     Pt states \"Somewhere near my spleen.\"    Anxiety and depression     Arthritis     Diarrhea     Generalized headaches     Heart murmur     History of transfusion     Hypertension     Hypothyroid     Kidney stones     Lupus     Migraines     MRSA (methicillin resistant Staphylococcus aureus)     in past , on face    Nausea     PONV (postoperative nausea and vomiting)     Renal failure     21%    Thrombosis 2007    RIGHT KNEE       Treatment Plan:  Enterocolitis: General surgery and GI consulted and following.  Still having significant abdominal pain, but has had a bowel movement now. "  Nausea is improved however.  Advance diet as tolerated.  Monitor electrolytes and renal function.  - Blood culture NGTD    RANDALL: Monitor with IVF.    Disposition: Inpatient    Reviewed treatment plans with the patient and/or family.   30 minutes spent in face to face interaction and coordination of care.     Electronically signed by Zia Kramer MD on 10/8/2023 at 08:07 CDT

## 2023-10-08 NOTE — PROGRESS NOTES
[facility]  Inpatient Progress Note      Date of Admission: 10/3/2023  Date of Service:  10/08/23    Reason for Follow Up: Abdominal pain    Subjective     Subjective:   Patient lying in bed with family at bedside.  She continues to complain of abdominal pain but reports it is better compared to yesterday.  Last bowel movement was yesterday.  She does remain nauseated, no emesis.  Clear liquid diet started this morning, she has tolerated that well.    Current Facility-Administered Medications:     allopurinol (ZYLOPRIM) tablet 100 mg, 100 mg, Oral, BID, Greg Rey MD, 100 mg at 10/08/23 0929    amLODIPine (NORVASC) tablet 5 mg, 5 mg, Oral, Q24H, Greg Rey MD, 5 mg at 10/08/23 0928    apixaban (ELIQUIS) tablet 5 mg, 5 mg, Oral, BID, Greg Rey MD, 5 mg at 10/08/23 0928    Calcium Replacement - Follow Nurse / BPA Driven Protocol, , Does not apply, PRN, Zia Kramer MD    escitalopram (LEXAPRO) tablet 20 mg, 20 mg, Oral, Daily, Greg Rey MD, 20 mg at 10/08/23 0928    famotidine (PEPCID) injection 20 mg, 20 mg, Intravenous, Daily, 20 mg at 10/08/23 0926 **OR** famotidine (PEPCID) tablet 20 mg, 20 mg, Oral, Daily, Alan Mathews MD, 20 mg at 10/07/23 0832    gabapentin (NEURONTIN) capsule 300 mg, 300 mg, Oral, Daily PRN, Greg Rey MD, 300 mg at 10/04/23 2020    HYDROmorphone (DILAUDID) injection 0.5 mg, 0.5 mg, Intravenous, Q3H PRN, Greg Rey MD, 0.5 mg at 10/08/23 0927    lactated ringers infusion, 100 mL/hr, Intravenous, Continuous, Thai Rausch APRN, Last Rate: 100 mL/hr at 10/07/23 2351, 100 mL/hr at 10/07/23 2351    levothyroxine (SYNTHROID, LEVOTHROID) tablet 150 mcg, 150 mcg, Oral, Daily, Greg Rey MD, 150 mcg at 10/08/23 0929    Magnesium Standard Dose Replacement - Follow Nurse / BPA Driven Protocol, , Does not apply, PRN, Zia Kramer MD    metoprolol succinate XL (TOPROL-XL) 24 hr  tablet 25 mg, 25 mg, Oral, Daily, Greg Rey MD, 25 mg at 10/08/23 0928    metroNIDAZOLE (FLAGYL) IVPB 500 mg, 500 mg, Intravenous, Q6H, Alan Mathews MD, Last Rate: 100 mL/hr at 10/08/23 0939, 500 mg at 10/08/23 0939    ondansetron (ZOFRAN) injection 4 mg, 4 mg, Intravenous, Q6H PRN, Thai Rausch, APRN, 4 mg at 10/08/23 0505    pantoprazole (PROTONIX) EC tablet 40 mg, 40 mg, Oral, BID, Greg Rey MD, 40 mg at 10/08/23 0927    [COMPLETED] PEG-KCl-NaCl-NaSulf-Na Asc-C (MOVIPREP) powder 1,000 mL, 1,000 mL, Oral, Once, 1,000 mL at 10/05/23 2330 **FOLLOWED BY** PEG-KCl-NaCl-NaSulf-Na Asc-C (MOVIPREP) powder 1,000 mL, 1,000 mL, Oral, Once, Monse Vivas MD    Phosphorus Replacement - Follow Nurse / BPA Driven Protocol, , Does not apply, Nia MONTANEZ Andrew W, MD    piperacillin-tazobactam (ZOSYN) 3.375 g in iso-osmotic dextrose 50 ml (premix), 3.375 g, Intravenous, Q8H, Greg Rey MD    Potassium Replacement - Follow Nurse / BPA Driven Protocol, , Does not apply, Nia MONTANEZ Andrew W, MD    QUEtiapine XR (SEROquel XR) 24 hr tablet 300 mg, 300 mg, Oral, Nightly, Greg Rey MD, 300 mg at 10/07/23 2037    simethicone (MYLICON) chewable tablet 80 mg, 80 mg, Oral, 4x Daily PRN, Alan Mathews MD    sodium chloride 0.9 % flush 10 mL, 10 mL, Intravenous, Q12H, Thai Rausch, APRN, 10 mL at 10/08/23 0930    sodium chloride 0.9 % flush 10 mL, 10 mL, Intravenous, PRN, Thai Rausch APRN    sodium chloride 0.9 % infusion 40 mL, 40 mL, Intravenous, PRN, Thai Rausch APRN    topiramate (TOPAMAX) tablet 100 mg, 100 mg, Oral, BID, Greg Rey MD, 100 mg at 10/08/23 0930    Review of Systems     Constitution:  negative for chills, fatigue and fevers  Eyes:  negative for blurriness and change of vision  ENT:   negative for sore throat and voice change  Respiratory: negative for  cough and shortness of air  Cardiovascular:  Negative for chest  pain or palpitations  Gastrointestinal:  negative for  See HPI  Genitourinary:  negative for  blood in urine and painful urination  Integument: negative for  rash and redness  Hematologic / Lymphatic: negative for  excessive bleeding and easy bruising  Musculoskeletal: negative for  joint pain and joint stiffness out of the ordinary  Neurological:  negative for  seizures and speech abnormality  Behavioral/Psych:  negative for  anxiety and depression out of the ordinary  Endocrine: negative for  diabetes and weight loss, unintended  Allergies / Immunologic:  negative for  anaphylaxis and rash        Objective     Vital Signs  Temp:  [97.5 øF (36.4 øC)-98.3 øF (36.8 øC)] 97.5 øF (36.4 øC)  Heart Rate:  [62-75] 71  Resp:  [18] 18  BP: (103-105)/(55-62) 104/57  Body mass index is 26.9 kg/mý.    Intake/Output Summary (Last 24 hours) at 10/8/2023 1017  Last data filed at 10/8/2023 0427  Gross per 24 hour   Intake 240 ml   Output --   Net 240 ml     No intake/output data recorded.       Physical Exam:   General: patient awake, alert and cooperative   Eyes: Normal lids and lashes, no scleral icterus   Neck: supple, normal ROM   Skin: warm and dry, not jaundiced   Cardiovascular: regular rhythm and rate, no murmurs auscultated   Pulm: clear to auscultation bilaterally, regular and unlabored   Abdomen: soft, generalized tenderness, nondistended; normal bowel sounds (more active compared to yesterday)   Rectal: deferred   Extremities: no rash or edema   Psychiatric: Normal mood and behavior; memory intact         Results Review:    I have reviewed all of the patients current test results  Results from last 7 days   Lab Units 10/08/23  0454 10/07/23  0508 10/06/23  0540   WBC 10*3/mm3 9.03 11.10* 13.07*   HEMOGLOBIN g/dL 7.4* 8.0* 8.6*   HEMATOCRIT % 24.2* 25.4* 28.1*   PLATELETS 10*3/mm3 274 247 242       Results from last 7 days   Lab Units 10/08/23  0454 10/07/23  0508 10/06/23  0540   SODIUM mmol/L 136 136 132*   POTASSIUM  mmol/L 2.9* 2.8* 3.1*   CHLORIDE mmol/L 106 104 102   CO2 mmol/L 20.0* 18.0* 17.0*   BUN mg/dL 17 22 27*   CREATININE mg/dL 2.06* 2.36* 2.52*   CALCIUM mg/dL 7.4* 7.1* 7.0*   BILIRUBIN mg/dL 0.2 0.3 0.5   ALK PHOS U/L 120* 121* 137*   ALT (SGPT) U/L 7 9 9   AST (SGOT) U/L 11 13 17   GLUCOSE mg/dL 99 96 106*             Lab Results   Lab Value Date/Time    LIPASE 15 10/03/2023 1250    LIPASE 43 08/03/2021 1208    LIPASE 41 10/23/2020 1301    LIPASE 78 03/13/2017 1212    LIPASE 197 10/11/2016 0553    LIPASE 340 (H) 10/10/2016 1654       Radiology:    Imaging Results (Last 24 Hours)       ** No results found for the last 24 hours. **              Assessment & Plan       Enterocolitis    CKD (chronic kidney disease)    Dehydration    Nausea and vomiting    Acute abdominal pain      Impression/Plan    Enterocolitis  Abdominal pain  Nausea vomiting  CKD  Anemia    WBCs have improved.  Continue current treatment.  I would recommend continuing clear liquids for today at minimum.  I do not recommend advancing diet until abdominal pain is lessened.  Patient was encouraged to sit on the side of the bed or up in bedside chair.  She was encouraged to ambulate as tolerated.  No orders from GI perspective at this time.    There is no active GI coverage as of Monday morning 7 AM 10/9/2023    Electronically signed by CALVIN Starkey, 10/08/23, 10:17 AM CDT.       CALVIN Starkey  10/08/23  10:17 CDT

## 2023-10-08 NOTE — PLAN OF CARE
Goal Outcome Evaluation:            Patient treated for pain multiple times per MAR/orders overnight. IV abx therapy and fluids maintained overnight. Monitoring lytes as per indicated. Patient personal items and call button in reach. Patient had no significant changes or incidents this shift. Will update day RN as appropriate at change of shift report in the morning.

## 2023-10-08 NOTE — PROGRESS NOTES
"Pharmacy Renal Dose Conversion    Thao Mohr is a 78 y.o. year old female  162.6 cm (64\") 71.1 kg (156 lb 11.2 oz)    Estimated Creatinine Clearance: 22.1 mL/min (A) (by C-G formula based on SCr of 2.06 mg/dL (H)).   Creatinine   Date Value Ref Range Status   10/08/2023 2.06 (H) 0.57 - 1.00 mg/dL Final   10/07/2023 2.36 (H) 0.57 - 1.00 mg/dL Final   10/06/2023 2.52 (H) 0.57 - 1.00 mg/dL Final   04/15/2022 3.6 (H) 0.5 - 0.9 mg/dL Final   04/11/2022 3.4 (H) 0.5 - 0.9 mg/dL Final   04/10/2022 3.2 (H) 0.5 - 0.9 mg/dL Final       PLAN  Based on prescribing information provided by the drug , Zosyn 3.75 gm iv Q12H,  has been changed to Zosyn 3.375 gm iv Q8H (extended infusion).    Adjusted per the directives and guidelines established by [facility] and Therapeutics Committee and [facility] Executive Committee.  Pharmacy will continue to monitor daily and make further adjustment(s) accordingly.    Sherif Cruz, PharmD  10/08/2309:55 CDT    "

## 2023-10-09 LAB
ALBUMIN SERPL-MCNC: 2.3 G/DL (ref 3.5–5.2)
ALBUMIN/GLOB SERPL: 0.9 G/DL
ALP SERPL-CCNC: 119 U/L (ref 39–117)
ALT SERPL W P-5'-P-CCNC: 6 U/L (ref 1–33)
ANION GAP SERPL CALCULATED.3IONS-SCNC: 12 MMOL/L (ref 5–15)
AST SERPL-CCNC: 11 U/L (ref 1–32)
BACTERIA SPEC AEROBE CULT: NORMAL
BACTERIA SPEC AEROBE CULT: NORMAL
BASOPHILS # BLD AUTO: 0.06 10*3/MM3 (ref 0–0.2)
BASOPHILS NFR BLD AUTO: 0.7 % (ref 0–1.5)
BILIRUB SERPL-MCNC: 0.2 MG/DL (ref 0–1.2)
BUN SERPL-MCNC: 12 MG/DL (ref 8–23)
BUN/CREAT SERPL: 6.2 (ref 7–25)
CALCIUM SPEC-SCNC: 7.5 MG/DL (ref 8.6–10.5)
CHLORIDE SERPL-SCNC: 108 MMOL/L (ref 98–107)
CO2 SERPL-SCNC: 19 MMOL/L (ref 22–29)
CREAT SERPL-MCNC: 1.95 MG/DL (ref 0.57–1)
CRP SERPL-MCNC: 9.6 MG/DL (ref 0–0.5)
DEPRECATED RDW RBC AUTO: 50.2 FL (ref 37–54)
EGFRCR SERPLBLD CKD-EPI 2021: 26.1 ML/MIN/1.73
EOSINOPHIL # BLD AUTO: 0.74 10*3/MM3 (ref 0–0.4)
EOSINOPHIL NFR BLD AUTO: 9.1 % (ref 0.3–6.2)
ERYTHROCYTE [DISTWIDTH] IN BLOOD BY AUTOMATED COUNT: 14.7 % (ref 12.3–15.4)
ERYTHROCYTE [SEDIMENTATION RATE] IN BLOOD: 32 MM/HR (ref 0–30)
GLOBULIN UR ELPH-MCNC: 2.5 GM/DL
GLUCOSE SERPL-MCNC: 97 MG/DL (ref 65–99)
HCT VFR BLD AUTO: 25.4 % (ref 34–46.6)
HGB BLD-MCNC: 7.8 G/DL (ref 12–15.9)
IMM GRANULOCYTES # BLD AUTO: 0.03 10*3/MM3 (ref 0–0.05)
IMM GRANULOCYTES NFR BLD AUTO: 0.4 % (ref 0–0.5)
LYMPHOCYTES # BLD AUTO: 0.92 10*3/MM3 (ref 0.7–3.1)
LYMPHOCYTES NFR BLD AUTO: 11.3 % (ref 19.6–45.3)
MCH RBC QN AUTO: 28.6 PG (ref 26.6–33)
MCHC RBC AUTO-ENTMCNC: 30.7 G/DL (ref 31.5–35.7)
MCV RBC AUTO: 93 FL (ref 79–97)
MONOCYTES # BLD AUTO: 0.74 10*3/MM3 (ref 0.1–0.9)
MONOCYTES NFR BLD AUTO: 9.1 % (ref 5–12)
NEUTROPHILS NFR BLD AUTO: 5.62 10*3/MM3 (ref 1.7–7)
NEUTROPHILS NFR BLD AUTO: 69.4 % (ref 42.7–76)
NRBC BLD AUTO-RTO: 0 /100 WBC (ref 0–0.2)
PLATELET # BLD AUTO: 304 10*3/MM3 (ref 140–450)
PMV BLD AUTO: 10.3 FL (ref 6–12)
POTASSIUM SERPL-SCNC: 2.8 MMOL/L (ref 3.5–5.2)
PROT SERPL-MCNC: 4.8 G/DL (ref 6–8.5)
RBC # BLD AUTO: 2.73 10*6/MM3 (ref 3.77–5.28)
SODIUM SERPL-SCNC: 139 MMOL/L (ref 136–145)
WBC NRBC COR # BLD: 8.11 10*3/MM3 (ref 3.4–10.8)

## 2023-10-09 PROCEDURE — 85025 COMPLETE CBC W/AUTO DIFF WBC: CPT

## 2023-10-09 PROCEDURE — 85652 RBC SED RATE AUTOMATED: CPT | Performed by: SPECIALIST

## 2023-10-09 PROCEDURE — 99232 SBSQ HOSP IP/OBS MODERATE 35: CPT | Performed by: SPECIALIST

## 2023-10-09 PROCEDURE — 0 POTASSIUM CHLORIDE PER 2 MEQ

## 2023-10-09 PROCEDURE — 97162 PT EVAL MOD COMPLEX 30 MIN: CPT

## 2023-10-09 PROCEDURE — 25810000003 LACTATED RINGERS PER 1000 ML

## 2023-10-09 PROCEDURE — 25010000002 PIPERACILLIN SOD-TAZOBACTAM PER 1 G: Performed by: INTERNAL MEDICINE

## 2023-10-09 PROCEDURE — 25010000002 HYDROMORPHONE PER 4 MG

## 2023-10-09 PROCEDURE — 25010000002 METRONIDAZOLE 500 MG/100ML SOLUTION: Performed by: SPECIALIST

## 2023-10-09 PROCEDURE — 25010000002 HYDROMORPHONE PER 4 MG: Performed by: INTERNAL MEDICINE

## 2023-10-09 PROCEDURE — 80053 COMPREHEN METABOLIC PANEL: CPT

## 2023-10-09 PROCEDURE — 86140 C-REACTIVE PROTEIN: CPT | Performed by: SPECIALIST

## 2023-10-09 RX ORDER — HYDROMORPHONE HYDROCHLORIDE 1 MG/ML
0.5 INJECTION, SOLUTION INTRAMUSCULAR; INTRAVENOUS; SUBCUTANEOUS EVERY 6 HOURS PRN
Status: DISCONTINUED | OUTPATIENT
Start: 2023-10-09 | End: 2023-10-10

## 2023-10-09 RX ORDER — POTASSIUM CHLORIDE 29.8 MG/ML
20 INJECTION INTRAVENOUS ONCE
Status: COMPLETED | OUTPATIENT
Start: 2023-10-09 | End: 2023-10-09

## 2023-10-09 RX ADMIN — QUETIAPINE FUMARATE 300 MG: 150 TABLET, EXTENDED RELEASE ORAL at 21:49

## 2023-10-09 RX ADMIN — PIPERACILLIN SODIUM AND TAZOBACTAM SODIUM 3.38 G: 3; .375 INJECTION, SOLUTION INTRAVENOUS at 11:39

## 2023-10-09 RX ADMIN — APIXABAN 5 MG: 5 TABLET, FILM COATED ORAL at 08:16

## 2023-10-09 RX ADMIN — AMLODIPINE BESYLATE 5 MG: 5 TABLET ORAL at 08:16

## 2023-10-09 RX ADMIN — PANTOPRAZOLE SODIUM 40 MG: 40 TABLET, DELAYED RELEASE ORAL at 08:17

## 2023-10-09 RX ADMIN — APIXABAN 5 MG: 5 TABLET, FILM COATED ORAL at 21:49

## 2023-10-09 RX ADMIN — METRONIDAZOLE 500 MG: 5 INJECTION, SOLUTION INTRAVENOUS at 21:50

## 2023-10-09 RX ADMIN — HYDROMORPHONE HYDROCHLORIDE 0.5 MG: 1 INJECTION, SOLUTION INTRAMUSCULAR; INTRAVENOUS; SUBCUTANEOUS at 16:25

## 2023-10-09 RX ADMIN — HYDROMORPHONE HYDROCHLORIDE 0.5 MG: 1 INJECTION, SOLUTION INTRAMUSCULAR; INTRAVENOUS; SUBCUTANEOUS at 04:20

## 2023-10-09 RX ADMIN — PIPERACILLIN SODIUM AND TAZOBACTAM SODIUM 3.38 G: 3; .375 INJECTION, SOLUTION INTRAVENOUS at 02:33

## 2023-10-09 RX ADMIN — ESCITSLOPRAM 20 MG: 10 TABLET ORAL at 08:16

## 2023-10-09 RX ADMIN — METRONIDAZOLE 500 MG: 5 INJECTION, SOLUTION INTRAVENOUS at 10:40

## 2023-10-09 RX ADMIN — Medication 10 ML: at 21:50

## 2023-10-09 RX ADMIN — FAMOTIDINE 20 MG: 20 TABLET, FILM COATED ORAL at 08:16

## 2023-10-09 RX ADMIN — LEVOTHYROXINE SODIUM 150 MCG: 150 TABLET ORAL at 08:16

## 2023-10-09 RX ADMIN — SODIUM CHLORIDE, POTASSIUM CHLORIDE, SODIUM LACTATE AND CALCIUM CHLORIDE 100 ML/HR: 600; 310; 30; 20 INJECTION, SOLUTION INTRAVENOUS at 14:04

## 2023-10-09 RX ADMIN — PANTOPRAZOLE SODIUM 40 MG: 40 TABLET, DELAYED RELEASE ORAL at 21:49

## 2023-10-09 RX ADMIN — HYDROMORPHONE HYDROCHLORIDE 0.5 MG: 1 INJECTION, SOLUTION INTRAMUSCULAR; INTRAVENOUS; SUBCUTANEOUS at 10:40

## 2023-10-09 RX ADMIN — POTASSIUM CHLORIDE 20 MEQ: 29.8 INJECTION, SOLUTION INTRAVENOUS at 08:39

## 2023-10-09 RX ADMIN — TOPIRAMATE 100 MG: 100 TABLET, FILM COATED ORAL at 08:16

## 2023-10-09 RX ADMIN — ALLOPURINOL 100 MG: 100 TABLET ORAL at 08:16

## 2023-10-09 RX ADMIN — METRONIDAZOLE 500 MG: 5 INJECTION, SOLUTION INTRAVENOUS at 15:14

## 2023-10-09 RX ADMIN — METRONIDAZOLE 500 MG: 5 INJECTION, SOLUTION INTRAVENOUS at 03:57

## 2023-10-09 RX ADMIN — SODIUM CHLORIDE, POTASSIUM CHLORIDE, SODIUM LACTATE AND CALCIUM CHLORIDE 100 ML/HR: 600; 310; 30; 20 INJECTION, SOLUTION INTRAVENOUS at 00:07

## 2023-10-09 RX ADMIN — TOPIRAMATE 100 MG: 100 TABLET, FILM COATED ORAL at 21:50

## 2023-10-09 RX ADMIN — ALLOPURINOL 100 MG: 100 TABLET ORAL at 21:49

## 2023-10-09 RX ADMIN — PIPERACILLIN SODIUM AND TAZOBACTAM SODIUM 3.38 G: 3; .375 INJECTION, SOLUTION INTRAVENOUS at 18:30

## 2023-10-09 NOTE — PLAN OF CARE
Goal Outcome Evaluation:       Patient treated for pain per MAR/orders overnight. Patient rec'd IV abx therapy and fluids maintained overnight. Monitoring lytes as per indicated. Patient personal items and call button in reach. Patient had no significant changes or incidents this shift. Will update day RN as appropriate at change of shift report in the morning.

## 2023-10-09 NOTE — THERAPY EVALUATION
"Patient Name: Thao Mohr  : 1945    MRN: [id number]                              Today's Date: 10/9/2023       Admit Date: 10/3/2023    Visit Dx: No diagnosis found.  Patient Active Problem List   Diagnosis    Essential hypertension    Hypothyroidism    SLE (systemic lupus erythematosus)    CKD (chronic kidney disease)    Dehydration    Meniscus tear    Left ventricular systolic dysfunction without heart failure    Nonrheumatic aortic valve insufficiency    OA (osteoarthritis) of shoulder    S/P reverse total shoulder arthroplasty, left    Bilateral kidney stones    Nausea and vomiting    Acute abdominal pain    Enterocolitis     Past Medical History:   Diagnosis Date    Acid reflux     Aneurysm     Pt states \"Somewhere near my spleen.\"    Anxiety and depression     Arthritis     Diarrhea     Generalized headaches     Heart murmur     History of transfusion     Hypertension     Hypothyroid     Kidney stones     Lupus     Migraines     MRSA (methicillin resistant Staphylococcus aureus)     in past , on face    Nausea     PONV (postoperative nausea and vomiting)     Renal failure     21%    Thrombosis     RIGHT KNEE     Past Surgical History:   Procedure Laterality Date    APPENDECTOMY      CHOLECYSTECTOMY      COLONOSCOPY      ENDOSCOPY N/A 10/30/2020    Procedure: ESOPHAGOGASTRODUODENOSCOPY WITH ANESTHESIA;  Surgeon: Naresh Heard DO;  Location: [facility] ENDOSCOPY;  Service: Gastroenterology;  Laterality: N/A;  preop; abdominal pain  postop; normal   PCP Greg Rey     EXTRACORPOREAL SHOCK WAVE LITHOTRIPSY (ESWL) Right 2021    Procedure: EXTRACORPOREAL SHOCKWAVE LITHOTRIPSY RIGHT;  Surgeon: Ronnie Hayes MD;  Location: [facility] OR;  Service: Urology;  Laterality: Right;    EXTRACORPOREAL SHOCK WAVE LITHOTRIPSY (ESWL) Left 2022    Procedure: EXTRACORPOREAL SHOCKWAVE LITHOTRIPSY LEFT;  Surgeon: Ronnie Hayes MD;  Location: [facility] OR;  Service: Urology;  Laterality: Left; "    HYSTERECTOMY      JOINT REPLACEMENT      REPLACEMENT TOTAL KNEE Left     SHOULDER ROTATOR CUFF REPAIR Right     TOTAL HIP ARTHROPLASTY Right     TOTAL SHOULDER ARTHROPLASTY W/ DISTAL CLAVICLE EXCISION Left 12/27/2019    Procedure: LEFT REVERSE TOTAL SHOULDER ARTHROPLASTY;  Surgeon: Dhaval Yepez MD;  Location: [facility] OR;  Service: Orthopedics    WRIST FRACTURE SURGERY Left 2 weeks ago      General Information       Row Name 10/09/23 1303          Physical Therapy Time and Intention    Document Type evaluation (P)   Admitted d/t entercolitis.  -JS     Mode of Treatment physical therapy (P)   -JS       Row Name 10/09/23 1303          General Information    Patient Profile Reviewed yes (P)   -JS     Prior Level of Function independent:;all household mobility;gait;community mobility;transfer;bed mobility;ADL's;grooming;dressing;bathing;home management;driving (P)   -JS     Existing Precautions/Restrictions fall (P)   -JS     Barriers to Rehab medically complex (P)   -JS       Row Name 10/09/23 1303          Living Environment    People in Home spouse (P)   -JS       Row Name 10/09/23 1303          Home Main Entrance    Number of Stairs, Main Entrance two (P)   -JS     Stair Railings, Main Entrance none (P)   -JS       Row Name 10/09/23 1303          Stairs Within Home, Primary    Number of Stairs, Within Home, Primary none (P)   -JS       Row Name 10/09/23 1303          Cognition    Orientation Status (Cognition) oriented x 4 (P)   -       Row Name 10/09/23 1303          Safety Issues, Functional Mobility    Safety Issues Affecting Function (Mobility) impulsivity (P)   -JS     Impairments Affecting Function (Mobility) balance;endurance/activity tolerance;pain;strength;shortness of breath (P)   -JS     Comment, Safety Issues/Impairments (Mobility) Step over tub with grab bars and shower chair. Grab bar by toilet. Flat bed. Single point cane if she is to need it. (P)   -JS               User Key  (r) = Recorded By,  (t) = Taken By, (c) = Cosigned By      Initials Name Provider Type    Jose Luis Perkins, PT Student PT Student                   Mobility       Row Name 10/09/23 1303          Bed Mobility    Bed Mobility supine-sit;sit-supine (P)   -JS     Supine-Sit [place] (Bed Mobility) moderate assist (50% patient effort) (P)   -JS     Sit-Supine [place] (Bed Mobility) moderate assist (50% patient effort) (P)   -JS     Assistive Device (Bed Mobility) head of bed elevated;draw sheet;bed rails (P)   -JS       Row Name 10/09/23 1303          Sit-Stand Transfer    Sit-Stand [place] (Transfers) minimum assist (75% patient effort) (P)   -JS       Row Name 10/09/23 1303          Gait/Stairs (Locomotion)    [place] Level (Gait) minimum assist (75% patient effort) (P)   -JS     Distance in Feet (Gait) 10 (P)   -JS     Deviations/Abnormal Patterns (Gait) stride length decreased;gait speed decreased;festinating/shuffling;nedra decreased (P)   -JS               User Key  (r) = Recorded By, (t) = Taken By, (c) = Cosigned By      Initials Name Provider Type    Jose Luis Perkins, PT Student PT Student                   Obj/Interventions       Row Name 10/09/23 1303          Range of Motion Comprehensive    General Range of Motion lower extremity range of motion deficits identified (P)   -JS     Comment, General Range of Motion Knee extension limited 10% B d/t hamstring tightness in seated; Hip flexion limited 80% d/t weakness. (P)   -JS       Row Name 10/09/23 1303          Strength Comprehensive (MMT)    General Manual Muscle Testing (MMT) Assessment lower extremity strength deficits identified (P)   -JS     Comment, General Manual Muscle Testing (MMT) Assessment LEs: DF 4/5 B; PF 4/5 B; Knee extension 4/5 B; Knee flexion 3/5 B; Hip flexion 2-/5 B. (P)   -JS       Row Name 10/09/23 1303          Balance    Balance Assessment sitting static balance;standing static balance;standing dynamic balance (P)   -JS     Static  Sitting Balance standby assist;contact guard (P)   -JS     Position, Sitting Balance sitting edge of bed (P)   -JS     Static Standing Balance minimal assist (P)   -JS     Dynamic Standing Balance minimal assist (P)   -JS     Position/Device Used, Standing Balance supported;other (see comments) (P)   Therapist hand held support  -JS     Comment, Balance Patient preferred UE support and required SBA when supported. CGA when her UE were off of bed. Min A for ambulation to [facility] [place] [place]. (P)   -JS       Row Name 10/09/23 1303          Sensory Assessment (Somatosensory)    Sensory Assessment (Somatosensory) sensation intact (P)   -JS               User Key  (r) = Recorded By, (t) = Taken By, (c) = Cosigned By      Initials Name Provider Type    Jose Luis Perkins, PT Student PT Student                   Goals/Plan       Row Name 10/09/23 1303          Bed Mobility Goal 1 (PT)    Activity/Assistive Device (Bed Mobility Goal 1, PT) sit to supine;supine to sit (P)   -JS     [place] Level/Cues Needed (Bed Mobility Goal 1, PT) standby assist (P)   -JS     Time Frame (Bed Mobility Goal 1, PT) long term goal (LTG);by discharge (P)   -JS     Progress/Outcomes (Bed Mobility Goal 1, PT) new goal (P)   -JS       Row Name 10/09/23 1303          Transfer Goal 1 (PT)    Activity/Assistive Device (Transfer Goal 1, PT) sit-to-stand/stand-to-sit;bed-to-chair/chair-to-bed (P)   -JS     [place] Level/Cues Needed (Transfer Goal 1, PT) standby assist (P)   -JS     Time Frame (Transfer Goal 1, PT) long term goal (LTG);by discharge (P)   -JS     Progress/Outcome (Transfer Goal 1, PT) new goal (P)   -JS       Row Name 10/09/23 1303          Gait Training Goal 1 (PT)    Activity/Assistive Device (Gait Training Goal 1, PT) gait (walking locomotion);decrease fall risk;increase endurance/gait distance;improve balance and speed (P)   -JS     [place] Level (Gait Training Goal 1, PT) standby assist (P)   -JS     Distance (Gait Training Goal 1,  PT) 50 (P)   -JS     Time Frame (Gait Training Goal 1, PT) long term goal (LTG);by discharge (P)   -JS     Progress/Outcome (Gait Training Goal 1, PT) new goal (P)   -JS       Row Name 10/09/23 1303          Stairs Goal 1 (PT)    Activity/Assistive Device (Stairs Goal 1, PT) ascending stairs;descending stairs (P)   -JS     [place] Level/Cues Needed (Stairs Goal 1, PT) contact guard required (P)   -JS     Number of Stairs (Stairs Goal 1, PT) 2 (P)   -JS     Time Frame (Stairs Goal 1, PT) long term goal (LTG);by discharge (P)   -JS     Progress/Outcome (Stairs Goal 1, PT) new goal (P)   -JS       Row Name 10/09/23 1301          Therapy Assessment/Plan (PT)    Planned Therapy Interventions (PT) balance training;bed mobility training;gait training;patient/family education;stair training;strengthening;transfer training (P)   -JS               User Key  (r) = Recorded By, (t) = Taken By, (c) = Cosigned By      Initials Name Provider Type    Jose Luis Perkins, PT Student PT Student                   Clinical Impression       Row Name 10/09/23 1301          Pain    Pretreatment Pain Rating 9/10 (P)   -JS     Posttreatment Pain Rating 9/10 (P)   -JS     Pain Location - abdomen (P)   -JS     Pain Intervention(s) Medication (See MAR);Ambulation/increased activity;Repositioned (P)   -JS       Row Name 10/09/23 1309          Plan of Care Review    Plan of Care Reviewed With patient;spouse (P)   -JS     Progress no change (P)   -JS     Outcome Evaluation PT eval complete. Pt is A&Ox4 with complaints of 9/10 pain in abdomen.  present bedside to help with history. Available and able to take care of her at home as needed. Pt required mod A for bed mobility and min A for sit>stand x2 this visit. She was able to ambulate 10 feet with min A to [facility] [place] this visit with slow gait and decreased step length. She required UE support to ambulate. Upon returning to sitting she reported severe dizziness and lightheadedness. SpO2 after  activity read 77%. SpO2 stabilized to 93% before departure. Nursing alerted of SpO2 decrease with minimal activity. Pt is limited by decreased activity tolerance, pain and generalized weakness that are limiting her ability to perform functional mobility and live independently. PT services necessary to address these deficits. Anticipate discharge home with assist. Patient may benefit from home health services as well. (P)   -JS       Row Name 10/09/23 1303          Therapy Assessment/Plan (PT)    Patient/Family Therapy Goals Statement (PT) Go home (P)   -JS     Rehab Potential (PT) good, to achieve stated therapy goals (P)   -JS     Criteria for Skilled Interventions Met (PT) yes;meets criteria;skilled treatment is necessary (P)   -JS     Therapy Frequency (PT) 2 times/day (P)   -JS     Predicted Duration of Therapy Intervention (PT) Until dishcarge (P)   -SHANDRA       Row Name 10/09/23 1303          Vital Signs    O2 Delivery Pre Treatment room air (P)   -JS     Intra SpO2 (%) 77 (P)   -JS     O2 Delivery Intra Treatment room air (P)   -JS     Post SpO2 (%) 93 (P)   -JS     O2 Delivery Post Treatment room air (P)   -JS     Pre Patient Position Supine (P)   -JS     Intra Patient Position Standing (P)   -JS     Post Patient Position Supine (P)   -JS       Row Name 10/09/23 1303          Positioning and Restraints    Pre-Treatment Position in bed (P)   -JS     Post Treatment Position bed (P)   -JS     In Bed fowlers;call light within reach;encouraged to call for assist;patient within staff view;with family/caregiver;SCD pump applied;side rails up x2;with other staff (P)   -               User Key  (r) = Recorded By, (t) = Taken By, (c) = Cosigned By      Initials Name Provider Type    Jose Luis Perkins, PT Student PT Student                   Outcome Measures       Row Name 10/09/23 1303 10/09/23 0840       How much help from another person do you currently need...    Turning from your back to your side while in flat bed  without using bedrails? 4 (P)   -JS 4  -CH    Moving from lying on back to sitting on the side of a flat bed without bedrails? 2 (P)   -JS 4  -CH    Moving to and from a bed to a chair (including a wheelchair)? 3 (P)   -JS 3  -CH    Standing up from a chair using your arms (e.g., wheelchair, bedside chair)? 3 (P)   -JS 3  -CH    Climbing 3-5 steps with a railing? 2 (P)   -JS 3  -CH    To walk in hospital room? 3 (P)   -JS 3  -CH    AM-PAC 6 Clicks Score (PT) 17 (P)   -JS 20  -CH    Highest level of mobility 5 --> Static standing (P)   -JS 6 --> Walked 10 steps or more  -      Row Name 10/09/23 1303          Functional Assessment    Outcome Measure Options AM-PAC 6 Clicks Basic Mobility (PT) (P)   -               User Key  (r) = Recorded By, (t) = Taken By, (c) = Cosigned By      Initials Name Provider Type     Casie Garcia, RN Registered Nurse    Jose Luis Perkins, PT Student PT Student                                 Physical Therapy Education       Title: PT OT SLP Therapies (Done)       Topic: Physical Therapy (Done)       Point: Mobility training (Done)       Learning Progress Summary             Patient Acceptance, E, VU,DU,NR by  at 10/9/2023 1303    Comment: Cueing for proper and safe transfer mechanics.   Family Acceptance, E, VU,DU,NR by  at 10/9/2023 1303    Comment: Cueing for proper and safe transfer mechanics.                         Point: Body mechanics (Done)       Learning Progress Summary             Patient Acceptance, E, VU,DU,NR by  at 10/9/2023 1303    Comment: Cueing for proper and safe transfer mechanics.   Family Acceptance, E, VU,DU,NR by  at 10/9/2023 1303    Comment: Cueing for proper and safe transfer mechanics.                                         User Key       Initials Effective Dates Name Provider Type Discipline    SHANDRA 07/13/23 -  Jose Luis Aguirre, PT Student PT Student PT                  PT Recommendation and Plan  Planned Therapy Interventions (PT): (P)  balance training, bed mobility training, gait training, patient/family education, stair training, strengthening, transfer training  Plan of Care Reviewed With: (P) patient, spouse  Progress: (P) no change  Outcome Evaluation: (P) PT eval complete. Pt is A&Ox4 with complaints of 9/10 pain in abdomen.  present bedside to help with history. Available and able to take care of her at home as needed. Pt required mod A for bed mobility and min A for sit>stand x2 this visit. She was able to ambulate 10 feet with min A to [facility] [place] visit with slow gait and decreased step length. She required UE support to ambulate. Upon returning to sitting she reported severe dizziness and lightheadedness. SpO2 after activity read 77%. SpO2 stabilized to 93% before departure. Nursing alerted of SpO2 decrease with minimal activity. Pt is limited by decreased activity tolerance, pain and generalized weakness that are limiting her ability to perform functional mobility and live independently. PT services necessary to address these deficits. Anticipate discharge home with assist. Patient may benefit from home health services as well.     Time Calculation:         PT Charges       Row Name 10/09/23 1303             Time Calculation    Start Time 1303 (P)   11 mins  -JS      Stop Time 1338 (P)   -JS      Time Calculation (min) 35 min (P)   -JS      PT Received On 10/09/23 (P)   -JS      PT Goal Re-Cert Due Date 10/19/23 (P)   -JS         Untimed Charges    PT Eval/Re-eval Minutes 46 (P)   -JS         Total Minutes    Untimed Charges Total Minutes 46 (P)   -JS       Total Minutes 46 (P)   -JS                User Key  (r) = Recorded By, (t) = Taken By, (c) = Cosigned By      Initials Name Provider Type    Jose Luis Perkins, PT Student PT Student                      PT G-Codes  Outcome Measure Options: (P) AM-PAC 6 Clicks Basic Mobility (PT)  AM-PAC 6 Clicks Score (PT): (P) 17  PT Discharge Summary  Anticipated Discharge Disposition (PT): (P)  home with home health, home with assist    Jose Luis Aguirre, PT Student  10/9/2023

## 2023-10-09 NOTE — PROGRESS NOTES
Chief Complaint: Nominal pain      Interval History:     Overall, patient appears to be doing better over the weekend.  She has been able to have a bowel movement every day.  She states her bowel movement was normal yesterday.  She has been able to get up and ambulate to the bathroom and back to the bed.  He was evaluated by gastroenterology and general surgery.  An endoscopy/colonoscopy was performed with no abnormalities.  Her labs have also slightly improved.  No leukocytosis, and mildly elevated inflammatory markers.  She has had persistent hypokalemia.  Renal function is remained stable.  Normocytic anemia has progressively gotten worse over the past few days.  Likely from chronic kidney disease.    I have discussed with the patient her improving condition.  Her abdomen is less tender on palpation today.  I recommended that she get up with physical therapy today and move about the room.  We will replace her potassium, and monitor hemoglobin and transfuse if drops below 7.  We will advance diet as tolerated.  I am also going to decrease her frequency of pain medication.  She does have a history of narcotic use and pain management.    Review of Systems:   General ROS: negative for - chills or fever  Respiratory ROS: no cough, shortness of breath, or wheezing  Cardiovascular ROS: no chest pain or dyspnea on exertion  Gastrointestinal ROS: negative for - abdominal pain, diarrhea or nausea/vomiting       Vital Signs  Temp:  [97.5 øF (36.4 øC)-98.6 øF (37 øC)] 98.6 øF (37 øC)  Heart Rate:  [70-77] 77  Resp:  [16-18] 16  BP: ()/(50-63) 96/50    Intake/Output Summary (Last 24 hours) at 10/9/2023 0737  Last data filed at 10/9/2023 0510  Gross per 24 hour   Intake 1333.16 ml   Output 800 ml   Net 533.16 ml       Physical Exam:     General Appearance:    Alert, cooperative, in no acute distress   Head:    N/A   Throat:   N/A   Neck:   N/A   Lungs:     Clear to auscultation,respirations regular, even and                   unlabored    Heart:    Regular rhythm and normal rate, normal S1 and S2, no            murmur, no gallop, no rub   Abdomen:   Abdomen is protuberant but less distended.  Normal active bowel sounds.  Mild generalized tenderness on palpation that is improved.   Extremities:   No edema, no cyanosis, no clubbing   Pulses:   N/A   Skin: Pallor that is chronic.   Lymph nodes:   N/A   Neurologic:   N/A          Lab Review:       Lab Results (last 24 hours)       Procedure Component Value Units Date/Time    C-reactive Protein [[id number]]  (Abnormal) Collected: 10/09/23 0533    Specimen: Blood Updated: 10/09/23 0609     C-Reactive Protein 9.60 mg/dL     Comprehensive Metabolic Panel [[id number]]  (Abnormal) Collected: 10/09/23 0533    Specimen: Blood Updated: 10/09/23 0609     Glucose 97 mg/dL      BUN 12 mg/dL      Creatinine 1.95 mg/dL      Sodium 139 mmol/L      Potassium 2.8 mmol/L      Chloride 108 mmol/L      CO2 19.0 mmol/L      Calcium 7.5 mg/dL      Total Protein 4.8 g/dL      Albumin 2.3 g/dL      ALT (SGPT) 6 U/L      AST (SGOT) 11 U/L      Alkaline Phosphatase 119 U/L      Total Bilirubin 0.2 mg/dL      Globulin 2.5 gm/dL      A/G Ratio 0.9 g/dL      BUN/Creatinine Ratio 6.2     Anion Gap 12.0 mmol/L      eGFR 26.1 mL/min/1.73     Narrative:      GFR Normal >60  Chronic Kidney Disease <60  Kidney Failure <15    The GFR formula is only valid for adults with stable renal function between ages 18 and 70.    Sedimentation Rate [[id number]]  (Abnormal) Collected: 10/09/23 0533    Specimen: Blood Updated: 10/09/23 0605     Sed Rate 32 mm/hr     CBC & Differential [[id number]]  (Abnormal) Collected: 10/09/23 0533    Specimen: Blood Updated: 10/09/23 0553    Narrative:      The following orders were created for panel order CBC & Differential.  Procedure                               Abnormality         Status                     ---------                               -----------         ------                      CBC Auto Differential[[id number]]        Abnormal            Final result                 Please view results for these tests on the individual orders.    CBC Auto Differential [[id number]]  (Abnormal) Collected: 10/09/23 0533    Specimen: Blood Updated: 10/09/23 0553     WBC 8.11 10*3/mm3      RBC 2.73 10*6/mm3      Hemoglobin 7.8 g/dL      Hematocrit 25.4 %      MCV 93.0 fL      MCH 28.6 pg      MCHC 30.7 g/dL      RDW 14.7 %      RDW-SD 50.2 fl      MPV 10.3 fL      Platelets 304 10*3/mm3      Neutrophil % 69.4 %      Lymphocyte % 11.3 %      Monocyte % 9.1 %      Eosinophil % 9.1 %      Basophil % 0.7 %      Immature Grans % 0.4 %      Neutrophils, Absolute 5.62 10*3/mm3      Lymphocytes, Absolute 0.92 10*3/mm3      Monocytes, Absolute 0.74 10*3/mm3      Eosinophils, Absolute 0.74 10*3/mm3      Basophils, Absolute 0.06 10*3/mm3      Immature Grans, Absolute 0.03 10*3/mm3      nRBC 0.0 /100 WBC     Blood Culture - Blood, Hand, Right [[id number]]  (Normal) Collected: 10/04/23 1533    Specimen: Blood from Hand, Right Updated: 10/08/23 1630     Blood Culture No growth at 4 days    Blood Culture - Blood, Arm, Left [[id number]]  (Normal) Collected: 10/04/23 1533    Specimen: Blood from Arm, Left Updated: 10/08/23 1630     Blood Culture No growth at 4 days          Cultures:    Blood Culture   Date Value Ref Range Status   10/04/2023 No growth at 4 days  Preliminary   10/04/2023 No growth at 4 days  Preliminary       Radiology Review:  Imaging Results (Last 24 Hours)       ** No results found for the last 24 hours. **                     ASSESSMENT:      Enterocolitis    CKD (chronic kidney disease)    Dehydration    Nausea and vomiting    Acute abdominal pain      PLAN:    I have discussed this case with Dr. Rey.    1.  Replace potassium.  Continue to monitor labs.  2.  PT/OT evaluation.  Patient needs to get up out of bed and move.  3.  Monitor labs closely including hemoglobin and transfuse if hemoglobin drops  below 7.  4.  Reduce frequency of pain medication.  Continue IV antibiotics and IV fluids for now.  5.  Advance diet.  We will start with a bland diet.  6.  We appreciate GI and general surgery consultation.    Further orders per Dr. Rey.      Thai Rausch, APRN  10/09/23  07:37 CDT        Part of this note may be an electronic transcription/translation of spoken language to printed text using the Dragon Dictation System.

## 2023-10-09 NOTE — PLAN OF CARE
Goal Outcome Evaluation:  Plan of Care Reviewed With: (P) patient, spouse        Progress: (P) no change  Outcome Evaluation: (P) PT eval complete. Pt is A&Ox4 with complaints of 9/10 pain in abdomen.  present bedside to help with history. Available and able to take care of her at home as needed. Pt required mod A for bed mobility and min A for sit>stand x2 this visit. She was able to ambulate 10 feet with min A to [facility] this visit with slow gait and decreased step length. She required UE support to ambulate. Upon returning to sitting she reported severe dizziness and lightheadedness. SpO2 after activity read 77%. SpO2 stabilized to 93% before departure. Nursing alerted of SpO2 decrease with minimal activity. Pt is limited by decreased activity tolerance, pain and generalized weakness that are limiting her ability to perform functional mobility and live independently. PT services necessary to address these deficits. Anticipate discharge home with assist. Patient may benefit from home health services as well.      Anticipated Discharge Disposition (PT): (P) home with home health, home with assist

## 2023-10-09 NOTE — NURSING NOTE
Patient potassium level this morning 2.8 as reported per lab. Criteria for creat clearance <30 mL/min report result to physician. Per physician notations, potassium levels of 2.8-2.9 have been documented in previous notations. See corresponding notations per pharmacy referencing lytes replacement protocol.

## 2023-10-09 NOTE — PROGRESS NOTES
LOS: 5 days   Patient Care Team:  Greg Rey MD as PCP - General (Internal Medicine)  Greg Rey MD as Referring Physician (Internal Medicine)  Ronnie Matute MD as Cardiologist (Cardiology)  Naresh Heard DO as Consulting Physician (Gastroenterology)    Chief Complaint: Central abdominal pain    Subjective     Subjective     Hospital day 5 for central abdominal pain, she is feeling better, she is passing gas, having bowel movements, overall improving.  Objective      Objective     Vital Signs  Temp:  [98 øF (36.7 øC)-99 øF (37.2 øC)] 99 øF (37.2 øC)  Heart Rate:  [71-79] 77  Resp:  [16] 16  BP: ()/(47-72) 108/72    Intake & Output (last 3 days)         10/06 0701  10/07 0700 10/07 0701  10/08 0700 10/08 0701  10/09 0700 10/09 0701  10/10 0700    P.O. 0 240 120 240    I.V. (mL/kg) 1098 (15.4)  1213.2 (17.1) 1000 (14.1)    IV Piggyback 100       Total Intake(mL/kg) 1198 (16.8) 240 (3.4) 1333.2 (18.8) 1240 (17.4)    Urine (mL/kg/hr)   800 (0.5)     Total Output   800     Net +1198 +240 +533.2 +1240            Urine Unmeasured Occurrence 3 x 5 x 1 x     Stool Unmeasured Occurrence 2 x 1 x              Physical Exam:     General Appearance:    Alert, cooperative, in no acute distress   Lungs:     Clear to auscultation, respirations regular, even and                  unlabored    Heart:    Regular rhythm and normal rate, normal S1 and S2, no            murmur, no gallop, no rub, no click   Chest Wall:    No abnormalities observed   Abdomen:   Minimally distended, occasional bowel sounds, much less tender, white count is reduced from 13 down to 8, C-reactive protein is also reduced currently down to 9 from 25, and ESR is reduced to 32.        Results Review:     I reviewed the patient's new clinical results.  I reviewed the patient's new imaging results and agree with the interpretation.    Results from last 7 days   Lab Units 10/09/23  0533 10/08/23  0454 10/07/23  0506    WBC 10*3/mm3 8.11 9.03 11.10*   HEMOGLOBIN g/dL 7.8* 7.4* 8.0*   HEMATOCRIT % 25.4* 24.2* 25.4*   PLATELETS 10*3/mm3 304 274 247        Results from last 7 days   Lab Units 10/09/23  0533 10/08/23  0454 10/07/23  0508   SODIUM mmol/L 139 136 136   POTASSIUM mmol/L 2.8* 2.9* 2.8*   CHLORIDE mmol/L 108* 106 104   CO2 mmol/L 19.0* 20.0* 18.0*   BUN mg/dL 12 17 22   CREATININE mg/dL 1.95* 2.06* 2.36*   CALCIUM mg/dL 7.5* 7.4* 7.1*   BILIRUBIN mg/dL 0.2 0.2 0.3   ALK PHOS U/L 119* 120* 121*   ALT (SGPT) U/L 6 7 9   AST (SGOT) U/L 11 11 13   GLUCOSE mg/dL 97 99 96       Assessment/Plan     Assessment & Plan       Enterocolitis    CKD (chronic kidney disease)    Dehydration    Nausea and vomiting    Acute abdominal pain      Patient with gastroenteritis, overall improving, no changes no suggestions.    [] X-Ray  [] Reviewed Records  [] Called Physician/Consulted    Alan Mathews MD  10/09/23  16:35 CDT      Time: time spent with patient 15 minutes     Part of this note may be an electronic transcription/translation of spoken language to printed text using the Dragon Dictation System.

## 2023-10-09 NOTE — CASE MANAGEMENT/SOCIAL WORK
Continued Stay Note  ARIELLA Olvera     Patient Name: Thao Mohr  MRN: [id number]  Today's Date: 10/9/2023    Admit Date: 10/3/2023    Plan: Home   Discharge Plan       Row Name 10/09/23 1208       Plan    Plan Home    Patient/Family in Agreement with Plan yes    Plan Comments Continuing to follow for d/c needs. Noted that therapy has been ordered so will see what pt is able to do.                   Discharge Codes    No documentation.                       SHADE Whitfield

## 2023-10-10 ENCOUNTER — READMISSION MANAGEMENT (OUTPATIENT)
Dept: CALL CENTER | Facility: HOSPITAL | Age: 78
End: 2023-10-10
Payer: MEDICARE

## 2023-10-10 VITALS
DIASTOLIC BLOOD PRESSURE: 82 MMHG | TEMPERATURE: 98.4 F | BODY MASS INDEX: 26.75 KG/M2 | HEIGHT: 64 IN | OXYGEN SATURATION: 100 % | SYSTOLIC BLOOD PRESSURE: 143 MMHG | WEIGHT: 156.7 LBS | HEART RATE: 86 BPM | RESPIRATION RATE: 16 BRPM

## 2023-10-10 LAB
ALBUMIN SERPL-MCNC: 2.5 G/DL (ref 3.5–5.2)
ALBUMIN/GLOB SERPL: 1 G/DL
ALP SERPL-CCNC: 112 U/L (ref 39–117)
ALT SERPL W P-5'-P-CCNC: 6 U/L (ref 1–33)
ANION GAP SERPL CALCULATED.3IONS-SCNC: 12 MMOL/L (ref 5–15)
ANION GAP SERPL CALCULATED.3IONS-SCNC: 13 MMOL/L (ref 5–15)
AST SERPL-CCNC: 16 U/L (ref 1–32)
BASOPHILS # BLD AUTO: 0.04 10*3/MM3 (ref 0–0.2)
BASOPHILS NFR BLD AUTO: 0.6 % (ref 0–1.5)
BILIRUB SERPL-MCNC: 0.2 MG/DL (ref 0–1.2)
BUN SERPL-MCNC: 8 MG/DL (ref 8–23)
BUN SERPL-MCNC: 9 MG/DL (ref 8–23)
BUN/CREAT SERPL: 5.2 (ref 7–25)
BUN/CREAT SERPL: 5.5 (ref 7–25)
CALCIUM SPEC-SCNC: 7.7 MG/DL (ref 8.6–10.5)
CALCIUM SPEC-SCNC: 7.7 MG/DL (ref 8.6–10.5)
CHLORIDE SERPL-SCNC: 111 MMOL/L (ref 98–107)
CHLORIDE SERPL-SCNC: 112 MMOL/L (ref 98–107)
CO2 SERPL-SCNC: 18 MMOL/L (ref 22–29)
CO2 SERPL-SCNC: 19 MMOL/L (ref 22–29)
CREAT SERPL-MCNC: 1.53 MG/DL (ref 0.57–1)
CREAT SERPL-MCNC: 1.64 MG/DL (ref 0.57–1)
DEPRECATED RDW RBC AUTO: 49.7 FL (ref 37–54)
EGFRCR SERPLBLD CKD-EPI 2021: 32.1 ML/MIN/1.73
EGFRCR SERPLBLD CKD-EPI 2021: 34.9 ML/MIN/1.73
EOSINOPHIL # BLD AUTO: 0.42 10*3/MM3 (ref 0–0.4)
EOSINOPHIL NFR BLD AUTO: 6.1 % (ref 0.3–6.2)
ERYTHROCYTE [DISTWIDTH] IN BLOOD BY AUTOMATED COUNT: 14.6 % (ref 12.3–15.4)
GLOBULIN UR ELPH-MCNC: 2.4 GM/DL
GLUCOSE SERPL-MCNC: 102 MG/DL (ref 65–99)
GLUCOSE SERPL-MCNC: 106 MG/DL (ref 65–99)
HCT VFR BLD AUTO: 25.7 % (ref 34–46.6)
HGB BLD-MCNC: 8 G/DL (ref 12–15.9)
IMM GRANULOCYTES # BLD AUTO: 0.06 10*3/MM3 (ref 0–0.05)
IMM GRANULOCYTES NFR BLD AUTO: 0.9 % (ref 0–0.5)
LYMPHOCYTES # BLD AUTO: 1.16 10*3/MM3 (ref 0.7–3.1)
LYMPHOCYTES NFR BLD AUTO: 17 % (ref 19.6–45.3)
MAGNESIUM SERPL-MCNC: 1.5 MG/DL (ref 1.6–2.4)
MCH RBC QN AUTO: 29.1 PG (ref 26.6–33)
MCHC RBC AUTO-ENTMCNC: 31.1 G/DL (ref 31.5–35.7)
MCV RBC AUTO: 93.5 FL (ref 79–97)
MONOCYTES # BLD AUTO: 0.77 10*3/MM3 (ref 0.1–0.9)
MONOCYTES NFR BLD AUTO: 11.3 % (ref 5–12)
NEUTROPHILS NFR BLD AUTO: 4.39 10*3/MM3 (ref 1.7–7)
NEUTROPHILS NFR BLD AUTO: 64.1 % (ref 42.7–76)
NRBC BLD AUTO-RTO: 0 /100 WBC (ref 0–0.2)
PLATELET # BLD AUTO: 335 10*3/MM3 (ref 140–450)
PMV BLD AUTO: 10.4 FL (ref 6–12)
POTASSIUM SERPL-SCNC: 2.7 MMOL/L (ref 3.5–5.2)
POTASSIUM SERPL-SCNC: 3.1 MMOL/L (ref 3.5–5.2)
PROT SERPL-MCNC: 4.9 G/DL (ref 6–8.5)
RBC # BLD AUTO: 2.75 10*6/MM3 (ref 3.77–5.28)
SODIUM SERPL-SCNC: 142 MMOL/L (ref 136–145)
SODIUM SERPL-SCNC: 143 MMOL/L (ref 136–145)
WBC NRBC COR # BLD: 6.84 10*3/MM3 (ref 3.4–10.8)

## 2023-10-10 PROCEDURE — 25010000002 PIPERACILLIN SOD-TAZOBACTAM PER 1 G: Performed by: INTERNAL MEDICINE

## 2023-10-10 PROCEDURE — 80053 COMPREHEN METABOLIC PANEL: CPT

## 2023-10-10 PROCEDURE — 99232 SBSQ HOSP IP/OBS MODERATE 35: CPT | Performed by: SPECIALIST

## 2023-10-10 PROCEDURE — 85025 COMPLETE CBC W/AUTO DIFF WBC: CPT

## 2023-10-10 PROCEDURE — 97116 GAIT TRAINING THERAPY: CPT

## 2023-10-10 PROCEDURE — 25010000002 METRONIDAZOLE 500 MG/100ML SOLUTION: Performed by: SPECIALIST

## 2023-10-10 PROCEDURE — 97110 THERAPEUTIC EXERCISES: CPT

## 2023-10-10 PROCEDURE — 25810000003 LACTATED RINGERS PER 1000 ML

## 2023-10-10 PROCEDURE — 83735 ASSAY OF MAGNESIUM: CPT

## 2023-10-10 RX ORDER — POTASSIUM CHLORIDE 20 MEQ/1
20 TABLET, EXTENDED RELEASE ORAL 2 TIMES DAILY
Qty: 60 TABLET | Refills: 0 | Status: SHIPPED | OUTPATIENT
Start: 2023-10-10 | End: 2023-11-09

## 2023-10-10 RX ORDER — HYDROMORPHONE HYDROCHLORIDE 1 MG/ML
0.5 INJECTION, SOLUTION INTRAMUSCULAR; INTRAVENOUS; SUBCUTANEOUS EVERY 8 HOURS PRN
Status: DISCONTINUED | OUTPATIENT
Start: 2023-10-10 | End: 2023-10-10 | Stop reason: HOSPADM

## 2023-10-10 RX ORDER — POTASSIUM CHLORIDE 750 MG/1
60 CAPSULE, EXTENDED RELEASE ORAL DAILY
Status: DISCONTINUED | OUTPATIENT
Start: 2023-10-10 | End: 2023-10-10 | Stop reason: HOSPADM

## 2023-10-10 RX ORDER — POTASSIUM CHLORIDE 750 MG/1
60 CAPSULE, EXTENDED RELEASE ORAL ONCE
Status: COMPLETED | OUTPATIENT
Start: 2023-10-10 | End: 2023-10-10

## 2023-10-10 RX ORDER — METRONIDAZOLE 500 MG/1
500 TABLET ORAL 3 TIMES DAILY
Qty: 21 TABLET | Refills: 0 | Status: SHIPPED | OUTPATIENT
Start: 2023-10-10 | End: 2023-10-17

## 2023-10-10 RX ORDER — CEFDINIR 300 MG/1
300 CAPSULE ORAL DAILY
Qty: 7 CAPSULE | Refills: 0 | Status: SHIPPED | OUTPATIENT
Start: 2023-10-10 | End: 2023-10-17

## 2023-10-10 RX ADMIN — FAMOTIDINE 20 MG: 20 TABLET, FILM COATED ORAL at 09:36

## 2023-10-10 RX ADMIN — ESCITSLOPRAM 20 MG: 10 TABLET ORAL at 09:36

## 2023-10-10 RX ADMIN — APIXABAN 5 MG: 5 TABLET, FILM COATED ORAL at 09:36

## 2023-10-10 RX ADMIN — AMLODIPINE BESYLATE 5 MG: 5 TABLET ORAL at 09:36

## 2023-10-10 RX ADMIN — METRONIDAZOLE 500 MG: 5 INJECTION, SOLUTION INTRAVENOUS at 03:47

## 2023-10-10 RX ADMIN — ALLOPURINOL 100 MG: 100 TABLET ORAL at 09:36

## 2023-10-10 RX ADMIN — POTASSIUM CHLORIDE 60 MEQ: 10 CAPSULE, COATED, EXTENDED RELEASE ORAL at 16:29

## 2023-10-10 RX ADMIN — PIPERACILLIN SODIUM AND TAZOBACTAM SODIUM 3.38 G: 3; .375 INJECTION, SOLUTION INTRAVENOUS at 03:31

## 2023-10-10 RX ADMIN — TOPIRAMATE 100 MG: 100 TABLET, FILM COATED ORAL at 09:36

## 2023-10-10 RX ADMIN — LEVOTHYROXINE SODIUM 150 MCG: 150 TABLET ORAL at 09:36

## 2023-10-10 RX ADMIN — POTASSIUM CHLORIDE 60 MEQ: 750 CAPSULE, EXTENDED RELEASE ORAL at 09:36

## 2023-10-10 RX ADMIN — PANTOPRAZOLE SODIUM 40 MG: 40 TABLET, DELAYED RELEASE ORAL at 09:36

## 2023-10-10 RX ADMIN — Medication 10 ML: at 09:38

## 2023-10-10 RX ADMIN — SODIUM CHLORIDE, POTASSIUM CHLORIDE, SODIUM LACTATE AND CALCIUM CHLORIDE 100 ML/HR: 600; 310; 30; 20 INJECTION, SOLUTION INTRAVENOUS at 01:49

## 2023-10-10 RX ADMIN — METOPROLOL SUCCINATE 25 MG: 25 TABLET, EXTENDED RELEASE ORAL at 09:36

## 2023-10-10 NOTE — PLAN OF CARE
Problem: Adult Inpatient Plan of Care  Goal: Plan of Care Review  Outcome: Ongoing, Progressing  Flowsheets (Taken 10/10/2023 3992)  Outcome Evaluation: Patient RA. No c/o pain. IV CDI, IVF d/c per order. Eliquis. K+ given per order. GI soft diet. Up x1. Voiding per bedside commode. Meds whole with water. Up in chair with PT. VSS, safety maintained

## 2023-10-10 NOTE — PROGRESS NOTES
LOS: 6 days   Patient Care Team:  Greg Rey MD as PCP - General (Internal Medicine)  Greg Rey MD as Referring Physician (Internal Medicine)  Ronnie Matute MD as Cardiologist (Cardiology)  Naresh Heard DO as Consulting Physician (Gastroenterology)    Chief Complaint: Central abdominal pain    Subjective     Subjective     Hospital day #6 for admission for central abdominal pain 3 CAT scans were consistent with gastroenteritis, she is slowly improved her C-reactive protein is reduced from 24 down to 9, her white count is reduced from 13 down to 7, she is feeling better she has less pain but it is still present, and her white count is reduced to 6 at present.  By report she is being discharged by Dr. Rey later today.  Objective      Objective     Vital Signs  Temp:  [98.2 øF (36.8 øC)-99 øF (37.2 øC)] 98.4 øF (36.9 øC)  Heart Rate:  [81-92] 86  Resp:  [16] 16  BP: (123-143)/(64-86) 143/82    Intake & Output (last 3 days)         10/07 0701  10/08 0700 10/08 0701  10/09 0700 10/09 0701  10/10 0700 10/10 0701  10/11 0700    P.O. 240 120 480     I.V. (mL/kg)  1213.2 (17.1) 2914 (41)     IV Piggyback        Total Intake(mL/kg) 240 (3.4) 1333.2 (18.8) 3394 (47.7)     Urine (mL/kg/hr)  800 (0.5)      Total Output  800      Net +240 +533.2 +3394             Urine Unmeasured Occurrence 5 x 1 x 4 x 2 x    Stool Unmeasured Occurrence 1 x  4 x 2 x            Physical Exam:     General Appearance:    Alert, cooperative, in no acute distress   Lungs:     Clear to auscultation, respirations regular, even and                  unlabored    Heart:    Regular rhythm and normal rate, normal S1 and S2, no            murmur, no gallop, no rub, no click   Chest Wall:    No abnormalities observed   Abdomen:   Slightly distended, occasional bowel sounds, tenderness in the central portion of her abdomen slightly more on the left, overall this is improved but still she is tender.  White count is  noted reduced from 13,000 down to 6.        Results Review:     I reviewed the patient's new clinical results.  I reviewed the patient's new imaging results and agree with the interpretation.    Results from last 7 days   Lab Units 10/10/23  0447 10/09/23  0533 10/08/23  0454   WBC 10*3/mm3 6.84 8.11 9.03   HEMOGLOBIN g/dL 8.0* 7.8* 7.4*   HEMATOCRIT % 25.7* 25.4* 24.2*   PLATELETS 10*3/mm3 335 304 274        Results from last 7 days   Lab Units 10/10/23  1156 10/10/23  0447 10/09/23  0533 10/08/23  0454   SODIUM mmol/L 142 143 139 136   POTASSIUM mmol/L 3.1* 2.7* 2.8* 2.9*   CHLORIDE mmol/L 111* 112* 108* 106   CO2 mmol/L 18.0* 19.0* 19.0* 20.0*   BUN mg/dL 8 9 12 17   CREATININE mg/dL 1.53* 1.64* 1.95* 2.06*   CALCIUM mg/dL 7.7* 7.7* 7.5* 7.4*   BILIRUBIN mg/dL  --  0.2 0.2 0.2   ALK PHOS U/L  --  112 119* 120*   ALT (SGPT) U/L  --  6 6 7   AST (SGOT) U/L  --  16 11 11   GLUCOSE mg/dL 106* 102* 97 99       Assessment/Plan     Assessment & Plan       Enterocolitis    CKD (chronic kidney disease)    Dehydration    Nausea and vomiting    Acute abdominal pain      Patient with gastroenteritis followed with slow improvement, IVs have been discontinued, if she stays I would like her to continue with some antibiotics as she is improving with these, will defer to Dr. Rey as to disposition.  No follow-up with me as needed, continue follow-up Dr. Rey.    [] X-Ray  [] Reviewed Records  [] Called Physician/Consulted    Alan Mathews MD  10/10/23  17:45 CDT      Time: time spent with patient 15 minutes     Part of this note may be an electronic transcription/translation of spoken language to printed text using the Dragon Dictation System.

## 2023-10-10 NOTE — THERAPY TREATMENT NOTE
"Acute Care - Physical Therapy Treatment Note  [facility]     Patient Name: Thao Mohr  : 1945  MRN: [id number]  Today's Date: 10/10/2023      Visit Dx:     ICD-10-CM ICD-9-CM   1. Impaired functional mobility and activity tolerance [Z74.09]  Z74.09 V49.89     Patient Active Problem List   Diagnosis    Essential hypertension    Hypothyroidism    SLE (systemic lupus erythematosus)    CKD (chronic kidney disease)    Dehydration    Meniscus tear    Left ventricular systolic dysfunction without heart failure    Nonrheumatic aortic valve insufficiency    OA (osteoarthritis) of shoulder    S/P reverse total shoulder arthroplasty, left    Bilateral kidney stones    Nausea and vomiting    Acute abdominal pain    Enterocolitis     Past Medical History:   Diagnosis Date    Acid reflux     Aneurysm     Pt states \"Somewhere near my spleen.\"    Anxiety and depression     Arthritis     Diarrhea     Generalized headaches     Heart murmur     History of transfusion     Hypertension     Hypothyroid     Kidney stones     Lupus     Migraines     MRSA (methicillin resistant Staphylococcus aureus)     in past , on face    Nausea     PONV (postoperative nausea and vomiting)     Renal failure     21%    Thrombosis     RIGHT KNEE     Past Surgical History:   Procedure Laterality Date    APPENDECTOMY      CHOLECYSTECTOMY      COLONOSCOPY      ENDOSCOPY N/A 10/30/2020    Procedure: ESOPHAGOGASTRODUODENOSCOPY WITH ANESTHESIA;  Surgeon: Naresh Heard DO;  Location: [facility] ENDOSCOPY;  Service: Gastroenterology;  Laterality: N/A;  preop; abdominal pain  postop; normal   PCP Greg Rey     EXTRACORPOREAL SHOCK WAVE LITHOTRIPSY (ESWL) Right 2021    Procedure: EXTRACORPOREAL SHOCKWAVE LITHOTRIPSY RIGHT;  Surgeon: Ronnie Hayes MD;  Location: [facility] OR;  Service: Urology;  Laterality: Right;    EXTRACORPOREAL SHOCK WAVE LITHOTRIPSY (ESWL) Left 2022    Procedure: EXTRACORPOREAL SHOCKWAVE LITHOTRIPSY LEFT;  " Surgeon: Ronnie Hayes MD;  Location:  [facility] OR;  Service: Urology;  Laterality: Left;    HYSTERECTOMY      JOINT REPLACEMENT      REPLACEMENT TOTAL KNEE Left     SHOULDER ROTATOR CUFF REPAIR Right     TOTAL HIP ARTHROPLASTY Right     TOTAL SHOULDER ARTHROPLASTY W/ DISTAL CLAVICLE EXCISION Left 12/27/2019    Procedure: LEFT REVERSE TOTAL SHOULDER ARTHROPLASTY;  Surgeon: Dhaval Yepez MD;  Location:  [facility] OR;  Service: Orthopedics    WRIST FRACTURE SURGERY Left 2 weeks ago     PT Assessment (last 12 hours)       PT Evaluation and Treatment       Row Name 10/10/23 1030          Physical Therapy Time and Intention    Subjective Information complains of;pain;dizziness (P)   -TOBIAS     Document Type therapy note (daily note) (P)   -TOBIAS     Mode of Treatment physical therapy (P)   -       Row Name 10/10/23 1030          General Information    Existing Precautions/Restrictions fall (P)   -       Row Name 10/10/23 1030          Pain    Pretreatment Pain Rating 7/10 (P)   -TOBIAS     Posttreatment Pain Rating 7/10 (P)   -TOBIAS     Pain Location - abdomen (P)   -       Row Name 10/10/23 1030          Bed Mobility    Bed Mobility supine-sit;sit-supine (P)   -TOBIAS     Supine-Sit [place] (Bed Mobility) verbal cues;minimum assist (75% patient effort) (P)   -TOBIAS     Sit-Supine [place] (Bed Mobility) verbal cues;minimum assist (75% patient effort) (P)   -TOBIAS     Assistive Device (Bed Mobility) head of bed elevated (P)   -TOBIAS     Comment, (Bed Mobility) Cues to push off and reach back with UE. (P)   -       Row Name 10/10/23 1030          Transfers    Transfers sit-stand transfer;stand-sit transfer (P)   -       Row Name 10/10/23 1030          Sit-Stand Transfer    Sit-Stand [place] (Transfers) minimum assist (75% patient effort);moderate assist (50% patient effort);verbal cues (P)   -TOBIAS     Assistive Device (Sit-Stand Transfers) walker, front-wheeled (P)   -       Row Name 10/10/23 1030          Stand-Sit  Transfer    Stand-Sit [place] (Transfers) minimum assist (75% patient effort);moderate assist (50% patient effort);verbal cues (P)   -TOBIAS     Assistive Device (Stand-Sit Transfers) walker, front-wheeled (P)   -TOBIAS       Row Name 10/10/23 1030          Gait/Stairs (Locomotion)    [place] Level (Gait) minimum assist (75% patient effort) (P)   -TOBIAS     Distance in Feet (Gait) 30' x 2 (P)   -TOBIAS     Deviations/Abnormal Patterns (Gait) stride length decreased;gait speed decreased (P)   -TOBIAS     Comment, (Gait/Stairs) Pt c/o dizziness with amb. (P)   -TOBIAS       Row Name 10/10/23 1030          Motor Skills    Therapeutic Exercise aerobic (P)   -TOBIAS       Row Name 10/10/23 1030          Aerobic Exercise    Comment, Aerobic Exercise (Therapeutic Exercise) BLE AROM in supine x 15 (P)   -TOBIAS       Row Name 10/10/23 1030          Plan of Care Review    Outcome Evaluation Pt c/o of pain in the abdomen 7/10 and dizziness. Bed mob min A. Sit <> stand <> sit min/mod A, cues required for push off and reach back with UE. Pt amb 30' x 2 with fww, required sitting breaks in the recliner throughout amb due to being dizzy. BLE AROM in supine x 15. Pt not agreeable to inpatient rehab, recommend home health and 24/7 care. (P)   -TOBIAS       Row Name 10/10/23 1030          Positioning and Restraints    Pre-Treatment Position in bed (P)   -TOBIAS     Post Treatment Position chair (P)   -TOBIAS     In Chair reclined;call light within reach;with family/caregiver (P)   -TOBIAS               User Key  (r) = Recorded By, (t) = Taken By, (c) = Cosigned By      Initials Name Provider Type    Jose Luis Ramos PTA Student PTA Student                    Physical Therapy Education       Title: PT OT SLP Therapies (Done)       Topic: Physical Therapy (Done)       Point: Mobility training (Done)       Learning Progress Summary             Patient EagTERI sotomayor D, VU, DU by TOBIAS at 10/10/2023 1110    Acceptance, MADHU WILLIAMSON DU,NR by SHANDRA at 10/9/2023 1303    Comment: Cueing for  proper and safe transfer mechanics.   Family Acceptance, E, VU,DU,NR by SHANDRA at 10/9/2023 1303    Comment: Cueing for proper and safe transfer mechanics.                         Point: Body mechanics (Done)       Learning Progress Summary             Patient Eager, E,D, VU,DU by TOBIAS at 10/10/2023 1110    Acceptance, E, VU,DU,NR by JS at 10/9/2023 1303    Comment: Cueing for proper and safe transfer mechanics.   Family Acceptance, E, VU,DU,NR by SHANDRA at 10/9/2023 1303    Comment: Cueing for proper and safe transfer mechanics.                                         User Key       Initials Effective Dates Name Provider Type Discipline    SHANDRA 07/13/23 -  Jose Luis Aguirre, PT Student PT Student PT    TOBIAS 09/26/23 -  Jose Luis Barraza, PTA Student PTA Student PT                  PT Recommendation and Plan     Outcome Evaluation: (P) Pt c/o of pain in the abdomen 7/10 and dizziness. Bed mob min A. Sit <> stand <> sit min/mod A, cues required for push off and reach back with UE. Pt amb 30' x 2 with fww, required sitting breaks in the recliner throughout amb due to being dizzy. BLE AROM in supine x 15. Pt not agreeable to inpatient rehab, recommend home health and 24/7 care.   Outcome Measures       Row Name 10/10/23 1100             How much help from another person do you currently need...    Turning from your back to your side while in flat bed without using bedrails? 4 (P)   -TOBIAS      Moving from lying on back to sitting on the side of a flat bed without bedrails? 3 (P)   -TOBIAS      Moving to and from a bed to a chair (including a wheelchair)? 2 (P)   -TOBIAS      Standing up from a chair using your arms (e.g., wheelchair, bedside chair)? 3 (P)   -TOBIAS      Climbing 3-5 steps with a railing? 2 (P)   -TOBIAS      To walk in hospital room? 3 (P)   -TOBIAS      AM-PAC 6 Clicks Score (PT) 17 (P)   -TOBIAS      Highest level of mobility 5 --> Static standing (P)   -TOBIAS                User Key  (r) = Recorded By, (t) = Taken By, (c) = Cosigned By       Initials Name Provider Type    Jose Luis Ramos PTA Student PTA Student                     Time Calculation:    PT Charges       Row Name 10/10/23 1108             Time Calculation    Start Time 1030 (P)   -TOBIAS      Stop Time 1057 (P)   -TOBIAS      Time Calculation (min) 27 min (P)   -TOBIAS      PT Received On 10/10/23 (P)   -TOBIAS      PT Goal Re-Cert Due Date 10/19/23 (P)   -TOBIAS         Time Calculation- PT    Total Timed Code Minutes- PT 27 minute(s) (P)   -TOBIAS                User Key  (r) = Recorded By, (t) = Taken By, (c) = Cosigned By      Initials Name Provider Type    Jose Luis Ramos PTA Student PTA Student                      PT G-Codes  Outcome Measure Options: AM-PAC 6 Clicks Basic Mobility (PT)  AM-PAC 6 Clicks Score (PT): (P) 17    Jose Luis Barraza PTA Student  10/10/2023

## 2023-10-10 NOTE — PLAN OF CARE
Goal Outcome Evaluation:              Outcome Evaluation: Patient RA. No c/o pain. IV CDI, IVF d/c per order. Eliquis. K+ given per order. GI soft diet. Up x1. Voiding per bedside commode. Meds whole with water. Up in chair with PT. VSS, safety maintained

## 2023-10-10 NOTE — PROGRESS NOTES
Chief Complaint: Abdominal pain      Interval History:     Patient continues to have improvement.  She was able to advance diet yesterday.  Abdomen is less tender this morning.  She was able to get up some with physical therapy yesterday.  I encouraged her to get up more today, and we are going to decrease her pain medication frequency further.    Renal function is stable.  Persistent hypokalemia.  We will replace with oral potassium today, check magnesium, and recheck labs at noon today.  Discontinue IV fluids.  Discontinue IV antibiotics.  Labs have improved with no leukocytosis.  Anemia stable.  Vital signs are stable.  Plan for discharge if she is able to get up, move around, continue to advance diet, and pain is adequate.    Review of Systems:   General ROS: negative for - chills or fever  Respiratory ROS: no cough, shortness of breath, or wheezing  Cardiovascular ROS: no chest pain or dyspnea on exertion  Gastrointestinal ROS: negative for - abdominal pain, diarrhea or nausea/vomiting       Vital Signs  Temp:  [98 øF (36.7 øC)-99 øF (37.2 øC)] 98.6 øF (37 øC)  Heart Rate:  [75-85] 85  Resp:  [16] 16  BP: ()/(47-72) 123/66    Intake/Output Summary (Last 24 hours) at 10/10/2023 0730  Last data filed at 10/10/2023 0149  Gross per 24 hour   Intake 3394 ml   Output --   Net 3394 ml       Physical Exam:     General Appearance:    Alert, cooperative, in no acute distress   Head:    N/A   Throat:   N/A   Neck:   N/A   Lungs:     Clear to auscultation,respirations regular, even and                  unlabored    Heart:    Regular rhythm and normal rate, normal S1 and S2, no            murmur, no gallop, no rub   Abdomen:     Normal bowel sounds, no masses, no organomegaly, soft        non-tender, non-distended, no guarding, no rebound                tenderness   Extremities:   No edema, no cyanosis, no clubbing   Pulses:   N/A   Skin:   N/A   Lymph nodes:   N/A   Neurologic:   N/A          Lab Review:        Lab Results (last 24 hours)       Procedure Component Value Units Date/Time    Comprehensive Metabolic Panel [[id number]]  (Abnormal) Collected: 10/10/23 0447    Specimen: Blood Updated: 10/10/23 0603     Glucose 102 mg/dL      BUN 9 mg/dL      Creatinine 1.64 mg/dL      Sodium 143 mmol/L      Potassium 2.7 mmol/L      Chloride 112 mmol/L      CO2 19.0 mmol/L      Calcium 7.7 mg/dL      Total Protein 4.9 g/dL      Albumin 2.5 g/dL      ALT (SGPT) 6 U/L      AST (SGOT) 16 U/L      Alkaline Phosphatase 112 U/L      Total Bilirubin 0.2 mg/dL      Globulin 2.4 gm/dL      A/G Ratio 1.0 g/dL      BUN/Creatinine Ratio 5.5     Anion Gap 12.0 mmol/L      eGFR 32.1 mL/min/1.73     Narrative:      GFR Normal >60  Chronic Kidney Disease <60  Kidney Failure <15    The GFR formula is only valid for adults with stable renal function between ages 18 and 70.    CBC & Differential [[id number]]  (Abnormal) Collected: 10/10/23 0447    Specimen: Blood Updated: 10/10/23 0555    Narrative:      The following orders were created for panel order CBC & Differential.  Procedure                               Abnormality         Status                     ---------                               -----------         ------                     CBC Auto Differential[[id number]]        Abnormal            Final result                 Please view results for these tests on the individual orders.    CBC Auto Differential [[id number]]  (Abnormal) Collected: 10/10/23 0447    Specimen: Blood Updated: 10/10/23 0555     WBC 6.84 10*3/mm3      RBC 2.75 10*6/mm3      Hemoglobin 8.0 g/dL      Hematocrit 25.7 %      MCV 93.5 fL      MCH 29.1 pg      MCHC 31.1 g/dL      RDW 14.6 %      RDW-SD 49.7 fl      MPV 10.4 fL      Platelets 335 10*3/mm3      Neutrophil % 64.1 %      Lymphocyte % 17.0 %      Monocyte % 11.3 %      Eosinophil % 6.1 %      Basophil % 0.6 %      Immature Grans % 0.9 %      Neutrophils, Absolute 4.39 10*3/mm3      Lymphocytes, Absolute 1.16  10*3/mm3      Monocytes, Absolute 0.77 10*3/mm3      Eosinophils, Absolute 0.42 10*3/mm3      Basophils, Absolute 0.04 10*3/mm3      Immature Grans, Absolute 0.06 10*3/mm3      nRBC 0.0 /100 WBC     Blood Culture - Blood, Hand, Right [[id number]]  (Normal) Collected: 10/04/23 1533    Specimen: Blood from Hand, Right Updated: 10/09/23 1630     Blood Culture No growth at 5 days    Blood Culture - Blood, Arm, Left [[id number]]  (Normal) Collected: 10/04/23 1533    Specimen: Blood from Arm, Left Updated: 10/09/23 1630     Blood Culture No growth at 5 days          Cultures:    Blood Culture   Date Value Ref Range Status   10/04/2023 No growth at 5 days  Final   10/04/2023 No growth at 5 days  Final       Radiology Review:  Imaging Results (Last 24 Hours)       ** No results found for the last 24 hours. **                     ASSESSMENT:      Enterocolitis    CKD (chronic kidney disease)    Dehydration    Nausea and vomiting    Acute abdominal pain      PLAN:    Discussed this case with Dr. Rey.    1.  Discontinue IV fluids, IV antibiotics, and decreased frequency of pain medication.  2.  Continue to advance diet.  3.  PT eval and treat.  Get the patient up and moving around today.  4.  Replace potassium p.o., check magnesium, and recheck electrolytes at noon today.  Will replace further if needed.  5.  Plan for discharge tomorrow if things go well today.    Further orders per Dr. Rey.      CALVIN Chong  10/10/23  07:30 CDT        Part of this note may be an electronic transcription/translation of spoken language to printed text using the Dragon Dictation System.

## 2023-10-10 NOTE — DISCHARGE SUMMARY
DISCHARGE SUMMARY       Date of Admission: 10/3/2023  Date of Discharge:  10/10/2023  Primary Care Physician: Greg Rey MD    Discharge Diagnoses:    Enterocolitis    CKD (chronic kidney disease)    Dehydration    Nausea and vomiting    Acute abdominal pain        Presenting Problem/History of Present Illness  Colitis [K52.9]  Abdominal pain [R10.9]  Enterocolitis [K52.9]       Hospital Course  Patient admitted to the hospital with intractable abdominal pain.  She had had a CT scan at an outside ER showing enterocolitis.  She had repeat CT scan without contrast here showing similar findings.  Labs remarkable for an elevated white count and inflammatory markers.  She was seen in consultation by general surgery.  She had marked tenderness on exam.  Etiology of pain was not clear.  She eventually had an MRI of the abdomen which also showed enterocolitis but no clear focal infection and no obstruction.  She was started empirically on antibiotics with Zosyn and Flagyl.  She received IV pain medication and IV fluids.  Her renal function improved.  Eventually her white blood count returned to normal and she had a marked decrease in inflammatory markers.  Her pain has gradually improved.  She started on a clear liquid diet and advance up to regular diet which she has tolerated.  At this point plan is to discharge home to complete a course of antibiotics.  Hospital course was complicated by some rather severe hypokalemia.  She has been repleted and is sent home on potassium supplementation.    Procedures Performed         Consults:   Consults       Date and Time Order Name Status Description    10/4/2023 12:55 PM Inpatient General Surgery Consult      10/4/2023  7:33 AM Inpatient Gastroenterology Consult Completed             Pertinent Test Results:  Lab Results (most recent)       Procedure Component Value Units Date/Time    Basic Metabolic Panel [[id number]]  (Abnormal) Collected: 10/10/23 1156     Specimen: Blood Updated: 10/10/23 1240     Glucose 106 mg/dL      BUN 8 mg/dL      Creatinine 1.53 mg/dL      Sodium 142 mmol/L      Potassium 3.1 mmol/L      Chloride 111 mmol/L      CO2 18.0 mmol/L      Calcium 7.7 mg/dL      BUN/Creatinine Ratio 5.2     Anion Gap 13.0 mmol/L      eGFR 34.9 mL/min/1.73     Narrative:      GFR Normal >60  Chronic Kidney Disease <60  Kidney Failure <15    The GFR formula is only valid for adults with stable renal function between ages 18 and 70.    Magnesium [[id number]]  (Abnormal) Collected: 10/10/23 0447    Specimen: Blood Updated: 10/10/23 0746     Magnesium 1.5 mg/dL     Comprehensive Metabolic Panel [[id number]]  (Abnormal) Collected: 10/10/23 0447    Specimen: Blood Updated: 10/10/23 0603     Glucose 102 mg/dL      BUN 9 mg/dL      Creatinine 1.64 mg/dL      Sodium 143 mmol/L      Potassium 2.7 mmol/L      Chloride 112 mmol/L      CO2 19.0 mmol/L      Calcium 7.7 mg/dL      Total Protein 4.9 g/dL      Albumin 2.5 g/dL      ALT (SGPT) 6 U/L      AST (SGOT) 16 U/L      Alkaline Phosphatase 112 U/L      Total Bilirubin 0.2 mg/dL      Globulin 2.4 gm/dL      A/G Ratio 1.0 g/dL      BUN/Creatinine Ratio 5.5     Anion Gap 12.0 mmol/L      eGFR 32.1 mL/min/1.73     Narrative:      GFR Normal >60  Chronic Kidney Disease <60  Kidney Failure <15    The GFR formula is only valid for adults with stable renal function between ages 18 and 70.    CBC & Differential [[id number]]  (Abnormal) Collected: 10/10/23 0447    Specimen: Blood Updated: 10/10/23 0555    Narrative:      The following orders were created for panel order CBC & Differential.  Procedure                               Abnormality         Status                     ---------                               -----------         ------                     CBC Auto Differential[[id number]]        Abnormal            Final result                 Please view results for these tests on the individual orders.    CBC Auto Differential  [[id number]]  (Abnormal) Collected: 10/10/23 0447    Specimen: Blood Updated: 10/10/23 0555     WBC 6.84 10*3/mm3      RBC 2.75 10*6/mm3      Hemoglobin 8.0 g/dL      Hematocrit 25.7 %      MCV 93.5 fL      MCH 29.1 pg      MCHC 31.1 g/dL      RDW 14.6 %      RDW-SD 49.7 fl      MPV 10.4 fL      Platelets 335 10*3/mm3      Neutrophil % 64.1 %      Lymphocyte % 17.0 %      Monocyte % 11.3 %      Eosinophil % 6.1 %      Basophil % 0.6 %      Immature Grans % 0.9 %      Neutrophils, Absolute 4.39 10*3/mm3      Lymphocytes, Absolute 1.16 10*3/mm3      Monocytes, Absolute 0.77 10*3/mm3      Eosinophils, Absolute 0.42 10*3/mm3      Basophils, Absolute 0.04 10*3/mm3      Immature Grans, Absolute 0.06 10*3/mm3      nRBC 0.0 /100 WBC     Blood Culture - Blood, Hand, Right [[id number]]  (Normal) Collected: 10/04/23 1533    Specimen: Blood from Hand, Right Updated: 10/09/23 1630     Blood Culture No growth at 5 days    Blood Culture - Blood, Arm, Left [[id number]]  (Normal) Collected: 10/04/23 1533    Specimen: Blood from Arm, Left Updated: 10/09/23 1630     Blood Culture No growth at 5 days    C-reactive Protein [[id number]]  (Abnormal) Collected: 10/09/23 0533    Specimen: Blood Updated: 10/09/23 0609     C-Reactive Protein 9.60 mg/dL     Comprehensive Metabolic Panel [[id number]]  (Abnormal) Collected: 10/09/23 0533    Specimen: Blood Updated: 10/09/23 0609     Glucose 97 mg/dL      BUN 12 mg/dL      Creatinine 1.95 mg/dL      Sodium 139 mmol/L      Potassium 2.8 mmol/L      Chloride 108 mmol/L      CO2 19.0 mmol/L      Calcium 7.5 mg/dL      Total Protein 4.8 g/dL      Albumin 2.3 g/dL      ALT (SGPT) 6 U/L      AST (SGOT) 11 U/L      Alkaline Phosphatase 119 U/L      Total Bilirubin 0.2 mg/dL      Globulin 2.5 gm/dL      A/G Ratio 0.9 g/dL      BUN/Creatinine Ratio 6.2     Anion Gap 12.0 mmol/L      eGFR 26.1 mL/min/1.73     Narrative:      GFR Normal >60  Chronic Kidney Disease <60  Kidney Failure <15    The GFR formula is  only valid for adults with stable renal function between ages 18 and 70.    Sedimentation Rate [[id number]]  (Abnormal) Collected: 10/09/23 0533    Specimen: Blood Updated: 10/09/23 0605     Sed Rate 32 mm/hr     CBC & Differential [[id number]]  (Abnormal) Collected: 10/09/23 0533    Specimen: Blood Updated: 10/09/23 0553    Narrative:      The following orders were created for panel order CBC & Differential.  Procedure                               Abnormality         Status                     ---------                               -----------         ------                     CBC Auto Differential[[id number]]        Abnormal            Final result                 Please view results for these tests on the individual orders.    CBC Auto Differential [[id number]]  (Abnormal) Collected: 10/09/23 0533    Specimen: Blood Updated: 10/09/23 0553     WBC 8.11 10*3/mm3      RBC 2.73 10*6/mm3      Hemoglobin 7.8 g/dL      Hematocrit 25.4 %      MCV 93.0 fL      MCH 28.6 pg      MCHC 30.7 g/dL      RDW 14.7 %      RDW-SD 50.2 fl      MPV 10.3 fL      Platelets 304 10*3/mm3      Neutrophil % 69.4 %      Lymphocyte % 11.3 %      Monocyte % 9.1 %      Eosinophil % 9.1 %      Basophil % 0.7 %      Immature Grans % 0.4 %      Neutrophils, Absolute 5.62 10*3/mm3      Lymphocytes, Absolute 0.92 10*3/mm3      Monocytes, Absolute 0.74 10*3/mm3      Eosinophils, Absolute 0.74 10*3/mm3      Basophils, Absolute 0.06 10*3/mm3      Immature Grans, Absolute 0.03 10*3/mm3      nRBC 0.0 /100 WBC     Magnesium [[id number]]  (Normal) Collected: 10/08/23 0054    Specimen: Blood Updated: 10/08/23 0121     Magnesium 2.0 mg/dL     C-reactive Protein [[id number]]  (Abnormal) Collected: 10/06/23 0540    Specimen: Blood Updated: 10/06/23 0632     C-Reactive Protein 24.58 mg/dL     Sedimentation Rate [[id number]]  (Abnormal) Collected: 10/05/23 0454    Specimen: Blood Updated: 10/05/23 0537     Sed Rate 40 mm/hr     Lactic Acid, Plasma [[id number]]   (Normal) Collected: 10/04/23 0817    Specimen: Blood Updated: 10/04/23 0923     Lactate 0.8 mmol/L     Amylase [[id number]]  (Normal) Collected: 10/03/23 1250    Specimen: Blood Updated: 10/03/23 1320     Amylase 28 U/L     Lipase [[id number]]  (Normal) Collected: 10/03/23 1250    Specimen: Blood Updated: 10/03/23 1318     Lipase 15 U/L             Condition on Discharge: Stable and improved    Discharge Disposition  Home or Self Care    Discharge Medications     Discharge Medications        New Medications        Instructions Start Date   cefdinir 300 MG capsule  Commonly known as: OMNICEF   300 mg, Oral, Daily      metroNIDAZOLE 500 MG tablet  Commonly known as: Flagyl   500 mg, Oral, 3 Times Daily      potassium chloride 20 MEQ CR tablet  Commonly known as: K-DUR,KLOR-CON   20 mEq, Oral, 2 Times Daily             Continue These Medications        Instructions Start Date   allopurinol 100 MG tablet  Commonly known as: ZYLOPRIM   100 mg, Oral, 2 Times Daily      apixaban 5 MG tablet tablet  Commonly known as: ELIQUIS   5 mg, Oral, 2 Times Daily      calcitriol 0.25 MCG capsule  Commonly known as: ROCALTROL   0.25 mcg, Oral, 3 Times Weekly      diazePAM 5 MG tablet  Commonly known as: VALIUM   5 mg, Oral, Daily      escitalopram 20 MG tablet  Commonly known as: LEXAPRO   20 mg, Oral, Daily      gabapentin 300 MG capsule  Commonly known as: NEURONTIN   300 mg, Oral, Daily PRN      levothyroxine 150 MCG tablet  Commonly known as: SYNTHROID, LEVOTHROID   150 mcg, Oral, Daily      Magnesium Oxide -Mg Supplement 500 MG tablet   1 tablet, Oral, 2 Times Daily      metoprolol succinate XL 25 MG 24 hr tablet  Commonly known as: TOPROL-XL   25 mg, Oral, Daily      pantoprazole 40 MG EC tablet  Commonly known as: PROTONIX   40 mg, Oral, 2 Times Daily      PRENATAL VITAMIN PO   1 tablet, Oral, Daily, OTC      PROBIOTIC DAILY PO   1 capsule, Oral, Daily, OTC      promethazine 25 MG tablet  Commonly known as: PHENERGAN   25 mg,  Oral, Every 8 Hours PRN      QUEtiapine  MG 24 hr tablet  Commonly known as: SEROquel XR   300 mg, Oral, Nightly      SODIUM BICARBONATE PO   650 mg, Oral, Every 4 Hours PRN      tiZANidine 4 MG tablet  Commonly known as: ZANAFLEX   4 mg, Oral, Nightly PRN      topiramate 100 MG tablet  Commonly known as: TOPAMAX   100 mg, Oral, 2 Times Daily      vitamin B-12 1000 MCG tablet  Commonly known as: CYANOCOBALAMIN   1,000 mcg, Oral, Daily, OTC      vitamin D 1.25 MG (54704 UT) capsule capsule  Commonly known as: ERGOCALCIFEROL   50,000 Units, Oral, 2 Times Weekly, Sunday and Wednesday             Stop These Medications      amLODIPine 5 MG tablet  Commonly known as: NORVASC     Procrit 40747 UNIT/ML injection  Generic drug: epoetin al              Discharge Diet:   Diet Instructions       Diet: Regular/House Diet; Regular Texture (IDDSI 7); Thin (IDDSI 0)      Discharge Diet: Regular/House Diet    Texture: Regular Texture (IDDSI 7)    Fluid Consistency: Thin (IDDSI 0)            Discharge Care Plan / Instructions:    Activity at Discharge:   Activity Instructions       Activity as Tolerated              Follow-up Appointments  No future appointments.  Additional Instructions for the Follow-ups that You Need to Schedule       Discharge Follow-up with PCP   As directed       Currently Documented PCP:    Greg Rey MD    PCP Phone Number:    [phone]     Follow Up Details: 1 week                Test Results Pending at Discharge       Greg Rey MD  10/10/23  16:56 CDT        Part of this note may be an electronic transcription/translation of spoken language to printed text using the Dragon Dictation System.

## 2023-10-10 NOTE — PLAN OF CARE
Goal Outcome Evaluation:              Outcome Evaluation: (P) Pt c/o of pain in the abdomen 7/10 and dizziness. Bed mob min A. Sit <> stand <> sit min/mod A, cues required for push off and reach back with UE. Pt amb 30' x 2 with fww, required sitting breaks in the recliner throughout amb due to being dizzy. BLE AROM in supine x 15. Pt not agreeable to inpatient rehab, recommend home health and 24/7 care.

## 2023-10-11 NOTE — OUTREACH NOTE
Prep Survey      Flowsheet Row Responses   [facility] facility patient discharged from? [facility]   Is LACE score < 7 ? No   Eligibility Readm Mgmt   Discharge diagnosis Enterocolitis   Does the patient have one of the following disease processes/diagnoses(primary or secondary)? Other   Does the patient have Home health ordered? No   Is there a DME ordered? No   Prep survey completed? Yes            JUSTUS SILVA - Registered Nurse

## 2023-10-19 ENCOUNTER — READMISSION MANAGEMENT (OUTPATIENT)
Dept: CALL CENTER | Facility: HOSPITAL | Age: 78
End: 2023-10-19
Payer: MEDICARE

## 2023-10-19 NOTE — OUTREACH NOTE
Medical Week 2 Survey      Flowsheet Row Responses   [facility] patient discharged from? [place]   Does the patient have one of the following disease processes/diagnoses(primary or secondary)? Other   Week 2 attempt successful? Yes   Call start time 1532   Discharge diagnosis Enterocolitis   Call end time 1534   Meds reviewed with patient/caregiver? Yes   Is the patient having any side effects they believe may be caused by any medication additions or changes? No   Does the patient have all medications ordered at discharge? Yes   Is the patient taking all medications as directed (includes completed medication regime)? Yes   Does the patient have a primary care provider?  Yes   Does the patient have an appointment with their PCP within 7 days of discharge? No   What is preventing the patient from scheduling follow up appointments within 7 days of discharge? Transportation  [Patient states their car is in the shop right now.]   Nursing Interventions Educated patient on importance of making appointment   Has the patient kept scheduled appointments due by today? N/A   Has home health visited the patient within 72 hours of discharge? N/A   Psychosocial issues? No   Did the patient receive a copy of their discharge instructions? Yes   Nursing interventions Reviewed instructions with patient   What is the patient's perception of their health status since discharge? Improving   Is the patient/caregiver able to teach back signs and symptoms related to disease process for when to call PCP? Yes   Is the patient/caregiver able to teach back signs and symptoms related to disease process for when to call 911? Yes   Is the patient/caregiver able to teach back the hierarchy of who to call/visit for symptoms/problems? PCP, Specialist, Home health nurse, Urgent Care, ED, 911 Yes   If the patient is a current smoker, are they able to teach back resources for cessation? Not a smoker   Week 2 Call Completed? Yes   Call end time  1534            Samreen DOWNING - Licensed Nurse

## 2023-10-25 ENCOUNTER — READMISSION MANAGEMENT (OUTPATIENT)
Dept: CALL CENTER | Facility: HOSPITAL | Age: 78
End: 2023-10-25
Payer: MEDICARE

## 2023-10-25 NOTE — OUTREACH NOTE
Medical Week 3 Survey      Flowsheet Row Responses   [facility] facility patient discharged from? [place]   Does the patient have one of the following disease processes/diagnoses(primary or secondary)? Other   Week 3 attempt successful? No   Unsuccessful attempts Attempt 1            Michelle NAVARRETE - Registered Nurse

## 2024-04-04 ENCOUNTER — OFFICE VISIT (OUTPATIENT)
Dept: FAMILY MEDICINE CLINIC | Facility: CLINIC | Age: 79
End: 2024-04-04
Payer: MEDICARE

## 2024-04-04 VITALS
TEMPERATURE: 98.4 F | HEART RATE: 105 BPM | HEIGHT: 64 IN | BODY MASS INDEX: 24.11 KG/M2 | DIASTOLIC BLOOD PRESSURE: 75 MMHG | OXYGEN SATURATION: 98 % | RESPIRATION RATE: 16 BRPM | WEIGHT: 141.2 LBS | SYSTOLIC BLOOD PRESSURE: 103 MMHG

## 2024-04-04 DIAGNOSIS — J02.9 SORE THROAT: ICD-10-CM

## 2024-04-04 DIAGNOSIS — J02.9 ACUTE PHARYNGITIS, UNSPECIFIED ETIOLOGY: ICD-10-CM

## 2024-04-04 DIAGNOSIS — J01.00 ACUTE MAXILLARY SINUSITIS, RECURRENCE NOT SPECIFIED: Primary | ICD-10-CM

## 2024-04-04 LAB
EXPIRATION DATE: NORMAL
INTERNAL CONTROL: NORMAL
Lab: NORMAL
S PYO AG THROAT QL: NEGATIVE

## 2024-04-04 PROCEDURE — 87880 STREP A ASSAY W/OPTIC: CPT

## 2024-04-04 PROCEDURE — 3074F SYST BP LT 130 MM HG: CPT

## 2024-04-04 PROCEDURE — 1160F RVW MEDS BY RX/DR IN RCRD: CPT

## 2024-04-04 PROCEDURE — 99213 OFFICE O/P EST LOW 20 MIN: CPT

## 2024-04-04 PROCEDURE — 1159F MED LIST DOCD IN RCRD: CPT

## 2024-04-04 PROCEDURE — 3078F DIAST BP <80 MM HG: CPT

## 2024-04-04 RX ORDER — METHOCARBAMOL 500 MG/1
1 TABLET, FILM COATED ORAL 3 TIMES DAILY
COMMUNITY

## 2024-04-04 RX ORDER — AMOXICILLIN 500 MG/1
1000 CAPSULE ORAL 2 TIMES DAILY
Qty: 28 CAPSULE | Refills: 0 | Status: SHIPPED | OUTPATIENT
Start: 2024-04-04 | End: 2024-04-11

## 2024-04-04 NOTE — PROGRESS NOTES
"Chief Complaint  Sore Throat (Symptoms for 2 days)    Subjective        Thao Mohr presents to [facility]  Sore Throat   This is a new problem. The current episode started in the past 7 days (Monday 4/1/24). The problem has been unchanged.   Pain is worse on both side(s). There has been no fever. The pain is at a severity of 7/10. Associated symptoms include congestion, headaches, a hoarse voice and trouble swallowing. Pertinent negatives include no abdominal pain. She has tried gargles for the symptoms. The treatment provided no relief.     Patient is a 78-year-old female presenting today with complaitns of sore throat. First noticed Monday (4/1/24) night. Pain is aggravated with breathing and swallowing. Has tried gargles with no relief.     Past Medical History:   Diagnosis Date    Acid reflux     Aneurysm     Pt states \"Somewhere near my spleen.\"    Anxiety and depression     Arthritis     Diarrhea     Generalized headaches     Heart murmur     History of transfusion     Hypertension     Hypothyroid     Kidney stones     Lupus     Migraines     MRSA (methicillin resistant Staphylococcus aureus)     in past , on face    Nausea     PONV (postoperative nausea and vomiting)     Renal failure     21%    Thrombosis 2007    RIGHT KNEE     Past Surgical History:   Procedure Laterality Date    APPENDECTOMY      CHOLECYSTECTOMY      COLONOSCOPY      ENDOSCOPY N/A 10/30/2020    Procedure: ESOPHAGOGASTRODUODENOSCOPY WITH ANESTHESIA;  Surgeon: Naresh Heard DO;  Location: [facility] ENDOSCOPY;  Service: Gastroenterology;  Laterality: N/A;  preop; abdominal pain  postop; normal   PCP Greg Rey     EXTRACORPOREAL SHOCK WAVE LITHOTRIPSY (ESWL) Right 8/16/2021    Procedure: EXTRACORPOREAL SHOCKWAVE LITHOTRIPSY RIGHT;  Surgeon: Ronnie Hayes MD;  Location: [facility] OR;  Service: Urology;  Laterality: Right;    EXTRACORPOREAL SHOCK WAVE LITHOTRIPSY (ESWL) Left 1/17/2022    Procedure: " "EXTRACORPOREAL SHOCKWAVE LITHOTRIPSY LEFT;  Surgeon: Ronnie Hayes MD;  Location:  [facility] OR;  Service: Urology;  Laterality: Left;    HYSTERECTOMY      JOINT REPLACEMENT      REPLACEMENT TOTAL KNEE Left     SHOULDER ROTATOR CUFF REPAIR Right     TOTAL HIP ARTHROPLASTY Right     TOTAL SHOULDER ARTHROPLASTY W/ DISTAL CLAVICLE EXCISION Left 12/27/2019    Procedure: LEFT REVERSE TOTAL SHOULDER ARTHROPLASTY;  Surgeon: Dhaval Yepez MD;  Location:  [facility] OR;  Service: Orthopedics    WRIST FRACTURE SURGERY Left 2 weeks ago     Social History     Socioeconomic History    Marital status:    Tobacco Use    Smoking status: Never     Passive exposure: Never    Smokeless tobacco: Never   Vaping Use    Vaping status: Never Used   Substance and Sexual Activity    Alcohol use: No    Drug use: No    Sexual activity: Defer     Family History   Problem Relation Age of Onset    Cancer Father     Coronary artery disease Brother     Colon cancer Paternal Aunt     Colon polyps Neg Hx     Esophageal cancer Neg Hx        Review of Systems   HENT:  Positive for congestion, hoarse voice, sore throat and trouble swallowing.    Gastrointestinal:  Negative for abdominal pain.   Review of systems is negative unless otherwise specified in HPI.      Objective   Vital Signs:  /75 (BP Location: Left arm, Patient Position: Sitting, Cuff Size: Adult)   Pulse 105   Temp 98.4 °F (36.9 °C) (Infrared)   Resp 16   Ht 162.6 cm (64\")   Wt 64 kg (141 lb 3.2 oz)   SpO2 98%   BMI 24.24 kg/m²   Estimated body mass index is 24.24 kg/m² as calculated from the following:    Height as of this encounter: 162.6 cm (64\").    Weight as of this encounter: 64 kg (141 lb 3.2 oz).       BMI is within normal parameters. No other follow-up for BMI required.    PHQ-9 Depression Screening  Little interest or pleasure in doing things? 0-->not at all   Feeling down, depressed, or hopeless? 0-->not at all   Trouble falling or staying asleep, or sleeping " too much?     Feeling tired or having little energy?     Poor appetite or overeating?     Feeling bad about yourself - or that you are a failure or have let yourself or your family down?     Trouble concentrating on things, such as reading the newspaper or watching television?     Moving or speaking so slowly that other people could have noticed? Or the opposite - being so fidgety or restless that you have been moving around a lot more than usual?     Thoughts that you would be better off dead, or of hurting yourself in some way?     PHQ-9 Total Score 0   If you checked off any problems, how difficult have these problems made it for you to do your work, take care of things at home, or get along with other people?          Physical Exam  Vitals and nursing note reviewed.   Constitutional:       General: She is not in acute distress.     Appearance: She is ill-appearing.   HENT:      Head: Normocephalic.      Right Ear: Tympanic membrane normal.      Left Ear: Tympanic membrane normal.      Nose: Congestion present.      Right Sinus: Maxillary sinus tenderness present.      Left Sinus: Maxillary sinus tenderness present.      Mouth/Throat:      Mouth: Mucous membranes are moist.      Pharynx: Oropharyngeal exudate and posterior oropharyngeal erythema present.   Eyes:      Pupils: Pupils are equal, round, and reactive to light.   Cardiovascular:      Rate and Rhythm: Normal rate and regular rhythm.      Pulses: Normal pulses.      Heart sounds: Normal heart sounds.   Pulmonary:      Effort: Pulmonary effort is normal. No respiratory distress.      Breath sounds: Normal breath sounds.   Abdominal:      General: Bowel sounds are normal.      Palpations: Abdomen is soft.   Musculoskeletal:         General: Normal range of motion.      Cervical back: Normal range of motion. Tenderness present.   Skin:     General: Skin is warm and dry.      Capillary Refill: Capillary refill takes less than 2 seconds.   Neurological:       General: No focal deficit present.      Mental Status: She is alert.        Result Review :  The following data was reviewed by: CALVIN Benavides on 04/04/2024:  CMP          10/8/2023    04:54 10/9/2023    05:33 10/10/2023    04:47 10/10/2023    11:56   CMP   Glucose 99  97  102  106    BUN 17  12  9  8    Creatinine 2.06  1.95  1.64  1.53    EGFR 24.4  26.1  32.1  34.9    Sodium 136  139  143  142    Potassium 2.9  2.8  2.7  3.1    Chloride 106  108  112  111    Calcium 7.4  7.5  7.7  7.7    Total Protein 4.8  4.8  4.9     Albumin 2.4  2.3  2.5     Globulin 2.4  2.5  2.4     Total Bilirubin 0.2  0.2  0.2     Alkaline Phosphatase 120  119  112     AST (SGOT) 11  11  16     ALT (SGPT) 7  6  6     Albumin/Globulin Ratio 1.0  0.9  1.0     BUN/Creatinine Ratio 8.3  6.2  5.5  5.2    Anion Gap 10.0  12.0  12.0  13.0      CBC          10/8/2023    04:54 10/9/2023    05:33 10/10/2023    04:47   CBC   WBC 9.03  8.11  6.84    RBC 2.57  2.73  2.75    Hemoglobin 7.4  7.8  8.0    Hematocrit 24.2  25.4  25.7    MCV 94.2  93.0  93.5    MCH 28.8  28.6  29.1    MCHC 30.6  30.7  31.1    RDW 14.6  14.7  14.6    Platelets 274  304  335        Strep          4/4/2024    16:43   Common Labs   POC Strep A, Molecular Negative               Assessment and Plan   Diagnoses and all orders for this visit:    1. Acute maxillary sinusitis, recurrence not specified (Primary)  -     amoxicillin (AMOXIL) 500 MG capsule; Take 2 capsules by mouth 2 (Two) Times a Day for 7 days.  Dispense: 28 capsule; Refill: 0    2. Acute pharyngitis, unspecified etiology    3. Sore throat  -     POCT rapid strep A      - Treat sinusitis with antibiotic that also covers strep pharyngitis even with negative testing.          Follow Up   Return if symptoms worsen or fail to improve.  Patient was given instructions and counseling regarding her condition or for health maintenance advice. Please see specific information pulled into the AVS if appropriate.     Signed  by:    Hansel Jenkins, APRN Date: 04/04/24

## 2024-06-07 ENCOUNTER — OFFICE VISIT (OUTPATIENT)
Dept: FAMILY MEDICINE CLINIC | Facility: CLINIC | Age: 79
End: 2024-06-07
Payer: MEDICARE

## 2024-06-07 VITALS
BODY MASS INDEX: 23.56 KG/M2 | SYSTOLIC BLOOD PRESSURE: 136 MMHG | TEMPERATURE: 98 F | RESPIRATION RATE: 18 BRPM | OXYGEN SATURATION: 97 % | WEIGHT: 138 LBS | HEIGHT: 64 IN | DIASTOLIC BLOOD PRESSURE: 89 MMHG | HEART RATE: 117 BPM

## 2024-06-07 DIAGNOSIS — J01.40 ACUTE NON-RECURRENT PANSINUSITIS: Primary | ICD-10-CM

## 2024-06-07 DIAGNOSIS — H65.192 ACUTE MEE (MIDDLE EAR EFFUSION), LEFT: ICD-10-CM

## 2024-06-07 PROCEDURE — 99213 OFFICE O/P EST LOW 20 MIN: CPT | Performed by: NURSE PRACTITIONER

## 2024-06-07 PROCEDURE — 3075F SYST BP GE 130 - 139MM HG: CPT | Performed by: NURSE PRACTITIONER

## 2024-06-07 PROCEDURE — 3079F DIAST BP 80-89 MM HG: CPT | Performed by: NURSE PRACTITIONER

## 2024-06-07 RX ORDER — FLUTICASONE PROPIONATE 50 MCG
2 SPRAY, SUSPENSION (ML) NASAL DAILY
Qty: 16 G | Refills: 0 | Status: SHIPPED | OUTPATIENT
Start: 2024-06-07

## 2024-06-07 RX ORDER — AMOXICILLIN AND CLAVULANATE POTASSIUM 875; 125 MG/1; MG/1
1 TABLET, FILM COATED ORAL 2 TIMES DAILY
Qty: 14 TABLET | Refills: 0 | Status: SHIPPED | OUTPATIENT
Start: 2024-06-07 | End: 2024-06-14

## 2024-06-07 NOTE — PROGRESS NOTES
"        Subjective     Chief Complaint   Patient presents with    Earache       Earache     Appointment with Dr. Rey on Tuesday.      Headache and left earache and chills.  She is feeling dizzy with standing and laying down.      Patient's PMR from outside medical facility reviewed and noted.    Review of Systems   Constitutional:  Positive for chills. Negative for fever.   HENT:  Positive for ear pain.         Otherwise complete ROS reviewed and negative except as mentioned in the HPI.    Past Medical History:   Past Medical History:   Diagnosis Date    Acid reflux     Aneurysm     Pt states \"Somewhere near my spleen.\"    Anxiety and depression     Arthritis     Diarrhea     Generalized headaches     Heart murmur     History of transfusion     Hypertension     Hypothyroid     Kidney stones     Lupus     Migraines     MRSA (methicillin resistant Staphylococcus aureus)     in past , on face    Nausea     PONV (postoperative nausea and vomiting)     Renal failure     21%    Thrombosis 2007    RIGHT KNEE     Past Surgical History:  Past Surgical History:   Procedure Laterality Date    APPENDECTOMY      CHOLECYSTECTOMY      COLONOSCOPY      ENDOSCOPY N/A 10/30/2020    Procedure: ESOPHAGOGASTRODUODENOSCOPY WITH ANESTHESIA;  Surgeon: Naresh Heard DO;  Location: [facility] ENDOSCOPY;  Service: Gastroenterology;  Laterality: N/A;  preop; abdominal pain  postop; normal   PCP Greg Rey     EXTRACORPOREAL SHOCK WAVE LITHOTRIPSY (ESWL) Right 8/16/2021    Procedure: EXTRACORPOREAL SHOCKWAVE LITHOTRIPSY RIGHT;  Surgeon: Ronnie Hayes MD;  Location: [facility] OR;  Service: Urology;  Laterality: Right;    EXTRACORPOREAL SHOCK WAVE LITHOTRIPSY (ESWL) Left 1/17/2022    Procedure: EXTRACORPOREAL SHOCKWAVE LITHOTRIPSY LEFT;  Surgeon: Ronnie Hayes MD;  Location: [facility] OR;  Service: Urology;  Laterality: Left;    HYSTERECTOMY      JOINT REPLACEMENT      REPLACEMENT TOTAL KNEE Left     SHOULDER ROTATOR CUFF " REPAIR Right     TOTAL HIP ARTHROPLASTY Right     TOTAL SHOULDER ARTHROPLASTY W/ DISTAL CLAVICLE EXCISION Left 12/27/2019    Procedure: LEFT REVERSE TOTAL SHOULDER ARTHROPLASTY;  Surgeon: Dhaval Yepez MD;  Location: [facility];  Service: Orthopedics    WRIST FRACTURE SURGERY Left 2 weeks ago     Social History:  reports that she has never smoked. She has never been exposed to tobacco smoke. She has never used smokeless tobacco. She reports that she does not drink alcohol and does not use drugs.    Family History: family history includes Cancer in her father; Colon cancer in her paternal aunt; Coronary artery disease in her brother.      Allergies:  Allergies   Allergen Reactions    Morphine Nausea And Vomiting     Medications:  Prior to Admission medications    Medication Sig Start Date End Date Taking? Authorizing Provider   allopurinol (ZYLOPRIM) 100 MG tablet Take 1 tablet by mouth 2 (Two) Times a Day.   Yes Mariano Moulton MD   apixaban (ELIQUIS) 5 MG tablet tablet Take 1 tablet by mouth 2 (Two) Times a Day.   Yes Mariano Moulton MD   calcitriol (ROCALTROL) 0.25 MCG capsule Take 1 capsule by mouth 3 (Three) Times a Week.   Yes Mariano Moulton MD   diazePAM (VALIUM) 5 MG tablet Take 1 tablet by mouth Daily.   Yes Mariano Moulton MD   escitalopram (LEXAPRO) 20 MG tablet Take 1 tablet by mouth Daily. 10/12/21  Yes Mariano Moulton MD   gabapentin (NEURONTIN) 300 MG capsule Take 1 capsule by mouth Daily As Needed (for pain).   Yes ProviderMariano MD   levothyroxine (SYNTHROID, LEVOTHROID) 150 MCG tablet Take 1 tablet by mouth Daily.   Yes ProviderMariano MD   Magnesium Oxide -Mg Supplement 500 MG tablet Take 1 tablet by mouth 2 (Two) Times a Day.   Yes Mariano Moulton MD   methocarbamol (ROBAXIN) 500 MG tablet Take 1 tablet by mouth 3 (Three) Times a Day.   Yes Mariano Moulton MD   metoprolol succinate XL (TOPROL-XL) 25 MG 24 hr tablet Take 1 tablet by mouth Daily.    Yes Mariano Moulton MD   pantoprazole (PROTONIX) 40 MG EC tablet Take 1 tablet by mouth 2 (Two) Times a Day.   Yes Mariano Moulton MD   Prenatal Vit-Fe Fumarate-FA (PRENATAL VITAMIN PO) Take 1 tablet by mouth Daily. OTC   Yes Mariano Moulton MD   Probiotic Product (PROBIOTIC DAILY PO) Take 1 capsule by mouth Daily. OTC   Yes Mariano Moulton MD   promethazine (PHENERGAN) 25 MG tablet Take 1 tablet by mouth Every 8 (Eight) Hours As Needed for Nausea or Vomiting.   Yes Mariano Moulton MD   QUEtiapine XR (SEROquel XR) 300 MG 24 hr tablet Take 1 tablet by mouth Every Night.   Yes Mariano Moulton MD   SODIUM BICARBONATE PO Take 650 mg by mouth Every 4 (Four) Hours As Needed (HEARTBURN).   Yes Mariano Moulton MD   topiramate (TOPAMAX) 100 MG tablet Take 1 tablet by mouth 2 (Two) Times a Day.   Yes Mariano Moulton MD   vitamin B-12 (CYANOCOBALAMIN) 1000 MCG tablet Take 1 tablet by mouth Daily. OTC   Yes Mariano Moulton MD   vitamin D (ERGOCALCIFEROL) 07626 UNITS capsule capsule Take 1 capsule by mouth 2 (Two) Times a Week. Sunday and Wednesday 8/21/16  Yes Mariano Moulton MD   tiZANidine (ZANAFLEX) 4 MG tablet Take 1 tablet by mouth At Night As Needed for Muscle Spasms.  Patient not taking: Reported on 4/4/2024    Mariano Moulton MD       BLAKE:        PHQ-9 Depression Screening  Little interest or pleasure in doing things?     Feeling down, depressed, or hopeless?     Trouble falling or staying asleep, or sleeping too much?     Feeling tired or having little energy?     Poor appetite or overeating?     Feeling bad about yourself - or that you are a failure or have let yourself or your family down?     Trouble concentrating on things, such as reading the newspaper or watching television?     Moving or speaking so slowly that other people could have noticed? Or the opposite - being so fidgety or restless that you have been moving around a lot more than usual?   "   Thoughts that you would be better off dead, or of hurting yourself in some way?     PHQ-9 Total Score     If you checked off any problems, how difficult have these problems made it for you to do your work, take care of things at home, or get along with other people?         PHQ-9 Total Score:           Objective     Vital Signs: /89 (BP Location: Right arm, Patient Position: Sitting, Cuff Size: Adult)   Pulse 117   Temp 98 °F (36.7 °C) (Infrared)   Resp 18   Ht 162.6 cm (64\")   Wt 62.6 kg (138 lb)   SpO2 97%   BMI 23.69 kg/m²   Physical Exam  Vitals and nursing note reviewed.   Constitutional:       Appearance: Normal appearance.   HENT:      Head: Normocephalic.      Right Ear: Tympanic membrane normal.      Left Ear: Tympanic membrane is bulging.      Nose: Nose normal.      Mouth/Throat:      Mouth: Mucous membranes are moist.   Eyes:      Conjunctiva/sclera: Conjunctivae normal.   Cardiovascular:      Rate and Rhythm: Normal rate and regular rhythm.      Pulses: Normal pulses.      Heart sounds: Normal heart sounds.   Pulmonary:      Effort: Pulmonary effort is normal.      Breath sounds: Normal breath sounds.   Musculoskeletal:         General: Normal range of motion.      Cervical back: Normal range of motion.   Skin:     General: Skin is warm and dry.      Coloration: Skin is pale.   Neurological:      General: No focal deficit present.      Mental Status: She is alert and oriented to person, place, and time.   Psychiatric:         Mood and Affect: Mood normal.         Behavior: Behavior normal.         BMI is within normal parameters. No other follow-up for BMI required.        Advance Care Planning            Results Reviewed:  Glucose   Date Value Ref Range Status   10/10/2023 106 (H) 65 - 99 mg/dL Final   04/15/2022 94 74 - 109 mg/dL Final     BUN   Date Value Ref Range Status   10/10/2023 8 8 - 23 mg/dL Final   04/15/2022 40 (H) 8 - 23 mg/dL Final     Creatinine   Date Value Ref Range " Status   10/10/2023 1.53 (H) 0.57 - 1.00 mg/dL Final   04/15/2022 3.6 (H) 0.5 - 0.9 mg/dL Final     Sodium   Date Value Ref Range Status   10/10/2023 142 136 - 145 mmol/L Final   04/15/2022 143 136 - 145 mmol/L Final     Potassium   Date Value Ref Range Status   10/10/2023 3.1 (L) 3.5 - 5.2 mmol/L Final   04/15/2022 5.4 (H) 3.5 - 5.0 mmol/L Final     Chloride   Date Value Ref Range Status   10/10/2023 111 (H) 98 - 107 mmol/L Final   04/15/2022 102 98 - 111 mmol/L Final     CO2   Date Value Ref Range Status   10/10/2023 18.0 (L) 22.0 - 29.0 mmol/L Final   04/15/2022 30 (H) 22 - 29 mmol/L Final     Calcium   Date Value Ref Range Status   10/10/2023 7.7 (L) 8.6 - 10.5 mg/dL Final   04/15/2022 8.6 (L) 8.8 - 10.2 mg/dL Final     ALT (SGPT)   Date Value Ref Range Status   10/10/2023 6 1 - 33 U/L Final   04/11/2022 250 (H) 5 - 33 U/L Final     AST (SGOT)   Date Value Ref Range Status   10/10/2023 16 1 - 32 U/L Final   04/11/2022 224 (H) 5 - 32 U/L Final     WBC   Date Value Ref Range Status   10/10/2023 6.84 3.40 - 10.80 10*3/mm3 Final   02/28/2023 5.8 4.8 - 10.8 K/uL Final     Hematocrit   Date Value Ref Range Status   10/10/2023 25.7 (L) 34.0 - 46.6 % Final   02/28/2023 35.0 (L) 37.0 - 47.0 % Final     Platelets   Date Value Ref Range Status   10/10/2023 335 140 - 450 10*3/mm3 Final   02/28/2023 236 130 - 400 K/uL Final         Assessment / Plan     Assessment/Plan     Diagnoses and all orders for this visit:    1. Acute non-recurrent pansinusitis (Primary)  -     amoxicillin-clavulanate (AUGMENTIN) 875-125 MG per tablet; Take 1 tablet by mouth 2 (Two) Times a Day for 7 days.  Dispense: 14 tablet; Refill: 0  -     fluticasone (FLONASE) 50 MCG/ACT nasal spray; 2 sprays into the nostril(s) as directed by provider Daily.  Dispense: 16 g; Refill: 0    2. Acute FABY (middle ear effusion), left               An After Visit Summary was printed and given to the patient at discharge.  No follow-ups on file.    I have discussed the  patient results/orders and plan/recommendation with them at today's visit.      CALVIN Whitfield   06/07/2024

## 2024-09-10 RX ORDER — TOPIRAMATE 100 MG/1
100 TABLET, FILM COATED ORAL PRN
COMMUNITY

## 2024-09-10 RX ORDER — FERROUS SULFATE 325(65) MG
325 TABLET ORAL
COMMUNITY

## 2024-09-18 ENCOUNTER — OFFICE VISIT (OUTPATIENT)
Dept: GASTROENTEROLOGY | Age: 79
End: 2024-09-18
Payer: MEDICARE

## 2024-09-18 VITALS
WEIGHT: 136 LBS | HEIGHT: 64 IN | SYSTOLIC BLOOD PRESSURE: 123 MMHG | HEART RATE: 70 BPM | OXYGEN SATURATION: 96 % | BODY MASS INDEX: 23.22 KG/M2 | DIASTOLIC BLOOD PRESSURE: 80 MMHG

## 2024-09-18 DIAGNOSIS — R10.13 EPIGASTRIC PAIN: Primary | ICD-10-CM

## 2024-09-18 DIAGNOSIS — R10.84 GENERALIZED ABDOMINAL PAIN: ICD-10-CM

## 2024-09-18 DIAGNOSIS — K59.00 CONSTIPATION, UNSPECIFIED CONSTIPATION TYPE: ICD-10-CM

## 2024-09-18 DIAGNOSIS — R11.2 NAUSEA AND VOMITING, UNSPECIFIED VOMITING TYPE: ICD-10-CM

## 2024-09-18 DIAGNOSIS — R63.4 WEIGHT LOSS: ICD-10-CM

## 2024-09-18 PROCEDURE — 1123F ACP DISCUSS/DSCN MKR DOCD: CPT | Performed by: NURSE PRACTITIONER

## 2024-09-18 PROCEDURE — G8428 CUR MEDS NOT DOCUMENT: HCPCS | Performed by: NURSE PRACTITIONER

## 2024-09-18 PROCEDURE — 4004F PT TOBACCO SCREEN RCVD TLK: CPT | Performed by: NURSE PRACTITIONER

## 2024-09-18 PROCEDURE — G8420 CALC BMI NORM PARAMETERS: HCPCS | Performed by: NURSE PRACTITIONER

## 2024-09-18 PROCEDURE — 1090F PRES/ABSN URINE INCON ASSESS: CPT | Performed by: NURSE PRACTITIONER

## 2024-09-18 PROCEDURE — G8399 PT W/DXA RESULTS DOCUMENT: HCPCS | Performed by: NURSE PRACTITIONER

## 2024-09-18 PROCEDURE — 99204 OFFICE O/P NEW MOD 45 MIN: CPT | Performed by: NURSE PRACTITIONER

## 2024-09-18 RX ORDER — POTASSIUM CITRATE 10 MEQ/1
10 TABLET, EXTENDED RELEASE ORAL 2 TIMES DAILY
COMMUNITY

## 2024-09-20 ASSESSMENT — ENCOUNTER SYMPTOMS
SHORTNESS OF BREATH: 0
TROUBLE SWALLOWING: 0
CONSTIPATION: 1
ABDOMINAL PAIN: 1
CHOKING: 0
RECTAL PAIN: 0
COUGH: 0
ANAL BLEEDING: 0
ABDOMINAL DISTENTION: 0
DIARRHEA: 0
BLOOD IN STOOL: 0
NAUSEA: 1
VOMITING: 1

## 2024-09-24 ENCOUNTER — TELEPHONE (OUTPATIENT)
Dept: GASTROENTEROLOGY | Age: 79
End: 2024-09-24

## 2024-10-01 ENCOUNTER — TELEPHONE (OUTPATIENT)
Dept: GASTROENTEROLOGY | Age: 79
End: 2024-10-01

## 2024-10-01 NOTE — TELEPHONE ENCOUNTER
Called patient to remind them of their procedure with Dr. Chase Gamez  at [facility]  on 10/4/24  to arrive at 830     CONFIRMED  PATIENT HAS INSTR & PRESCRIPTION     Also let patient know that their  will need to remain during procedure

## 2024-10-04 ENCOUNTER — ANESTHESIA (OUTPATIENT)
Dept: ENDOSCOPY | Age: 79
End: 2024-10-04
Payer: MEDICARE

## 2024-10-04 ENCOUNTER — TELEPHONE (OUTPATIENT)
Dept: GASTROENTEROLOGY | Age: 79
End: 2024-10-04

## 2024-10-04 ENCOUNTER — ANESTHESIA EVENT (OUTPATIENT)
Dept: ENDOSCOPY | Age: 79
End: 2024-10-04
Payer: MEDICARE

## 2024-10-04 ENCOUNTER — HOSPITAL ENCOUNTER (OUTPATIENT)
Age: 79
Setting detail: OUTPATIENT SURGERY
Discharge: HOME | End: 2024-10-04
Attending: INTERNAL MEDICINE | Admitting: INTERNAL MEDICINE
Payer: MEDICARE

## 2024-10-04 VITALS
HEART RATE: 91 BPM | WEIGHT: 136 LBS | BODY MASS INDEX: 23.22 KG/M2 | HEIGHT: 64 IN | TEMPERATURE: 98.2 F | RESPIRATION RATE: 16 BRPM | OXYGEN SATURATION: 97 % | SYSTOLIC BLOOD PRESSURE: 137 MMHG | DIASTOLIC BLOOD PRESSURE: 81 MMHG

## 2024-10-04 DIAGNOSIS — K59.00 CONSTIPATION, UNSPECIFIED CONSTIPATION TYPE: ICD-10-CM

## 2024-10-04 DIAGNOSIS — R11.2 NAUSEA AND VOMITING, UNSPECIFIED VOMITING TYPE: ICD-10-CM

## 2024-10-04 DIAGNOSIS — R63.4 WEIGHT LOSS: ICD-10-CM

## 2024-10-04 DIAGNOSIS — R10.31 RLQ ABDOMINAL PAIN: ICD-10-CM

## 2024-10-04 DIAGNOSIS — R14.0 BLOATING: ICD-10-CM

## 2024-10-04 DIAGNOSIS — R10.13 EPIGASTRIC PAIN: ICD-10-CM

## 2024-10-04 LAB
ALBUMIN SERPL-MCNC: 3.3 G/DL (ref 3.5–5.2)
ALP SERPL-CCNC: 95 U/L (ref 35–104)
ALT SERPL-CCNC: 12 U/L (ref 5–33)
ANION GAP SERPL CALCULATED.3IONS-SCNC: 14 MMOL/L (ref 7–19)
AST SERPL-CCNC: 16 U/L (ref 5–32)
BASOPHILS # BLD: 0.1 K/UL (ref 0–0.2)
BASOPHILS NFR BLD: 1 % (ref 0–1)
BILIRUB SERPL-MCNC: 0.4 MG/DL (ref 0.2–1.2)
BUN SERPL-MCNC: 24 MG/DL (ref 8–23)
CALCIUM SERPL-MCNC: 8.2 MG/DL (ref 8.8–10.2)
CHLORIDE SERPL-SCNC: 105 MMOL/L (ref 98–111)
CO2 SERPL-SCNC: 19 MMOL/L (ref 22–29)
CREAT SERPL-MCNC: 1.6 MG/DL (ref 0.5–0.9)
EOSINOPHIL # BLD: 0.1 K/UL (ref 0–0.6)
EOSINOPHIL NFR BLD: 2 % (ref 0–5)
ERYTHROCYTE [DISTWIDTH] IN BLOOD BY AUTOMATED COUNT: 15.5 % (ref 11.5–14.5)
GLUCOSE SERPL-MCNC: 96 MG/DL (ref 70–99)
HCT VFR BLD AUTO: 35.7 % (ref 37–47)
HGB BLD-MCNC: 10.4 G/DL (ref 12–16)
IMM GRANULOCYTES # BLD: 0 K/UL
LYMPHOCYTES # BLD: 1.2 K/UL (ref 1.1–4.5)
LYMPHOCYTES NFR BLD: 20.2 % (ref 20–40)
MCH RBC QN AUTO: 31.4 PG (ref 27–31)
MCHC RBC AUTO-ENTMCNC: 29.1 G/DL (ref 33–37)
MCV RBC AUTO: 107.9 FL (ref 81–99)
MONOCYTES # BLD: 0.6 K/UL (ref 0–0.9)
MONOCYTES NFR BLD: 10 % (ref 0–10)
NEUTROPHILS # BLD: 4.1 K/UL (ref 1.5–7.5)
NEUTS SEG NFR BLD: 66.6 % (ref 50–65)
PLATELET # BLD AUTO: 211 K/UL (ref 130–400)
PMV BLD AUTO: 10.4 FL (ref 9.4–12.3)
POTASSIUM SERPL-SCNC: 4.4 MMOL/L (ref 3.5–5)
PROT SERPL-MCNC: 5.6 G/DL (ref 6.4–8.3)
RBC # BLD AUTO: 3.31 M/UL (ref 4.2–5.4)
SODIUM SERPL-SCNC: 138 MMOL/L (ref 136–145)
WBC # BLD AUTO: 6.1 K/UL (ref 4.8–10.8)

## 2024-10-04 PROCEDURE — 80053 COMPREHEN METABOLIC PANEL: CPT

## 2024-10-04 PROCEDURE — 85025 COMPLETE CBC W/AUTO DIFF WBC: CPT

## 2024-10-04 PROCEDURE — 3700000001 HC ADD 15 MINUTES (ANESTHESIA): Performed by: INTERNAL MEDICINE

## 2024-10-04 PROCEDURE — 7100000011 HC PHASE II RECOVERY - ADDTL 15 MIN: Performed by: INTERNAL MEDICINE

## 2024-10-04 PROCEDURE — 7100000010 HC PHASE II RECOVERY - FIRST 15 MIN: Performed by: INTERNAL MEDICINE

## 2024-10-04 PROCEDURE — 3609012400 HC EGD TRANSORAL BIOPSY SINGLE/MULTIPLE: Performed by: INTERNAL MEDICINE

## 2024-10-04 PROCEDURE — 36415 COLL VENOUS BLD VENIPUNCTURE: CPT

## 2024-10-04 PROCEDURE — 2580000003 HC RX 258: Performed by: INTERNAL MEDICINE

## 2024-10-04 PROCEDURE — 2500000003 HC RX 250 WO HCPCS: Performed by: NURSE ANESTHETIST, CERTIFIED REGISTERED

## 2024-10-04 PROCEDURE — 43239 EGD BIOPSY SINGLE/MULTIPLE: CPT | Performed by: INTERNAL MEDICINE

## 2024-10-04 PROCEDURE — 2709999900 HC NON-CHARGEABLE SUPPLY: Performed by: INTERNAL MEDICINE

## 2024-10-04 PROCEDURE — 88305 TISSUE EXAM BY PATHOLOGIST: CPT

## 2024-10-04 PROCEDURE — 3700000000 HC ANESTHESIA ATTENDED CARE: Performed by: INTERNAL MEDICINE

## 2024-10-04 PROCEDURE — 6360000002 HC RX W HCPCS: Performed by: NURSE ANESTHETIST, CERTIFIED REGISTERED

## 2024-10-04 RX ORDER — SODIUM CHLORIDE, SODIUM LACTATE, POTASSIUM CHLORIDE, CALCIUM CHLORIDE 600; 310; 30; 20 MG/100ML; MG/100ML; MG/100ML; MG/100ML
INJECTION, SOLUTION INTRAVENOUS CONTINUOUS
Status: DISCONTINUED | OUTPATIENT
Start: 2024-10-04 | End: 2024-10-04 | Stop reason: HOSPADM

## 2024-10-04 RX ORDER — LIDOCAINE HYDROCHLORIDE 10 MG/ML
INJECTION, SOLUTION EPIDURAL; INFILTRATION; INTRACAUDAL; PERINEURAL
Status: DISCONTINUED | OUTPATIENT
Start: 2024-10-04 | End: 2024-10-04 | Stop reason: SDUPTHER

## 2024-10-04 RX ORDER — PROPOFOL 10 MG/ML
INJECTION, EMULSION INTRAVENOUS
Status: DISCONTINUED | OUTPATIENT
Start: 2024-10-04 | End: 2024-10-04 | Stop reason: SDUPTHER

## 2024-10-04 RX ORDER — FENTANYL CITRATE 50 UG/ML
INJECTION, SOLUTION INTRAMUSCULAR; INTRAVENOUS
Status: DISCONTINUED | OUTPATIENT
Start: 2024-10-04 | End: 2024-10-04 | Stop reason: SDUPTHER

## 2024-10-04 RX ADMIN — LIDOCAINE HYDROCHLORIDE 50 MG: 10 INJECTION, SOLUTION EPIDURAL; INFILTRATION; INTRACAUDAL; PERINEURAL at 08:56

## 2024-10-04 RX ADMIN — FENTANYL CITRATE 50 MCG: 50 INJECTION INTRAMUSCULAR; INTRAVENOUS at 08:58

## 2024-10-04 RX ADMIN — SODIUM CHLORIDE, SODIUM LACTATE, POTASSIUM CHLORIDE, AND CALCIUM CHLORIDE: 600; 310; 30; 20 INJECTION, SOLUTION INTRAVENOUS at 08:51

## 2024-10-04 RX ADMIN — PROPOFOL 200 MG: 10 INJECTION, EMULSION INTRAVENOUS at 08:56

## 2024-10-04 ASSESSMENT — PAIN - FUNCTIONAL ASSESSMENT
PAIN_FUNCTIONAL_ASSESSMENT: 0-10
PAIN_FUNCTIONAL_ASSESSMENT: NONE - DENIES PAIN

## 2024-10-04 NOTE — ANESTHESIA PRE PROCEDURE
Results   Component Value Date/Time    WBC 5.8 02/28/2023 11:12 AM    RBC 3.36 02/28/2023 11:12 AM    HGB 10.6 02/28/2023 11:12 AM    HCT 35.0 02/28/2023 11:12 AM    .2 02/28/2023 11:12 AM    RDW 15.4 02/28/2023 11:12 AM     02/28/2023 11:12 AM       CMP:   Lab Results   Component Value Date/Time     04/19/2022 03:34 AM    K 4.1 04/19/2022 03:34 AM     04/19/2022 03:34 AM    CO2 26 04/19/2022 03:34 AM    BUN 15 04/19/2022 03:34 AM    CREATININE 2.4 04/19/2022 03:34 AM    GFRAA 24 04/19/2022 03:34 AM    LABGLOM 20 04/19/2022 03:34 AM    GLUCOSE 89 04/19/2022 03:34 AM    CALCIUM 8.3 04/19/2022 03:34 AM    BILITOT 0.3 04/19/2022 03:34 AM    ALKPHOS 217 04/19/2022 03:34 AM    AST 17 04/19/2022 03:34 AM    ALT 31 04/19/2022 03:34 AM       POC Tests: No results for input(s): \"POCGLU\", \"POCNA\", \"POCK\", \"POCCL\", \"POCBUN\", \"POCHEMO\", \"POCHCT\" in the last 72 hours.    Coags:   Lab Results   Component Value Date/Time    PROTIME 13.5 07/27/2020 11:55 AM    INR 1.04 07/27/2020 11:55 AM    APTT 26.5 07/27/2020 11:55 AM       HCG (If Applicable): No results found for: \"PREGTESTUR\", \"PREGSERUM\", \"HCG\", \"HCGQUANT\"     ABGs: No results found for: \"PHART\", \"PO2ART\", \"CGW8WPK\", \"PHF4PKS\", \"BEART\", \"T0NPGRCR\"     Type & Screen (If Applicable):  Lab Results   Component Value Date    ABORH O POS 07/27/2020    LABANTI NEG 07/27/2020       Drug/Infectious Status (If Applicable):  No results found for: \"HIV\", \"HEPCAB\"    COVID-19 Screening (If Applicable):   Lab Results   Component Value Date/Time    COVID19 Not Detected 07/23/2020 02:26 PM           Anesthesia Evaluation  Patient summary reviewed and Nursing notes reviewed   no history of anesthetic complications:   Airway: Mallampati: II          Dental:    (+) upper dentures and lower dentures      Pulmonary:normal exam        (-) asthma                           Cardiovascular:  Exercise tolerance: good (>4 METS)  (+) hypertension:                  Neuro/Psych:

## 2024-10-04 NOTE — DISCHARGE INSTRUCTIONS
1.  Await path results, the patient will be contacted in 7-10 days with biopsy results.   2.  Reschedule her colonoscopy for next Thursday at the hospital after a strict 2-day clear liquid diet and a 2-day prep using Plenvu or a similar solution assisted by Zofran 4 mg sublingual or p.o. every 4 hours as needed  3.  Will check CBC and CMP today since patient has had no recent labs since last October, despite of severe normocytic anemia    - Resume previous meds and diet  - GI clinic f/u 4 to 6 weeks weeks with Ms. Cardenas    - Keep scheduled f/u appts with other MDs     - NO ASA/NSAIDs x 2 weeks       NSAIDS (Non-steroidal Anti-Inflammatory)    You have been directed by your physician to avoid any NSAID's; the following medications are a list of those to avoid. If you think that you are taking any NSAID's notify your physician.     Over The Counter    Advil                      Motrin  Nuprin                   Ibuprofen  Midol                     Aleve  Naproxen              Orudis  Aspirin                   Marycarmen-Mavis    Prescriptions and Generics    Cataflam              Relafen  Voltaren               Clinoril  Indocin                 Naproxen  Arthrotec              Lodine  Daypro                 Nalfon  Toradol                Ansaid  Feldene               Meclofenamate  Fenoprofen          Ponstel  Mobic                   Celebrex  Vioxx

## 2024-10-04 NOTE — ANESTHESIA POSTPROCEDURE EVALUATION
Department of Anesthesiology  Postprocedure Note    Patient: Kendra Arreguin  MRN: [id number]  YOB: 1945  Date of evaluation: 10/4/2024    Procedure Summary       Date: 10/04/24 Room / Location: [place] [address] / [facility]    Anesthesia Start: 0851 Anesthesia Stop:     Procedure: ESOPHAGOGASTRODUODENOSCOPY BIOPSY (Abdomen) Diagnosis:       Epigastric pain      Weight loss      Bloating      Nausea and vomiting, unspecified vomiting type      RLQ abdominal pain      Constipation, unspecified constipation type      (Epigastric pain [R10.13])      (Weight loss [R63.4])      (Bloating [R14.0])      (Nausea and vomiting, unspecified vomiting type [R11.2])      (RLQ abdominal pain [R10.31])      (Constipation, unspecified constipation type [K59.00])    Surgeons: Chase Gamez MD Responsible Provider: Sagrario Riggs APRN - CRNA    Anesthesia Type: general, TIVA ASA Status: 3            Anesthesia Type: No value filed.    Kenn Phase I: Kenn Score: 10    Kenn Phase II:      Anesthesia Post Evaluation    Patient location during evaluation: bedside  Patient participation: complete - patient participated  Level of consciousness: awake and alert  Pain score: 0  Airway patency: patent  Nausea & Vomiting: no nausea and no vomiting  Cardiovascular status: blood pressure returned to baseline  Respiratory status: acceptable, spontaneous ventilation, nonlabored ventilation and room air  Hydration status: euvolemic  Comments: BP (!) 149/91   Pulse (!) 104   Temp 98.5 °F (36.9 °C) (Temporal)   Resp 20   Ht 1.626 m (5' 4\")   Wt 61.7 kg (136 lb)   SpO2 96%   BMI 23.34 kg/m²     Pain management: adequate    No notable events documented.

## 2024-10-04 NOTE — OP NOTE
Endoscopic Procedure Note    Patient: Kendra Arreguin : 1945  Med Rec#: [id number] Acc#: [id number]     Primary Care Provider Lennox Steen MD    Endoscopist: Chase Gamez MD, MD    Date of Procedure:  10/4/2024    Procedure:   EGD     with cold biopsies    Indications:     1. Epigastric pain  2. Weight loss  3. Nausea and vomiting, unspecified vomiting type  4. Generalized abdominal pain  5. Constipation, unspecified constipation type  6.  History of severe anemia with last hemoglobin 8gm/dL in      Anesthesia:  Sedation was administered by anesthesia who monitored the patient during the procedure.    Estimated Blood Loss: minimal    Procedure:   After reviewing the patient's chart and obtaining informed consent, the patient was placed in the left lateral decubitus position.  A forward-viewing Olympus endoscope was lubricated and inserted through the mouth into the oropharynx. Under direct visualization, the upper esophagus was intubated. The scope was advanced to the level of the third portion of duodenum. Scope was slowly withdrawn with careful inspection of the mucosal surfaces. The scope was retroflexed for inspection of the gastric fundus and incisura. Findings and maneuvers are listed in impression below. The patient tolerated the procedure well. The scope was removed. There were no immediate complications.    Findings/IMPRESSION:  Esophagus: normal and a normal EG junction at 36 cm.  There is no obvious hiatal hernia present.      Stomach:  normal.    NO ulcers or masses or gastric outlet obstruction or retained food or fluid. Rugae were normal and lumen distended well with insufflation. Retroflexed views otherwise revealed a normal GE junction, fundus and cardia as well.       Duodenum: Normal. Random biopsies were taken to check for Celiac disease and other causes of villous atrophy.     No lesions to explain any of her symptoms or anemia were discovered on this exam.      RECOMMENDATIONS:    1.  Await path results, the patient will be contacted in 7-10 days with biopsy results.   2.  Reschedule her colonoscopy for next Thursday at the hospital after a strict 2-day clear liquid diet and a 2-day prep using Plenvu or a similar solution assisted by Zofran 4 mg sublingual or p.o. every 4 hours as needed  3.  Will check CBC and CMP today since patient has had no recent labs since last October, despite of severe normocytic anemia    - Resume previous meds and diet  - GI clinic f/u 4 to 6 weeks weeks with Ms. Cardenas    - Keep scheduled f/u appts with other MDs     - NO ASA/NSAIDs x 2 weeks     The results were discussed with the patient and family.  A copy of the images obtained were given to the patient.     (Please note that portions of this note were completed with a voice recognition program. Efforts were made to edit the dictations but occasionally words may be mis-transcribed.)     Chase Gamez MD, MD  10/4/2024  8:28 AM

## 2024-10-04 NOTE — PROGRESS NOTES
Colonoscopy cancelled. Patient unable to tolerate prep and threw up, still passing formed stool. Discussed with  To reschedule.

## 2024-10-04 NOTE — H&P
Patient Name: Kendra Arreguin  : 1945  MRN: [id number]  DATE: 10/04/24    Allergies: No Known Allergies     ENDOSCOPY  History and Physical    Procedure:    [x] Diagnostic Colonoscopy       [] Screening Colonoscopy  [x] EGD      [] ERCP      [] EUS       [] Other    [x] Previous office notes/History and Physical reviewed from the patients chart. Please see EMR for further details of HPI. I have examined the patient's status immediately prior to the procedure and:      Indications/HPI:      Patient was originally scheduled for both EGD and colonoscopy exams today but she could not tolerate the prep with a lot of vomiting and nausea last night and hence wants only an EGD exam today.    1. Epigastric pain  2. Weight loss  3. Nausea and vomiting, unspecified vomiting type  4. Generalized abdominal pain  5. Constipation, unspecified constipation type  6.  Severe normocytic anemia with hemoglobin of 8 in 2023    []Abdominal Pain   []Cancer- GI/Lung     []Fhx of colon CA/polyps  []History of Polyps  []Barretts            []Melena  []Abnormal Imaging              []Dysphagia              []Persistent Pneumonia   []Anemia                            []Food Impaction        []History of Polyps  [] GI Bleed             []Pulmonary nodule/Mass   []Change in bowel habits []Heartburn/Reflux  []Rectal Bleed (BRBPR)  []Chest Pain - Non Cardiac []Heme (+) Stool []Ulcers  []Constipation  []Hemoptysis  []Varices  []Diarrhea  []Hypoxemia    []Nausea/Vomiting   []Screening   []Crohns/Colitis  []Other:     Anesthesia:   [x] MAC [] Moderate Sedation   [] General   [] None     ROS: 12 pt Review of Symptoms was negative unless mentioned above    Medications:   Prior to Admission medications    Medication Sig Start Date End Date Taking? Authorizing Provider   Magnesium Oxide 400 MG CAPS Take by mouth daily    Yes Donaldo Boyd MD   MAGNESIUM GLYCINATE PO Take 665 mg by mouth daily    Donaldo Boyd MD  daily    ProviderDonaldo MD   vitamin D (ERGOCALCIFEROL) 28501 UNITS CAPS capsule Take 1 capsule by mouth three times a week Sunday/wednesday    Donaldo Boyd MD   levothyroxine (SYNTHROID) 150 MCG tablet Take 137 mcg by mouth Daily Take 4 days per week    Donaldo Boyd MD   Prenatal Vit-Fe Fumarate-FA (PRENATAL VITAMIN PO) Take by mouth daily     ProviderDonaldo MD       Past Medical History:  Past Medical History:   Diagnosis Date    Arthritis     Asthma     Diarrhea     DVT (deep vein thrombosis) in pregnancy     History of blood transfusion     Hx of blood clots     hAD dvt WAS ON Coumadin for a year.  2006    Hypertension     Lupus     Nausea & vomiting     Renal failure     Splenic artery aneurysm (HCC)     Stomach pain     Thyroid disease        Past Surgical History:  Past Surgical History:   Procedure Laterality Date    COLONOSCOPY  2021    normal per pt    GASTRIC BYPASS SURGERY  1975    HYSTERECTOMY (CERVIX STATUS UNKNOWN)      JOINT REPLACEMENT Right     hip and shoulder    LYMPHADENECTOMY      TN ARTHRP KNE CONDYLE&PLATU MEDIAL&LAT COMPARTMENTS Left 11/27/2017    KNEE TOTAL ARTHROPLASTY performed by Yusef Padron MD at [facility] OR    SHOULDER SURGERY Left 07/27/2020    LLEFT REVERSE TOTAL SHOULDER POLY EXCHANGE AND BICEPS TENOTOMY performed by Yusef Padron MD at [facility] OR    UPPER GASTROINTESTINAL ENDOSCOPY  2021    normal per pt    VASCULAR SURGERY N/A 04/15/2022    PLACEMENT OF TUNNELED PERM-CATHETER performed by Jayme Stewart MD at [facility] OR       Social History:  Social History     Tobacco Use    Smoking status: Never    Smokeless tobacco: Never   Substance Use Topics    Alcohol use: No    Drug use: No       Vital Signs:   Vitals:    10/04/24 0735   BP: (!) 149/91   Pulse: (!) 104   Resp: 20   Temp: 98.5 °F (36.9 °C)   SpO2: 96%        Physical Exam:  Cardiac:  [x]WNL  []Comments:  Pulmonary:  [x]WNL   []Comments:  Neuro/Mental Status:  [x]WNL  []Comments:  Abdominal:  [x]WNL

## 2024-10-08 ENCOUNTER — TELEPHONE (OUTPATIENT)
Dept: GASTROENTEROLOGY | Age: 79
End: 2024-10-08

## 2024-10-17 ENCOUNTER — ANESTHESIA (OUTPATIENT)
Dept: ENDOSCOPY | Age: 79
End: 2024-10-17
Payer: MEDICARE

## 2024-10-17 ENCOUNTER — TELEPHONE (OUTPATIENT)
Dept: GASTROENTEROLOGY | Age: 79
End: 2024-10-17

## 2024-10-17 ENCOUNTER — HOSPITAL ENCOUNTER (OUTPATIENT)
Age: 79
Setting detail: OUTPATIENT SURGERY
Discharge: HOME | End: 2024-10-17
Attending: INTERNAL MEDICINE | Admitting: INTERNAL MEDICINE
Payer: MEDICARE

## 2024-10-17 ENCOUNTER — ANESTHESIA EVENT (OUTPATIENT)
Dept: ENDOSCOPY | Age: 79
End: 2024-10-17
Payer: MEDICARE

## 2024-10-17 ENCOUNTER — ANCILLARY PROCEDURE (OUTPATIENT)
Dept: ENDOSCOPY | Age: 79
End: 2024-10-17
Attending: INTERNAL MEDICINE
Payer: MEDICARE

## 2024-10-17 VITALS
OXYGEN SATURATION: 99 % | RESPIRATION RATE: 18 BRPM | HEART RATE: 73 BPM | TEMPERATURE: 97.2 F | HEIGHT: 64 IN | BODY MASS INDEX: 23.56 KG/M2 | DIASTOLIC BLOOD PRESSURE: 70 MMHG | SYSTOLIC BLOOD PRESSURE: 126 MMHG | WEIGHT: 138 LBS

## 2024-10-17 PROCEDURE — 3700000001 HC ADD 15 MINUTES (ANESTHESIA): Performed by: INTERNAL MEDICINE

## 2024-10-17 PROCEDURE — 45378 DIAGNOSTIC COLONOSCOPY: CPT | Performed by: INTERNAL MEDICINE

## 2024-10-17 PROCEDURE — 2500000003 HC RX 250 WO HCPCS: Performed by: NURSE ANESTHETIST, CERTIFIED REGISTERED

## 2024-10-17 PROCEDURE — 3609027000 HC COLONOSCOPY: Performed by: INTERNAL MEDICINE

## 2024-10-17 PROCEDURE — 7100000011 HC PHASE II RECOVERY - ADDTL 15 MIN: Performed by: INTERNAL MEDICINE

## 2024-10-17 PROCEDURE — 3700000000 HC ANESTHESIA ATTENDED CARE: Performed by: INTERNAL MEDICINE

## 2024-10-17 PROCEDURE — 2709999900 HC NON-CHARGEABLE SUPPLY: Performed by: INTERNAL MEDICINE

## 2024-10-17 PROCEDURE — 6360000002 HC RX W HCPCS: Performed by: NURSE ANESTHETIST, CERTIFIED REGISTERED

## 2024-10-17 PROCEDURE — 7100000010 HC PHASE II RECOVERY - FIRST 15 MIN: Performed by: INTERNAL MEDICINE

## 2024-10-17 PROCEDURE — 2580000003 HC RX 258: Performed by: INTERNAL MEDICINE

## 2024-10-17 RX ORDER — PROPOFOL 10 MG/ML
INJECTION, EMULSION INTRAVENOUS
Status: DISCONTINUED | OUTPATIENT
Start: 2024-10-17 | End: 2024-10-17 | Stop reason: SDUPTHER

## 2024-10-17 RX ORDER — LIDOCAINE HYDROCHLORIDE 10 MG/ML
INJECTION, SOLUTION EPIDURAL; INFILTRATION; INTRACAUDAL; PERINEURAL
Status: DISCONTINUED | OUTPATIENT
Start: 2024-10-17 | End: 2024-10-17 | Stop reason: SDUPTHER

## 2024-10-17 RX ORDER — SODIUM CHLORIDE, SODIUM LACTATE, POTASSIUM CHLORIDE, CALCIUM CHLORIDE 600; 310; 30; 20 MG/100ML; MG/100ML; MG/100ML; MG/100ML
INJECTION, SOLUTION INTRAVENOUS CONTINUOUS
Status: DISCONTINUED | OUTPATIENT
Start: 2024-10-17 | End: 2024-10-17 | Stop reason: HOSPADM

## 2024-10-17 RX ADMIN — LIDOCAINE HYDROCHLORIDE 50 MG: 10 INJECTION, SOLUTION EPIDURAL; INFILTRATION; INTRACAUDAL; PERINEURAL at 15:16

## 2024-10-17 RX ADMIN — SODIUM CHLORIDE, SODIUM LACTATE, POTASSIUM CHLORIDE, AND CALCIUM CHLORIDE: 600; 310; 30; 20 INJECTION, SOLUTION INTRAVENOUS at 11:59

## 2024-10-17 RX ADMIN — PROPOFOL 100 MG: 10 INJECTION, EMULSION INTRAVENOUS at 15:16

## 2024-10-17 ASSESSMENT — PAIN - FUNCTIONAL ASSESSMENT
PAIN_FUNCTIONAL_ASSESSMENT: NONE - DENIES PAIN

## 2024-10-17 NOTE — ANESTHESIA PRE PROCEDURE
Mallampati: II  TM distance: >3 FB   Neck ROM: full  Mouth opening: > = 3 FB   Dental:    (+) upper dentures and lower dentures      Pulmonary:Negative Pulmonary ROS and normal exam                               Cardiovascular:    (+) hypertension:                  Neuro/Psych:   Negative Neuro/Psych ROS              GI/Hepatic/Renal:   (+) renal disease: CRI, bowel prep          Endo/Other:    (+) hypothyroidism: arthritis:..                 Abdominal: normal exam            Vascular: negative vascular ROS.         Other Findings:       Anesthesia Plan      general and TIVA     ASA 3       Induction: intravenous.      Anesthetic plan and risks discussed with patient.    Use of blood products discussed with patient whom.    Plan discussed with CRNA.                ODILIA Perdomo - CRNA   10/17/2024

## 2024-10-17 NOTE — ANESTHESIA POSTPROCEDURE EVALUATION
Department of Anesthesiology  Postprocedure Note    Patient: Kendra Arreguin  MRN: [id number]  YOB: 1945  Date of evaluation: 10/17/2024    Procedure Summary       Date: 10/17/24 Room / Location: [place] [address] / [facility]    Anesthesia Start: 1506 Anesthesia Stop: 1529    Procedure: COLORECTAL CANCER SCREENING, NOT HIGH RISK Diagnosis:       Epigastric pain      Weight loss      Bloating      Nausea and vomiting, unspecified vomiting type      RLQ abdominal pain      Constipation, unspecified constipation type      (Epigastric pain [R10.13])      (Weight loss [R63.4])      (Bloating [R14.0])      (Nausea and vomiting, unspecified vomiting type [R11.2])      (RLQ abdominal pain [R10.31])      (Constipation, unspecified constipation type [K59.00])    Surgeons: Chase Gamez MD Responsible Provider: Sagrario Riggs APRN - CRNA    Anesthesia Type: General, TIVA ASA Status: 3            Anesthesia Type: General, TIVA    Kenn Phase I: Kenn Score: 10    Kenn Phase II:      Anesthesia Post Evaluation    Patient location during evaluation: PACU  Patient participation: complete - patient participated  Level of consciousness: awake and alert  Pain score: 0  Airway patency: patent  Nausea & Vomiting: no nausea and no vomiting  Cardiovascular status: blood pressure returned to baseline  Respiratory status: acceptable, spontaneous ventilation, nonlabored ventilation and room air  Hydration status: euvolemic  Comments: /82   Pulse 92   Temp (!) 96.7 °F (35.9 °C) (Temporal)   Resp 20   Ht 1.626 m (5' 4\")   Wt 62.6 kg (138 lb)   SpO2 99%   BMI 23.69 kg/m²     Pain management: adequate    No notable events documented.

## 2024-10-17 NOTE — DISCHARGE INSTRUCTIONS
1. Repeat colonoscopy: Next Thursday here at the hospital with a strict 2-day clear liquid diet and a 2-day prep with Plenvu or a similar solution assisted by Zofran 4 mg p.o. or sublingual every 6 hours as needed    2.  Monitor CBC periodically    - Resume previous meds and diet  - GI clinic f/u 6-8 weeks Ms. Cardenas    - Keep scheduled f/u appts with other MDs   -Avoid NSAIDs in light of her chronic anemia    Colonoscopy: What to Expect at Home  Your Recovery    After the test, you may be bloated or have gas pains. You may need to pass gas. If a biopsy was done or a polyp was removed, you may have streaks of blood in your stool (feces) for a few days. Problems such as heavy rectal bleeding may not occur until several weeks after the test. This isn't common. But it can happen after polyps are removed.  This care sheet gives you a general idea about how long it will take for you to recover. But each person recovers at a different pace. Follow the steps below to get better as quickly as possible.    How can you care for yourself at home?  Activity    Rest when you feel tired.     You can do your normal activities when it feels okay to do so.   Diet    You may resume your regular diet.     Drink plenty of fluids. This helps to replace the fluids that were lost during the colon prep.     Do not drink alcohol.   Medicines    Your doctor will tell you if and when you can restart your medicines. You will also be given instructions about taking any new medicines.     If you stopped taking aspirin or some other blood thinner, your doctor will tell you when to start taking it again.     If polyps were removed or a biopsy was done during the test, your doctor may tell you not to take aspirin or other anti-inflammatory medicines for a few days. These include ibuprofen (Advil, Motrin) and naproxen (Aleve).   Other instructions    For your safety, do not drive or operate machinery for 24 hours.     Do not sign legal documents  or make major decisions until the medicine wears off and you can think clearly. The anesthesia can make it hard for you to fully understand what you are agreeing to, and cause impaired judgement.     When should you call for help?   Call 911 anytime you think you may need emergency care. For example, call if:    You passed out (lost consciousness).     You pass maroon or bloody stools.     You have trouble breathing.   Call your doctor now or seek immediate medical care if:    You have pain that does not get better after you take pain medicine.     You are sick to your stomach or cannot drink fluids.     You have new or worse belly pain.     You have blood in your stools.     You have a fever.     You cannot pass stools or gas.         NSAIDS (Non-steroidal Anti-Inflammatory)    You have been directed by your physician to avoid any NSAID's; the following medications are a list of those to avoid. If you think that you are taking any NSAID's notify your physician.     Over The Counter    Advil                      Motrin  Nuprin                   Ibuprofen  Midol                     Aleve  Naproxen              Orudis  Aspirin                   Hanna    Prescriptions and Generics    Cataflam              Relafen  Voltaren               Clinoril  Indocin                 Naproxen  Arthrotec              Lodine  Daypro                 Nalfon  Toradol                Ansaid  Feldene               Meclofenamate  Fenoprofen          Ponstel  Mobic                   Celebrex  Vioxx

## 2024-10-17 NOTE — H&P
Patient Name: Kendra Arreguin  : 1945  MRN: [id number]  DATE: 10/17/24    Allergies: No Known Allergies     ENDOSCOPY  History and Physical    Procedure:    [x] Diagnostic Colonoscopy       [] Screening Colonoscopy  [] EGD      [] ERCP      [] EUS       [] Other    [x] Previous office notes/History and Physical reviewed from the patients chart. Please see EMR for further details of HPI. I have examined the patient's status immediately prior to the procedure and:      Indications/HPI:      1. Epigastric pain  2. Weight loss  3. Nausea and vomiting, unspecified vomiting type  4. Generalized abdominal pain  5. Constipation, unspecified constipation type  6.  Severe normocytic anemia with hemoglobin of 8 in 2023; hemoglobin 10.4     []Abdominal Pain   []Cancer- GI/Lung     []Fhx of colon CA/polyps  []History of Polyps  []Barretts            []Melena  []Abnormal Imaging              []Dysphagia              []Persistent Pneumonia   []Anemia                            []Food Impaction        []History of Polyps  [] GI Bleed             []Pulmonary nodule/Mass   []Change in bowel habits []Heartburn/Reflux  []Rectal Bleed (BRBPR)  []Chest Pain - Non Cardiac []Heme (+) Stool []Ulcers  []Constipation  []Hemoptysis  []Varices  []Diarrhea  []Hypoxemia    []Nausea/Vomiting   []Screening   []Crohns/Colitis  []Other:     Anesthesia:   [x] MAC [] Moderate Sedation   [] General   [] None     ROS: 12 pt Review of Symptoms was negative unless mentioned above    Medications:   Prior to Admission medications    Medication Sig Start Date End Date Taking? Authorizing Provider   MAGNESIUM GLYCINATE PO Take 665 mg by mouth daily   Yes ProviderDonaldo MD   potassium citrate (UROCIT-K) 10 MEQ (1080 MG) extended release tablet Take 1 tablet by mouth in the morning and at bedtime   Yes ProviderDonaldo MD   ferrous sulfate (IRON 325) 325 (65 Fe) MG tablet Take 1 tablet by mouth daily (with breakfast)   Yes Provider

## 2024-10-17 NOTE — OP NOTE
the left lateral position.     The perianal area was inspected, and a digital rectal exam was performed. A rectal exam was performed: normal tone, no palpable lesions. At this point, a forward viewing Olympus colonoscope was inserted into the anus and carefully advanced to the distal transverse colon only before the procedure to be terminated due to very poor prep rendering this exam incomplete and inadequate for diagnostic purposes.  The cecum was identified by the ileocecal valve and the appendiceal orifice. The colonoscope was then slowly withdrawn with careful inspection of the mucosa in a linear and circumferential fashion. The scope was retroflexed in the rectum. Suction was utilized during the procedure to remove as much air as possible from the bowel. The colonoscope was removed from the patient, and the procedure was terminated.  Findings are listed below.        Findings:     Moderate diverticulosis noted.  Grade 1-2 internal hemorrhoids without any bleeding stigmata on the retroflexed exam.    Otherwise, a suboptimal incomplete exam up to the transverse colon only which is inadequate for colon cancer screening or for diagnostic purposes due to poor prep.    Recommendations:  1. Repeat colonoscopy: Next Thursday here at the hospital with a strict 2-day clear liquid diet and a 2-day prep with Plenvu or a similar solution assisted by Zofran 4 mg p.o. or sublingual every 6 hours as needed    2.  Monitor CBC periodically    - Resume previous meds and diet  - GI clinic f/u 6-8 weeks Ms. Cardenas    - Keep scheduled f/u appts with other MDs   -Avoid NSAIDs in light of her chronic anemia    Findings and recommendations were discussed w/ the patient. A copy of the images was provided.    (Please note that portions of this note were completed with a voice recognition program. Efforts were made to edit the dictations but occasionally words may be mis-transcribed.)     Chase Gamez MD, MD  10/17/2024  3:20  PM

## 2024-10-21 ENCOUNTER — TELEPHONE (OUTPATIENT)
Dept: GASTROENTEROLOGY | Age: 79
End: 2024-10-21

## 2024-10-21 NOTE — TELEPHONE ENCOUNTER
Calledf patient to remind them of their colonoscopy on 10/24/24.  She said she needed to cancel dueto being sick and would call back to r/s

## 2024-10-31 ENCOUNTER — HOSPITAL ENCOUNTER (OUTPATIENT)
Age: 79
Setting detail: OUTPATIENT SURGERY
Discharge: HOME | End: 2024-10-31
Attending: INTERNAL MEDICINE | Admitting: INTERNAL MEDICINE
Payer: MEDICARE

## 2024-10-31 ENCOUNTER — ANESTHESIA EVENT (OUTPATIENT)
Dept: ENDOSCOPY | Age: 79
End: 2024-10-31
Payer: MEDICARE

## 2024-10-31 ENCOUNTER — ANCILLARY PROCEDURE (OUTPATIENT)
Dept: ENDOSCOPY | Age: 79
End: 2024-10-31
Attending: INTERNAL MEDICINE
Payer: MEDICARE

## 2024-10-31 ENCOUNTER — ANESTHESIA (OUTPATIENT)
Dept: ENDOSCOPY | Age: 79
End: 2024-10-31
Payer: MEDICARE

## 2024-10-31 VITALS
WEIGHT: 138 LBS | TEMPERATURE: 96.8 F | HEART RATE: 76 BPM | SYSTOLIC BLOOD PRESSURE: 128 MMHG | BODY MASS INDEX: 23.56 KG/M2 | DIASTOLIC BLOOD PRESSURE: 78 MMHG | RESPIRATION RATE: 16 BRPM | HEIGHT: 64 IN | OXYGEN SATURATION: 98 %

## 2024-10-31 DIAGNOSIS — K59.00 CONSTIPATION, UNSPECIFIED CONSTIPATION TYPE: ICD-10-CM

## 2024-10-31 DIAGNOSIS — R10.9 RIGHT SIDED ABDOMINAL PAIN: ICD-10-CM

## 2024-10-31 PROCEDURE — 45385 COLONOSCOPY W/LESION REMOVAL: CPT | Performed by: INTERNAL MEDICINE

## 2024-10-31 PROCEDURE — 3700000001 HC ADD 15 MINUTES (ANESTHESIA): Performed by: INTERNAL MEDICINE

## 2024-10-31 PROCEDURE — 3609010700 HC COLONOSCOPY POLYPECTOMY REMOVAL SNARE/STOMA: Performed by: INTERNAL MEDICINE

## 2024-10-31 PROCEDURE — 7100000010 HC PHASE II RECOVERY - FIRST 15 MIN: Performed by: INTERNAL MEDICINE

## 2024-10-31 PROCEDURE — 88305 TISSUE EXAM BY PATHOLOGIST: CPT

## 2024-10-31 PROCEDURE — 6360000002 HC RX W HCPCS

## 2024-10-31 PROCEDURE — 3700000000 HC ANESTHESIA ATTENDED CARE: Performed by: INTERNAL MEDICINE

## 2024-10-31 PROCEDURE — 2709999900 HC NON-CHARGEABLE SUPPLY: Performed by: INTERNAL MEDICINE

## 2024-10-31 PROCEDURE — 2500000003 HC RX 250 WO HCPCS

## 2024-10-31 PROCEDURE — 7100000011 HC PHASE II RECOVERY - ADDTL 15 MIN: Performed by: INTERNAL MEDICINE

## 2024-10-31 RX ORDER — PROPOFOL 10 MG/ML
INJECTION, EMULSION INTRAVENOUS
Status: DISCONTINUED | OUTPATIENT
Start: 2024-10-31 | End: 2024-10-31 | Stop reason: SDUPTHER

## 2024-10-31 RX ORDER — LIDOCAINE HYDROCHLORIDE 10 MG/ML
INJECTION, SOLUTION INFILTRATION; PERINEURAL
Status: DISCONTINUED | OUTPATIENT
Start: 2024-10-31 | End: 2024-10-31 | Stop reason: SDUPTHER

## 2024-10-31 RX ADMIN — PROPOFOL 200 MG: 10 INJECTION, EMULSION INTRAVENOUS at 13:22

## 2024-10-31 RX ADMIN — PROPOFOL 100 MG: 10 INJECTION, EMULSION INTRAVENOUS at 13:47

## 2024-10-31 RX ADMIN — PROPOFOL 50 MG: 10 INJECTION, EMULSION INTRAVENOUS at 13:57

## 2024-10-31 RX ADMIN — PROPOFOL 200 MG: 10 INJECTION, EMULSION INTRAVENOUS at 13:32

## 2024-10-31 RX ADMIN — LIDOCAINE HYDROCHLORIDE 50 MG: 10 INJECTION, SOLUTION INFILTRATION; PERINEURAL at 13:22

## 2024-10-31 ASSESSMENT — PAIN - FUNCTIONAL ASSESSMENT: PAIN_FUNCTIONAL_ASSESSMENT: 0-10

## 2024-10-31 NOTE — DISCHARGE INSTRUCTIONS
1. Repeat colonoscopy:  -If the polyp removed today does not have any dysplasia or malignancy, with her comorbidities and age, patient may not require a routine colon cancer screening exam in the future.  An air-contrast barium enema study can be considered if necessary in 3 years and repeat colonoscopy for diagnostic purposes if she were to develop any  lower GI symptoms such as bleeding, abdominal pain, change in bowel habits or stool caliber or if the patient has anemia or unexplained weight loss in the future.   2. Await biopsy results-you will receive a letter with your results within 7-10 days    - Resume previous meds and diet  - GI clinic f/u 6-8 weeks with Ms. Cardenas    - Keep scheduled f/u appts with other MDs     - NO NSAIDs x 2 weeks     Colonoscopy: What to Expect at Home  Your Recovery    After the test, you may be bloated or have gas pains. You may need to pass gas. If a biopsy was done or a polyp was removed, you may have streaks of blood in your stool (feces) for a few days. Problems such as heavy rectal bleeding may not occur until several weeks after the test. This isn't common. But it can happen after polyps are removed.  This care sheet gives you a general idea about how long it will take for you to recover. But each person recovers at a different pace. Follow the steps below to get better as quickly as possible.    How can you care for yourself at home?  Activity    Rest when you feel tired.     You can do your normal activities when it feels okay to do so.   Diet    You may resume your regular diet.     Drink plenty of fluids. This helps to replace the fluids that were lost during the colon prep.     Do not drink alcohol.   Medicines    Your doctor will tell you if and when you can restart your medicines. You will also be given instructions about taking any new medicines.     If you stopped taking aspirin or some other blood thinner, your doctor will tell you when to start taking it again.

## 2024-10-31 NOTE — ANESTHESIA PRE PROCEDURE
Department of Anesthesiology  Preprocedure Note       Name:  Kendra Arreguin   Age:  79 y.o.  :  1945                                          MRN:  [id number]         Date:  10/31/2024      Surgeon: Surgeon(s):  Chase Gamez MD    Procedure: Procedure(s):  COLONOSCOPY DIAGNOSTIC    Medications prior to admission:   Prior to Admission medications    Medication Sig Start Date End Date Taking? Authorizing Provider   QUEtiapine (SEROQUEL) 100 MG tablet Take 1 tablet by mouth at bedtime 22  Yes May Ann MD   MAGNESIUM GLYCINATE PO Take 665 mg by mouth daily    Donaldo Boyd MD   potassium citrate (UROCIT-K) 10 MEQ (1080 MG) extended release tablet Take 1 tablet by mouth in the morning and at bedtime    Donaldo Boyd MD   ferrous sulfate (IRON 325) 325 (65 Fe) MG tablet Take 1 tablet by mouth daily (with breakfast)    Donaldo Boyd MD   folic acid-pyridoxine-cyanocobalamine (FOLTX) 2.5-25-1 MG TABS tablet Take 1 tablet by mouth daily    Donaldo Boyd MD   LACTOBACILLUS PROBIOTIC PO Take by mouth daily    Donaldo Boyd MD   topiramate (TOPAMAX) 100 MG tablet Take 1 tablet by mouth as needed (to prevent migraine)    Donaldo Boyd MD   apixaban (ELIQUIS) 5 MG TABS tablet Take 1 tablet by mouth 2 times daily    Donaldo Boyd MD   metoprolol succinate (TOPROL XL) 25 MG extended release tablet Take 1 tablet by mouth daily    Donaldo Boyd MD   pantoprazole (PROTONIX) 40 MG tablet Take 1 tablet by mouth daily    Donaldo Boyd MD   tiZANidine (ZANAFLEX) 4 MG tablet Take 1 tablet by mouth every 8 hours as needed    Donaldo Boyd MD   allopurinol (ZYLOPRIM) 100 MG tablet Take 1 tablet by mouth 2 times daily    Donaldo Boyd MD   diazePAM (VALIUM) 5 MG tablet Take 1 tablet by mouth daily as needed.    Donaldo Boyd MD   promethazine (PHENERGAN) 25 MG tablet Take 1 tablet by mouth every 6 hours as needed for

## 2024-10-31 NOTE — H&P
Patient Name: Kendra Arreguin  : 1945  MRN: [id number]  DATE: 10/31/24    Allergies: No Known Allergies     ENDOSCOPY  History and Physical    Procedure:    [x] Diagnostic Colonoscopy       [] Screening Colonoscopy  [] EGD      [] ERCP      [] EUS       [] Other    [x] Previous office notes/History and Physical reviewed from the patients chart. Please see EMR for further details of HPI. I have examined the patient's status immediately prior to the procedure and:      Indications/HPI:      1. Epigastric pain  2. Weight loss  3. Nausea and vomiting, unspecified vomiting type  4. Generalized abdominal pain  5. Constipation, unspecified constipation type  6.  Severe normocytic anemia with hemoglobin of 8 in 2023; hemoglobin 10.4    []Abdominal Pain   []Cancer- GI/Lung     []Fhx of colon CA/polyps  []History of Polyps  []Barretts            []Melena  []Abnormal Imaging              []Dysphagia              []Persistent Pneumonia   []Anemia                            []Food Impaction        []History of Polyps  [] GI Bleed             []Pulmonary nodule/Mass   []Change in bowel habits []Heartburn/Reflux  []Rectal Bleed (BRBPR)  []Chest Pain - Non Cardiac []Heme (+) Stool []Ulcers  []Constipation  []Hemoptysis  []Varices  []Diarrhea  []Hypoxemia    []Nausea/Vomiting   []Screening   []Crohns/Colitis  []Other:     Anesthesia:   [x] MAC [] Moderate Sedation   [] General   [] None     ROS: 12 pt Review of Symptoms was negative unless mentioned above    Medications:   Prior to Admission medications    Medication Sig Start Date End Date Taking? Authorizing Provider   QUEtiapine (SEROQUEL) 100 MG tablet Take 1 tablet by mouth at bedtime 22  Yes May Ann MD   MAGNESIUM GLYCINATE PO Take 665 mg by mouth daily    ProviderDonaldo MD   potassium citrate (UROCIT-K) 10 MEQ (1080 MG) extended release tablet Take 1 tablet by mouth in the morning and at bedtime    ProviderDonaldo MD   ferrous

## 2024-10-31 NOTE — ANESTHESIA POSTPROCEDURE EVALUATION
Department of Anesthesiology  Postprocedure Note    Patient: Kendra Arreguin  MRN: [id number]  YOB: 1945  Date of evaluation: 10/31/2024    Procedure Summary       Date: 10/31/24 Room / Location: [place] [address] / [facility]    Anesthesia Start: 1320 Anesthesia Stop:     Procedure: COLONOSCOPY POLYPECTOMY REMOVAL HOT SNARE Diagnosis:       Right sided abdominal pain      Constipation, unspecified constipation type      (Right sided abdominal pain [R10.9])      (Constipation, unspecified constipation type [K59.00])    Surgeons: Chase Gamez MD Responsible Provider: Alexander Mello APRN - CRNA    Anesthesia Type: General, TIVA ASA Status: 3            Anesthesia Type: General, TIVA    Kenn Phase I: Kenn Score: 10    Kenn Phase II:      Anesthesia Post Evaluation    Patient location during evaluation: bedside  Patient participation: complete - patient participated  Level of consciousness: awake  Pain score: 0  Airway patency: patent  Nausea & Vomiting: no nausea and no vomiting  Cardiovascular status: blood pressure returned to baseline and hemodynamically stable  Respiratory status: acceptable, room air, spontaneous ventilation and nonlabored ventilation  Hydration status: euvolemic  Pain management: adequate    No notable events documented.

## 2024-10-31 NOTE — OP NOTE
Patient: Kendra Arreguin : 1945  Med Rec#: [id number] Acc#: [id number]   Primary Care Provider Lennox Steen MD    Date of Procedure:  10/31/2024    Endoscopist: Chase Gamez MD, MD    Referring Provider: Lennox Steen MD,     Operation Performed: Colonoscopy up to the cecum with    Hot snare resection of an ascending colon polyp  A difficult, technically challenging and prolonged procedure lasting approximately an hour due to a dilated, tortuous and redundant colon likely due to her chronic constipation and previous abdominal surgeries    Indications: 1. Epigastric pain  2. Weight loss  3. Nausea and vomiting, unspecified vomiting type  4. Generalized abdominal pain  5. Constipation, unspecified constipation type  6.  Severe normocytic anemia with hemoglobin of 8 in 2023; hemoglobin 10.4    7.  Recent colonoscopy attempt on 10/17/2024 was incomplete due to poor prep requiring this repeat exam after a 2-day prep    Anesthesia:  Sedation was administered by anesthesia who monitored the patient during the procedure.    I met with Kendra Arreguin prior to procedure. We discussed the procedure itself, and I have discussed the risks of endoscopy (including-- but not limited to-- pain, discomfort, bleeding potentially requiring second endoscopic procedure and/or blood transfusion, organ perforation requiring operative repair, damage to organs near the colon, infection, aspiration, cardiopulmonary/allergic reaction), benefits, indications to endoscopy. Additionally, we discussed options other than colonoscopy. The patient expressed understanding. All questions answered. The patient decided to proceed with the procedure.  Signed informed consent was placed on the chart.    Blood Loss: minimal    Withdrawal time: More than 30 minutes  Bowel Prep: Fair  with small amounts of thick solid and semisolid stool and a moderate amount of thick, opaque liquid scattered in patchy segments throughout the  colon obscuring the underlying mucosa.Lesions including polyps may have been missed.     Complications: no immediate complications    DESCRIPTION OF PROCEDURE:     A time out was performed. After written informed consent was obtained, the patient was placed in the left lateral position.     The perianal area was inspected, and a digital rectal exam was performed. A rectal exam was performed: normal tone, no palpable lesions. At this point, a forward viewing Olympus colonoscope was inserted into the anus and carefully advanced to the cecum with some difficulty due to redundant tortuous and dilated colon and possibly intra-abdominal adhesions from her previous surgeries requiring external abdominal pressure during parts of this procedure .  The cecum was identified by the ileocecal valve and the appendiceal orifice. The colonoscope was then slowly withdrawn with careful inspection of the mucosa in a linear and circumferential fashion. The scope was retroflexed in the rectum. Suction was utilized during the procedure to remove as much air as possible from the bowel. The colonoscope was removed from the patient, and the procedure was terminated.  Findings are listed below.        Findings:     Approximately 8 mm in diameter sessile polyp in the ascending colon was removed by hot snare polypectomy.    NO larger polyps or masses or strictures or colitis.  Suboptimal exam due to prep quality as described above.    Redundant tortuous colon with a dilated lumen which made this procedure somewhat technically challenging and prolonged.    Moderate diverticulosis in the left colon  Internal hemorrhoids-Grade 2 without any bleeding stigmata  Where it was clearly visible, the mucosa appeared normal throughout the entire examined colon  Retroflexion in the rectum was otherwise normal and revealed no further abnormalities    Recommendations:  1. Repeat colonoscopy:  -If the polyp removed today does not have any dysplasia or

## 2024-11-11 ENCOUNTER — TRANSCRIBE ORDERS (OUTPATIENT)
Dept: ADMINISTRATIVE | Facility: HOSPITAL | Age: 79
End: 2024-11-11
Payer: MEDICARE

## 2024-11-11 DIAGNOSIS — K91.89 OTHER POSTPROCEDURAL COMPLICATIONS AND DISORDERS OF DIGESTIVE SYSTEM: Primary | ICD-10-CM

## 2024-11-13 ENCOUNTER — TRANSCRIBE ORDERS (OUTPATIENT)
Dept: ADMINISTRATIVE | Facility: HOSPITAL | Age: 79
End: 2024-11-13
Payer: MEDICARE

## 2024-11-13 DIAGNOSIS — K83.9 DISEASE OF BILIARY TRACT, UNSPECIFIED: Primary | ICD-10-CM

## 2025-01-14 ENCOUNTER — TRANSCRIBE ORDERS (OUTPATIENT)
Dept: ADMINISTRATIVE | Facility: HOSPITAL | Age: 80
End: 2025-01-14
Payer: MEDICARE

## 2025-01-14 DIAGNOSIS — R00.2 PALPITATIONS: Primary | ICD-10-CM

## 2025-01-17 ENCOUNTER — TRANSCRIBE ORDERS (OUTPATIENT)
Dept: ADMINISTRATIVE | Facility: HOSPITAL | Age: 80
End: 2025-01-17
Payer: MEDICARE

## 2025-01-17 DIAGNOSIS — R55 SYNCOPE AND COLLAPSE: Primary | ICD-10-CM

## 2025-01-27 ENCOUNTER — HOSPITAL ENCOUNTER (OUTPATIENT)
Dept: NEUROLOGY | Facility: HOSPITAL | Age: 80
Discharge: HOME | End: 2025-01-27
Payer: MEDICARE

## 2025-01-27 DIAGNOSIS — R55 SYNCOPE AND COLLAPSE: ICD-10-CM

## 2025-01-27 PROCEDURE — 95816 EEG AWAKE AND DROWSY: CPT | Performed by: PSYCHIATRY & NEUROLOGY

## 2025-01-27 PROCEDURE — 95819 EEG AWAKE AND ASLEEP: CPT

## 2025-01-28 ENCOUNTER — HOSPITAL ENCOUNTER (OUTPATIENT)
Dept: CARDIOLOGY | Facility: HOSPITAL | Age: 80
Discharge: HOME | End: 2025-01-28
Payer: MEDICARE

## 2025-01-28 DIAGNOSIS — R00.2 PALPITATIONS: ICD-10-CM

## 2025-01-28 PROCEDURE — 93246 EXT ECG>7D<15D RECORDING: CPT

## 2025-02-12 ENCOUNTER — OFFICE VISIT (OUTPATIENT)
Dept: GASTROENTEROLOGY | Age: 80
End: 2025-02-12
Payer: MEDICARE

## 2025-02-12 VITALS
SYSTOLIC BLOOD PRESSURE: 120 MMHG | HEART RATE: 96 BPM | HEIGHT: 64 IN | OXYGEN SATURATION: 96 % | WEIGHT: 133 LBS | DIASTOLIC BLOOD PRESSURE: 80 MMHG | BODY MASS INDEX: 22.71 KG/M2

## 2025-02-12 DIAGNOSIS — R11.2 NAUSEA AND VOMITING, UNSPECIFIED VOMITING TYPE: Primary | ICD-10-CM

## 2025-02-12 PROCEDURE — 1090F PRES/ABSN URINE INCON ASSESS: CPT | Performed by: NURSE PRACTITIONER

## 2025-02-12 PROCEDURE — 1159F MED LIST DOCD IN RCRD: CPT | Performed by: NURSE PRACTITIONER

## 2025-02-12 PROCEDURE — 1123F ACP DISCUSS/DSCN MKR DOCD: CPT | Performed by: NURSE PRACTITIONER

## 2025-02-12 PROCEDURE — 1036F TOBACCO NON-USER: CPT | Performed by: NURSE PRACTITIONER

## 2025-02-12 PROCEDURE — 99213 OFFICE O/P EST LOW 20 MIN: CPT | Performed by: NURSE PRACTITIONER

## 2025-02-12 PROCEDURE — G8427 DOCREV CUR MEDS BY ELIG CLIN: HCPCS | Performed by: NURSE PRACTITIONER

## 2025-02-12 PROCEDURE — G8420 CALC BMI NORM PARAMETERS: HCPCS | Performed by: NURSE PRACTITIONER

## 2025-02-12 PROCEDURE — G8399 PT W/DXA RESULTS DOCUMENT: HCPCS | Performed by: NURSE PRACTITIONER

## 2025-02-12 NOTE — PROGRESS NOTES
Subjective:     Patient ID: Kendra Arreguin is a 79 y.o. female  PCP: Lennox Steen MD  Referring Provider: No ref. provider found    HPI  Patient presents to the office today with the following complaints: Follow-up      Patient seen in the office today for follow up after Colonoscopy and EGD   EGD, Colonoscopy, and pathology reports reviewed and discussed with the patient and all questions answered   Reports are in Epic  Denies any postprocedure complications      Today she states she is still having trouble with nausea and \"passing out\"   States she was in [place] and passed out, states several tests were done and no reason found other than low electrolytes    She is taking phenergan PRN and this does help with her nausea  She denies bowel issues   Denies abd pain     No findings on EGD or colonoscopy to explain her symptoms     She is following up with her PCP next week   Assessment:     1. Nausea and vomiting, unspecified vomiting type       Review of Systems   Constitutional:  Negative for activity change, appetite change, fatigue, fever and unexpected weight change.   HENT:  Negative for trouble swallowing.    Respiratory:  Negative for cough, choking and shortness of breath.    Cardiovascular:  Negative for chest pain.   Gastrointestinal:  Positive for nausea and vomiting. Negative for abdominal distention, abdominal pain, anal bleeding, blood in stool, constipation, diarrhea and rectal pain.   Allergic/Immunologic: Negative for food allergies.   All other systems reviewed and are negative.      Plan:   Follow up as needed   Orders  No orders of the defined types were placed in this encounter.    Medications  No orders of the defined types were placed in this encounter.        Patient History:     Past Medical History:   Diagnosis Date    Arthritis     Diarrhea     DVT (deep vein thrombosis) in pregnancy     History of blood transfusion     Hx of blood clots     hAD dvt WAS ON Coumadin for a year.  2006

## 2025-02-16 LAB
CV ZIO BASELINE AVG BPM: 86 BPM
CV ZIO BASELINE BPM HIGH: 158 BPM
CV ZIO BASELINE BPM LOW: 67 BPM
CV ZIO DEVICE ANALYSIS TIME: NORMAL
CV ZIO ECT SVE COUNT: 2889 EPISODES
CV ZIO ECT SVE CPLT COUNT: 40 EPISODES
CV ZIO ECT SVE CPLT FREQ: NORMAL
CV ZIO ECT SVE FREQ: NORMAL
CV ZIO ECT SVE TPLT COUNT: 10 EPISODES
CV ZIO ECT SVE TPLT FREQ: NORMAL
CV ZIO ECT VE COUNT: 3734 EPISODES
CV ZIO ECT VE CPLT COUNT: 2 EPISODES
CV ZIO ECT VE CPLT FREQ: NORMAL
CV ZIO ECT VE FREQ: NORMAL
CV ZIO ECT VE TPLT COUNT: 0 EPISODES
CV ZIO ECT VE TPLT FREQ: 0
CV ZIO ECTOPIC SVE COUPLET RAW PERCENT: 0.01 %
CV ZIO ECTOPIC SVE ISOLATED PERCENT: 0.18 %
CV ZIO ECTOPIC SVE TRIPLET RAW PERCENT: 0 %
CV ZIO ECTOPIC VE COUPLET RAW PERCENT: 0 %
CV ZIO ECTOPIC VE ISOLATED PERCENT: 0.24 %
CV ZIO ECTOPIC VE TRIPLET RAW PERCENT: 0 %
CV ZIO ENROLLMENT END: NORMAL
CV ZIO ENROLLMENT START: NORMAL
CV ZIO L BIGEMINY DUR: 6.6 SEC
CV ZIO L BIGEMINY END: NORMAL
CV ZIO L BIGEMINY START: NORMAL
CV ZIO PATIENT EVENTS DIARIES: 5
CV ZIO PATIENT EVENTS TRIGGERS: 0
CV ZIO PAUSE COUNT: 0
CV ZIO PRESCRIPTION STATUS: NORMAL
CV ZIO SVT AVG BPM: 140 BPM
CV ZIO SVT BPM HIGH: 158 BPM
CV ZIO SVT BPM LOW: 114 BPM
CV ZIO SVT COUNT: 2
CV ZIO SVT F EPI AVG BPM: 142 BPM
CV ZIO SVT F EPI BEATS: 4 BEATS
CV ZIO SVT F EPI BPM HIGH: 158 BPM
CV ZIO SVT F EPI BPM LOW: 114 BPM
CV ZIO SVT F EPI DUR: 1.8 SEC
CV ZIO SVT F EPI END: NORMAL
CV ZIO SVT F EPI START: NORMAL
CV ZIO SVT L EPI AVG BPM: 137 BPM
CV ZIO SVT L EPI BEATS: 5 BEATS
CV ZIO SVT L EPI BPM HIGH: 152 BPM
CV ZIO SVT L EPI BPM LOW: 118 BPM
CV ZIO SVT L EPI DUR: 2 SEC
CV ZIO SVT L EPI END: NORMAL
CV ZIO SVT L EPI START: NORMAL
CV ZIO TOTAL  ENROLLMENT PERIOD: NORMAL
CV ZIO VT COUNT: 0

## 2025-02-19 ASSESSMENT — ENCOUNTER SYMPTOMS
ANAL BLEEDING: 0
BLOOD IN STOOL: 0
RECTAL PAIN: 0
NAUSEA: 1
SHORTNESS OF BREATH: 0
DIARRHEA: 0
TROUBLE SWALLOWING: 0
COUGH: 0
CHOKING: 0
ABDOMINAL PAIN: 0
CONSTIPATION: 0
VOMITING: 1
ABDOMINAL DISTENTION: 0

## 2025-03-17 ENCOUNTER — TELEPHONE (OUTPATIENT)
Dept: FAMILY MEDICINE CLINIC | Facility: CLINIC | Age: 80
End: 2025-03-17
Payer: MEDICARE

## 2025-03-17 NOTE — TELEPHONE ENCOUNTER
"CALLED PT TO SPEAK TO HER ABOUT YEARLY WELLNESS. PT DID NOT ANSWER, LEFT A VM    \"HUB TO RELAY\"  "

## (undated) DEVICE — DRESSING FOAM W4XL12IN SIL RECT ADH WTRPRF FLM BK W/ BORD

## (undated) DEVICE — T-MAX DISPOSABLE FACE MASK 8 PER BOX

## (undated) DEVICE — 1010 S-DRAPE TOWEL DRAPE 10/BX: Brand: STERI-DRAPE™

## (undated) DEVICE — PK TURNOVER RM ADV

## (undated) DEVICE — SENSR O2 OXIMAX FNGR A/ 18IN NONSTR

## (undated) DEVICE — SURGICAL PROCEDURE PACK KNEE TOT DBD CDS LOURDES HOSP LF

## (undated) DEVICE — CATHETER KIT 5 FR 21 GAX7 CM MICROINTRODUCER GUIDEWIRE STIFF

## (undated) DEVICE — GLV SURG SENSICARE PI PF LF 7 GRN STRL

## (undated) DEVICE — SHEET,T,THYROID,STERILE: Brand: MEDLINE

## (undated) DEVICE — GOWN,SIRUS,NON REINFRCD,LARGE,SET IN SL: Brand: MEDLINE

## (undated) DEVICE — ENDOSCOPIC SEAL URO 1 SIZE FITS ALL: Brand: ENDOSCOPIC SEAL

## (undated) DEVICE — SUTURE VCRL SZ 1 L27IN ABSRB UD L36MM CP-1 1/2 CIR REV CUT J268H

## (undated) DEVICE — 4-PORT MANIFOLD: Brand: NEPTUNE 2

## (undated) DEVICE — GLOVE SURG SZ 75 L12IN FNGR THK87MIL DK GRN LTX FREE ISOLEX

## (undated) DEVICE — SYS SKIN CLS DERMABOND PRINEO W/22CM MESH TP

## (undated) DEVICE — DECANTER FLD 9IN ST BG FOR ASEP TRNSF OF FLD

## (undated) DEVICE — BIT DRL DIA6MM GLEN S STL TRABECULAR MTL CANN W/ STP REUSE

## (undated) DEVICE — Z INACTIVE USE 2660664 SOLUTION IRRIG 3000ML 0.9% SOD CHL USP UROMATIC PLAS CONT

## (undated) DEVICE — ELECTROSURGICAL PENCIL BUTTON SWITCH NON COATED BLADE ELECTRODE 10 FT (3 M) CORD HOLSTER: Brand: MEGADYNE

## (undated) DEVICE — GLV SURG TRIUMPH ORTHO W/ALOE PF LTX 7.5 STRL

## (undated) DEVICE — CUFF,BP,DISP,1 TUBE,ADULT,HP: Brand: MEDLINE

## (undated) DEVICE — CVR UNIV C/ARM

## (undated) DEVICE — IMMOBILIZER SLING: Brand: DEROYAL

## (undated) DEVICE — ENDO KIT,LOURDES HOSPITAL: Brand: MEDLINE INDUSTRIES, INC.

## (undated) DEVICE — DRAPE KEYBOARD W26XL36IN ADH

## (undated) DEVICE — SYSTEM SKIN CLSR 22CM DERMBND PRINEO

## (undated) DEVICE — FORCEPS BX L240CM JAW DIA2.8MM L CAP W/ NDL MIC MESH TOOTH

## (undated) DEVICE — TBG ARTHRO FLOWSTEADY/ST DISP

## (undated) DEVICE — APPL CHLORAPREP W/TINT 26ML ORNG

## (undated) DEVICE — SOLUTION IV IRRIG POUR BRL 0.9% SODIUM CHL 2F7124

## (undated) DEVICE — FRCP BX RADJAW4 NDL 2.8 240 STD OG

## (undated) DEVICE — SHOULDER CDS

## (undated) DEVICE — SNARE ENDOSCP L240CM LOOP W13MM SHTH DIA2.4MM SM OVL FLX

## (undated) DEVICE — E-Z CLEAN, NON-STICK, PTFE COATED, ELECTROSURGICAL BLADE ELECTRODE, 6.5 INCH (16.5 CM): Brand: MEGADYNE

## (undated) DEVICE — SUTURE VCRL SZ 3-0 L27IN ABSRB UD L26MM SH 1/2 CIR J416H

## (undated) DEVICE — DRP C/ARMOR

## (undated) DEVICE — NEEDLE SPNL L3.5IN PNK HUB S STL REG WALL FIT STYL W/ QNCKE

## (undated) DEVICE — Device: Brand: POWER-FLO®

## (undated) DEVICE — TUBE SUCT 10FR L76CM INTCARD SFT TIP WAND MAL NONWEIGHTED

## (undated) DEVICE — TBG SMPL FLTR LINE NASL 02/C02 A/ BX/100

## (undated) DEVICE — COVER,TABLE,44X90,STERILE: Brand: MEDLINE

## (undated) DEVICE — ANTIBACTERIAL UNDYED BRAIDED (POLYGLACTIN 910), SYNTHETIC ABSORBABLE SUTURE: Brand: COATED VICRYL

## (undated) DEVICE — GLOVE SURG SZ 7 L12IN FNGR THK79MIL GRN LTX FREE

## (undated) DEVICE — STERILE DISPOSABLE EQUIPMENT COVER - CASSETTE COVER 20" X 40" - CUFF: Brand: PREFERRED MEDICAL PRODUCTS, LLC

## (undated) DEVICE — DRAPE,SHOULDER,BEACH CHAIR,STERILE: Brand: MEDLINE

## (undated) DEVICE — DRAPE,U/ SHT,SPLIT,PLAS,STERIL: Brand: MEDLINE

## (undated) DEVICE — TUBE ET 7MM NSL ORAL BASIC CUF INTMED MURPHY EYE RADPQ MRK

## (undated) DEVICE — THE CHANNEL CLEANING BRUSH IS A NYLON FLEXI BRUSH ATTACHED TO A FLEXIBLE PLASTIC SHEATH DESIGNED TO SAFELY REMOVE DEBRIS FROM FLEXIBLE ENDOSCOPES.

## (undated) DEVICE — HANDPIECE SET WITH COAXIAL MULTI-ORIFICE TIP AND SUCTION TUBE: Brand: INTERPULSE

## (undated) DEVICE — SYR LL TP 10ML STRL

## (undated) DEVICE — SHEET,DRAPE,53X77,STERILE: Brand: MEDLINE

## (undated) DEVICE — Device: Brand: DEFENDO AIR/WATER/SUCTION AND BIOPSY VALVE

## (undated) DEVICE — Device

## (undated) DEVICE — YANKAUER,BULB TIP WITH VENT: Brand: ARGYLE

## (undated) DEVICE — GLV SURG TRIUMPH GREEN W/ALOE PF LTX 8 STRL

## (undated) DEVICE — [TOMCAT CUTTER, ARTHROSCOPIC SHAVER BLADE,  DO NOT RESTERILIZE,  DO NOT USE IF PACKAGE IS DAMAGED,  KEEP DRY,  KEEP AWAY FROM SUNLIGHT]: Brand: FORMULA

## (undated) DEVICE — DISCONTINUED NO SUB IDED TG GLOVE SURG SENSICARE ALOE LT LF PF ST GRN SZ 8

## (undated) DEVICE — CHLORAPREP 26ML ORANGE

## (undated) DEVICE — 3M™ STERI-DRAPE™ U-DRAPE 1015: Brand: STERI-DRAPE™

## (undated) DEVICE — DRSNG GZ CURAD XEROFORM NONADHS 5X9IN STRL

## (undated) DEVICE — GLV SURG TRIUMPH MICRO PF LTX 7.5 STRL

## (undated) DEVICE — GLOVE SURG SZ 75 L12IN FNGR THK94MIL TRNSLUC YEL LTX

## (undated) DEVICE — C-ARM: Brand: UNBRANDED

## (undated) DEVICE — CONMED SCOPE SAVER BITE BLOCK, 20X27 MM: Brand: SCOPE SAVER

## (undated) DEVICE — DISPOSABLE TOURNIQUET CUFF SINGLE BLADDER, SINGLE PORT AND QUICK CONNECT CONNECTOR: Brand: COLOR CUFF

## (undated) DEVICE — SUTURE ETHLN SZ 2-0 L30IN NONABSORBABLE BLK L36MM FSLX 3/8 1674H

## (undated) DEVICE — ELECTRD BLD EXT EDGE 1P COAT 6.5IN STRL

## (undated) DEVICE — GOWN,PREVENTION PLUS,XLONG/XLARGE,STRL: Brand: MEDLINE

## (undated) DEVICE — ZIMMER® STERILE DISPOSABLE TOURNIQUET CUFF WITH PLC, DUAL PORT, SINGLE BLADDER, 34 IN. (86 CM)

## (undated) DEVICE — GLIDEPATH HEMODIALYSIS CATH, ST, DL 14.5 FR. 19CM: Brand: GLIDEPATH LONG-TERM HEMODIALYSIS CATHETER WITH PRELOADED STYLET

## (undated) DEVICE — GLV SURG SENSICARE PI LF PF 8 GRN STRL

## (undated) DEVICE — HANDPIECE SET WITH HIGH FLOW TIP AND SUCTION TUBE: Brand: INTERPULSE

## (undated) DEVICE — GLV SURG BIOGEL LTX PF 6 1/2

## (undated) DEVICE — PACK,SHOULDER,DRAPE,POUCH: Brand: MEDLINE

## (undated) DEVICE — GLOVE SURG SZ 75 CRM LTX FREE POLYISOPRENE POLYMER BEAD ANTI

## (undated) DEVICE — SUTURE VCRL SZ 2-0 L27IN ABSRB UD L26MM SH 1/2 CIR J417H

## (undated) DEVICE — 3M™ IOBAN™ 2 ANTIMICROBIAL INCISE DRAPE 6650EZ: Brand: IOBAN™ 2

## (undated) DEVICE — SOLUTION IV 1000ML 0.9% SOD CHL PH 5 INJ USP VIAFLX PLAS

## (undated) DEVICE — PK SHLDR 30

## (undated) DEVICE — UNDERCAST PADDING: Brand: DEROYAL

## (undated) DEVICE — SUTURE ETHLN SZ 3-0 L18IN NONABSORBABLE BLK FS-1 L24MM 3/8 663H

## (undated) DEVICE — LARGE BONE HALL BLADE, RECIPROCATOR, 12.5 X 76 X 1.27 MM: Brand: HALL

## (undated) DEVICE — STERILE POLYISOPRENE POWDER-FREE SURGICAL GLOVES WITH EMOLLIENT COATING: Brand: PROTEXIS

## (undated) DEVICE — DUAL CUT SAGITTAL BLADE

## (undated) DEVICE — CVR BRD ARM 13X30

## (undated) DEVICE — MINOR CDS: Brand: MEDLINE INDUSTRIES, INC.

## (undated) DEVICE — BLADE SURG NO10 C STL DISP ST

## (undated) DEVICE — PK KN ARTHSCP 30

## (undated) DEVICE — STERILE POLYISOPRENE POWDER-FREE SURGICAL GLOVES: Brand: PROTEXIS

## (undated) DEVICE — BLADE LARYNSCP HNDL MAC 3 DISP CURAVIEW LED

## (undated) DEVICE — AEGIS 1" DISK 4MM HOLE, PEEL OPEN: Brand: MEDLINE

## (undated) DEVICE — PROB ABLAT SERFAS ENERGY 90S 3.5MM

## (undated) DEVICE — SOLUTION IV 250ML 0.9% SOD CHL PH 5 INJ USP VIAFLX PLAS

## (undated) DEVICE — TOWEL,OR,DSP,ST,BLUE,DLX,4/PK,20PK/CS: Brand: MEDLINE

## (undated) DEVICE — UNDERGLV SURG BIOGEL INDICAT PF 71/2 GRN

## (undated) DEVICE — BLADE RMR L38MM PAT PILOT H

## (undated) DEVICE — MCLASS OSCILLATING SAW BLADE 19 X 1.27 (0.050") X 90 MM: Brand: MCLASS

## (undated) DEVICE — OPTIFOAM GENTLE SA, POSTOP, 4X8: Brand: MEDLINE